# Patient Record
Sex: FEMALE | Race: WHITE | NOT HISPANIC OR LATINO | URBAN - METROPOLITAN AREA
[De-identification: names, ages, dates, MRNs, and addresses within clinical notes are randomized per-mention and may not be internally consistent; named-entity substitution may affect disease eponyms.]

---

## 2018-07-24 ENCOUNTER — OUTPATIENT (OUTPATIENT)
Dept: OUTPATIENT SERVICES | Facility: HOSPITAL | Age: 68
LOS: 1 days | End: 2018-07-24
Payer: MEDICARE

## 2018-07-24 PROCEDURE — 71046 X-RAY EXAM CHEST 2 VIEWS: CPT | Mod: 26

## 2018-07-24 PROCEDURE — 71046 X-RAY EXAM CHEST 2 VIEWS: CPT

## 2018-08-10 ENCOUNTER — INPATIENT (INPATIENT)
Facility: HOSPITAL | Age: 68
LOS: 0 days | Discharge: HOME | DRG: 885 | End: 2018-08-11
Attending: STUDENT IN AN ORGANIZED HEALTH CARE EDUCATION/TRAINING PROGRAM | Admitting: STUDENT IN AN ORGANIZED HEALTH CARE EDUCATION/TRAINING PROGRAM
Payer: MEDICARE

## 2018-08-10 VITALS
SYSTOLIC BLOOD PRESSURE: 142 MMHG | RESPIRATION RATE: 18 BRPM | TEMPERATURE: 99 F | HEART RATE: 98 BPM | OXYGEN SATURATION: 98 % | DIASTOLIC BLOOD PRESSURE: 86 MMHG

## 2018-08-10 DIAGNOSIS — F33.2 MAJOR DEPRESSIVE DISORDER, RECURRENT SEVERE WITHOUT PSYCHOTIC FEATURES: ICD-10-CM

## 2018-08-10 LAB
ALBUMIN SERPL ELPH-MCNC: 4.1 G/DL — SIGNIFICANT CHANGE UP (ref 3.3–5)
ALP SERPL-CCNC: 76 U/L — SIGNIFICANT CHANGE UP (ref 40–120)
ALT FLD-CCNC: 9 U/L — LOW (ref 10–45)
ANION GAP SERPL CALC-SCNC: 16 MMOL/L — SIGNIFICANT CHANGE UP (ref 5–17)
APAP SERPL-MCNC: <15 UG/ML — SIGNIFICANT CHANGE UP (ref 10–30)
APPEARANCE UR: ABNORMAL
AST SERPL-CCNC: 15 U/L — SIGNIFICANT CHANGE UP (ref 10–40)
BASOPHILS NFR BLD AUTO: 0.2 % — SIGNIFICANT CHANGE UP (ref 0–2)
BILIRUB SERPL-MCNC: 0.3 MG/DL — SIGNIFICANT CHANGE UP (ref 0.2–1.2)
BILIRUB UR-MCNC: NEGATIVE — SIGNIFICANT CHANGE UP
BUN SERPL-MCNC: 24 MG/DL — HIGH (ref 7–23)
CALCIUM SERPL-MCNC: 9.6 MG/DL — SIGNIFICANT CHANGE UP (ref 8.4–10.5)
CHLORIDE SERPL-SCNC: 98 MMOL/L — SIGNIFICANT CHANGE UP (ref 96–108)
CO2 SERPL-SCNC: 26 MMOL/L — SIGNIFICANT CHANGE UP (ref 22–31)
COLOR SPEC: YELLOW — SIGNIFICANT CHANGE UP
CREAT SERPL-MCNC: 1.45 MG/DL — HIGH (ref 0.5–1.3)
DIFF PNL FLD: ABNORMAL
EOSINOPHIL NFR BLD AUTO: 2.5 % — SIGNIFICANT CHANGE UP (ref 0–6)
ETHANOL SERPL-MCNC: <10 MG/DL — SIGNIFICANT CHANGE UP (ref 0–10)
GLUCOSE SERPL-MCNC: 103 MG/DL — HIGH (ref 70–99)
GLUCOSE UR QL: NEGATIVE — SIGNIFICANT CHANGE UP
HCT VFR BLD CALC: 38.3 % — SIGNIFICANT CHANGE UP (ref 34.5–45)
HGB BLD-MCNC: 12.8 G/DL — SIGNIFICANT CHANGE UP (ref 11.5–15.5)
KETONES UR-MCNC: NEGATIVE — SIGNIFICANT CHANGE UP
LEUKOCYTE ESTERASE UR-ACNC: ABNORMAL
LYMPHOCYTES # BLD AUTO: 17.5 % — SIGNIFICANT CHANGE UP (ref 13–44)
MCHC RBC-ENTMCNC: 29 PG — SIGNIFICANT CHANGE UP (ref 27–34)
MCHC RBC-ENTMCNC: 33.4 G/DL — SIGNIFICANT CHANGE UP (ref 32–36)
MCV RBC AUTO: 86.7 FL — SIGNIFICANT CHANGE UP (ref 80–100)
MONOCYTES NFR BLD AUTO: 7 % — SIGNIFICANT CHANGE UP (ref 2–14)
NEUTROPHILS NFR BLD AUTO: 72.8 % — SIGNIFICANT CHANGE UP (ref 43–77)
NITRITE UR-MCNC: NEGATIVE — SIGNIFICANT CHANGE UP
PCP SPEC-MCNC: SIGNIFICANT CHANGE UP
PH UR: 6 — SIGNIFICANT CHANGE UP (ref 5–8)
PLATELET # BLD AUTO: 289 K/UL — SIGNIFICANT CHANGE UP (ref 150–400)
POTASSIUM SERPL-MCNC: 4.1 MMOL/L — SIGNIFICANT CHANGE UP (ref 3.5–5.3)
POTASSIUM SERPL-SCNC: 4.1 MMOL/L — SIGNIFICANT CHANGE UP (ref 3.5–5.3)
PROT SERPL-MCNC: 7.4 G/DL — SIGNIFICANT CHANGE UP (ref 6–8.3)
PROT UR-MCNC: >=300 MG/DL
RBC # BLD: 4.42 M/UL — SIGNIFICANT CHANGE UP (ref 3.8–5.2)
RBC # FLD: 13.4 % — SIGNIFICANT CHANGE UP (ref 10.3–16.9)
SODIUM SERPL-SCNC: 140 MMOL/L — SIGNIFICANT CHANGE UP (ref 135–145)
SP GR SPEC: 1.02 — SIGNIFICANT CHANGE UP (ref 1–1.03)
UROBILINOGEN FLD QL: 0.2 E.U./DL — SIGNIFICANT CHANGE UP
WBC # BLD: 10.3 K/UL — SIGNIFICANT CHANGE UP (ref 3.8–10.5)
WBC # FLD AUTO: 10.3 K/UL — SIGNIFICANT CHANGE UP (ref 3.8–10.5)

## 2018-08-10 PROCEDURE — 99285 EMERGENCY DEPT VISIT HI MDM: CPT

## 2018-08-10 RX ORDER — ATENOLOL 25 MG/1
50 TABLET ORAL ONCE
Qty: 0 | Refills: 0 | Status: COMPLETED | OUTPATIENT
Start: 2018-08-10 | End: 2018-08-10

## 2018-08-10 RX ORDER — LANOLIN ALCOHOL/MO/W.PET/CERES
3 CREAM (GRAM) TOPICAL AT BEDTIME
Qty: 0 | Refills: 0 | Status: DISCONTINUED | OUTPATIENT
Start: 2018-08-10 | End: 2018-08-11

## 2018-08-10 RX ORDER — HYDRALAZINE HCL 50 MG
10 TABLET ORAL ONCE
Qty: 0 | Refills: 0 | Status: COMPLETED | OUTPATIENT
Start: 2018-08-10 | End: 2018-08-11

## 2018-08-10 RX ORDER — SERTRALINE 25 MG/1
25 TABLET, FILM COATED ORAL DAILY
Qty: 0 | Refills: 0 | Status: DISCONTINUED | OUTPATIENT
Start: 2018-08-10 | End: 2018-08-11

## 2018-08-10 RX ADMIN — ATENOLOL 50 MILLIGRAM(S): 25 TABLET ORAL at 22:46

## 2018-08-10 NOTE — ED ADULT NURSE REASSESSMENT NOTE - GENERAL PATIENT STATE
smiling/interactive/comfortable appearance/family/SO at bedside
comfortable appearance/1:1, and agent/family/SO at bedside

## 2018-08-10 NOTE — ED ADULT NURSE NOTE - OBJECTIVE STATEMENT
Per agent,  pt is eating very small amts of non nutritious food for the past few days, but she drinks lots of water, and did verbalize to her many times that she wanted to end it, 'melly its Per agent,  "pt is eating very small amts of non nutritious food for the past few days, but she drinks lots of water, and did verbalize to her many times that she wanted to end it, 'melly its not wrth it, but has not acted on it because she don't want to do it" agent added that pt had se psychiatrist before but is not on meds., and did tell her that she attempted to cut her wrist but never tried", no previous hx of attempts.

## 2018-08-10 NOTE — ED ADULT TRIAGE NOTE - CHIEF COMPLAINT QUOTE
Patient BIBA after HCP called 911 for patient's failure to thrive. Patient also endorsing passive SI. To be placed on 1:1

## 2018-08-10 NOTE — ED BEHAVIORAL HEALTH ASSESSMENT NOTE - DETAILS
10cm cyst vs. lipoma over clavicular area Report given Leyla Lizama notified in ED Patient attempted to cut wrist superficially but could not muster true intent due to fear of pain. Deferred to primary team

## 2018-08-10 NOTE — ED ADULT NURSE NOTE - INTERVENTIONS DEFINITIONS
Room bathroom lighting operational/Physically safe environment: no spills, clutter or unnecessary equipment/Instruct patient to call for assistance

## 2018-08-10 NOTE — ED BEHAVIORAL HEALTH ASSESSMENT NOTE - HPI (INCLUDE ILLNESS QUALITY, SEVERITY, DURATION, TIMING, CONTEXT, MODIFYING FACTORS, ASSOCIATED SIGNS AND SYMPTOMS)
68 F with PPH of MDD, recurrent, brought in by close friend due to worsening depression with SI. Patient's landlord apparently contacted friend Leyla Lizama to report that there was concern about patient's living conditions, and she was found to be living in squalor with urine and feces on the floor.     On interview patient reports that she has experienced episodes of depression throughout her adult life, but that they have been getting progressively worse since she retired 7 years ago. The most recent episode began approximately 7 months ago. Her endorsed symptoms are depressed mood, profoundly decreased energy, anhedonia, hopelessness, lack of appetite, disrupted sleep and SI with no specific plan. She has also attempted to superficially cut/scratch her wrist over the past months, but has never broken the skin due to fear of pain. She denies symptoms of mirian and psychosis both past and present. Patient states she is amenable to voluntary admission. 68 F with PPH of MDD, recurrent, brought in by EMS due to worsening depression with SI. Patient's landlord apparently contacted friend Leyla Lizama to report that there was concern about patient's living conditions, and she was found to be living in squalor with urine and feces on the floor.     On interview patient reports that she has experienced episodes of depression throughout her adult life, but that they have been getting progressively worse since she retired 7 years ago. The most recent episode began approximately 7 months ago. Her endorsed symptoms are depressed mood, profoundly decreased energy, anhedonia, hopelessness, lack of appetite, disrupted sleep and SI with no specific plan. She has also attempted to superficially cut/scratch her wrist over the past months, but has never broken the skin due to fear of pain. She denies symptoms of mirian and psychosis both past and present. Patient states she is amenable to voluntary admission.

## 2018-08-10 NOTE — ED ADULT NURSE REASSESSMENT NOTE - NS ED NURSE REASSESS COMMENT FT1
sandwich and half a pitcher of water well tolerated, remains cooperative denies pain, dizziness, SOB, assisted to bathroom by MARY ANN PCT

## 2018-08-10 NOTE — ED BEHAVIORAL HEALTH ASSESSMENT NOTE - DESCRIPTION
Uneventful. HTN Born in [place] the [facility], [employer], worked as  at the [employer] until custodial in 2011. , no children.

## 2018-08-10 NOTE — ED PROVIDER NOTE - MEDICAL DECISION MAKING DETAILS
Labs reassuring, negative tox workup w/no active medical complaints. VSS. + severe depressive episode, seen by psych, unable to cope at home, will admit for further psychiatric treatment as pt poses a great risk to self in current state.

## 2018-08-10 NOTE — ED PROVIDER NOTE - OBJECTIVE STATEMENT
67 y/o F w/hx HTN and remote history of MDD previously on zoloft 20+ years ago, today p/w progressive functional decline at home w/severe anhedonia, anorexia, poor sleep, feeling depressed and suicidal (would cut herself and claims to have started cutting herself several months ago), often drinking alcohol at home to consol herself. She states that her depression began after retiring from her job at the [employer] 1.5yrs ago. No prior hospitalizations. No medical complaints. Found covered in feces and urine in her home by friends.

## 2018-08-10 NOTE — ED BEHAVIORAL HEALTH ASSESSMENT NOTE - OTHER PAST PSYCHIATRIC HISTORY (INCLUDE DETAILS REGARDING ONSET, COURSE OF ILLNESS, INPATIENT/OUTPATIENT TREATMENT)
History of recurrent MDD  Saw psychotherapist for many years but stopped 20 years ago  1 year trial of Zoloft 20 years ago, responded well with no side effects  No history of hospitalizations  No history of SA

## 2018-08-10 NOTE — ED PROVIDER NOTE - PHYSICAL EXAMINATION
VITAL SIGNS: I have reviewed nursing notes and confirm.  CONSTITUTIONAL: Well-developed; well-nourished; in no acute distress.  SKIN: Agree with RN documentation regarding decubitus evaluation. Remainder of skin exam is warm and dry, no acute rash. + 1.5x1.5 cyst L medial clavicle   HEAD: Normocephalic; atraumatic.  EYES: PERRL, EOM intact; conjunctiva and sclera clear.  ENT: No nasal discharge; airway clear.  NECK: Supple; non tender.  CARD: S1, S2 normal; no murmurs, gallops, or rubs. Regular rate and rhythm.  RESP: No wheezes, rales or rhonchi.  ABD: Normal bowel sounds; soft; non-distended; non-tender; no hepatosplenomegaly.  EXT: Normal ROM. No clubbing, cyanosis or edema.  LYMPH: No acute cervical adenopathy.  NEURO: Alert, oriented. Grossly unremarkable.  PSYCH: Cooperative, appropriate.

## 2018-08-11 ENCOUNTER — INPATIENT (INPATIENT)
Facility: HOSPITAL | Age: 68
LOS: 24 days | DRG: 65 | End: 2018-09-05
Attending: PSYCHIATRY & NEUROLOGY | Admitting: PSYCHIATRY & NEUROLOGY
Payer: MEDICARE

## 2018-08-11 VITALS
HEART RATE: 58 BPM | OXYGEN SATURATION: 97 % | DIASTOLIC BLOOD PRESSURE: 93 MMHG | SYSTOLIC BLOOD PRESSURE: 229 MMHG | RESPIRATION RATE: 18 BRPM | TEMPERATURE: 98 F

## 2018-08-11 VITALS
HEART RATE: 53 BPM | SYSTOLIC BLOOD PRESSURE: 174 MMHG | TEMPERATURE: 98 F | DIASTOLIC BLOOD PRESSURE: 83 MMHG | WEIGHT: 136.03 LBS | RESPIRATION RATE: 16 BRPM

## 2018-08-11 DIAGNOSIS — R00.1 BRADYCARDIA, UNSPECIFIED: ICD-10-CM

## 2018-08-11 DIAGNOSIS — F33.2 MAJOR DEPRESSIVE DISORDER, RECURRENT SEVERE WITHOUT PSYCHOTIC FEATURES: ICD-10-CM

## 2018-08-11 DIAGNOSIS — G91.9 HYDROCEPHALUS, UNSPECIFIED: ICD-10-CM

## 2018-08-11 DIAGNOSIS — I16.0 HYPERTENSIVE URGENCY: ICD-10-CM

## 2018-08-11 DIAGNOSIS — I16.1 HYPERTENSIVE EMERGENCY: ICD-10-CM

## 2018-08-11 DIAGNOSIS — R63.8 OTHER SYMPTOMS AND SIGNS CONCERNING FOOD AND FLUID INTAKE: ICD-10-CM

## 2018-08-11 DIAGNOSIS — I63.9 CEREBRAL INFARCTION, UNSPECIFIED: ICD-10-CM

## 2018-08-11 LAB
ALBUMIN SERPL ELPH-MCNC: 3.2 G/DL — LOW (ref 3.3–5)
ALP SERPL-CCNC: 63 U/L — SIGNIFICANT CHANGE UP (ref 40–120)
ALT FLD-CCNC: 11 U/L — SIGNIFICANT CHANGE UP (ref 10–45)
ANION GAP SERPL CALC-SCNC: 19 MMOL/L — HIGH (ref 5–17)
APPEARANCE UR: CLEAR — SIGNIFICANT CHANGE UP
APTT BLD: 34 SEC — SIGNIFICANT CHANGE UP (ref 27.5–37.4)
AST SERPL-CCNC: 15 U/L — SIGNIFICANT CHANGE UP (ref 10–40)
BASOPHILS NFR BLD AUTO: 0.6 % — SIGNIFICANT CHANGE UP (ref 0–2)
BILIRUB SERPL-MCNC: 0.2 MG/DL — SIGNIFICANT CHANGE UP (ref 0.2–1.2)
BILIRUB UR-MCNC: NEGATIVE — SIGNIFICANT CHANGE UP
BUN SERPL-MCNC: 29 MG/DL — HIGH (ref 7–23)
CALCIUM SERPL-MCNC: 8.9 MG/DL — SIGNIFICANT CHANGE UP (ref 8.4–10.5)
CHLORIDE SERPL-SCNC: 94 MMOL/L — LOW (ref 96–108)
CHOLEST SERPL-MCNC: 199 MG/DL — SIGNIFICANT CHANGE UP (ref 10–199)
CK MB CFR SERPL CALC: 2.6 NG/ML — SIGNIFICANT CHANGE UP (ref 0–6.7)
CK SERPL-CCNC: 62 U/L — SIGNIFICANT CHANGE UP (ref 25–170)
CO2 SERPL-SCNC: 25 MMOL/L — SIGNIFICANT CHANGE UP (ref 22–31)
COLOR SPEC: YELLOW — SIGNIFICANT CHANGE UP
CREAT SERPL-MCNC: 1.77 MG/DL — HIGH (ref 0.5–1.3)
DIFF PNL FLD: NEGATIVE — SIGNIFICANT CHANGE UP
EOSINOPHIL NFR BLD AUTO: 2.5 % — SIGNIFICANT CHANGE UP (ref 0–6)
GLUCOSE SERPL-MCNC: 92 MG/DL — SIGNIFICANT CHANGE UP (ref 70–99)
GLUCOSE UR QL: NEGATIVE — SIGNIFICANT CHANGE UP
HCT VFR BLD CALC: 34.7 % — SIGNIFICANT CHANGE UP (ref 34.5–45)
HCT VFR BLD CALC: 37 % — SIGNIFICANT CHANGE UP (ref 34.5–45)
HDLC SERPL-MCNC: 34 MG/DL — LOW (ref 40–125)
HGB BLD-MCNC: 11.6 G/DL — SIGNIFICANT CHANGE UP (ref 11.5–15.5)
HGB BLD-MCNC: 11.9 G/DL — SIGNIFICANT CHANGE UP (ref 11.5–15.5)
INR BLD: 1.03 — SIGNIFICANT CHANGE UP (ref 0.88–1.16)
KETONES UR-MCNC: NEGATIVE — SIGNIFICANT CHANGE UP
LEUKOCYTE ESTERASE UR-ACNC: NEGATIVE — SIGNIFICANT CHANGE UP
LIPID PNL WITH DIRECT LDL SERPL: 134 MG/DL — HIGH
LYMPHOCYTES # BLD AUTO: 18.5 % — SIGNIFICANT CHANGE UP (ref 13–44)
MCHC RBC-ENTMCNC: 28.5 PG — SIGNIFICANT CHANGE UP (ref 27–34)
MCHC RBC-ENTMCNC: 29.1 PG — SIGNIFICANT CHANGE UP (ref 27–34)
MCHC RBC-ENTMCNC: 32.2 G/DL — SIGNIFICANT CHANGE UP (ref 32–36)
MCHC RBC-ENTMCNC: 33.4 G/DL — SIGNIFICANT CHANGE UP (ref 32–36)
MCV RBC AUTO: 87 FL — SIGNIFICANT CHANGE UP (ref 80–100)
MCV RBC AUTO: 88.5 FL — SIGNIFICANT CHANGE UP (ref 80–100)
MONOCYTES NFR BLD AUTO: 10 % — SIGNIFICANT CHANGE UP (ref 2–14)
NEUTROPHILS NFR BLD AUTO: 68.4 % — SIGNIFICANT CHANGE UP (ref 43–77)
NITRITE UR-MCNC: NEGATIVE — SIGNIFICANT CHANGE UP
PH UR: 7 — SIGNIFICANT CHANGE UP (ref 5–8)
PLATELET # BLD AUTO: 271 K/UL — SIGNIFICANT CHANGE UP (ref 150–400)
PLATELET # BLD AUTO: 289 K/UL — SIGNIFICANT CHANGE UP (ref 150–400)
POTASSIUM SERPL-MCNC: 3.4 MMOL/L — LOW (ref 3.5–5.3)
POTASSIUM SERPL-SCNC: 3.4 MMOL/L — LOW (ref 3.5–5.3)
PROT SERPL-MCNC: 6.1 G/DL — SIGNIFICANT CHANGE UP (ref 6–8.3)
PROT UR-MCNC: 30 MG/DL
PROTHROM AB SERPL-ACNC: 11.4 SEC — SIGNIFICANT CHANGE UP (ref 9.8–12.7)
RBC # BLD: 3.99 M/UL — SIGNIFICANT CHANGE UP (ref 3.8–5.2)
RBC # BLD: 4.18 M/UL — SIGNIFICANT CHANGE UP (ref 3.8–5.2)
RBC # FLD: 13.2 % — SIGNIFICANT CHANGE UP (ref 10.3–16.9)
RBC # FLD: 13.5 % — SIGNIFICANT CHANGE UP (ref 10.3–16.9)
SODIUM SERPL-SCNC: 138 MMOL/L — SIGNIFICANT CHANGE UP (ref 135–145)
SP GR SPEC: 1.01 — SIGNIFICANT CHANGE UP (ref 1–1.03)
TOTAL CHOLESTEROL/HDL RATIO MEASUREMENT: 5.9 RATIO — SIGNIFICANT CHANGE UP (ref 3.3–7.1)
TRIGL SERPL-MCNC: 156 MG/DL — HIGH (ref 10–149)
TROPONIN T SERPL-MCNC: 0.33 NG/ML — CRITICAL HIGH (ref 0–0.01)
TROPONIN T SERPL-MCNC: 0.36 NG/ML — CRITICAL HIGH (ref 0–0.01)
UROBILINOGEN FLD QL: 0.2 E.U./DL — SIGNIFICANT CHANGE UP
WBC # BLD: 8.5 K/UL — SIGNIFICANT CHANGE UP (ref 3.8–10.5)
WBC # BLD: 8.5 K/UL — SIGNIFICANT CHANGE UP (ref 3.8–10.5)
WBC # FLD AUTO: 8.5 K/UL — SIGNIFICANT CHANGE UP (ref 3.8–10.5)
WBC # FLD AUTO: 8.5 K/UL — SIGNIFICANT CHANGE UP (ref 3.8–10.5)

## 2018-08-11 PROCEDURE — 93010 ELECTROCARDIOGRAM REPORT: CPT

## 2018-08-11 PROCEDURE — 70496 CT ANGIOGRAPHY HEAD: CPT | Mod: 26

## 2018-08-11 PROCEDURE — 99291 CRITICAL CARE FIRST HOUR: CPT

## 2018-08-11 PROCEDURE — 0042T: CPT

## 2018-08-11 PROCEDURE — 70450 CT HEAD/BRAIN W/O DYE: CPT | Mod: 26,59

## 2018-08-11 PROCEDURE — 71045 X-RAY EXAM CHEST 1 VIEW: CPT | Mod: 26

## 2018-08-11 PROCEDURE — 70498 CT ANGIOGRAPHY NECK: CPT | Mod: 26

## 2018-08-11 PROCEDURE — 93306 TTE W/DOPPLER COMPLETE: CPT | Mod: 26

## 2018-08-11 PROCEDURE — 99233 SBSQ HOSP IP/OBS HIGH 50: CPT

## 2018-08-11 RX ORDER — ASPIRIN/CALCIUM CARB/MAGNESIUM 324 MG
325 TABLET ORAL ONCE
Qty: 0 | Refills: 0 | Status: COMPLETED | OUTPATIENT
Start: 2018-08-11 | End: 2018-08-11

## 2018-08-11 RX ORDER — POTASSIUM CHLORIDE 20 MEQ
40 PACKET (EA) ORAL ONCE
Qty: 0 | Refills: 0 | Status: COMPLETED | OUTPATIENT
Start: 2018-08-11 | End: 2018-08-11

## 2018-08-11 RX ORDER — AMLODIPINE BESYLATE 2.5 MG/1
10 TABLET ORAL DAILY
Qty: 0 | Refills: 0 | Status: DISCONTINUED | OUTPATIENT
Start: 2018-08-11 | End: 2018-08-11

## 2018-08-11 RX ORDER — HYDRALAZINE HCL 50 MG
10 TABLET ORAL ONCE
Qty: 0 | Refills: 0 | Status: COMPLETED | OUTPATIENT
Start: 2018-08-11 | End: 2018-08-11

## 2018-08-11 RX ORDER — POTASSIUM CHLORIDE 20 MEQ
10 PACKET (EA) ORAL
Qty: 0 | Refills: 0 | Status: DISCONTINUED | OUTPATIENT
Start: 2018-08-11 | End: 2018-08-11

## 2018-08-11 RX ORDER — ATORVASTATIN CALCIUM 80 MG/1
40 TABLET, FILM COATED ORAL AT BEDTIME
Qty: 0 | Refills: 0 | Status: DISCONTINUED | OUTPATIENT
Start: 2018-08-11 | End: 2018-08-12

## 2018-08-11 RX ORDER — SODIUM CHLORIDE 9 MG/ML
1000 INJECTION INTRAMUSCULAR; INTRAVENOUS; SUBCUTANEOUS
Qty: 0 | Refills: 0 | Status: DISCONTINUED | OUTPATIENT
Start: 2018-08-11 | End: 2018-08-11

## 2018-08-11 RX ORDER — CEFTRIAXONE 500 MG/1
1 INJECTION, POWDER, FOR SOLUTION INTRAMUSCULAR; INTRAVENOUS ONCE
Qty: 0 | Refills: 0 | Status: COMPLETED | OUTPATIENT
Start: 2018-08-11 | End: 2018-08-11

## 2018-08-11 RX ORDER — ASPIRIN/CALCIUM CARB/MAGNESIUM 324 MG
81 TABLET ORAL DAILY
Qty: 0 | Refills: 0 | Status: DISCONTINUED | OUTPATIENT
Start: 2018-08-12 | End: 2018-08-12

## 2018-08-11 RX ORDER — HYDRALAZINE HCL 50 MG
5 TABLET ORAL ONCE
Qty: 0 | Refills: 0 | Status: DISCONTINUED | OUTPATIENT
Start: 2018-08-11 | End: 2018-08-11

## 2018-08-11 RX ORDER — ATROPINE SULFATE 0.1 MG/ML
0.5 SYRINGE (ML) INJECTION
Qty: 0 | Refills: 0 | Status: DISCONTINUED | OUTPATIENT
Start: 2018-08-11 | End: 2018-08-27

## 2018-08-11 RX ORDER — ASPIRIN/CALCIUM CARB/MAGNESIUM 324 MG
81 TABLET ORAL DAILY
Qty: 0 | Refills: 0 | Status: DISCONTINUED | OUTPATIENT
Start: 2018-08-11 | End: 2018-08-11

## 2018-08-11 RX ORDER — AMLODIPINE BESYLATE 2.5 MG/1
10 TABLET ORAL DAILY
Qty: 0 | Refills: 0 | Status: DISCONTINUED | OUTPATIENT
Start: 2018-08-11 | End: 2018-08-12

## 2018-08-11 RX ADMIN — Medication 10 MILLIGRAM(S): at 00:05

## 2018-08-11 RX ADMIN — CEFTRIAXONE 100 GRAM(S): 500 INJECTION, POWDER, FOR SOLUTION INTRAMUSCULAR; INTRAVENOUS at 09:00

## 2018-08-11 RX ADMIN — Medication 40 MILLIEQUIVALENT(S): at 16:01

## 2018-08-11 RX ADMIN — Medication 100 MILLIEQUIVALENT(S): at 12:34

## 2018-08-11 RX ADMIN — AMLODIPINE BESYLATE 10 MILLIGRAM(S): 2.5 TABLET ORAL at 14:04

## 2018-08-11 RX ADMIN — Medication 10 MILLIGRAM(S): at 01:32

## 2018-08-11 RX ADMIN — Medication 0.1 MILLIGRAM(S): at 02:33

## 2018-08-11 RX ADMIN — Medication 325 MILLIGRAM(S): at 09:47

## 2018-08-11 RX ADMIN — Medication 40 MILLIEQUIVALENT(S): at 12:34

## 2018-08-11 RX ADMIN — ATORVASTATIN CALCIUM 40 MILLIGRAM(S): 80 TABLET, FILM COATED ORAL at 22:33

## 2018-08-11 RX ADMIN — SODIUM CHLORIDE 75 MILLILITER(S): 9 INJECTION INTRAMUSCULAR; INTRAVENOUS; SUBCUTANEOUS at 08:58

## 2018-08-11 NOTE — H&P ADULT - PROBLEM SELECTOR PLAN 1
- Stroke Code called for Left sided weakness and diminished sensation, NIHSS 6 on first assessment   - CTH reveals lacunar infarcts (basal ganglia, b/l thalami) of indeterminate age   - TPA not given, NIHSS on follow up was 1   - Patient admitted to [facility] for further stroke workup.   - C/w ASA, Lipitor 40mg per nocte   - Allow for permissive HTN first 24 hours - SBP < 200mmHg   - Follow up MRI Brain     - PT/OT Consult

## 2018-08-11 NOTE — ED ADULT NURSE NOTE - CHPI ED NUR SYMPTOMS NEG
no confusion/no change in level of consciousness/no dizziness/no weakness/no blurred vision/no vomiting/no loss of consciousness/no numbness/no fever/no nausea

## 2018-08-11 NOTE — ED PROVIDER NOTE - PROGRESS NOTE DETAILS
cards fellow was consulted for symptomatic bradycardia and hypertensive emergency and will be down to see the patient and also follow along during admission to [facility]. trop 0.36. Cards fellow and [facility] resident notified. Suspect due to HTN urgency and will trend. pt with no new complaints.

## 2018-08-11 NOTE — ED PROVIDER NOTE - OBJECTIVE STATEMENT
68F with a h/o HTN and major depressive disorder who was admitted to 8[facility] last night for major depressive episode who was sent to ER after developing left sided weakness and elevated blood pressure on 8[facility]. A code stoke was called and Dr. Paul and stroke team was down to see the patient. The patient gives a vague history and is complaining of generalized weakness and "not feeling well." She denies CP/SOb, headache, dizziness, nausea or vomits.

## 2018-08-11 NOTE — H&P ADULT - ASSESSMENT
67yo Female, PMHx HTN and Major Depressive Disorder, presented to the ED with recent history of self harm and depressed mood - stroke code called for L sided weakness and decreased sensation along with significant hypertension. Patient found to have lacunar infarcts, indeterminate age - pursuing remainder of stroke workup.

## 2018-08-11 NOTE — CONSULT NOTE ADULT - ASSESSMENT
69 yo F with HTN, p/w hypertensive emergency and sinus bradycardia    1.	Hypertensive emergency 67 yo F with HTN, p/w hypertensive emergency and sinus bradycardia    Hypertensive emergency 2/2 medication non-compliance  - Recommend starting Hydralazine 10 mg PO TID, can up titrate to 25 mg PO TID if Bps uncontrolled and norvasc 5 mg po qd, up titrate to 10 if Bp not at goal, (Bp goal less than 160/90 if neurology has deemed patient did not have a CVA)  - Recommend starting Lipitor 80 mg po qhs, aspirin  - Avoid Betablocker and Diltiazem / Verapamil   - ECHO, HbA1C, Trend troponin with CK and CMB  - Daily EKGs  - Troponemia is Type 2, with normal CK in the setting of supply demand mismatch than true ischemia, continue to trend and observe for new symptomatology and or EKG changes    Sinus Bradycardia likely 2/2 BB agents, no other obvious etiology identified at this time. Patient currently is sinus bradycardia with HR 55-60 with sinus arrhythmia.   - Continue to avoid any BB or CCBs as mentioned above  - Plan as above otherwise    Will continue to follow

## 2018-08-11 NOTE — ED ADULT NURSE REASSESSMENT NOTE - NS ED NURSE REASSESS COMMENT FT1
pt lost control of her bladder while assisted by Charmaine COLÓN on the way to the bathroom. pt lost control of her bladder while assisted by Charmaine COLÓN on the way to the bathroom, feels tired after Melatonin

## 2018-08-11 NOTE — H&P ADULT - HISTORY OF PRESENT ILLNESS
68F with a h/o HTN and major depressive disorder who was admitted to 8[facility] last night for major depressive episode who was sent to ER after developing left sided weakness and elevated blood pressure on 8U. A code stoke was called and Dr. Paul and stroke team was down to see the patient. The patient gives a vague history and is complaining of generalized weakness and "not feeling well." She denies CP/SOb, headache, dizziness, nausea or vomits. 69yo Female, PMHx HTN and Major Depressive Disorder, presented to the ED with recent history of self harm and depressed mood 69yo Female, PMHx HTN and Major Depressive Disorder, presented to the ED with recent history of self harm and depressed mood. Primary complaint was of progressive functional decline at home w/severe anhedonia, anorexia, poor sleep, suicidality (would cut herself and claims to have started cutting herself several months ago), often drinking alcohol at home to consol herself. She states that her depression began after retiring from her job at the [employer] 1.5yrs ago. Patient was admitted to [facility] for depressive episode - however, 1-2 hours after arriving onto the floor, a stroke code was called for L sided weakness and hypertension. NIHSS was 6 on initial assessment. On CTH - noted to have lacunar infarcts (basal ganglia and R/L thalami). On second assessment, NIHSS 1. Patient was brought back to the ED and re-worked up. EKG revealed profound bradycardia - Cardiology was consulted for HR 30s - asymptomatic. Neurosurgery also consulted for what appeared to be mild hydrocephalus on CT Scan.     SBP in the 200s - allowing for permissive HTN, SBP 180s.     Patient admitted to [facility] for further workup.

## 2018-08-11 NOTE — ED PROVIDER NOTE - CARE PLAN
Principal Discharge DX:	Weakness  Secondary Diagnosis:	Hypertension  Secondary Diagnosis:	Bradycardia

## 2018-08-11 NOTE — H&P ADULT - NSHPSOCIALHISTORY_GEN_ALL_CORE
Patient used to have a job working in the [employer]. Developed depression after prison. Admits to severe alcohol use.

## 2018-08-11 NOTE — H&P ADULT - PROBLEM SELECTOR PLAN 3
- Cardiology consulted given profound bradycardia, HR 30's - albeit asymptomatic   - On EKG: Sinus Bradycardia, with sinus arrythmia   - Unclear etiology   -   - Avoiding Beta Blockers and Dilt/Verapamil   - Pacer pads placed and Atropine at bedside - Cardiology consulted given profound bradycardia, HR 30's - albeit asymptomatic   - On EKG: Sinus Bradycardia, with sinus arrythmia   - Unclear etiology   - Avoiding Beta Blockers and Dilt/Verapamil   - Pacer pads placed and Atropine at bedside

## 2018-08-11 NOTE — CONSULT NOTE ADULT - SUBJECTIVE AND OBJECTIVE BOX
HPI:  67 yo Female, PMHx HTN and Major Depressive Disorder, presented to the ED with recent history of self harm and depressed mood (11 Aug 2018 10:04), initially admitted to psychiatry for major depression, however patient was found to be in hypertensive emergency with Bps in 220s-250 / 110s with L sided weakness, stroke code called, CT head negative for acute ICH, CTA neck --> b/l carotid artery stenosis (50-70%). Per ER attending patient was given atenolol 50 mg PO x 1 and a total of 10 mg IV hydralazine. Timing of the BB and the onset of bradycardia is unclear. However patient was in sinus bradycardic with sinus arrhythmia with a nidus HR of 42. Patient otherwise denies chest pain, chest pressure, SOB, palpitations, presyncope or syncope. Patient denies taking any medications at home and has not had any cardiac JOHNSON in the past.      ROS: A 10-point review of systems was otherwise negative.    PAST MEDICAL & SURGICAL HISTORY:      SOCIAL HISTORY:  FAMILY HISTORY:      ALLERGIES: 	  No Known Allergies    MEDICATIONS:  atropine Injectable 0.5 milliGRAM(s) IV Push every 5 minutes PRN  hydrALAZINE Injectable 5 milliGRAM(s) IV Push once  potassium chloride  10 mEq/100 mL IVPB 10 milliEquivalent(s) IV Intermittent every 1 hour  sodium chloride 0.9%. 1000 milliLiter(s) IV Continuous <Continuous>      Home Meds:  None per patient    PHYSICAL EXAM:  T(C): 36.7 (08-11-18 @ 07:53), Max: 37 (08-10-18 @ 19:57)  HR: 44 (08-11-18 @ 10:20) (44 - 98)  BP: 191/96 (08-11-18 @ 10:20) (142/86 - 247/131)  RR: 15 (08-11-18 @ 10:20) (15 - 18)  SpO2: 98% (08-11-18 @ 10:20) (97% - 99%)  Wt(kg): --    GEN: Awake, comfortable. NAD.   HEENT: NCAT, PERRL, EOMI. Mucosa moist. No JVD.   RESP: CTA b/l  CV: RRR, normal s1/s2. No m/r/g.  ABD: Soft, NTND. BS+  EXT: Warm. No edema, clubbing, or cyanosis.   NEURO: AAOx3. No focal deficits.    I&O's Summary    Height (cm): 152.4 (08-11 @ 10:20)  Weight (kg): 60.5 (08-11 @ 10:20), 61.7 (08-10 @ 20:47)  BMI (kg/m2): 26 (08-11 @ 10:20)  BSA (m2): 1.57 (08-11 @ 10:20)  	  LABS:	 	    CARDIAC MARKERS: Troponin T, Serum: 0.36 (8/11, 08:31am)                            11.6   8.5   )-----------( 271      ( 11 Aug 2018 08:31 )             34.7     08-11    138  |  94<L>  |  29<H>  ----------------------------<  92  3.4<L>   |  25  |  1.77<H>    Ca    8.9      11 Aug 2018 08:31  Mg     2.1     08-11    TPro  6.1  /  Alb  3.2<L>  /  TBili  0.2  /  DBili  x   /  AST  15  /  ALT  11  /  AlkPhos  63  08-11  proBNP: Pending  Lipid Profile: Lipid Profile (08.11.18 @ 08:31)  ·	  Total Cholesterol/HDL Ratio Measurement: 5.9 RATIO  ·	  Cholesterol, Serum: 199 mg/dL  ·	  Triglycerides, Serum: 156 mg/dL  ·	  HDL Cholesterol, Serum: 34 mg/dL    HgA1c: Pending  TSH: Thyroid Stimulating Hormone, Serum: 1.865 uIU/mL (08-11 @ 08:31)    TELEMETRY: Sinus bradycardia with sinus arrhythmia 	    ECG: Sinus bradycardia with sinus arrhythmia, LVH with voltage criteria and strain pattern in limb leads, borderline ischemic TWI II, III, aVF   RADIOLOGY: CT head negative, CTA neck < from: CT Angio Neck w/ IV Cont (08.11.18 @ 07:52): Bilateral carotid bifurcations with a moderate 50-70% stenosis at the origin of bilateral  internal carotid arteries.  ECHO: Pending  STRESS: NA (None in the past)  CATH: NA (None in the past)

## 2018-08-11 NOTE — CONSULT NOTE ADULT - SUBJECTIVE AND OBJECTIVE BOX
HISTORY OF PRESENT ILLNESS: 67 yo F with PMHx of MDD, HTN admitted in psych unit for suicidal ideation had episodes of Left sided weakness for which stroke code was called this am at 07:05 am. presenting NIHSS was 6 which spontaneously improved to 1 Patient is on  and no tPA was given.  CTH showed no acute infarct but showed multiple age-indeterminate lacunar infarction in jovon basal ganglia and thalami. CTH also showed mild hydrocephalus. Patient denied HA, Nausea, vomiting, but as per team patient was incontinent earlier this morning. Patient is being treated for UTI. Neurosurgery was consulted for findings of hydrocephalus on CTH.     Allergies    No Known Allergies    Intolerances      REVIEW OF SYSTEMS  Constitutional: No fever, weight loss or fatigue  Eyes: No eye pain, visual disturbances, or discharge  ENMT:  No difficulty hearing, tinnitus, vertigo; No sinus or throat pain  Neck: No pain or stiffness  Respiratory: No cough, wheezing, chills or hemoptysis  Cardiovascular: No chest pain, palpitations, shortness of breath, dizziness or leg swelling  Gastrointestinal: No abdominal pain. No nausea, vomiting or hematemesis; No diarrhea or constipation. Nohematochezia.  Genitourinary: No dysuria, frequency, hematuria or incontinence  Neurological: As per HPI  Skin: No itching, burning, rashes or lesions   Endocrine: No heat or cold intolerance; No hair loss  Musculoskeletal: No joint pain or swelling; No muscle, back or extremity pain  Psychiatric: No depression, anxiety, mood swings or difficulty sleeping  Heme/Lymph: No easy bruising or bleeding gums      MEDICATIONS:  Antibiotics:    Neuro:    Anticoagulation:    OTHER:  atropine Injectable 0.5 milliGRAM(s) IV Push every 5 minutes PRN  hydrALAZINE Injectable 5 milliGRAM(s) IV Push once    IVF:  potassium chloride    Tablet ER 40 milliEquivalent(s) Oral once  potassium chloride  10 mEq/100 mL IVPB 10 milliEquivalent(s) IV Intermittent every 1 hour  sodium chloride 0.9%. 1000 milliLiter(s) IV Continuous <Continuous>      Vital Signs Last 24 Hrs  T(C): 36.7 (11 Aug 2018 07:53), Max: 37 (10 Aug 2018 19:57)  T(F): 98 (11 Aug 2018 07:53), Max: 98.6 (10 Aug 2018 19:57)  HR: 44 (11 Aug 2018 10:20) (44 - 98)  BP: 191/96 (11 Aug 2018 10:20) (142/86 - 247/131)  BP(mean): 132 (11 Aug 2018 10:20) (132 - 146)  RR: 15 (11 Aug 2018 10:20) (15 - 18)  SpO2: 98% (11 Aug 2018 10:20) (97% - 99%)    PHYSICAL EXAM:  Patient is somnolent with flat affect. Arousable but intermittently following commands  A&Ox3, OE on command, but doesn't maintain eye contact. Able to count fingers correctly; PERRL  No facial asymmetry. Tongue midline.   Moving Right UE and RLE spontaneously  5/5 strength.   Left LE purposeful withdrawal to noxious stimuli   Moving LUE on command, 4/5 strength.   limited evaluation of dysmetria but no obvious dysmetria noted.   Ataxia: patient unable to properly fc     LABS:                        11.6   8.5   )-----------( 271      ( 11 Aug 2018 08:31 )             34.7     08-11    138  |  94<L>  |  29<H>  ----------------------------<  92  3.4<L>   |  25  |  1.77<H>    Ca    8.9      11 Aug 2018 08:31  Mg     2.1     08-11    TPro  6.1  /  Alb  3.2<L>  /  TBili  0.2  /  DBili  x   /  AST  15  /  ALT  11  /  AlkPhos  63  08-11    PT/INR - ( 11 Aug 2018 08:31 )   PT: 11.4 sec;   INR: 1.03          PTT - ( 11 Aug 2018 08:31 )  PTT:34.0 sec  Urinalysis Basic - ( 11 Aug 2018 09:10 )    Color: Yellow / Appearance: Clear / S.010 / pH: x  Gluc: x / Ketone: NEGATIVE  / Bili: Negative / Urobili: 0.2 E.U./dL   Blood: x / Protein: 30 mg/dL / Nitrite: NEGATIVE   Leuk Esterase: NEGATIVE / RBC: < 5 /HPF / WBC 5-10 /HPF   Sq Epi: x / Non Sq Epi: 0-5 /HPF / Bacteria: Many /HPF      CULTURES:      RADIOLOGY & ADDITIONAL STUDIES:  < from: CT Angio Neck w/ IV Cont (18 @ 07:52) >  IMPRESSION:  1. Examination reveals moderate partially calcified atherosclerotic   plaque in bilateral carotid  bifurcations with a moderate 50-70% stenosis at the origin of bilateral   internal carotid arteries. There  is medial retropharyngeal ectasia of bilateral internal carotid arteries.  2. The left vertebral artery is not visualized probably secondary to   chronic occlusion.    < end of copied text >  < from: CT Angio Neck w/ IV Cont (18 @ 07:52) >  IMPRESSION:  1. There is chronic occlusion of the left vertebral artery.  2. The right vertebral artery and basilar artery are hypoplastic   suggesting vertebrobasilar  insufficiency.  3. There is moderate atherosclerotic calcification of bilateral carotid   siphons with a mild 40-50%  stenosis. No aneurysm.      < end of copied text >  < from: CT Perfusion w/ Maps w/ IV Cont (18 @ 07:50) >  IMPRESSION:  The study is nondiagnostic due to misregistration artifact.    < end of copied text >  < from: CT Angio Head w/ IV Cont (18 @ 07:49) >  IMPRESSION:  1. There is chronic occlusion of the left vertebral artery.  2. The right vertebral artery and basilar artery are hypoplastic   suggesting vertebrobasilar  insufficiency.  3. There is moderate atherosclerotic calcification of bilateral carotid   siphons with a mild 40-50%  stenosis. No aneurysm.    < end of copied text >  < from: CT Brain Stroke Protocol (18 @ 07:48) >  IMPRESSION:   1.  No large territorial infarction.   2.  Multiple age-indeterminate lacunar infarctions inthe basal ganglia   and thalami.   3.  Mild hydrocephalus.     < end of copied text >    Assessment: 67 yo F with MDD, HTN, CT finding of mild hydrocephalus       Plan:  - no neurosurgical intervention needed.   - Observe for symptoms such as HA, N/V, ataxia etc    d/w Dr. Simmons

## 2018-08-11 NOTE — H&P ADULT - NSHPLABSRESULTS_GEN_ALL_CORE
11.9   8.5   )-----------( 289      ( 11 Aug 2018 12:42 )             37.0       138  |  94<L>  |  29<H>  ----------------------------<  92  3.4<L>   |  25  |  1.77<H>    Ca    8.9      11 Aug 2018 08:31  Mg     2.1     08-    TPro  6.1  /  Alb  3.2<L>  /  TBili  0.2  /  DBili  x   /  AST  15  /  ALT  11  /  AlkPhos  63  08-          Urinalysis Basic - ( 11 Aug 2018 09:10 )    Color: Yellow / Appearance: Clear / S.010 / pH: x  Gluc: x / Ketone: NEGATIVE  / Bili: Negative / Urobili: 0.2 E.U./dL   Blood: x / Protein: 30 mg/dL / Nitrite: NEGATIVE   Leuk Esterase: NEGATIVE / RBC: < 5 /HPF / WBC 5-10 /HPF   Sq Epi: x / Non Sq Epi: 0-5 /HPF / Bacteria: Many /HPF      PT/INR - ( 11 Aug 2018 08:31 )   PT: 11.4 sec;   INR: 1.03          PTT - ( 11 Aug 2018 08:31 )  PTT:34.0 sec      CARDIAC MARKERS ( 11 Aug 2018 12:42 )  x     / 0.33 ng/mL / 62 U/L / x     / 2.6 ng/mL  CARDIAC MARKERS ( 11 Aug 2018 09:10 )  x     / x     / 72 U/L / x     / x      CARDIAC MARKERS ( 11 Aug 2018 08:31 )  x     / 0.36 ng/mL / x     / x     / x          CAPILLARY BLOOD GLUCOSE  POCT Blood Glucose.: 103 mg/dL (11 Aug 2018 07:53)

## 2018-08-11 NOTE — H&P ADULT - PROBLEM SELECTOR PLAN 4
- Psych to be consulted, unable to reach at this time.   - Patient was admitted for severe depression, with suicidality  - c/w constant obs, 1:1   - Likely transfer back to [facility] after medical issues resolve

## 2018-08-11 NOTE — ED PROVIDER NOTE - PHYSICAL EXAMINATION
GEN: Elderly, chronically ill appearing, sleepy but arousable, alert, oriented to person, place, time/situation, appears generally weak and dehdryated. NTAF  ENT: Airway patent, Nasal mucosa clear. Mouth with dry mucosa.  EYES: Clear bilaterally. perll, eomi  RESPIRATORY: Breathing comfortably with normal RR. No w/c/r  CARDIAC: RRR, intermittently bradycardic  ABDOMEN: Soft, nontender, +bowel sounds, no rebound, rigidity, or guarding.  MSK: Range of motion is not limited, no deformities noted.  NEURO: Alert and oriented x 3. Cn 2-12 intact.  Left side weaker compared to R, decreased sensation L side. FTN and no pronator drift. Gait deferred.  SKIN: Skin normal color for race, warm, dry and intact. No evidence of rash.  PSYCH: Alert and oriented to person, place, time/situation. depressed mood and affect.

## 2018-08-11 NOTE — ED ADULT NURSE REASSESSMENT NOTE - NS ED NURSE REASSESS COMMENT FT1
awaiting 8U admit, BP monitoring, denies pain, weakness, dizziness, SOB at this time, speaks clear full sentence, repeat BP in 30 mins. awaiting 8U admit, BP monitoring, denies pain, weakness, dizziness, SOB at this time, speaks clear full sentence, repeat BP in 30 mins. Last 0025 - 231/114, HR - 65, RR - 18 pain 0/10

## 2018-08-11 NOTE — ED ADULT NURSE NOTE - NSIMPLEMENTINTERV_GEN_ALL_ED
Implemented All Fall Risk Interventions:  [place] to call system. Call bell, personal items and telephone within reach. Instruct patient to call for assistance. Room bathroom lighting operational. Non-slip footwear when patient is off stretcher. Physically safe environment: no spills, clutter or unnecessary equipment. Stretcher in lowest position, wheels locked, appropriate side rails in place. Provide visual cue, wrist band, yellow gown, etc. Monitor gait and stability. Monitor for mental status changes and reorient to person, place, and time. Review medications for side effects contributing to fall risk. Reinforce activity limits and safety measures with patient and family.

## 2018-08-11 NOTE — H&P ADULT - PROBLEM SELECTOR PLAN 2
- SBP 220s-230s in the ED and on floor, patient entirely asymptomatic   - Permissive HTN - SBP < 200mmHg in setting of stroke   - Started patient on Norvasc 10mg - and will start Hydralazine 10mg TID and up-titrate as allows to meet BP goal. - SBP 220s-230s in the ED and on floor, patient entirely asymptomatic   - Elevated trop, since peaked, likely in the setting of demand ischemia   - Permissive HTN - SBP < 200mmHg in setting of stroke   - Started patient on Norvasc 10mg - and will start Hydralazine 10mg TID and up-titrate as allows to meet BP goal.

## 2018-08-11 NOTE — ED ADULT NURSE NOTE - OBJECTIVE STATEMENT
PT alert and orientated x's 3. Pt denies pain or discomfort. Pt moves all extremeties. Pt has no neuro deficits upon my arrival to her bedside; speech is clear, face is symetric.

## 2018-08-11 NOTE — CONSULT NOTE ADULT - SUBJECTIVE AND OBJECTIVE BOX
**STROKE CODE CONSULT NOTE**    Last known well time/Time of onset of symptoms: 2018 at 5:20am    HPI:  68F with a h/o HTN and major depressive disorder who was admitted to 8U last night for major depressive episode who was sent to ER after developing left sided weakness and elevated blood pressure on 8U. A code stoke was called and Dr. Paul and stroke team was down to see the patient. The patient gives a vague history and is complaining of generalized weakness and "not feeling well." She denies CP/SOb, headache, dizziness, nausea or vomits.  PAST MEDICAL & SURGICAL HISTORY:    FAMILY HISTORY:      SOCIAL HISTORY:  Smoking Cessation: Discussed    ROS:  Constitutional: No fever, weight loss or fatigue  Eyes: No eye pain, visual disturbances, or discharge  ENMT:  No difficulty hearing, tinnitus, vertigo; No sinus or throat pain  Neck: No pain or stiffness  Respiratory: No cough, wheezing, chills or hemoptysis  Cardiovascular: No chest pain, palpitations, shortness of breath, dizziness or leg swelling  Gastrointestinal: No abdominal pain. No nausea, vomiting or hematemesis; No diarrhea or constipation. Nohematochezia.  Genitourinary: No dysuria, frequency, hematuria or incontinence  Neurological: As per HPI  Skin: No itching, burning, rashes or lesions   Endocrine: No heat or cold intolerance; No hair loss  Musculoskeletal: No joint pain or swelling; No muscle, back or extremity pain  Psychiatric: No depression, anxiety, mood swings or difficulty sleeping  Heme/Lymph: No easy bruising or bleeding gums    MEDICATIONS  (STANDING):  sodium chloride 0.9%. 1000 milliLiter(s) (75 mL/Hr) IV Continuous <Continuous>      Allergies    No Known Allergies    Intolerances    Vital Signs Last 24 Hrs  T(C): 36.7 (11 Aug 2018 07:53), Max: 37 (10 Aug 2018 19:57)  T(F): 98 (11 Aug 2018 07:53), Max: 98.6 (10 Aug 2018 19:57)  HR: 60 (11 Aug 2018 08:30) (49 - 98)  BP: 214/100 (11 Aug 2018 08:30) (142/86 - 247/131)  BP(mean): --  RR: 16 (11 Aug 2018 08:02) (16 - 18)  SpO2: 97% (11 Aug 2018 08:02) (97% - 99%)    PHYSICAL EXAM:  Constitutional: WDWN; NAD  Cardiovascular: RRR, no appreciable murmurs; no carotid bruits  Neurologic:  Mental status: Awake, alert and oriented x3.  Recent and remote memory intact.  Naming, repetition and comprehension intact.  Attention/concentration intact.  No dysarthria, no aphasia.  Fund of knowledge appropriate.    Cranial nerves: Pupils equally round and reactive to light, visual fields full, no nystagmus, extraocular muscles intact, V1 through V3 intact bilaterally and symmetric, face symmetric, hearing intact to finger rub, palate elevation symmetric, tongue was midline, sternocleidomastoid/shoulder shrug strength bilaterally 5/5.    Motor:  Normal bulk and tone, strength 5/5 in bilateral upper and lower extremities.   strength 5/5.  Rapid alternating movements intact and symmetric.   Sensation: Intact to light touch, proprioception, and pinprick.  No neglect.   Coordination: No dysmetria on finger-to-nose and heel-to-shin.  No clumsiness.  Reflexes: 2+ in upper and lower extremities, downgoing toes bilaterally  Gait: Narrow and steady. No ataxia.  Romberg negative    NIHSS:    Fingerstick Blood Glucose: CAPILLARY BLOOD GLUCOSE      POCT Blood Glucose.: 103 mg/dL (11 Aug 2018 07:53)       LABS:                        11.6   8.5   )-----------( 271      ( 11 Aug 2018 08:31 )             34.7     08-11    138  |  94<L>  |  29<H>  ----------------------------<  92  3.4<L>   |  25  |  1.77<H>    Ca    8.9      11 Aug 2018 08:31  Mg     2.1     08-11    TPro  6.1  /  Alb  3.2<L>  /  TBili  0.2  /  DBili  x   /  AST  15  /  ALT  11  /  AlkPhos  63  08-11    PT/INR - ( 11 Aug 2018 08:31 )   PT: 11.4 sec;   INR: 1.03          PTT - ( 11 Aug 2018 08:31 )  PTT:34.0 sec  CARDIAC MARKERS ( 11 Aug 2018 08:31 )  x     / 0.36 ng/mL / x     / x     / x          Urinalysis Basic - ( 11 Aug 2018 09:10 )    Color: Yellow / Appearance: Clear / S.010 / pH: x  Gluc: x / Ketone: NEGATIVE  / Bili: Negative / Urobili: 0.2 E.U./dL   Blood: x / Protein: 30 mg/dL / Nitrite: NEGATIVE   Leuk Esterase: NEGATIVE / RBC: < 5 /HPF / WBC 5-10 /HPF   Sq Epi: x / Non Sq Epi: 0-5 /HPF / Bacteria: Many /HPF        RADIOLOGY & ADDITIONAL STUDIES:    IV-tPA (Y/N):                                   Bolus time:  Reason IV-tPA not given:    ASSESSMENT/PLAN: **STROKE CODE CONSULT NOTE**    Last known well time/Time of onset of symptoms: 2018 at 5:20am    HPI:  67yo Female, PMHx HTN and Major Depressive Disorder, presented to the ED with recent history of self harm and depressed mood. Primary complaint was of progressive functional decline at home w/severe anhedonia, anorexia, poor sleep, suicidality (would cut herself and claims to have started cutting herself several months ago), often drinking alcohol at home to consol herself. She states that her depression began after retiring from her job at the [employer] 1.5yrs ago. Patient was [facility] for depressive episode - however, 1-2 hours after arriving onto the floor, a stroke code was called for L sided weakness and hypertension. NIHSS was 6 on initial assessment. On CTH - noted to have lacunar infarcts (basal ganglia and R/L thalami). On second assessment, NIHSS 1. Patient was brought back to the ED and re-worked up. EKG revealed profound bradycardia - Cardiology was consulted for HR 30s - asymptomatic. Neurosurgery also consulted for what appeared to be mild hydrocephalus on CT Scan.     SBP in the 200s - allowing for permissive HTN, SBP 180s.     Patient admitted to [facility] for further workup.          FAMILY HISTORY: None      SOCIAL HISTORY:  Used to work at the [employer]. Drinks heavily every day.     ROS:  Constitutional: No fever, weight loss or fatigue  Eyes: No eye pain, visual disturbances, or discharge  ENMT:  No difficulty hearing, tinnitus, vertigo; No sinus or throat pain  Neck: No pain or stiffness  Respiratory: No cough, wheezing, chills or hemoptysis  Cardiovascular: No chest pain, palpitations, shortness of breath, dizziness or leg swelling  Gastrointestinal: No abdominal pain. No nausea, vomiting or hematemesis; No diarrhea or constipation. Nohematochezia.  Genitourinary: No dysuria, frequency, hematuria or incontinence  Neurological: As per HPI  Skin: No itching, burning, rashes or lesions   Endocrine: No heat or cold intolerance; No hair loss  Musculoskeletal: No joint pain or swelling; No muscle, back or extremity pain  Psychiatric: No depression, anxiety, mood swings or difficulty sleeping  Heme/Lymph: No easy bruising or bleeding gums    MEDICATIONS  (STANDING):  sodium chloride 0.9%. 1000 milliLiter(s) (75 mL/Hr) IV Continuous <Continuous>      Allergies    No Known Allergies    Intolerances    Vital Signs Last 24 Hrs  T(C): 36.7 (11 Aug 2018 07:53), Max: 37 (10 Aug 2018 19:57)  T(F): 98 (11 Aug 2018 07:53), Max: 98.6 (10 Aug 2018 19:57)  HR: 60 (11 Aug 2018 08:30) (49 - 98)  BP: 214/100 (11 Aug 2018 08:30) (142/86 - 247/131)  BP(mean): --  RR: 16 (11 Aug 2018 08:02) (16 - 18)  SpO2: 97% (11 Aug 2018 08:02) (97% - 99%)    PHYSICAL EXAM:  Physical Exam: PHYSICAL EXAM:  Constitutional: Patient seated comfortably in bed, of appropriate color, nutrition, and hydration.   HEENT: PERRLA, Normal Range of eye movement with no complaint of diplopia, Normal Hearing  Neck: No LAD, No JVD  Back: Normal spine flexure, No CVA tenderness  Respiratory: Normal air entry, Lungs clear to auscultation b/l w/o wheeze or crepitations.   Cardiovascular: S1 and S2 present - no additional abnormal sounds or murmurs. Normal rhythm and rate of pulse.   Gastrointestinal: BS+, soft, NT/ND  Extremities: No peripheral edema  Vascular: 2+ peripheral pulses  Neurological: Neurologic:  A&O x 3   -- CN: unimpaired visual acuity.  Blink to threat intact B/L.  No gaze palsy. vertical nystagmus with upwards gaze;  Pupils reactive b/l. Face is symmetric without facial droop.  Hearing intact b/L. no tongue deviation; soft palette elevation symmetric; shoulder shrug symmetric b/l   -- Motor: Normal bulk and tone throughout. Left side weaker as compared to R - drift not present on R side, minimal effort achieved on L   -- Sensory: sensation in tact on R - diminished on L    -- Coordination: no finger to nose dysmetria; no limb ataxia noted during cerebellar exam.   -- Reflexes: 2+ in brachioradialis and patellae b/l.    -- Gait: Deferred  Psychiatric: Depressed mood and flat affect	      NIHSS:  Fingerstick Blood Glucose: CAPILLARY BLOOD GLUCOSE  POCT Blood Glucose.: 103 mg/dL (11 Aug 2018 07:53)       LABS:                        11.6   8.5   )-----------( 271      ( 11 Aug 2018 08:31 )             34.7     08-11    138  |  94<L>  |  29<H>  ----------------------------<  92  3.4<L>   |  25  |  1.77<H>    Ca    8.9      11 Aug 2018 08:31  Mg     2.1     -11    TPro  6.1  /  Alb  3.2<L>  /  TBili  0.2  /  DBili  x   /  AST  15  /  ALT  11  /  AlkPhos  63  08-11    PT/INR - ( 11 Aug 2018 08:31 )   PT: 11.4 sec;   INR: 1.03          PTT - ( 11 Aug 2018 08:31 )  PTT:34.0 sec  CARDIAC MARKERS ( 11 Aug 2018 08:31 )  x     / 0.36 ng/mL / x     / x     / x          Urinalysis Basic - ( 11 Aug 2018 09:10 )    Color: Yellow / Appearance: Clear / S.010 / pH: x  Gluc: x / Ketone: NEGATIVE  / Bili: Negative / Urobili: 0.2 E.U./dL   Blood: x / Protein: 30 mg/dL / Nitrite: NEGATIVE   Leuk Esterase: NEGATIVE / RBC: < 5 /HPF / WBC 5-10 /HPF   Sq Epi: x / Non Sq Epi: 0-5 /HPF / Bacteria: Many /HPF      RADIOLOGY & ADDITIONAL STUDIES:    IV-tPA (Y/N):                                   Bolus time:  Reason IV-tPA not given:    ASSESSMENT/PLAN:  67yo Female, PMHx HTN and Major Depressive Disorder, presented to the ED with recent history of self harm and depressed mood - stroke code called for L sided weakness and decreased sensation along with significant hypertension. Patient found to have lacunar infarcts, indeterminate age - pursuing remainder of stroke workup.        Stroke Code called for Left sided weakness and diminished sensation, NIHSS 6 on first assessment   - CTH reveals lacunar infarcts (basal ganglia, b/l thalami) of indeterminate age   - TPA not given, NIHSS on follow up was 1   - Patient admitted to [facility] for further stroke workup.   - C/w ASA, Lipitor 40mg per nocte   - Allow for permissive HTN first 24 hours - SBP < 200mmHg   - Follow up MRI Brain     - PT/OT Consult.   - HSQ, SCDs

## 2018-08-11 NOTE — ED ADULT NURSE NOTE - DOES PATIENT HAVE ADVANCE DIRECTIVE
----- Message from Tyree Bangura DO sent at 8/17/2017  9:26 PM CDT -----  Stable chem panel.  Fasting BS normal
Pt notified Stable chem panel. Fasting BS normal.  Pt verbalized understanding and denied having any questions.
unknown

## 2018-08-12 DIAGNOSIS — I63.9 CEREBRAL INFARCTION, UNSPECIFIED: ICD-10-CM

## 2018-08-12 LAB
ANION GAP SERPL CALC-SCNC: 16 MMOL/L — SIGNIFICANT CHANGE UP (ref 5–17)
BUN SERPL-MCNC: 23 MG/DL — SIGNIFICANT CHANGE UP (ref 7–23)
CALCIUM SERPL-MCNC: 9.2 MG/DL — SIGNIFICANT CHANGE UP (ref 8.4–10.5)
CHLORIDE SERPL-SCNC: 100 MMOL/L — SIGNIFICANT CHANGE UP (ref 96–108)
CO2 SERPL-SCNC: 22 MMOL/L — SIGNIFICANT CHANGE UP (ref 22–31)
CREAT SERPL-MCNC: 1.57 MG/DL — HIGH (ref 0.5–1.3)
GLUCOSE SERPL-MCNC: 105 MG/DL — HIGH (ref 70–99)
MAGNESIUM SERPL-MCNC: 2.1 MG/DL — SIGNIFICANT CHANGE UP (ref 1.6–2.6)
PHOSPHATE SERPL-MCNC: 3.4 MG/DL — SIGNIFICANT CHANGE UP (ref 2.5–4.5)
POTASSIUM SERPL-MCNC: 3.7 MMOL/L — SIGNIFICANT CHANGE UP (ref 3.5–5.3)
POTASSIUM SERPL-SCNC: 3.7 MMOL/L — SIGNIFICANT CHANGE UP (ref 3.5–5.3)
SODIUM SERPL-SCNC: 138 MMOL/L — SIGNIFICANT CHANGE UP (ref 135–145)

## 2018-08-12 PROCEDURE — 70496 CT ANGIOGRAPHY HEAD: CPT | Mod: 26

## 2018-08-12 PROCEDURE — 99291 CRITICAL CARE FIRST HOUR: CPT | Mod: 24

## 2018-08-12 PROCEDURE — 70450 CT HEAD/BRAIN W/O DYE: CPT | Mod: 26,59

## 2018-08-12 PROCEDURE — 70498 CT ANGIOGRAPHY NECK: CPT | Mod: 26

## 2018-08-12 PROCEDURE — 70551 MRI BRAIN STEM W/O DYE: CPT | Mod: 26

## 2018-08-12 PROCEDURE — 0042T: CPT

## 2018-08-12 RX ORDER — ATORVASTATIN CALCIUM 80 MG/1
80 TABLET, FILM COATED ORAL AT BEDTIME
Qty: 0 | Refills: 0 | Status: DISCONTINUED | OUTPATIENT
Start: 2018-08-12 | End: 2018-08-27

## 2018-08-12 RX ORDER — ASPIRIN/CALCIUM CARB/MAGNESIUM 324 MG
81 TABLET ORAL ONCE
Qty: 0 | Refills: 0 | Status: DISCONTINUED | OUTPATIENT
Start: 2018-08-12 | End: 2018-08-12

## 2018-08-12 RX ORDER — AMLODIPINE BESYLATE 2.5 MG/1
10 TABLET ORAL DAILY
Qty: 0 | Refills: 0 | Status: DISCONTINUED | OUTPATIENT
Start: 2018-08-12 | End: 2018-08-12

## 2018-08-12 RX ORDER — HYDRALAZINE HCL 50 MG
10 TABLET ORAL EVERY 8 HOURS
Qty: 0 | Refills: 0 | Status: DISCONTINUED | OUTPATIENT
Start: 2018-08-12 | End: 2018-08-12

## 2018-08-12 RX ORDER — HYDRALAZINE HCL 50 MG
5 TABLET ORAL ONCE
Qty: 0 | Refills: 0 | Status: COMPLETED | OUTPATIENT
Start: 2018-08-12 | End: 2018-08-12

## 2018-08-12 RX ORDER — HYDRALAZINE HCL 50 MG
10 TABLET ORAL EVERY 4 HOURS
Qty: 0 | Refills: 0 | Status: DISCONTINUED | OUTPATIENT
Start: 2018-08-12 | End: 2018-08-12

## 2018-08-12 RX ORDER — DEXTROSE 50 % IN WATER 50 %
25 SYRINGE (ML) INTRAVENOUS ONCE
Qty: 0 | Refills: 0 | Status: DISCONTINUED | OUTPATIENT
Start: 2018-08-12 | End: 2018-09-05

## 2018-08-12 RX ORDER — SODIUM CHLORIDE 9 MG/ML
1000 INJECTION INTRAMUSCULAR; INTRAVENOUS; SUBCUTANEOUS
Qty: 0 | Refills: 0 | Status: DISCONTINUED | OUTPATIENT
Start: 2018-08-12 | End: 2018-08-14

## 2018-08-12 RX ORDER — HYDRALAZINE HCL 50 MG
10 TABLET ORAL ONCE
Qty: 0 | Refills: 0 | Status: DISCONTINUED | OUTPATIENT
Start: 2018-08-12 | End: 2018-08-12

## 2018-08-12 RX ORDER — SODIUM CHLORIDE 9 MG/ML
1000 INJECTION, SOLUTION INTRAVENOUS
Qty: 0 | Refills: 0 | Status: DISCONTINUED | OUTPATIENT
Start: 2018-08-12 | End: 2018-09-05

## 2018-08-12 RX ORDER — ASPIRIN/CALCIUM CARB/MAGNESIUM 324 MG
243 TABLET ORAL ONCE
Qty: 0 | Refills: 0 | Status: COMPLETED | OUTPATIENT
Start: 2018-08-12 | End: 2018-08-12

## 2018-08-12 RX ORDER — HYDRALAZINE HCL 50 MG
5 TABLET ORAL EVERY 4 HOURS
Qty: 0 | Refills: 0 | Status: DISCONTINUED | OUTPATIENT
Start: 2018-08-12 | End: 2018-08-12

## 2018-08-12 RX ORDER — INSULIN LISPRO 100/ML
VIAL (ML) SUBCUTANEOUS
Qty: 0 | Refills: 0 | Status: DISCONTINUED | OUTPATIENT
Start: 2018-08-12 | End: 2018-08-13

## 2018-08-12 RX ORDER — GLUCAGON INJECTION, SOLUTION 0.5 MG/.1ML
1 INJECTION, SOLUTION SUBCUTANEOUS ONCE
Qty: 0 | Refills: 0 | Status: DISCONTINUED | OUTPATIENT
Start: 2018-08-12 | End: 2018-09-05

## 2018-08-12 RX ORDER — POTASSIUM CHLORIDE 20 MEQ
10 PACKET (EA) ORAL
Qty: 0 | Refills: 0 | Status: COMPLETED | OUTPATIENT
Start: 2018-08-12 | End: 2018-08-12

## 2018-08-12 RX ORDER — DEXTROSE 50 % IN WATER 50 %
15 SYRINGE (ML) INTRAVENOUS ONCE
Qty: 0 | Refills: 0 | Status: DISCONTINUED | OUTPATIENT
Start: 2018-08-12 | End: 2018-09-05

## 2018-08-12 RX ORDER — ASPIRIN/CALCIUM CARB/MAGNESIUM 324 MG
325 TABLET ORAL DAILY
Qty: 0 | Refills: 0 | Status: DISCONTINUED | OUTPATIENT
Start: 2018-08-12 | End: 2018-08-26

## 2018-08-12 RX ORDER — DEXTROSE 50 % IN WATER 50 %
12.5 SYRINGE (ML) INTRAVENOUS ONCE
Qty: 0 | Refills: 0 | Status: DISCONTINUED | OUTPATIENT
Start: 2018-08-12 | End: 2018-09-05

## 2018-08-12 RX ADMIN — Medication 10 MILLIGRAM(S): at 00:36

## 2018-08-12 RX ADMIN — AMLODIPINE BESYLATE 10 MILLIGRAM(S): 2.5 TABLET ORAL at 02:00

## 2018-08-12 RX ADMIN — Medication 243 MILLIGRAM(S): at 16:28

## 2018-08-12 RX ADMIN — Medication 5 MILLIGRAM(S): at 05:16

## 2018-08-12 RX ADMIN — ATORVASTATIN CALCIUM 80 MILLIGRAM(S): 80 TABLET, FILM COATED ORAL at 21:01

## 2018-08-12 RX ADMIN — Medication 5 MILLIGRAM(S): at 20:54

## 2018-08-12 RX ADMIN — SODIUM CHLORIDE 75 MILLILITER(S): 9 INJECTION INTRAMUSCULAR; INTRAVENOUS; SUBCUTANEOUS at 17:09

## 2018-08-12 RX ADMIN — Medication 81 MILLIGRAM(S): at 11:14

## 2018-08-12 RX ADMIN — Medication 10 MILLIGRAM(S): at 13:02

## 2018-08-12 NOTE — PROGRESS NOTE ADULT - PROBLEM SELECTOR PLAN 1
Second Stroke Code since hospital stay called for Left sided weakness, R sided facial droop and L hemianopia. NIH 10. Repeat CT head with no acute changes however MRI ON demonstrating acute infarct in PATRICIA in addition to lacunar. TPA not given as out of window (last known normal: evening og 8/11)  Possibly 2/2 L vertebral artery occlusion.  - C/w  (increased from 81 mg to 325 mg daily), increased from Lipitor 40mg to 80 mg daily today   - Permissive HTN - Goals: -220mmHg in setting of stroke. Discontinued anti-hypertensive meds to maintain pressures within specified goals  - PT/OT Consult

## 2018-08-12 NOTE — CONSULT NOTE ADULT - SUBJECTIVE AND OBJECTIVE BOX
NEUROLOGY CONSULT PROGRESS NOTE    INTERVAL HISTORY:      REVIEW OF SYSTEMS:  As per HPI, otherwise negative for Constitutional, Eyes, Ears/Nose/Mouth/Throat, Neck, Cardiovascular, Respiratory, Gastrointestinal, Genitourinary, Skin, Endocrine, Musculoskeletal, Psychiatric, and Hematologic/Lymphatic.    MEDICATIONS:  aspirin 325 milliGRAM(s) Oral daily  atorvastatin 80 milliGRAM(s) Oral at bedtime  atropine Injectable 0.5 milliGRAM(s) IV Push every 5 minutes PRN  sodium chloride 0.9%. 1000 milliLiter(s) IV Continuous <Continuous>    VITAL SIGNS:  Vital Signs Last 24 Hrs  T(C): 36.4 (12 Aug 2018 05:53), Max: 36.9 (12 Aug 2018 02:00)  T(F): 97.5 (12 Aug 2018 05:53), Max: 98.4 (12 Aug 2018 02:00)  HR: 64 (12 Aug 2018 16:22) (44 - 64)  BP: 227/104 (12 Aug 2018 16:22) (139/67 - 227/104)  BP(mean): 149 (12 Aug 2018 16:22) (96 - 149)  RR: 16 (12 Aug 2018 16:22) (16 - 19)  SpO2: 98% (12 Aug 2018 16:22) (96% - 98%)    PHYSICAL EXAMINATION:  Constitutional: WDWN female; NAD  Eyes: anicteric sclera, no conjunctival pallor  Cardiovascular: +S1/S2, RRR; no M/R/G  Neurologic:  - Mental Status:  AAOx3; speech is fluent with intact naming, repetition, and comprehension  - Cranial Nerves II-XII:    II:  PERRLA; visual fields are full to confrontation on the R eye, blurry vision associated with the L eye, unable to correctly identify   III, IV, VI:  EOMI, no nystagmus  V:  facial sensation is intact in the V1-V3 distribution bilaterally.  VII:  face is symmetric with normal eye closure and smile  VIII:  hearing is intact to finger rub  IX, X:  uvula is midline and soft palate rises symmetrically  XI:  head turning and shoulder shrug are intact bilaterally  XII:  tongue protrudes in the midline  - Motor:  strength is 5/5 throughout; normal muscle bulk and tone throughout; no pronator drift  - Reflexes:  2+ and symmetric at the biceps, triceps, brachioradialis, knees, and ankles;  plantar reflexes downgoing bilaterally  - Sensory:  intact to light touch, pin prick, vibration, and joint-position sense throughout  - Coordination:  finger-nose-finger and heel-knee-shin intact without dysmetria; rapid alternating hand movements intact  - Gait:  normal steps, base, arm swing, and turning; heel and toe walking are normal; tandem gait is normal; Romberg testing is negative    LABS:                          11.9   8.5   )-----------( 289      ( 11 Aug 2018 12:42 )             37.0     08-12    138  |  100  |  23  ----------------------------<  105<H>  3.7   |  22  |  1.57<H>    Ca    9.2      12 Aug 2018 06:24  Phos  3.4     08-12  Mg     2.1     -12    TPro  6.1  /  Alb  3.2<L>  /  TBili  0.2  /  DBili  x   /  AST  15  /  ALT  11  /  AlkPhos  63  08-11    PT/INR - ( 11 Aug 2018 08:31 )   PT: 11.4 sec;   INR: 1.03          PTT - ( 11 Aug 2018 08:31 )  PTT:34.0 sec  Urinalysis Basic - ( 11 Aug 2018 09:10 )    Color: Yellow / Appearance: Clear / S.010 / pH: x  Gluc: x / Ketone: NEGATIVE  / Bili: Negative / Urobili: 0.2 E.U./dL   Blood: x / Protein: 30 mg/dL / Nitrite: NEGATIVE   Leuk Esterase: NEGATIVE / RBC: < 5 /HPF / WBC 5-10 /HPF   Sq Epi: x / Non Sq Epi: 0-5 /HPF / Bacteria: Many /HPF        RADIOLOGY & ADDITIONAL STUDIES:      IMPRESSION & RECOMMENDATIONS: NEUROLOGY STROKE CODE CONSULT NOTE    HPI:   69 y/o Female, PMHx HTN and Major Depressive Disorder with hx of alcohol use, and self-cutting, presented to the ED with recent history of self harm and depressed mood, admitted to inpatient psych ([facility]) for close monitoring. Approximately, 1-2 hours after arrival, stroke code called for L sided weakness and hypertension to >200s. NIHSS was 6 on initial assessment for L sided numbness and LLE weakness.  CT head notable for lacunar infarcts (basal ganglia and R/L thalami) of indeterminate age. 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 Repeat assessment with NIHSS 1 with resolution of weakness. Of note, EKG revealed profound bradycardia - Cardiology was consulted for HR 30s - recommending that patient c/w CVA w/u, avoid BBs and continue to monitor with daily EKGs. Neurosurgery also consulted for what appeared to be mild hydrocephalus on CT Scan, recommending no intervention. Patient subsequently admitted to [facility] for further workup of CVA. Patient noted to be consistently hypertensive to 200s overnight, requiring IV hydralazine with improvement in pressures. MRI performed ON revealing L acute PATRICIA infarct along with previously noted findings. AM exam notable for LLE weakness, 2/5 and LUE 2-3/5 strength. 11:30 pm on 2018 exam per NF team notable for similar weakness however with no other deficits. Around 5:20 pm on 2018, patient found to have new R facial droop with worsening LLE/LUE strength, stroke code called. NIHSS score 10, for LUE/LLE inability to maintain against gravity, L eye hemianopia and R facial droop. Repeat CT head with no acute changes compared to prior imaging and CTA H/N unchanged. NSG consulted for consideration of intervention given L vertebral artery occlusion. Patient stepped up to SICU for close monitoring and for possible intervention.      REVIEW OF SYSTEMS:  As per HPI, otherwise negative for Constitutional, Eyes, Ears/Nose/Mouth/Throat, Neck, Cardiovascular, Respiratory, Gastrointestinal, Genitourinary, Skin, Endocrine, Musculoskeletal, Psychiatric, and Hematologic/Lymphatic.    MEDICATIONS:  aspirin 325 milliGRAM(s) Oral daily  atorvastatin 80 milliGRAM(s) Oral at bedtime  atropine Injectable 0.5 milliGRAM(s) IV Push every 5 minutes PRN  sodium chloride 0.9%. 1000 milliLiter(s) IV Continuous <Continuous>    VITAL SIGNS:  Vital Signs Last 24 Hrs  T(C): 36.4 (12 Aug 2018 05:53), Max: 36.9 (12 Aug 2018 02:00)  T(F): 97.5 (12 Aug 2018 05:53), Max: 98.4 (12 Aug 2018 02:00)  HR: 64 (12 Aug 2018 16:22) (44 - 64)  BP: 227/104 (12 Aug 2018 16:22) (139/67 - 227/104)  BP(mean): 149 (12 Aug 2018 16:22) (96 - 149)  RR: 16 (12 Aug 2018 16:22) (16 - 19)  SpO2: 98% (12 Aug 2018 16:22) (96% - 98%)    PHYSICAL EXAMINATION:  Constitutional: WDWN female; NAD  Eyes: anicteric sclera, no conjunctival pallor  Cardiovascular: +S1/S2, RRR; no M/R/G  Neurologic:  - Mental Status:  AAOx3; speech is fluent with intact naming, repetition, and comprehension  - Cranial Nerves II-XII:    II:  PERRLA; visual fields are full to confrontation on the R eye, blurry vision associated with the L eye, unable to correctly identify   III, IV, VI:  EOMI, no nystagmus  V:  facial sensation is intact in the V1-V3 distribution bilaterally.  VII:  R sided facial droop  VIII:  hearing is intact to finger rub  IX, X:  uvula is midline and soft palate rises symmetrically  XI:  head turning and shoulder shrug are intact bilaterally  XII:  tongue protrudes in the midline  - Motor:  strength is 2/5 LLE; 2-3/5 LUE; 5/5 strength RUE/RLE; normal muscle bulk and tone throughout; no pronator drift  - Reflexes:  2+ and symmetric at the biceps, triceps, brachioradialis, knees, and ankles;  plantar reflexes downgoing bilaterally  - Sensory:  intact to light touch, pin prick, vibration, and joint-position sense throughout  - Coordination:  finger-nose-finger without dysmetria on the R, inability to perform on the L 2/2 weakness; rapid alternating hand movements intact  - Gait:  not assessed    LABS:                          11.9   8.5   )-----------( 289      ( 11 Aug 2018 12:42 )             37.0     08-    138  |  100  |  23  ----------------------------<  105<H>  3.7   |  22  |  1.57<H>    Ca    9.2      12 Aug 2018 06:24  Phos  3.4       Mg     2.1         TPro  6.1  /  Alb  3.2<L>  /  TBili  0.2  /  DBili  x   /  AST  15  /  ALT  11  /  AlkPhos  63  08-11    PT/INR - ( 11 Aug 2018 08:31 )   PT: 11.4 sec;   INR: 1.03          PTT - ( 11 Aug 2018 08:31 )  PTT:34.0 sec  Urinalysis Basic - ( 11 Aug 2018 09:10 )    Color: Yellow / Appearance: Clear / S.010 / pH: x  Gluc: x / Ketone: NEGATIVE  / Bili: Negative / Urobili: 0.2 E.U./dL   Blood: x / Protein: 30 mg/dL / Nitrite: NEGATIVE   Leuk Esterase: NEGATIVE / RBC: < 5 /HPF / WBC 5-10 /HPF   Sq Epi: x / Non Sq Epi: 0-5 /HPF / Bacteria: Many /HPF        RADIOLOGY & ADDITIONAL STUDIES:      IMPRESSION & RECOMMENDATIONS:

## 2018-08-12 NOTE — PROGRESS NOTE ADULT - PROBLEM SELECTOR PLAN 3
Cardiology consulted given profound bradycardia, HR 30's - albeit asymptomatic. EKG revealing sinus bradycardia. Of unclear etiology at this time.    - Avoiding Beta Blockers and Dilt/Verapamil as per cardio  - continue to monitor with daily EKGs as per cardio recs

## 2018-08-12 NOTE — PROGRESS NOTE ADULT - PROBLEM SELECTOR PLAN 6
F: IVF @ 75 cc/hr  E: Replete PRN, Replete electrolytes K>4, Mg>2  N: NPO for now, S&S eval pending, would keep NPO in the event NSG decides to pursue intervention  Ppx: HSQ  Dispo: [facility]-->SICU

## 2018-08-12 NOTE — PROGRESS NOTE ADULT - SUBJECTIVE AND OBJECTIVE BOX
PGY2 Transfer Note ([facility]-->SICU)  Hospital Course: 69 y/o Female, PMHx HTN and Major Depressive Disorder with hx of alcohol use, and self-cutting, presented to the ED with recent history of self harm and depressed mood, admitted to inpatient psych ([facility]) for close monitoring. Approximately, 1-2 hours after arrival, stroke code called for L sided weakness and hypertension to >200s. NIHSS was 6 on initial assessment for L sided numbness and LLE weakness.  CT head notable for lacunar infarcts (basal ganglia and R/L thalami) of indeterminate age. 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 Repeat assessment with NIHSS 1 with resolution of weakness. Of note, EKG revealed profound bradycardia - Cardiology was consulted for HR 30s - recommending that patient c/w CVA w/u, avoid BBs and continue to monitor with daily EKGs. Neurosurgery also consulted for what appeared to be mild hydrocephalus on CT Scan, recommending no intervention. Patient subsequently admitted to [facility] for further workup of CVA. Patient noted to be consistently hypertensive to 200s overnight, requiring IV hydralazine with improvement in pressures. MRI performed ON revealing L acute PATRICIA infarct along with previously noted findings. AM exam notable for LLE weakness, 2/5 and LUE 2-3/5 strength. 11:30 pm on 2018 exam per NF team notable for similar weakness however with no other deficits. Around 5:20 pm on 2018, patient found to have new R facial droop with worsening LLE/LUE strength, stroke code called. NIHSS score 10, for LUE/LLE inability to maintain against gravity, L eye hemianopia and R facial droop. Repeat CT head with no acute changes compared to prior imaging and CTA H/N unchanged. NSG consulted for consideration of intervention given L vertebral artery occlusion. Patient stepped up to SICU for close monitoring and for possible intervention.  OVERNIGHT EVENTS: Patient noted to be hypertensive to 230s overnight around 1 am, given 10 mg IVP Hydralazine and administered standing Amlodipine 10 mg daily medication earlier. Patient hypertensive again to 210 around 5 am, given 5 mg IVP hydralazine as well with improvement of BPs to 140s.    SUBJECTIVE / INTERVAL HPI: Patient seen and examined at bedside. Patient with no reported complaints this morning. Denies any fevers, chills, NS, dizziness, headaches, acute vision changes, chest pain, shortness of breath, numbness, paresthesias, or weakness. Remainder of ROS negative.     VITAL SIGNS:  Vital Signs Last 24 Hrs  T(C): 36.6 (12 Aug 2018 18:00), Max: 36.9 (12 Aug 2018 02:00)  T(F): 97.8 (12 Aug 2018 18:00), Max: 98.4 (12 Aug 2018 02:00)  HR: 56 (12 Aug 2018 18:30) (44 - 64)  BP: 220/102 (12 Aug 2018 18:30) (139/67 - 233/105)  BP(mean): 163 (12 Aug 2018 18:30) (96 - 163)  RR: 16 (12 Aug 2018 18:30) (16 - 19)  SpO2: 97% (12 Aug 2018 18:30) (96% - 98%)    PHYSICAL EXAM:    Constitutional: WDWN female; NAD  Eyes: anicteric sclera, no conjunctival pallor  Cardiovascular: +S1/S2, RRR; no M/R/G  Neurologic:  - Mental Status:  AAOx3; speech is fluent with intact naming, repetition, and comprehension  - Cranial Nerves II-XII:    II:  PERRLA; visual fields are full to confrontation on the R eye, blurry vision associated with the L eye, unable to correctly identify   III, IV, VI:  EOMI, no nystagmus  V:  facial sensation is intact in the V1-V3 distribution bilaterally.  VII:  R sided facial droop  VIII:  hearing is intact to finger rub  IX, X:  uvula is midline and soft palate rises symmetrically  XI:  head turning and shoulder shrug are intact bilaterally  XII:  tongue protrudes in the midline  - Motor:  strength is 2/5 LLE; 2-3/5 LUE; 5/5 strength RUE/RLE; normal muscle bulk and tone throughout; no pronator drift  - Reflexes:  2+ and symmetric at the biceps, triceps, brachioradialis, knees, and ankles;  plantar reflexes downgoing bilaterally  - Sensory:  intact to light touch, pin prick, vibration, and joint-position sense throughout  - Coordination:  finger-nose-finger without dysmetria on the R, inability to perform on the L 2/2 weakness; rapid alternating hand movements intact  - Gait:  not assessed    MEDICATIONS:  MEDICATIONS  (STANDING):  aspirin 325 milliGRAM(s) Oral daily  atorvastatin 80 milliGRAM(s) Oral at bedtime  sodium chloride 0.9%. 1000 milliLiter(s) (75 mL/Hr) IV Continuous <Continuous>    MEDICATIONS  (PRN):  atropine Injectable 0.5 milliGRAM(s) IV Push every 5 minutes PRN Symptomatic Bradycardia      ALLERGIES:  Allergies    No Known Allergies    Intolerances        LABS:                        11.9   8.5   )-----------( 289      ( 11 Aug 2018 12:42 )             37.0     08-    138  |  100  |  23  ----------------------------<  105<H>  3.7   |  22  |  1.57<H>    Ca    9.2      12 Aug 2018 06:24  Phos  3.4     08-12  Mg     2.1     -12    TPro  6.1  /  Alb  3.2<L>  /  TBili  0.2  /  DBili  x   /  AST  15  /  ALT  11  /  AlkPhos  63  08-11    PT/INR - ( 11 Aug 2018 08:31 )   PT: 11.4 sec;   INR: 1.03          PTT - ( 11 Aug 2018 08:31 )  PTT:34.0 sec  Urinalysis Basic - ( 11 Aug 2018 09:10 )    Color: Yellow / Appearance: Clear / S.010 / pH: x  Gluc: x / Ketone: NEGATIVE  / Bili: Negative / Urobili: 0.2 E.U./dL   Blood: x / Protein: 30 mg/dL / Nitrite: NEGATIVE   Leuk Esterase: NEGATIVE / RBC: < 5 /HPF / WBC 5-10 /HPF   Sq Epi: x / Non Sq Epi: 0-5 /HPF / Bacteria: Many /HPF      CAPILLARY BLOOD GLUCOSE  130 (12 Aug 2018 17:54)      POCT Blood Glucose.: 130 mg/dL (12 Aug 2018 15:21)      RADIOLOGY & ADDITIONAL TESTS: Reviewed.    ASSESSMENT:    PLAN:

## 2018-08-12 NOTE — PROGRESS NOTE ADULT - SUBJECTIVE AND OBJECTIVE BOX
Pt seen and examined at the bedside. No acute complaints today.    VSS; SBPs intermittently elevated  HEENT: LEONARDA  Chest: CTA b/l  CVS: S1 S2 of normal intensity; RRR; No murmurs appreciated  Abd: Soft, NT, ND, BS present  Ext: No sig edema appreciated    LABS:                        11.9   8.5   )-----------( 289      ( 11 Aug 2018 12:42 )             37.0   08-12    138  |  100  |  23  ----------------------------<  105<H>  3.7   |  22  |  1.57<H>    Ca    9.2      12 Aug 2018 06:24  Phos  3.4     08-12  Mg     2.1     08-12    TPro  6.1  /  Alb  3.2<L>  /  TBili  0.2  /  DBili  x   /  AST  15  /  ALT  11  /  AlkPhos  63  08-11      ECG: No ecg from today  Echo: Severe concentric LVH. EF 55%. Grade I diastolic dysfunction; No sig valvular dz; No pericardial effusion    A/P:   67 yo F with HTN, p/w hypertensive emergency and sinus bradycardia    Hypertensive emergency 2/2 medication non-compliance  - Recommend starting Hydralazine 25 mg PO TID, uptitrate to 50mg TID if not at goal, dc IV Hydralazine as BP response may be unpredictable.  Cont on Norvasc 10 mg po qd. Goal BP to be established by neurology.  - Cont with high intensity statin + ASA 81mg    Sinus Bradycardia: Pt currently with intermittent sinus bradycardia on monitor. Asymptomatic.  - Continue to avoid any BB  Case discussed with Dr Rios

## 2018-08-12 NOTE — PROGRESS NOTE ADULT - ASSESSMENT
68y/F with  1.  acute R PATRICIA territory infarction; L centrum semiovale, L subcortical insular, medial R temporal horn; likely cardioembolic / A2A; chronic small lacunar infarctions B basal ganglia, corona radiata, bilateral thalami, wilfred  2.  vertebrobasilar insufficiency, L VA chronic occlusion, R VA / basilar artery hypoplastic  3.  Hypertension, dyslipidemia  4.  depression  5.  acute on chronic kidney injury    PLAN:   NEURO: neurochecks q4h  acute R PATRICIA infarction:  KATHERINE, increase ASA to 325 daily   vertebrobasilar insufficiency:  neurointerventional consult  depression  REHAB:  physical therapy evaluation and management    EARLY MOB:  ambulate as tolerated    PULM:  Room air, incentive spirometry  CARDIO:  SBP goal 160-220mm Hg permissive hypertension, d/c amlodipine; hydralazine  ENDO:  Blood sugar goals 140-180 mg/dL, continue insulin sliding scale; f/u A1C; continue high dose statins  GI:  PPI for GI prophylaxis  DIET: regular diet  RENAL:  start IVF NS@50cc/hr  HEM/ONC: Hb stable  VTE Prophylaxis: SCDs, start SQH  ID: afebrile, no leukocytosis  Social: will update family    ATTENDING ATTESTATION:  I was physically present for the key portions of the evaluation and management (E/M) service provided.  I agree with the above history, physical and plan which I have reviewed and edited where appropriate.     Patient not at high risk for neurologic deterioration / death.  Time spent on this noncritically ill patient: 45 minutes spent on total encounter, more than 50% of the visit was spent counseling and/or coordinating care by the attending physician.    Plan discussed with RN, house staff.    REVIEW OF SYSTEMS:  No headaches, no nausea or vomiting; 14 -point review of systems otherwise unremarkable. 68y/F with  1.  acute R PATRICIA territory infarction; L centrum semiovale, L subcortical insular, medial R temporal horn; likely cardioembolic / A2A; chronic small lacunar infarctions B basal ganglia, corona radiata, bilateral thalami, wilfred  2.  vertebrobasilar insufficiency, L VA chronic occlusion, R VA / basilar artery hypoplastic  3.  Hypertension, dyslipidemia  4.  depression  5.  acute on chronic kidney injury    PLAN:   NEURO: neurochecks q1h, transfer to ICU for closer monitoring  acute R PATRICIA infarction:  KATHERINE,  daily   vertebrobasilar insufficiency:  neurointerventional consult  depression: not on any pharmacologic treatment for now  REHAB:  physical therapy evaluation and management    EARLY MOB:  bedrest, flat    PULM:  Room air, incentive spirometry  CARDIO:  SBP goal 160-220mm Hg permissive hypertension, d/c amlodipine; hydralazine; KATHERINE  ENDO:  Blood sugar goals 140-180 mg/dL, continue insulin sliding scale; f/u A1C; continue high dose statins  GI:  PPI for GI prophylaxis  DIET: NPO for now  RENAL:  bolus 500cc x 1; start NS @75cc/hr  HEM/ONC: Hb stable  VTE Prophylaxis: SCDs, no DVT chemoprophylaxis for now as patient is high risk for bleed (recent stroke, permissive hypertension)  ID: afebrile, no leukocytosis  Social: Leyla (friend, HCP, power of ) at bedside and updated    ATTENDING ATTESTATION:  I was physically present for the key portions of the evaluation and management (E/M) service provided.  I agree with the above history, physical and plan which I have reviewed and edited where appropriate.     Patient not at high risk for neurologic deterioration / death.  Time spent on this noncritically ill patient: 45 minutes spent on total encounter, more than 50% of the visit was spent counseling and/or coordinating care by the attending physician.    Plan discussed with RN, house staff.    REVIEW OF SYSTEMS:  No headaches, no nausea or vomiting; 14 -point review of systems otherwise unremarkable. 68y/F with  1.  acute R PATRICIA territory infarction; L centrum semiovale, L subcortical insular, medial R temporal horn; likely cardioembolic / A2A; chronic small lacunar infarctions B basal ganglia, corona radiata, bilateral thalami, wilfred  2.  vertebrobasilar insufficiency, L VA chronic occlusion, R VA / basilar artery hypoplastic  3.  Hypertension, dyslipidemia  4.  depression  5.  acute on chronic kidney injury    PLAN:   NEURO: neurochecks q1h, transfer to ICU for closer monitoring  acute R PATRICIA infarction:  KATHERINE,  daily   vertebrobasilar insufficiency:  neurointerventional consult  depression: not on any pharmacologic treatment for now  REHAB:  physical therapy evaluation and management    EARLY MOB:  bedrest, flat    PULM:  Room air, incentive spirometry  CARDIO:  SBP goal 160-220mm Hg permissive hypertension, d/c amlodipine; hydralazine; KATHERINE  ENDO:  Blood sugar goals 140-180 mg/dL, continue insulin sliding scale; f/u A1C; continue high dose statins  GI:  PPI for GI prophylaxis  DIET: NPO for now  RENAL:  bolus 500cc x 1; start NS @75cc/hr  HEM/ONC: Hb stable  VTE Prophylaxis: SCDs, no DVT chemoprophylaxis for now as patient is high risk for bleed (recent stroke, permissive hypertension)  ID: afebrile, no leukocytosis  Social: Leyla (friend, HCP, power of ) at bedside and updated    ATTENDING ATTESTATION:  I was physically present for the key portions of the evaluation and management (E/M) service provided.  I agree with the above history, physical and plan, which I have reviewed and edited where appropriate.    Patient at high risk for neurological deterioration or death due to:  ICU delirium, aspiration PNA, DVT / PE.  Critical care time, excluding procedures: 60 minutes spent on total encounter, more than 50% of the visit was spent counseling and/or coordinating care by the attending physician.     Plan discussed with RN, house staff.

## 2018-08-12 NOTE — PROGRESS NOTE ADULT - ASSESSMENT
67 y/o Female, PMHx HTN and Major Depressive Disorder with hx of alcohol use, and self-cutting, presented to the ED with recent history of self harm and depressed mood, admitted to inpatient psych ([facility]) for close monitoring, HC c/b development of focal deficits, now s/p 2 STROKE CODES, with imaging revealing acute infarct in L PATRICIA and subacute insular infarct, being stepped up to the SICU for close monitoring and for consideration of possible intervention of L occluded vertebral artery.

## 2018-08-12 NOTE — PROGRESS NOTE ADULT - PROBLEM SELECTOR PLAN 2
SBP 220s-230s in the ED and on floor as well as overnight. Found to have tropinemia on transfer, since peaked, likely in the setting of demand ischemia of hypertensive emergency  - Permissive HTN - Goals: -220mmHg in setting of stroke. Discontinued anti-hypertensive meds to maintain pressures within specified goals  -Continue to closely monitor

## 2018-08-12 NOTE — CONSULT NOTE ADULT - ASSESSMENT
69 y/o Female, PMHx HTN and Major Depressive Disorder with hx of alcohol use, and self-cutting, presented to the ED with recent history of self harm and depressed mood, admitted to inpatient psych ([facility]) for close monitoring, HC c/b development of focal deficits, now s/p 2 STROKE CODES, with imaging revealing acute infarct in L PATRICIA and subacute insular infarct, being stepped up to the SICU for close monitoring and for consideration of possible intervention of L occluded vertebral artery.    #CVA   Patient now s/p 2 STROKE CODES, today with worsening weakness of the L side of the body, R facial droop and L eye hemianopia. No acute changes since prior imaging on repeat imaging after Stroke code.   -Initiate IVF at 75 cc/hr to maintain permissive HTN goals (Goal SBPs 160-220)  -s/p  mg x1 today (8/12/18), given complete occlusion of L vertebral artery, will continue with  daily  -NPO, pending S&S eval  -Would recommend CXR in AM to assess if patient aspirated given new R sided facial droop  -NSG on board, being considered for possible intervention, step up to the SICU for close monitoring, management as per NSG team

## 2018-08-12 NOTE — PROGRESS NOTE ADULT - SUBJECTIVE AND OBJECTIVE BOX
=====================   STROKE ATTENDING NOTE  =====================     ALCON COPE   [id number]  Summary:  68y/F with depression, found down in apartment in her stool, admitted initially to [facility] under psychiatry fro major depression.  On day of admission, noted to have L facial droop and L weakness, NIHSS 6, SBP 170s  a.m. glu WNL.  CT CTA showed multiple lacunar infarcts of indeterminate age, hydrocephalus.  Transferred to [facility] for further stroke work-up.  Overnight Events:  Hypertensive to 200s    PMH:  Hypertension Depression  Allergies:  No Known Allergies  Home meds:     PHYSICAL EXAMINATION  T(C): 36.4 ( @ 05:53), Max: 36.9 ( @ 02:00) HR: 58 (- @ 12:47) (44 - 62) BP: 197/88 (- @ 12:47) (139/67 - 197/88) RR: 16 (- @ 12:47) (16 - 19) SpO2: 97% ( @ 12:47) (96% - 98%)  NEUROLOGIC EXAMINATION:  Patient is awake, alert, fully oriented, pupils 2-3mm equal and briskly reactive to light, EOMs intact, muscle strength 5/5 on all 4 extremities  GENERAL:  not intubated, not in cardiorespiratory distress  EENT: anicteric  CARDIOVASC:  (+) S1 S2, bradycardic rate and regular rhythm  PULMONARY:  clear to auscultation bilaterally  ABDOMEN:  soft, nontender, with normoactive bowel sounds  EXTREMITIES:  no edema  SKIN:  no rash    LABS:             11.9   8.5   )-----------( 289      ( 11 Aug 2018 12:42 )             37.0     138  |  100  |  23  ----------------------------<  105<H>  3.7   |  22  |  1.57<H> (1.77, 1.45)    Ca    9.2      12 Aug 2018 06:24  Phos  3.4     08-12  Mg     2.1     08-12    TPro  6.1  /  Alb  3.2<L>  /  TBili  0.2  /  DBili  x   /  AST  15  /  ALT  11  /  AlkPhos  63   @ 07:01  -   @ 07:00  IN: 450 mL / OUT: 1 mL / NET: 449 mL    HbA1C =   LDL = 132 () 134 ()   HDL = 37 () 34 ()  TG = 149 () 156 ()   TSH = 1.865 ()    Bacteriology:  CSF studies:  EEG:  Neuroimagin/12 MRI:  acute R PATRICIA infarction, scattered smaller areas of acute infarction L centrum semiovale, L subcortical insular, medial margin of R temporal horn, ?embolic, no hemorrhagic transformation; extensive SVID, numerous chronic small lacunar infarctions B basal ganglia, bilateral corona radiata, bilateral thalami, wilfred   CTP: nondiagnostic   CTA: mod calcified atherosclerotic plaque bilateral carotid bifurcation, 50-70% stenosis at origin, L VA likely chronically occluded; R VA and basilar artery hypoplastic, ?vertebrobasilar insufficiency   CT: multiple age-indeterminate lacunar infarctions, basal ganglia, thalami; mild HCP  Other imagin/11 TTE:  severe LVH EF 55% LA severely dilated,     MEDICATIONS: amlodipine 10mg daily asa 81mg daily atorvastatin 80mg HS hydralazine 10mg PO q8h     IV FLUIDS:  DRIPS:  DIET: regular diet  Lines:    CODE STATUS:  full code                       GOALS OF CARE:  aggressive                      DISPOSITION:  [facility]  NIHSS 6 --> 1, no tPA given due to resolution of symptoms =====================   STROKE ATTENDING NOTE  =====================     ALCON COPE   [id number]  Summary:  68y/F with depression, found down in apartment in her stool, admitted initially to [facility] under psychiatry fro major depression.  On day of admission, noted to have L facial droop and L weakness, NIHSS 6, SBP 170s  a.m. glu WNL.  CT CTA showed multiple lacunar infarcts of indeterminate age, hydrocephalus.  Transferred to [facility] for further stroke work-up.  Overnight Events:  Hypertensive to 200s    PMH:  Hypertension Depression  Allergies:  No Known Allergies  Home meds:     PHYSICAL EXAMINATION  T(C): 36.4 ( @ 05:53), Max: 36.9 ( @ 02:00) HR: 58 (- @ 12:47) (44 - 62) BP: 197/88 (- @ 12:47) (139/67 - 197/88) RR: 16 (- @ 12:47) (16 - 19) SpO2: 97% ( @ 12:47) (96% - 98%)  NEUROLOGIC EXAMINATION:  Patient examined this afternoon - noted to have L facial droop, L UE 2/5, L LE 1/5, awake alert, fully oriented, following commands, pupils equal and reactive.  GENERAL:  not intubated, not in cardiorespiratory distress  EENT: anicteric  CARDIOVASC:  (+) S1 S2, bradycardic rate and regular rhythm  PULMONARY:  clear to auscultation bilaterally  ABDOMEN:  soft, nontender, with normoactive bowel sounds  EXTREMITIES:  no edema  SKIN:  no rash    LABS:             11.9   8.5   )-----------( 289      ( 11 Aug 2018 12:42 )             37.0     138  |  100  |  23  ----------------------------<  105<H>  3.7   |  22  |  1.57<H> (1.77, 1.45)    Ca    9.2      12 Aug 2018 06:24  Phos  3.4     08-12  Mg     2.1     08-12    TPro  6.1  /  Alb  3.2<L>  /  TBili  0.2  /  DBili  x   /  AST  15  /  ALT  11  /  AlkPhos  63   @ 07:01  -   @ 07:00  IN: 450 mL / OUT: 1 mL / NET: 449 mL    HbA1C =   LDL = 132 () 134 ()   HDL = 37 () 34 ()  TG = 149 () 156 ()   TSH = 1.865 ()    Bacteriology:  CSF studies:  EEG:  Neuroimagin/12 MRI:  acute R PATRICIA infarction, scattered smaller areas of acute infarction L centrum semiovale, L subcortical insular, medial margin of R temporal horn, ?embolic, no hemorrhagic transformation; extensive SVID, numerous chronic small lacunar infarctions B basal ganglia, bilateral corona radiata, bilateral thalami, wilfred   CTP: nondiagnostic   CTA: mod calcified atherosclerotic plaque bilateral carotid bifurcation, 50-70% stenosis at origin, L VA likely chronically occluded; R VA and basilar artery hypoplastic, ?vertebrobasilar insufficiency   CT: multiple age-indeterminate lacunar infarctions, basal ganglia, thalamic; mild HCP  Other imagin/11 TTE:  severe LVH EF 55% LA severely dilated,     MEDICATIONS: amlodipine 10mg daily asa 81mg daily atorvastatin 80mg HS hydralazine 10mg PO q8h     IV FLUIDS:  DRIPS:  DIET: regular diet  Lines:    CODE STATUS:  full code                       GOALS OF CARE:  aggressive                      DISPOSITION:  [facility]  NIHSS 6 --> 1, no tPA given due to resolution of symptoms =====================   STROKE ATTENDING NOTE  =====================     ALCON COPE   [id number]  Summary:  68y/F with depression, found down in apartment in her stool, admitted initially to [facility] under psychiatry fro major depression.  On day of admission, noted to have L facial droop and L weakness, NIHSS 6, SBP 170s  a.m. glu WNL.  CT CTA showed multiple lacunar infarcts of indeterminate age, hydrocephalus.  Transferred to [facility] for further stroke work-up.  Overnight Events:  Hypertensive to 200s; stroke code called this afternoon for new facial droop, weakness L UE/LE; uncertain time of onset,     PMH:  Hypertension Depression  Allergies:  No Known Allergies  Home meds:     PHYSICAL EXAMINATION  T(C): 36.4 ( @ 05:53), Max: 36.9 ( @ 02:00) HR: 58 ( @ 12:47) (44 - 62) BP: 197/88 (-12 @ 12:47) (139/67 - 197/88) RR: 16 (- @ 12:47) (16 - 19) SpO2: 97% (- @ 12:47) (96% - 98%)  NEUROLOGIC EXAMINATION:  Patient examined this afternoon - noted to have L facial droop, L UE 2/5, L LE 1/5, awake alert, fully oriented, following commands, pupils equal and reactive.  GENERAL:  not intubated, not in cardiorespiratory distress  EENT: anicteric  CARDIOVASC:  (+) S1 S2, bradycardic rate and regular rhythm  PULMONARY:  clear to auscultation bilaterally  ABDOMEN:  soft, nontender, with normoactive bowel sounds  EXTREMITIES:  no edema  SKIN:  no rash    LABS:             11.9   8.5   )-----------( 289      ( 11 Aug 2018 12:42 )             37.0     138  |  100  |  23  ----------------------------<  105<H>  3.7   |  22  |  1.57<H> (1.77, 1.45)    Ca    9.2      12 Aug 2018 06:24  Phos  3.4       Mg     2.1         TPro  6.1  /  Alb  3.2<L>  /  TBili  0.2  /  DBili  x   /  AST  15  /  ALT  11  /  AlkPhos  63   @ 07:01  -   @ 07:00  IN: 450 mL / OUT: 1 mL / NET: 449 mL    HbA1C =   LDL = 132 () 134 ()   HDL = 37 () 34 ()  TG = 149 () 156 ()   TSH = 1.865 ()    Bacteriology:  CSF studies:  EEG:  Neuroimagin/12 MRI:  acute R PATRICIA infarction, scattered smaller areas of acute infarction L centrum semiovale, L subcortical insular, medial margin of R temporal horn, ?embolic, no hemorrhagic transformation; extensive SVID, numerous chronic small lacunar infarctions B basal ganglia, bilateral corona radiata, bilateral thalami, wilfred   CTP: nondiagnostic   CTA: mod calcified atherosclerotic plaque bilateral carotid bifurcation, 50-70% stenosis at origin, L VA likely chronically occluded; R VA and basilar artery hypoplastic, ?vertebrobasilar insufficiency   CT: multiple age-indeterminate lacunar infarctions, basal ganglia, thalamic; mild HCP  Other imagin/11 TTE:  severe LVH EF 55% LA severely dilated,     MEDICATIONS: amlodipine 10mg daily asa 81mg daily atorvastatin 80mg HS hydralazine 10mg PO q8h     IV FLUIDS:  DRIPS:  DIET: regular diet  Lines:    CODE STATUS:  full code                       GOALS OF CARE:  aggressive                      DISPOSITION:  [facility]  NIHSS 6 --> 1, no tPA given due to resolution of symptoms

## 2018-08-12 NOTE — PROGRESS NOTE ADULT - SUBJECTIVE AND OBJECTIVE BOX
**f/u Consult note**      Update: Neurosurgery was reconsulted for findings of acute Rt PATRICIA region ischemic stroke in MRI and CTA finding of vertebrobasilar insufficiency. During exam patient noted to have new facial droop along with LLE plegia and LUE weakness. Stroke code was called and patient was taken to CT. while laying flat, some motor function returned to LLE. repeat CT showed no areas of infarction. CTA () findings were stable as compared to . CT perfusion showed no perfusion abnormality. Pressure dependent motor deficit was suspected and  patient was admitted to ICU for SBP augmentation under Dr. Paul. Out of window for tpA;      Exam:   AAOX2-3. Patient appears somnolent. Patient intermittently fails to FC. Verbal function intact;   Left facial droop noted. PERRL; VA diminished in Left eye. EOMI.   LUE: AG proximally; 4+/5 handgrip  LLE: 0/5   RUE and RLE 5/5   Sensation: intact to touch in all extremities  Pronator Drift: not present in RUE.     < from: CT Perfusion w/ Maps w/ IV Cont (18 @ 15:59) >  IMPRESSION: No perfusion abnormality on the CBV are CBF map. Areas of   mild increased transit time within the posterior circulation as described   above.    < end of copied text >    < from: CT Head No Cont (18 @ 15:49) >  IMPRESSION:     No evidence of acute intracranial hemorrhage. Please note the patient had   an MRI performed earlier this morning which bihemispheric areas of acute   infarction. These are not appreciated on this noncontrast CT study. The     < end of copied text >    < from: CT Angio Head w/ IV Cont (18 @ 16:01) >  FINDINGS:    Comparison made to a CTA head and neck performed just yesterday at   2018 redemonstrates an occluded left vertebral artery from its   origin off the left subclavian artery throughout its course.   Redemonstrated ispatency of the right vertebral artery and basilar   artery though both hypoplastic in caliber, and unchanged in appearance.   Proximal segments of the bilateral PCA and SCA branches are patent.    Patent bilateral common carotid arteries. Redemonstration is combination   of calcified and noncalcified plaque at the bilateral carotid bifurcation   resulting in moderate stenosis as described on the earlier CTA head and   neck. Bilateral intracranial internal carotid arteries and middle   cerebral arteries are patent. Right A1 segment is normal in caliber. Left   A1 segment is hypoplastic, essentially unchanged in appearance from the   prior study.    IMPRESSION:    < end of copied text >  < from: CT Angio Neck w/ IV Cont (18 @ 16:00) >  FINDINGS:    Comparison made to a CTA head and neck performed just yesterday at   2018 redemonstrates an occluded left vertebral artery from its   origin off the left subclavian artery throughout its course.   Redemonstrated ispatency of the right vertebral artery and basilar   artery though both hypoplastic in caliber, and unchanged in appearance.   Proximal segments of the bilateral PCA and SCA branches are patent.    Patent bilateral common carotid arteries. Redemonstration is combination   of calcified and noncalcified plaque at the bilateral carotid bifurcation   resulting in moderate stenosis as described on the earlier CTA head and   neck. Bilateral intracranial internal carotid arteries and middle   cerebral arteries are patent. Right A1 segment is normal in caliber. Left   A1 segment is hypoplastic, essentially unchanged in appearance from the   prior study.    IMPRESSION:  No significant interval change.    < end of copied text >      HISTORY OF PRESENT ILLNESS: 67 yo F with PMHx of MDD, HTN admitted in psych unit for suicidal ideation had episodes of Left sided weakness for which stroke code was called this am at 07:05 am. presenting NIHSS was 6 which spontaneously improved to 1 Patient is on  and no tPA was given.  CTH showed no acute infarct but showed multiple age-indeterminate lacunar infarction in jovon basal ganglia and thalami. CTH also showed mild hydrocephalus. Patient denied HA, Nausea, vomiting, but as per team patient was incontinent earlier this morning. Patient is being treated for UTI. Neurosurgery was consulted for findings of hydrocephalus on CTH.         Vital Signs Last 24 Hrs  T(C): 36.4 (12 Aug 2018 05:53), Max: 36.9 (12 Aug 2018 02:00)  T(F): 97.5 (12 Aug 2018 05:53), Max: 98.4 (12 Aug 2018 02:00)  HR: 64 (12 Aug 2018 16:22) (44 - 64)  BP: 227/104 (12 Aug 2018 16:22) (139/67 - 227/104)  BP(mean): 149 (12 Aug 2018 16:22) (96 - 149)  RR: 16 (12 Aug 2018 16:22) (16 - 19)  SpO2: 98% (12 Aug 2018 16:22) (96% - 98%)    I&O's Summary    11 Aug 2018 07:01  -  12 Aug 2018 07:00  --------------------------------------------------------  IN: 450 mL / OUT: 1 mL / NET: 449 mL                          11.9   8.5   )-----------( 289      ( 11 Aug 2018 12:42 )             37.0     08-12    138  |  100  |  23  ----------------------------<  105<H>  3.7   |  22  |  1.57<H>    Ca    9.2      12 Aug 2018 06:24  Phos  3.4     08-12  Mg     2.1     08-12    TPro  6.1  /  Alb  3.2<L>  /  TBili  0.2  /  DBili  x   /  AST  15  /  ALT  11  /  AlkPhos  63  08-11    PT/INR - ( 11 Aug 2018 08:31 )   PT: 11.4 sec;   INR: 1.03          PTT - ( 11 Aug 2018 08:31 )  PTT:34.0 sec  Urinalysis Basic - ( 11 Aug 2018 09:10 )    Color: Yellow / Appearance: Clear / S.010 / pH: x  Gluc: x / Ketone: NEGATIVE  / Bili: Negative / Urobili: 0.2 E.U./dL   Blood: x / Protein: 30 mg/dL / Nitrite: NEGATIVE   Leuk Esterase: NEGATIVE / RBC: < 5 /HPF / WBC 5-10 /HPF   Sq Epi: x / Non Sq Epi: 0-5 /HPF / Bacteria: Many /HPF      CARDIAC MARKERS ( 11 Aug 2018 12:42 )  x     / 0.33 ng/mL / 62 U/L / x     / 2.6 ng/mL  CARDIAC MARKERS ( 11 Aug 2018 09:10 )  x     / x     / 72 U/L / x     / x      CARDIAC MARKERS ( 11 Aug 2018 08:31 )  x     / 0.36 ng/mL / x     / x     / x          WEAKNESS HYPERTENSIONBRADYCARDIA  No pertinent family history in first degree relatives  Handoff  MEWS Score  Hypertension  Depression  Weakness  Nutrition, metabolism, and development symptoms  Hypertensive emergency  Hydrocephalus  Severe episode of recurrent major depressive disorder, without psychotic features  Bradycardia, sinus  Hypertensive urgency  Lacunar infarction  No significant past surgical history  STROKE CODE  0  UTI (urinary tract infection)  Bradycardia  Hypertension      A/p: 67 yo F with vertebrobasilar insufficiency.     - will discuss with Dr. Haddad for further recommendation.

## 2018-08-12 NOTE — PROGRESS NOTE ADULT - PROBLEM SELECTOR PLAN 4
Psych to be consulted, unable to reach at this time. Patient was admitted for severe depression, with suicidality. On 1:1.  - c/w constant obs, 1:1

## 2018-08-13 LAB
ANION GAP SERPL CALC-SCNC: 14 MMOL/L — SIGNIFICANT CHANGE UP (ref 5–17)
BUN SERPL-MCNC: 23 MG/DL — SIGNIFICANT CHANGE UP (ref 7–23)
CALCIUM SERPL-MCNC: 9.2 MG/DL — SIGNIFICANT CHANGE UP (ref 8.4–10.5)
CHLORIDE SERPL-SCNC: 101 MMOL/L — SIGNIFICANT CHANGE UP (ref 96–108)
CO2 SERPL-SCNC: 22 MMOL/L — SIGNIFICANT CHANGE UP (ref 22–31)
CREAT SERPL-MCNC: 1.44 MG/DL — HIGH (ref 0.5–1.3)
GLUCOSE SERPL-MCNC: 104 MG/DL — HIGH (ref 70–99)
HBA1C BLD-MCNC: 5.2 % — SIGNIFICANT CHANGE UP (ref 4–5.6)
HCT VFR BLD CALC: 38.1 % — SIGNIFICANT CHANGE UP (ref 34.5–45)
HGB BLD-MCNC: 12.7 G/DL — SIGNIFICANT CHANGE UP (ref 11.5–15.5)
MAGNESIUM SERPL-MCNC: 2.1 MG/DL — SIGNIFICANT CHANGE UP (ref 1.6–2.6)
MCHC RBC-ENTMCNC: 29.1 PG — SIGNIFICANT CHANGE UP (ref 27–34)
MCHC RBC-ENTMCNC: 33.3 G/DL — SIGNIFICANT CHANGE UP (ref 32–36)
MCV RBC AUTO: 87.2 FL — SIGNIFICANT CHANGE UP (ref 80–100)
PHOSPHATE SERPL-MCNC: 3.4 MG/DL — SIGNIFICANT CHANGE UP (ref 2.5–4.5)
PLATELET # BLD AUTO: 281 K/UL — SIGNIFICANT CHANGE UP (ref 150–400)
POTASSIUM SERPL-MCNC: 3.7 MMOL/L — SIGNIFICANT CHANGE UP (ref 3.5–5.3)
POTASSIUM SERPL-SCNC: 3.7 MMOL/L — SIGNIFICANT CHANGE UP (ref 3.5–5.3)
RBC # BLD: 4.37 M/UL — SIGNIFICANT CHANGE UP (ref 3.8–5.2)
RBC # FLD: 13.9 % — SIGNIFICANT CHANGE UP (ref 10.3–16.9)
SODIUM SERPL-SCNC: 137 MMOL/L — SIGNIFICANT CHANGE UP (ref 135–145)
WBC # BLD: 11.7 K/UL — HIGH (ref 3.8–10.5)
WBC # FLD AUTO: 11.7 K/UL — HIGH (ref 3.8–10.5)

## 2018-08-13 PROCEDURE — 99291 CRITICAL CARE FIRST HOUR: CPT

## 2018-08-13 PROCEDURE — 99291 CRITICAL CARE FIRST HOUR: CPT | Mod: 24

## 2018-08-13 RX ORDER — AMLODIPINE BESYLATE 2.5 MG/1
5 TABLET ORAL ONCE
Qty: 0 | Refills: 0 | Status: COMPLETED | OUTPATIENT
Start: 2018-08-13 | End: 2018-08-13

## 2018-08-13 RX ORDER — HEPARIN SODIUM 5000 [USP'U]/ML
5000 INJECTION INTRAVENOUS; SUBCUTANEOUS EVERY 8 HOURS
Qty: 0 | Refills: 0 | Status: DISCONTINUED | OUTPATIENT
Start: 2018-08-13 | End: 2018-09-05

## 2018-08-13 RX ORDER — AMLODIPINE BESYLATE 2.5 MG/1
5 TABLET ORAL DAILY
Qty: 0 | Refills: 0 | Status: DISCONTINUED | OUTPATIENT
Start: 2018-08-13 | End: 2018-08-13

## 2018-08-13 RX ORDER — CEFTRIAXONE 500 MG/1
INJECTION, POWDER, FOR SOLUTION INTRAMUSCULAR; INTRAVENOUS
Qty: 0 | Refills: 0 | Status: DISCONTINUED | OUTPATIENT
Start: 2018-08-13 | End: 2018-08-14

## 2018-08-13 RX ORDER — CEFTRIAXONE 500 MG/1
1 INJECTION, POWDER, FOR SOLUTION INTRAMUSCULAR; INTRAVENOUS ONCE
Qty: 0 | Refills: 0 | Status: COMPLETED | OUTPATIENT
Start: 2018-08-13 | End: 2018-08-13

## 2018-08-13 RX ORDER — CEFTRIAXONE 500 MG/1
1 INJECTION, POWDER, FOR SOLUTION INTRAMUSCULAR; INTRAVENOUS EVERY 24 HOURS
Qty: 0 | Refills: 0 | Status: DISCONTINUED | OUTPATIENT
Start: 2018-08-14 | End: 2018-08-14

## 2018-08-13 RX ORDER — AMLODIPINE BESYLATE 2.5 MG/1
10 TABLET ORAL DAILY
Qty: 0 | Refills: 0 | Status: DISCONTINUED | OUTPATIENT
Start: 2018-08-14 | End: 2018-08-27

## 2018-08-13 RX ORDER — LISINOPRIL 2.5 MG/1
20 TABLET ORAL DAILY
Qty: 0 | Refills: 0 | Status: DISCONTINUED | OUTPATIENT
Start: 2018-08-13 | End: 2018-08-14

## 2018-08-13 RX ORDER — NICARDIPINE HYDROCHLORIDE 30 MG/1
5 CAPSULE, EXTENDED RELEASE ORAL
Qty: 40 | Refills: 0 | Status: DISCONTINUED | OUTPATIENT
Start: 2018-08-13 | End: 2018-08-14

## 2018-08-13 RX ADMIN — LISINOPRIL 20 MILLIGRAM(S): 2.5 TABLET ORAL at 16:57

## 2018-08-13 RX ADMIN — HEPARIN SODIUM 5000 UNIT(S): 5000 INJECTION INTRAVENOUS; SUBCUTANEOUS at 15:00

## 2018-08-13 RX ADMIN — Medication 325 MILLIGRAM(S): at 12:44

## 2018-08-13 RX ADMIN — NICARDIPINE HYDROCHLORIDE 25 MG/HR: 30 CAPSULE, EXTENDED RELEASE ORAL at 23:22

## 2018-08-13 RX ADMIN — ATORVASTATIN CALCIUM 80 MILLIGRAM(S): 80 TABLET, FILM COATED ORAL at 21:25

## 2018-08-13 RX ADMIN — NICARDIPINE HYDROCHLORIDE 25 MG/HR: 30 CAPSULE, EXTENDED RELEASE ORAL at 00:33

## 2018-08-13 RX ADMIN — AMLODIPINE BESYLATE 5 MILLIGRAM(S): 2.5 TABLET ORAL at 12:44

## 2018-08-13 RX ADMIN — AMLODIPINE BESYLATE 5 MILLIGRAM(S): 2.5 TABLET ORAL at 16:57

## 2018-08-13 RX ADMIN — HEPARIN SODIUM 5000 UNIT(S): 5000 INJECTION INTRAVENOUS; SUBCUTANEOUS at 21:25

## 2018-08-13 RX ADMIN — CEFTRIAXONE 100 GRAM(S): 500 INJECTION, POWDER, FOR SOLUTION INTRAMUSCULAR; INTRAVENOUS at 15:01

## 2018-08-13 NOTE — DIETITIAN INITIAL EVALUATION ADULT. - PROBLEM SELECTOR PLAN 2
- SBP 220s-230s in the ED and on floor, patient entirely asymptomatic   - Elevated trop, since peaked, likely in the setting of demand ischemia   - Permissive HTN - SBP < 200mmHg in setting of stroke   - Started patient on Norvasc 10mg - and will start Hydralazine 10mg TID and up-titrate as allows to meet BP goal.

## 2018-08-13 NOTE — PROGRESS NOTE ADULT - ASSESSMENT
68F w/PMH HTN and MDD who initially p/w SI, hosp course c/b hypertensive emergency w/CVA and asx bradycardia.    - Imaging shows acute R PATRICIA territory infarction; L centrum semiovale, L subcortical insular, medial R temporal horn. chronic small lacunar infarctions B basal ganglia, corona radiata, bilateral thalami, wilfred  - NSICU team concerned for cardioembolic phenomenon, KATHERINE ordered  - L VA chronic occlusion, R VA and basilar artery hypoplastic -> VB insuff. Does not fully explain neuro event, however concern that pt may be flow dep and need elevated pressures to maintain cerebral perfusion  - C/w cardene gtt as per NSICU team, lowering slowly and monitoring neuro exam. Started on norvasc 5mg POqd, will likely need to be uptitrated tomorrow. Would minimize use of hydralazine IVP as BP response can be unpredictable. Can start PO hydralazine when ok w/NSICU team  - TTE w/severe concentric LVH, but no LVOT obstruction  - MARYJANE likely 2/2 hypertensive emergency, improving, cont to monitor, avoid nephrotoxic agents  - C/w [id number] and lipitor 80qhs as per neuro for risk modification  - Asx bradycardia, when able to ambulate will need to check HR w/ambulation to assess chronotropic competence. Avoid AVN blockade (cardene w/some effect, but primarily anti-HTN)

## 2018-08-13 NOTE — PROGRESS NOTE ADULT - SUBJECTIVE AND OBJECTIVE BOX
NEUROCRITICAL CARE PROGRESS NOTE    ALCON COPE   MRN-[id number]  Summary:  /  HPI:  69yo Female, PMHx HTN and Major Depressive Disorder, presented to the ED with recent history of self harm and depressed mood. Primary complaint was of progressive functional decline at home w/severe anhedonia, anorexia, poor sleep, suicidality (would cut herself and claims to have started cutting herself several months ago), often drinking alcohol at home to consol herself. She states that her depression began after retiring from her job at the [employer] 1.5yrs ago. Patient was admitted to [facility] for depressive episode - however, 1-2 hours after arriving onto the floor, a stroke code was called for L sided weakness and hypertension. NIHSS was 6 on initial assessment. On CTH - noted to have lacunar infarcts (basal ganglia and R/L thalami). On second assessment, NIHSS 1. Patient was brought back to the ED and re-worked up. EKG revealed profound bradycardia - Cardiology was consulted for HR 30s - asymptomatic. Neurosurgery also consulted for what appeared to be mild hydrocephalus on CT Scan.     S/Overnight events:      Vital Signs Last 24 Hrs  T(C): 36.3 (12 Aug 2018 22:05), Max: 36.6 (12 Aug 2018 18:00)  T(F): 97.4 (12 Aug 2018 22:05), Max: 97.8 (12 Aug 2018 18:00)  HR: 58 (13 Aug 2018 07:00) (54 - 76)  BP: 174/78 (13 Aug 2018 07:00) (139/67 - 243/117)  BP(mean): 117 (13 Aug 2018 07:00) (88 - 182)  RR: 18 (13 Aug 2018 07:00) (14 - 20)  SpO2: 97% (13 Aug 2018 07:00) (95% - 98%)    I&O's Detail    12 Aug 2018 07:01  -  13 Aug 2018 07:00  --------------------------------------------------------  IN:    niCARdipine Infusion: 142.5 mL    sodium chloride 0.9%.: 900 mL  Total IN: 1042.5 mL    OUT:    Voided: 400 mL  Total OUT: 400 mL    Total NET: 642.5 mL      LABS:                        12.7   11.7  )-----------( 281      ( 13 Aug 2018 05:19 )             38.1     08-13    137  |  101  |  23  ----------------------------<  104<H>  3.7   |  22  |  1.44<H>    Ca    9.2      13 Aug 2018 05:19  Phos  3.4     08-13  Mg     2.1         TPro  6.1  /  Alb  3.2<L>  /  TBili  0.2  /  DBili  x   /  AST  15  /  ALT  11  /  AlkPhos  63  08-11    PT/INR - ( 11 Aug 2018 08:31 )   PT: 11.4 sec;   INR: 1.03          PTT - ( 11 Aug 2018 08:31 )  PTT:34.0 sec  Urinalysis Basic - ( 11 Aug 2018 09:10 )    Color: Yellow / Appearance: Clear / S.010 / pH: x  Gluc: x / Ketone: NEGATIVE  / Bili: Negative / Urobili: 0.2 E.U./dL   Blood: x / Protein: 30 mg/dL / Nitrite: NEGATIVE   Leuk Esterase: NEGATIVE / RBC: < 5 /HPF / WBC 5-10 /HPF   Sq Epi: x / Non Sq Epi: 0-5 /HPF / Bacteria: Many /HPF      CARDIAC MARKERS ( 11 Aug 2018 12:42 )  x     / 0.33 ng/mL / 62 U/L / x     / 2.6 ng/mL  CARDIAC MARKERS ( 11 Aug 2018 09:10 )  x     / x     / 72 U/L / x     / x      CARDIAC MARKERS ( 11 Aug 2018 08:31 )  x     / 0.36 ng/mL / x     / x     / x          CAPILLARY BLOOD GLUCOSE  130 (12 Aug 2018 17:54)      POCT Blood Glucose.: 99 mg/dL (13 Aug 2018 06:18)  POCT Blood Glucose.: 100 mg/dL (12 Aug 2018 22:20)  POCT Blood Glucose.: 130 mg/dL (12 Aug 2018 15:21)      Drug Levels: [] N/A    CSF Analysis: [] N/A      Allergies    No Known Allergies    Intolerances      MEDICATIONS:  Antibiotics:    Neuro:  aspirin 325 milliGRAM(s) Oral daily    Anticoagulation:    OTHER:  atorvastatin 80 milliGRAM(s) Oral at bedtime  atropine Injectable 0.5 milliGRAM(s) IV Push every 5 minutes PRN  dextrose 40% Gel 15 Gram(s) Oral once PRN  dextrose 50% Injectable 12.5 Gram(s) IV Push once  dextrose 50% Injectable 25 Gram(s) IV Push once  dextrose 50% Injectable 25 Gram(s) IV Push once  glucagon  Injectable 1 milliGRAM(s) IntraMuscular once PRN  insulin lispro (HumaLOG) corrective regimen sliding scale   SubCutaneous Before meals and at bedtime  niCARdipine Infusion 5 mG/Hr IV Continuous <Continuous>    IVF:  dextrose 5%. 1000 milliLiter(s) IV Continuous <Continuous>  sodium chloride 0.9%. 1000 milliLiter(s) IV Continuous <Continuous>    CULTURES:  Culture Results:   >100,000 CFU/ml Gram Negative Rods  Identification and susceptibility to follow. ( @ 12:08)          NEUROLOGIC EXAMINATION:  Patient examined this afternoon - noted to have L facial droop, L UE 2/5, L LE 1/5, awake alert, fully oriented, following commands, pupils equal and reactive.  GENERAL:  not intubated, not in cardiorespiratory distress  EENT: anicteric  CARDIOVASC:  (+) S1 S2, bradycardic rate and regular rhythm  PULMONARY:  clear to auscultation bilaterally  ABDOMEN:  soft, nontender, with normoactive bowel sounds  EXTREMITIES:  no edema  SKIN:  no rash    LABS:             11.9   8.5   )-----------( 289      ( 11 Aug 2018 12:42 )             37.0     138  |  100  |  23  ----------------------------<  105<H>  3.7   |  22  |  1.57<H> (1.77, 1.45)    Ca    9.2      12 Aug 2018 06:24  Phos  3.4       Mg     2.1         TPro  6.1  /  Alb  3.2<L>  /  TBili  0.2  /  DBili  x   /  AST  15  /  ALT  11  /  AlkPhos  63   @ 07:01  -   @ 07:00  IN: 450 mL / OUT: 1 mL / NET: 449 mL    HbA1C =   LDL = 132 () 134 ()   HDL = 37 () 34 ()  TG = 149 () 156 ()   TSH = 1.865 ()    Bacteriology:  CSF studies:  EEG:  Neuroimagin/12 MRI:  acute R PATRICIA infarction, scattered smaller areas of acute infarction L centrum semiovale, L subcortical insular, medial margin of R temporal horn, ?embolic, no hemorrhagic transformation; extensive SVID, numerous chronic small lacunar infarctions B basal ganglia, bilateral corona radiata, bilateral thalami, wilfred   CTP: nondiagnostic   CTA: mod calcified atherosclerotic plaque bilateral carotid bifurcation, 50-70% stenosis at origin, L VA likely chronically occluded; R VA and basilar artery hypoplastic, ?vertebrobasilar insufficiency   CT: multiple age-indeterminate lacunar infarctions, basal ganglia, thalamic; mild HCP  Other imagin/11 TTE:  severe LVH EF 55% LA severely dilated,     MEDICATIONS: amlodipine 10mg daily asa 81mg daily atorvastatin 80mg HS hydralazine 10mg PO q8h NEUROCRITICAL CARE PROGRESS NOTE    ALCON COPE   [id number]  Summary:  /  HPI:  69yo Female, PMHx HTN and Major Depressive Disorder, presented to the ED with recent history of self harm and depressed mood. Primary complaint was of progressive functional decline at home w/severe anhedonia, anorexia, poor sleep, suicidality (would cut herself and claims to have started cutting herself several months ago), often drinking alcohol at home to consol herself. She states that her depression began after retiring from her job at the [employer] 1.5yrs ago. Patient was admitted to [facility] for depressive episode - however, 1-2 hours after arriving onto the floor, a stroke code was called for L sided weakness and hypertension. NIHSS was 6 on initial assessment. On CTH - noted to have lacunar infarcts (basal ganglia and R/L thalami). On second assessment, NIHSS 1. Patient was brought back to the ED and re-worked up. EKG revealed profound bradycardia - Cardiology was consulted for HR 30s - asymptomatic. Neurosurgery also consulted for what appeared to be mild hydrocephalus on CT Scan.     S/Overnight events:  NPO. doing well. stroke service called to evaluate. spoke to  to speak to friend.  Patient does NOT want us to contact her sister and her mother in a nursing home.      Vital Signs Last 24 Hrs  T(C): 36.3 (12 Aug 2018 22:05), Max: 36.6 (12 Aug 2018 18:00)  T(F): 97.4 (12 Aug 2018 22:05), Max: 97.8 (12 Aug 2018 18:00)  HR: 58 (13 Aug 2018 07:00) (54 - 76)  BP: 174/78 (13 Aug 2018 07:00) (139/67 - 243/117)  BP(mean): 117 (13 Aug 2018 07:00) (88 - 182)  RR: 18 (13 Aug 2018 07:00) (14 - 20)  SpO2: 97% (13 Aug 2018 07:00) (95% - 98%)    I&O's Detail    12 Aug 2018 07:01  -  13 Aug 2018 07:00  --------------------------------------------------------  IN:    niCARdipine Infusion: 142.5 mL    sodium chloride 0.9%.: 900 mL  Total IN: 1042.5 mL    OUT:    Voided: 400 mL  Total OUT: 400 mL    Total NET: 642.5 mL      LABS:                        12.7   11.7  )-----------( 281      ( 13 Aug 2018 05:19 )             38.1     08-13    137  |  101  |  23  ----------------------------<  104<H>  3.7   |  22  |  1.44<H>    Ca    9.2      13 Aug 2018 05:19  Phos  3.4     08-13  Mg     2.1     08-13    TPro  6.1  /  Alb  3.2<L>  /  TBili  0.2  /  DBili  x   /  AST  15  /  ALT  11  /  AlkPhos  63  08-11    PT/INR - ( 11 Aug 2018 08:31 )   PT: 11.4 sec;   INR: 1.03          PTT - ( 11 Aug 2018 08:31 )  PTT:34.0 sec  Urinalysis Basic - ( 11 Aug 2018 09:10 )    Color: Yellow / Appearance: Clear / S.010 / pH: x  Gluc: x / Ketone: NEGATIVE  / Bili: Negative / Urobili: 0.2 E.U./dL   Blood: x / Protein: 30 mg/dL / Nitrite: NEGATIVE   Leuk Esterase: NEGATIVE / RBC: < 5 /HPF / WBC 5-10 /HPF   Sq Epi: x / Non Sq Epi: 0-5 /HPF / Bacteria: Many /HPF      CARDIAC MARKERS ( 11 Aug 2018 12:42 )  x     / 0.33 ng/mL / 62 U/L / x     / 2.6 ng/mL  CARDIAC MARKERS ( 11 Aug 2018 09:10 )  x     / x     / 72 U/L / x     / x      CARDIAC MARKERS ( 11 Aug 2018 08:31 )  x     / 0.36 ng/mL / x     / x     / x          CAPILLARY BLOOD GLUCOSE  130 (12 Aug 2018 17:54)      POCT Blood Glucose.: 99 mg/dL (13 Aug 2018 06:18)  POCT Blood Glucose.: 100 mg/dL (12 Aug 2018 22:20)  POCT Blood Glucose.: 130 mg/dL (12 Aug 2018 15:21)      Drug Levels: [] N/A    CSF Analysis: [] N/A      Allergies    No Known Allergies    Intolerances      MEDICATIONS:  Antibiotics:    Neuro:  aspirin 325 milliGRAM(s) Oral daily    Anticoagulation:    OTHER:  atorvastatin 80 milliGRAM(s) Oral at bedtime  atropine Injectable 0.5 milliGRAM(s) IV Push every 5 minutes PRN  dextrose 40% Gel 15 Gram(s) Oral once PRN  dextrose 50% Injectable 12.5 Gram(s) IV Push once  dextrose 50% Injectable 25 Gram(s) IV Push once  dextrose 50% Injectable 25 Gram(s) IV Push once  glucagon  Injectable 1 milliGRAM(s) IntraMuscular once PRN  insulin lispro (HumaLOG) corrective regimen sliding scale   SubCutaneous Before meals and at bedtime  niCARdipine Infusion 5 mG/Hr IV Continuous <Continuous>    IVF:  dextrose 5%. 1000 milliLiter(s) IV Continuous <Continuous>  sodium chloride 0.9%. 1000 milliLiter(s) IV Continuous <Continuous>    CULTURES:  Culture Results:   >100,000 CFU/ml Gram Negative Rods  Identification and susceptibility to follow. ( @ 12:08)      NEUROLOGIC EXAMINATION:  Patient examined this afternoon - noted to have L facial droop, L UE 2/5, L LE 1/5, awake alert, fully oriented, following commands, pupils equal and reactive.    LABS:             11.9   8.5   )-----------( 289      ( 11 Aug 2018 12:42 )             37.0     138  |  100  |  23  ----------------------------<  105<H>  3.7   |  22  |  1.57<H> (1.77, 1.45)    Ca    9.2      12 Aug 2018 06:24  Phos  3.4       Mg     2.1         TPro  6.1  /  Alb  3.2<L>  /  TBili  0.2  /  DBili  x   /  AST  15  /  ALT  11  /  AlkPhos  63   @ 07:01  -  12 @ 07:00  IN: 450 mL / OUT: 1 mL / NET: 449 mL    HbA1C =   LDL = 132 () 134 ()   HDL = 37 () 34 ()  TG = 149 () 156 ()   TSH = 1.865 ()    Bacteriology:  CSF studies:  EEG:  Neuroimagin/12 MRI:  acute R PATRICIA infarction, scattered smaller areas of acute infarction L centrum semiovale, L subcortical insular, medial margin of R temporal horn, ?embolic, no hemorrhagic transformation; extensive SVID, numerous chronic small lacunar infarctions B basal ganglia, bilateral corona radiata, bilateral thalami, wilfred   CTP: nondiagnostic   CTA: mod calcified atherosclerotic plaque bilateral carotid bifurcation, 50-70% stenosis at origin, L VA likely chronically occluded; R VA and basilar artery hypoplastic, ?vertebrobasilar insufficiency   CT: multiple age-indeterminate lacunar infarctions, basal ganglia, thalamic; mild HCP  Other imagin/11 TTE:  severe LVH EF 55% LA severely dilated,     MEDICATIONS: amlodipine 10mg daily asa 81mg daily atorvastatin 80mg HS hydralazine 10mg PO q8h

## 2018-08-13 NOTE — PROGRESS NOTE ADULT - ASSESSMENT
68y/F with  1.  acute R PATRICIA territory infarction; L centrum semiovale, L subcortical insular, medial R temporal horn; likely cardioembolic / A2A; chronic small lacunar infarctions B basal ganglia, corona radiata, bilateral thalami, wilfred  2.  vertebrobasilar insufficiency, L VA chronic occlusion, R VA / basilar artery hypoplastic  3.  Hypertension, dyslipidemia  4.  depression  5.  acute on chronic kidney injury    PLAN:   NEURO: neurochecks q1h, transfer to ICU for closer monitoring  acute R PATRICIA infarction:  KATHERINE,  daily, statin  vertebrobasilar insufficiency:  neurointerventional consult  depression: not on any pharmacologic treatment for now  REHAB:  physical therapy evaluation and management    EARLY MOB:  bedrest, flat    PULM:  Room air, incentive spirometry  CARDIO:  SBP goal 160-220mm Hg permissive hypertension, d/c amlodipine; hydralazine; KATHERINE  ENDO:  Blood sugar goals 140-180 mg/dL, continue insulin sliding scale; f/u A1C  GI:  PPI for GI prophylaxis  DIET: NPO for now  RENAL:  bolus 500cc x 1; start NS @75cc/hr  HEM/ONC: Hb stable  VTE Prophylaxis: SCDs, no DVT chemoprophylaxis for now as patient is high risk for bleed (recent stroke, permissive hypertension)  ID: afebrile, no leukocytosis  Social: Leyla (friend, HCP, power of ) at bedside and updated    ATTENDING ATTESTATION:  I was physically present for the key portions of the evaluation and management (E/M) service provided.  I agree with the above history, physical and plan, which I have reviewed and edited where appropriate.    Patient at high risk for neurological deterioration or death due to:  ICU delirium, aspiration PNA, DVT / PE.  Critical care time, excluding procedures: 60 minutes spent on total encounter, more than 50% of the visit was spent counseling and/or coordinating care by the attending physician.     Plan discussed with RN, house staff. 68y/F with  1.  acute R PATRICIA territory infarction; L centrum semiovale, L subcortical insular, medial R temporal horn; likely cardioembolic / A2A; chronic small lacunar infarctions B basal ganglia, corona radiata, bilateral thalami, wilfred  2.  vertebrobasilar insufficiency, L VA chronic occlusion, R VA / basilar artery hypoplastic  3.  Hypertension, dyslipidemia  4.  depression  5.  acute on chronic kidney injury    PLAN:   NEURO: neurochecks q1h, transfer to ICU for closer monitoring  acute R PATRICIA infarction:  KATHERINE,  daily, statin  vertebrobasilar insufficiency:  neurointerventional consult  depression: not on any pharmacologic treatment for now  REHAB:  physical therapy evaluation and management    EARLY MOB:  bedrest, flat    PULM:  Room air, incentive spirometry  CARDIO:  SBP goal 160-220mm Hg permissive hypertension, d/c amlodipine; hydralazine; KATHERINE  ENDO:  Blood sugar goals 140-180 mg/dL, continue insulin sliding scale; f/u A1C  GI:  PPI for GI prophylaxis  DIET: NPO for now  RENAL:  start NS @75cc/hr  HEM/ONC: Hb stable  VTE Prophylaxis: SCDs, DVT chemoprophylaxis:  start heparin 5k q 8  ID: afebrile, no leukocytosis  Social: Leyla (friend, HCP, power of ) at bedside and updated    ATTENDING ATTESTATION:  I was physically present for the key portions of the evaluation and management (E/M) service provided.  I agree with the above history, physical and plan, which I have reviewed and edited where appropriate.    Patient at high risk for neurological deterioration or death due to:  ICU delirium, aspiration PNA, DVT / PE.  Critical care time, excluding procedures: 60 minutes spent on total encounter, more than 50% of the visit was spent counseling and/or coordinating care by the attending physician.     Plan discussed with RN, house staff. 68y/F with  1.  acute R PATRICIA territory infarction; L centrum semiovale, L subcortical insular, medial R temporal horn; likely cardioembolic / A2A; chronic small lacunar infarctions B basal ganglia, corona radiata, bilateral thalami, wilfred  2.  vertebrobasilar insufficiency, L VA chronic occlusion, R VA / basilar artery hypoplastic  3.  Hypertension, dyslipidemia  4.  depression  5.  acute on chronic kidney injury    PLAN:   NEURO: neurochecks q1h, transfer to ICU for closer monitoring  acute R PATRICIA infarction:  KATHERINE,  daily, statin  vertebrobasilar insufficiency:  neurointerventional consult  depression: not on any pharmacologic treatment for now  REHAB:  physical therapy evaluation and management    EARLY MOB:  bedrest, flat    PULM:  Room air, incentive spirometry  CARDIO:  SBP goal 160-220mm Hg permissive hypertension, d/c amlodipine; hydralazine; KATHERINE  ENDO:  Blood sugar goals 140-180 mg/dL, continue insulin sliding scale; f/u A1C  GI:  PPI for GI prophylaxis  DIET: NPO for now  RENAL:  NS @75cc/hr  HEM/ONC: Hb stable  VTE Prophylaxis: SCDs, DVT chemoprophylaxis:  start heparin 5k q 8  ID: afebrile, leukocytosis, GNR in urine, start ceftriaxone, f/u on cultures  Social: Leyla (friend, HCP, power of ) at bedside and updated    ATTENDING ATTESTATION:  I was physically present for the key portions of the evaluation and management (E/M) service provided.  I agree with the above history, physical and plan, which I have reviewed and edited where appropriate.    Patient at high risk for neurological deterioration or death due to:  ICU delirium, aspiration PNA, DVT / PE.  Critical care time, excluding procedures: 60 minutes spent on total encounter, more than 50% of the visit was spent counseling and/or coordinating care by the attending physician.     Plan discussed with RN, house staff.

## 2018-08-13 NOTE — PROGRESS NOTE ADULT - SUBJECTIVE AND OBJECTIVE BOX
INTERVAL EVENTS:    Pt seen and examined at bedside, no major events o/n. Pt still HTN on cardene gtt, highest 231/116 at 11PM last night, received hydralazine 5mg IVP x1. Pt currently denies complaints inc CP/palp/SOB/HA/LH/dizziness    MEDICATIONS  (STANDING):  amLODIPine   Tablet 5 milliGRAM(s) Oral daily  aspirin 325 milliGRAM(s) Oral daily  atorvastatin 80 milliGRAM(s) Oral at bedtime  cefTRIAXone   IVPB      dextrose 5%. 1000 milliLiter(s) (50 mL/Hr) IV Continuous <Continuous>  dextrose 50% Injectable 12.5 Gram(s) IV Push once  dextrose 50% Injectable 25 Gram(s) IV Push once  dextrose 50% Injectable 25 Gram(s) IV Push once  heparin  Injectable 5000 Unit(s) SubCutaneous every 8 hours  niCARdipine Infusion 5 mG/Hr (25 mL/Hr) IV Continuous <Continuous>  sodium chloride 0.9%. 1000 milliLiter(s) (75 mL/Hr) IV Continuous <Continuous>    MEDICATIONS  (PRN):  atropine Injectable 0.5 milliGRAM(s) IV Push every 5 minutes PRN Symptomatic Bradycardia  dextrose 40% Gel 15 Gram(s) Oral once PRN Blood Glucose LESS THAN 70 milliGRAM(s)/deciliter  glucagon  Injectable 1 milliGRAM(s) IntraMuscular once PRN Glucose LESS THAN 70 milligrams/deciliter      Vital Signs Last 24 Hrs  T(C): 35.5 (13 Aug 2018 14:00), Max: 36.6 (12 Aug 2018 18:00)  T(F): 95.9 (13 Aug 2018 14:00), Max: 97.8 (12 Aug 2018 18:00)  HR: 58 (13 Aug 2018 13:00) (54 - 76)  BP: 189/98 (13 Aug 2018 13:00) (150/70 - 243/117)  BP(mean): 154 (13 Aug 2018 13:00) (88 - 182)  RR: 18 (13 Aug 2018 10:01) (14 - 20)  SpO2: 97% (13 Aug 2018 13:00) (90% - 98%)     PHYSICAL EXAM:  GEN: Awake, alert. NAD. Flat affect  HEENT: NCAT, PERRL, EOMI. Mucosa moist. No JVD.  RESP: CTA b/l  CV: RRR. Normal S1/S2. No m/r/g.  ABD: Soft. NT/ND. BS+  EXT: Warm. No edema, clubbing, or cyanosis.   NEURO: AAOx3. Fluent speech. Mild L-sided facial droop. L-sided weakness (LLE>LUE)    LABS:                        12.7   11.7  )-----------( 281      ( 13 Aug 2018 05:19 )             38.1     08-13    137  |  101  |  23  ----------------------------<  104<H>  3.7   |  22  |  1.44<H>    Ca    9.2      13 Aug 2018 05:19  Phos  3.4     08-13  Mg     2.1     08-13              I&O's Summary    12 Aug 2018 07:01  -  13 Aug 2018 07:00  --------------------------------------------------------  IN: 1042.5 mL / OUT: 400 mL / NET: 642.5 mL      BNP  RADIOLOGY & ADDITIONAL STUDIES:    TELEMETRY: Sinus 40s-60s. No events. Notable TWI/borderline ST dep in inf leads, present on prev EKG, likely repol abn, unchanged.    EKG: No EKG for review

## 2018-08-13 NOTE — PROGRESS NOTE ADULT - SUBJECTIVE AND OBJECTIVE BOX
Hospital Course:   :admitted in psych unit for suicidal ideation had episodes of Left sided weakness for which stroke code was called this am at 07:05 am. presenting NIHSS was 6 which spontaneously improved to 1 Patient is on  and no tPA was given.  CTH showed no acute infarct but showed multiple age-indeterminate lacunar infarction in the basal ganglia and thalami. CTH also showed mild hydrocephalus. Neurosurgery was consulted for findings of hydrocephalus on CTH.   : Neurosurgery was reconsulted for findings of acute Rt PATRICIA region ischemic stroke in MRI and CTA finding of vertebrobasilar insufficiency. During exam patient noted to have new facial droop along with LLE plegia and LUE weakness. Stroke code was called and patient was taken to CT. while laying flat, some motor function returned to LLE. repeat CT showed no areas of infarction. CTA () findings were stable as compared to . CT perfusion showed no perfusion abnormality. Pressure dependent motor deficit was suspected and  patient was admitted to ICU for SBP augmentation under Dr. Strickland Out of window for tpA  : Neurologically Pt is stable.      Vital Signs Last 24 Hrs  T(C): 36.3 (12 Aug 2018 22:05), Max: 36.6 (12 Aug 2018 18:00)  T(F): 97.4 (12 Aug 2018 22:05), Max: 97.8 (12 Aug 2018 18:00)  HR: 58 (13 Aug 2018 06:00) (54 - 76)  BP: 209/84 (13 Aug 2018 06:00) (139/67 - 243/117)  BP(mean): 129 (13 Aug 2018 06:00) (88 - 182)  RR: 18 (13 Aug 2018 06:00) (14 - 20)  SpO2: 97% (13 Aug 2018 06:00) (95% - 98%)    I&O's Detail    11 Aug 2018 07:01  -  12 Aug 2018 07:00  --------------------------------------------------------  IN:    sodium chloride 0.9%: 450 mL  Total IN: 450 mL    OUT:    Voided: 1 mL  Total OUT: 1 mL    Total NET: 449 mL      12 Aug 2018 07:01  -  13 Aug 2018 06:24  --------------------------------------------------------  IN:    niCARdipine Infusion: 107.5 mL    sodium chloride 0.9%.: 750 mL  Total IN: 857.5 mL    OUT:    Voided: 400 mL  Total OUT: 400 mL    Total NET: 457.5 mL        I&O's Summary    11 Aug 2018 07:  -  12 Aug 2018 07:00  --------------------------------------------------------  IN: 450 mL / OUT: 1 mL / NET: 449 mL    12 Aug 2018 07:  -  13 Aug 2018 06:24  --------------------------------------------------------  IN: 857.5 mL / OUT: 400 mL / NET: 457.5 mL    PHYSICAL EXAM:  Neurological: AAOX2-3, somnolent. Patient intermittently fails to FC. Verbal function intact  Left facial droop noted. PERRL; VA diminished in Left eye. EOMI.   LUE: 3/5 proximally; 4+/5 handgrip, LLE: 0/5 , RUE and RLE 5/5, no drift  SILT    LABS:                        12.7   11.7  )-----------( 281      ( 13 Aug 2018 05:19 )             38.1     08-13    137  |  101  |  23  ----------------------------<  104<H>  3.7   |  22  |  1.44<H>    Ca    9.2      13 Aug 2018 05:19  Phos  3.4     08-13  Mg     2.1     08-13    TPro  6.1  /  Alb  3.2<L>  /  TBili  0.2  /  DBili  x   /  AST  15  /  ALT  11  /  AlkPhos  63  08-11    PT/INR - ( 11 Aug 2018 08:31 )   PT: 11.4 sec;   INR: 1.03          PTT - ( 11 Aug 2018 08:31 )  PTT:34.0 sec  Urinalysis Basic - ( 11 Aug 2018 09:10 )    Color: Yellow / Appearance: Clear / S.010 / pH: x  Gluc: x / Ketone: NEGATIVE  / Bili: Negative / Urobili: 0.2 E.U./dL   Blood: x / Protein: 30 mg/dL / Nitrite: NEGATIVE   Leuk Esterase: NEGATIVE / RBC: < 5 /HPF / WBC 5-10 /HPF   Sq Epi: x / Non Sq Epi: 0-5 /HPF / Bacteria: Many /HPF      CARDIAC MARKERS ( 11 Aug 2018 12:42 )  x     / 0.33 ng/mL / 62 U/L / x     / 2.6 ng/mL  CARDIAC MARKERS ( 11 Aug 2018 09:10 )  x     / x     / 72 U/L / x     / x      CARDIAC MARKERS ( 11 Aug 2018 08:31 )  x     / 0.36 ng/mL / x     / x     / x          CAPILLARY BLOOD GLUCOSE  130 (12 Aug 2018 17:54)      POCT Blood Glucose.: 100 mg/dL (12 Aug 2018 22:20)  POCT Blood Glucose.: 130 mg/dL (12 Aug 2018 15:21)      Drug Levels: [] N/A    CSF Analysis: [] N/A      Allergies    No Known Allergies    Intolerances      MEDICATIONS:  Antibiotics:    Neuro:  aspirin 325 milliGRAM(s) Oral daily    Anticoagulation:    OTHER:  atorvastatin 80 milliGRAM(s) Oral at bedtime  atropine Injectable 0.5 milliGRAM(s) IV Push every 5 minutes PRN  dextrose 40% Gel 15 Gram(s) Oral once PRN  dextrose 50% Injectable 12.5 Gram(s) IV Push once  dextrose 50% Injectable 25 Gram(s) IV Push once  dextrose 50% Injectable 25 Gram(s) IV Push once  glucagon  Injectable 1 milliGRAM(s) IntraMuscular once PRN  insulin lispro (HumaLOG) corrective regimen sliding scale   SubCutaneous Before meals and at bedtime  niCARdipine Infusion 5 mG/Hr IV Continuous <Continuous>    IVF:  dextrose 5%. 1000 milliLiter(s) IV Continuous <Continuous>  sodium chloride 0.9%. 1000 milliLiter(s) IV Continuous <Continuous>    CULTURES:  Culture Results:   >100,000 CFU/ml Gram Negative Rods  Identification and susceptibility to follow. ( @ 12:08)    RADIOLOGY & ADDITIONAL TESTS:      ASSESSMENT:  68y Female with vertebrobasilar insufficiency    WEAKNESS HYPERTENSIONBRADYCARDIA  No pertinent family history in first degree relatives  Handoff  MEWS Score  Hypertension  Depression  Weakness  CVA (cerebral vascular accident)  Nutrition, metabolism, and development symptoms  Hypertensive emergency  Hydrocephalus  Severe episode of recurrent major depressive disorder, without psychotic features  Bradycardia, sinus  Hypertensive urgency  Lacunar infarction  No significant past surgical history  STROKE CODE  0  UTI (urinary tract infection)  Bradycardia  Hypertension      PLAN:  -no neurosurgical intervention at this time  -D/w Dr. Haddad Hospital Course:   :admitted in psych unit for suicidal ideation had episodes of Left sided weakness for which stroke code was called this am at 07:05 am. presenting NIHSS was 6 which spontaneously improved to 1 Patient is on  and no tPA was given.  CTH showed no acute infarct but showed multiple age-indeterminate lacunar infarction in the basal ganglia and thalami. CTH also showed mild hydrocephalus. Neurosurgery was consulted for findings of hydrocephalus on CTH.   : Neurosurgery was reconsulted for findings of acute Rt PATRICIA region ischemic stroke in MRI and CTA finding of vertebrobasilar insufficiency. During exam patient noted to have new facial droop along with LLE plegia and LUE weakness. Stroke code was called and patient was taken to CT. while laying flat, some motor function returned to LLE. repeat CT showed no areas of infarction. CTA () findings were stable as compared to . CT perfusion showed no perfusion abnormality. Pressure dependent motor deficit was suspected and  patient was admitted to ICU for SBP augmentation under Dr. Strickland Out of window for tpA  : Neurologically Pt is stable.      Vital Signs Last 24 Hrs  T(C): 36.3 (12 Aug 2018 22:05), Max: 36.6 (12 Aug 2018 18:00)  T(F): 97.4 (12 Aug 2018 22:05), Max: 97.8 (12 Aug 2018 18:00)  HR: 58 (13 Aug 2018 06:00) (54 - 76)  BP: 209/84 (13 Aug 2018 06:00) (139/67 - 243/117)  BP(mean): 129 (13 Aug 2018 06:00) (88 - 182)  RR: 18 (13 Aug 2018 06:00) (14 - 20)  SpO2: 97% (13 Aug 2018 06:00) (95% - 98%)    I&O's Detail    11 Aug 2018 07:01  -  12 Aug 2018 07:00  --------------------------------------------------------  IN:    sodium chloride 0.9%: 450 mL  Total IN: 450 mL    OUT:    Voided: 1 mL  Total OUT: 1 mL    Total NET: 449 mL      12 Aug 2018 07:01  -  13 Aug 2018 06:24  --------------------------------------------------------  IN:    niCARdipine Infusion: 107.5 mL    sodium chloride 0.9%.: 750 mL  Total IN: 857.5 mL    OUT:    Voided: 400 mL  Total OUT: 400 mL    Total NET: 457.5 mL        I&O's Summary    11 Aug 2018 07:  -  12 Aug 2018 07:00  --------------------------------------------------------  IN: 450 mL / OUT: 1 mL / NET: 449 mL    12 Aug 2018 07:  -  13 Aug 2018 06:24  --------------------------------------------------------  IN: 857.5 mL / OUT: 400 mL / NET: 457.5 mL    PHYSICAL EXAM:  Neurological: AAOX2-3, somnolent. Patient intermittently fails to FC. Verbal function intact  Left facial droop noted. PERRL; VA diminished in Left eye. EOMI.   LUE: 3/5 proximally; 4+/5 handgrip, LLE: 0/5 , RUE and RLE 5/5, no drift  SILT    LABS:                        12.7   11.7  )-----------( 281      ( 13 Aug 2018 05:19 )             38.1     08-13    137  |  101  |  23  ----------------------------<  104<H>  3.7   |  22  |  1.44<H>    Ca    9.2      13 Aug 2018 05:19  Phos  3.4     08-13  Mg     2.1     08-13    TPro  6.1  /  Alb  3.2<L>  /  TBili  0.2  /  DBili  x   /  AST  15  /  ALT  11  /  AlkPhos  63  08-11    PT/INR - ( 11 Aug 2018 08:31 )   PT: 11.4 sec;   INR: 1.03          PTT - ( 11 Aug 2018 08:31 )  PTT:34.0 sec  Urinalysis Basic - ( 11 Aug 2018 09:10 )    Color: Yellow / Appearance: Clear / S.010 / pH: x  Gluc: x / Ketone: NEGATIVE  / Bili: Negative / Urobili: 0.2 E.U./dL   Blood: x / Protein: 30 mg/dL / Nitrite: NEGATIVE   Leuk Esterase: NEGATIVE / RBC: < 5 /HPF / WBC 5-10 /HPF   Sq Epi: x / Non Sq Epi: 0-5 /HPF / Bacteria: Many /HPF      CARDIAC MARKERS ( 11 Aug 2018 12:42 )  x     / 0.33 ng/mL / 62 U/L / x     / 2.6 ng/mL  CARDIAC MARKERS ( 11 Aug 2018 09:10 )  x     / x     / 72 U/L / x     / x      CARDIAC MARKERS ( 11 Aug 2018 08:31 )  x     / 0.36 ng/mL / x     / x     / x          CAPILLARY BLOOD GLUCOSE  130 (12 Aug 2018 17:54)      POCT Blood Glucose.: 100 mg/dL (12 Aug 2018 22:20)  POCT Blood Glucose.: 130 mg/dL (12 Aug 2018 15:21)      Drug Levels: [] N/A    CSF Analysis: [] N/A      Allergies    No Known Allergies    Intolerances      MEDICATIONS:  Antibiotics:    Neuro:  aspirin 325 milliGRAM(s) Oral daily    Anticoagulation:    OTHER:  atorvastatin 80 milliGRAM(s) Oral at bedtime  atropine Injectable 0.5 milliGRAM(s) IV Push every 5 minutes PRN  dextrose 40% Gel 15 Gram(s) Oral once PRN  dextrose 50% Injectable 12.5 Gram(s) IV Push once  dextrose 50% Injectable 25 Gram(s) IV Push once  dextrose 50% Injectable 25 Gram(s) IV Push once  glucagon  Injectable 1 milliGRAM(s) IntraMuscular once PRN  insulin lispro (HumaLOG) corrective regimen sliding scale   SubCutaneous Before meals and at bedtime  niCARdipine Infusion 5 mG/Hr IV Continuous <Continuous>    IVF:  dextrose 5%. 1000 milliLiter(s) IV Continuous <Continuous>  sodium chloride 0.9%. 1000 milliLiter(s) IV Continuous <Continuous>    CULTURES:  Culture Results:   >100,000 CFU/ml Gram Negative Rods  Identification and susceptibility to follow. ( @ 12:08)    RADIOLOGY & ADDITIONAL TESTS:      ASSESSMENT:  68y Female with vertebrobasilar insufficiency    WEAKNESS HYPERTENSIONBRADYCARDIA  No pertinent family history in first degree relatives  Handoff  MEWS Score  Hypertension  Depression  Weakness  CVA (cerebral vascular accident)  Nutrition, metabolism, and development symptoms  Hypertensive emergency  Hydrocephalus  Severe episode of recurrent major depressive disorder, without psychotic features  Bradycardia, sinus  Hypertensive urgency  Lacunar infarction  No significant past surgical history  STROKE CODE  0  UTI (urinary tract infection)  Bradycardia  Hypertension      PLAN:  -no neurosurgical intervention at this time  -Recommend MRI/MRA of head/neck  --220  -D/w Dr. Haddad and Dr. Hugo Hospital Course:   :admitted in psych unit for suicidal ideation had episodes of Left sided weakness for which stroke code was called this am at 07:05 am. presenting NIHSS was 6 which spontaneously improved to 1 Patient is on  and no tPA was given.  CTH showed no acute infarct but showed multiple age-indeterminate lacunar infarction in the basal ganglia and thalami. CTH also showed mild hydrocephalus. Neurosurgery was consulted for findings of hydrocephalus on CTH.   : Neurosurgery was reconsulted for findings of acute Rt PATRICIA region ischemic stroke in MRI and CTA finding of vertebrobasilar insufficiency. During exam patient noted to have new facial droop along with LLE plegia and LUE weakness. Stroke code was called and patient was taken to CT. while laying flat, some motor function returned to LLE. repeat CT showed no areas of infarction. CTA () findings were stable as compared to . CT perfusion showed no perfusion abnormality. Pressure dependent motor deficit was suspected and  patient was admitted to ICU for SBP augmentation under Dr. Strickland Out of window for tpA  : Neurologically Pt is stable.      Vital Signs Last 24 Hrs  T(C): 36.3 (12 Aug 2018 22:05), Max: 36.6 (12 Aug 2018 18:00)  T(F): 97.4 (12 Aug 2018 22:05), Max: 97.8 (12 Aug 2018 18:00)  HR: 58 (13 Aug 2018 06:00) (54 - 76)  BP: 209/84 (13 Aug 2018 06:00) (139/67 - 243/117)  BP(mean): 129 (13 Aug 2018 06:00) (88 - 182)  RR: 18 (13 Aug 2018 06:00) (14 - 20)  SpO2: 97% (13 Aug 2018 06:00) (95% - 98%)    I&O's Detail    11 Aug 2018 07:01  -  12 Aug 2018 07:00  --------------------------------------------------------  IN:    sodium chloride 0.9%: 450 mL  Total IN: 450 mL    OUT:    Voided: 1 mL  Total OUT: 1 mL    Total NET: 449 mL      12 Aug 2018 07:01  -  13 Aug 2018 06:24  --------------------------------------------------------  IN:    niCARdipine Infusion: 107.5 mL    sodium chloride 0.9%.: 750 mL  Total IN: 857.5 mL    OUT:    Voided: 400 mL  Total OUT: 400 mL    Total NET: 457.5 mL        I&O's Summary    11 Aug 2018 07:  -  12 Aug 2018 07:00  --------------------------------------------------------  IN: 450 mL / OUT: 1 mL / NET: 449 mL    12 Aug 2018 07:  -  13 Aug 2018 06:24  --------------------------------------------------------  IN: 857.5 mL / OUT: 400 mL / NET: 457.5 mL    PHYSICAL EXAM:  Neurological: AAOX2-3, somnolent. Patient intermittently fails to FC. Verbal function intact  Left facial droop noted. PERRL; VA diminished in Left eye. EOMI.   LUE: 3/5 proximally; 4+/5 handgrip, LLE: 0/5 , RUE and RLE 5/5, no drift  SILT    LABS:                        12.7   11.7  )-----------( 281      ( 13 Aug 2018 05:19 )             38.1     08-13    137  |  101  |  23  ----------------------------<  104<H>  3.7   |  22  |  1.44<H>    Ca    9.2      13 Aug 2018 05:19  Phos  3.4     08-13  Mg     2.1     08-13    TPro  6.1  /  Alb  3.2<L>  /  TBili  0.2  /  DBili  x   /  AST  15  /  ALT  11  /  AlkPhos  63  08-11    PT/INR - ( 11 Aug 2018 08:31 )   PT: 11.4 sec;   INR: 1.03          PTT - ( 11 Aug 2018 08:31 )  PTT:34.0 sec  Urinalysis Basic - ( 11 Aug 2018 09:10 )    Color: Yellow / Appearance: Clear / S.010 / pH: x  Gluc: x / Ketone: NEGATIVE  / Bili: Negative / Urobili: 0.2 E.U./dL   Blood: x / Protein: 30 mg/dL / Nitrite: NEGATIVE   Leuk Esterase: NEGATIVE / RBC: < 5 /HPF / WBC 5-10 /HPF   Sq Epi: x / Non Sq Epi: 0-5 /HPF / Bacteria: Many /HPF      CARDIAC MARKERS ( 11 Aug 2018 12:42 )  x     / 0.33 ng/mL / 62 U/L / x     / 2.6 ng/mL  CARDIAC MARKERS ( 11 Aug 2018 09:10 )  x     / x     / 72 U/L / x     / x      CARDIAC MARKERS ( 11 Aug 2018 08:31 )  x     / 0.36 ng/mL / x     / x     / x          CAPILLARY BLOOD GLUCOSE  130 (12 Aug 2018 17:54)      POCT Blood Glucose.: 100 mg/dL (12 Aug 2018 22:20)  POCT Blood Glucose.: 130 mg/dL (12 Aug 2018 15:21)      Drug Levels: [] N/A    CSF Analysis: [] N/A      Allergies    No Known Allergies    Intolerances      MEDICATIONS:  Antibiotics:    Neuro:  aspirin 325 milliGRAM(s) Oral daily    Anticoagulation:    OTHER:  atorvastatin 80 milliGRAM(s) Oral at bedtime  atropine Injectable 0.5 milliGRAM(s) IV Push every 5 minutes PRN  dextrose 40% Gel 15 Gram(s) Oral once PRN  dextrose 50% Injectable 12.5 Gram(s) IV Push once  dextrose 50% Injectable 25 Gram(s) IV Push once  dextrose 50% Injectable 25 Gram(s) IV Push once  glucagon  Injectable 1 milliGRAM(s) IntraMuscular once PRN  insulin lispro (HumaLOG) corrective regimen sliding scale   SubCutaneous Before meals and at bedtime  niCARdipine Infusion 5 mG/Hr IV Continuous <Continuous>    IVF:  dextrose 5%. 1000 milliLiter(s) IV Continuous <Continuous>  sodium chloride 0.9%. 1000 milliLiter(s) IV Continuous <Continuous>    CULTURES:  Culture Results:   >100,000 CFU/ml Gram Negative Rods  Identification and susceptibility to follow. ( @ 12:08)    RADIOLOGY & ADDITIONAL TESTS:      ASSESSMENT:  68y Female with vertebrobasilar insufficiency    WEAKNESS HYPERTENSIONBRADYCARDIA  No pertinent family history in first degree relatives  Handoff  MEWS Score  Hypertension  Depression  Weakness  CVA (cerebral vascular accident)  Nutrition, metabolism, and development symptoms  Hypertensive emergency  Hydrocephalus  Severe episode of recurrent major depressive disorder, without psychotic features  Bradycardia, sinus  Hypertensive urgency  Lacunar infarction  No significant past surgical history  STROKE CODE  0  UTI (urinary tract infection)  Bradycardia  Hypertension      PLAN:  -neuro checks every 1 hour  -no neurosurgical intervention at this time  -Recommend MRI/MRA [facility] of head/neck  -NS hydration with NS at 75cc/hr  --200  -Continue aspirin 325mg  -Start DVT prophylaxis with SQH, SCDs  -D/w Dr. Haddad and Dr. Hugo

## 2018-08-13 NOTE — DIETITIAN INITIAL EVALUATION ADULT. - ORAL INTAKE PTA
per H&P, but pt reports normally having a good appetite; intake is questionable 2/2 minimal history provided/poor

## 2018-08-13 NOTE — PROGRESS NOTE ADULT - SUBJECTIVE AND OBJECTIVE BOX
Neurology Stroke Progress Note    HPI  68y yo Female with major depressive disorder, found down in her apartment. Stroke code called on 8/11 for L facial droop and weakness. CT then showed multiple lacunar infarcts, age indeterminate, in basal ganglia and thalamus. CTA showed chronic L VA occlusion, R VA and basilar hypoplasia, carotid stenosis bilaterally. Symptoms improved, then yesterday 8/12 another stroke code was called for L facial droop and left UE/LE weakness, CT unchanged. MRI showed acute right PATRICIA infarct with scattered smaller areas of infarct.    Today, pt states she feels alright, denies any pain. States her name and the month.     MEDICATIONS  (STANDING):  amLODIPine   Tablet 5 milliGRAM(s) Oral daily  aspirin 325 milliGRAM(s) Oral daily  atorvastatin 80 milliGRAM(s) Oral at bedtime  dextrose 5%. 1000 milliLiter(s) (50 mL/Hr) IV Continuous <Continuous>  dextrose 50% Injectable 12.5 Gram(s) IV Push once  dextrose 50% Injectable 25 Gram(s) IV Push once  dextrose 50% Injectable 25 Gram(s) IV Push once  heparin  Injectable 5000 Unit(s) SubCutaneous every 8 hours  niCARdipine Infusion 5 mG/Hr (25 mL/Hr) IV Continuous <Continuous>  sodium chloride 0.9%. 1000 milliLiter(s) (75 mL/Hr) IV Continuous <Continuous>    MEDICATIONS  (PRN):  atropine Injectable 0.5 milliGRAM(s) IV Push every 5 minutes PRN Symptomatic Bradycardia  dextrose 40% Gel 15 Gram(s) Oral once PRN Blood Glucose LESS THAN 70 milliGRAM(s)/deciliter  glucagon  Injectable 1 milliGRAM(s) IntraMuscular once PRN Glucose LESS THAN 70 milligrams/deciliter      Allergies    No Known Allergies    Intolerances        ROS: As per HPI, otherwise negative    Vital Signs Last 24 Hrs  T(C): 36.2 (13 Aug 2018 10:00), Max: 36.6 (12 Aug 2018 18:00)  T(F): 97.1 (13 Aug 2018 10:00), Max: 97.8 (12 Aug 2018 18:00)  HR: 58 (13 Aug 2018 10:01) (54 - 76)  BP: 199/88 (13 Aug 2018 10:01) (150/70 - 243/117)  BP(mean): 123 (13 Aug 2018 10:01) (88 - 182)  RR: 18 (13 Aug 2018 10:01) (14 - 20)  SpO2: 97% (13 Aug 2018 10:01) (90% - 98%)    Physical exam:  General: awake and alert, laying comfortably, no acute distress  Neurologic:  Mental status: awake, alert, oriented to person, place, and time. Speech is fluent. Follows commands. No dysarthria, no aphasia.  Cranial nerves:   II: Left superior visual field pt said she could not see without looking directly at my fingers. Other visual fields intact - able to see fingers. pupils equally round and reactive to light,   II, IV, VI: EOMI without nystagmus  V:  V1-V3 sensation intact,   VII: Left facial droop  VIII: hearing is intact to finger rub  IX, X: Uvula is midline and soft palate rises symmetrically  XI: head turning and shoulder shrug are intact.  XII: tongue midline  Motor: Normal bulk and tone, strength 5/5 in right UE. Left UE 4/5 handgrip, no drift but 3/5 strength to testing. Right LE no drift, but would not keep up for strength testing. Left LE unable to lift off bed but with some purposeful spontaneous movement. Full strength in toes bilaterally.  Sensation: intact to light touch  Coordination: No dysmetria on finger-to-nose, though slower on the left. Right heel-to-shin slides off shin when sliding down. Left LE deferred.  Reflexes: 2+ in upper and lower extremities, downgoing toes bilaterally  Gait: deferred        LABS:                        12.7   11.7  )-----------( 281      ( 13 Aug 2018 05:19 )             38.1     08-13    137  |  101  |  23  ----------------------------<  104<H>  3.7   |  22  |  1.44<H>    Ca    9.2      13 Aug 2018 05:19  Phos  3.4     08-13  Mg     2.1     08-13    Lipid Profile (08.11.18 @ 12:42)    Total Cholesterol/HDL Ratio Measurement: 5.4 RATIO    Cholesterol, Serum: 199 mg/dL    Triglycerides, Serum: 149 mg/dL    HDL Cholesterol, Serum: 37 mg/dL    Direct LDL: 132    Hemoglobin A1C, Whole Blood (08.13.18 @ 05:19)    Hemoglobin A1C, Whole Blood: 5.2            RADIOLOGY & ADDITIONAL TESTS:  < from: CT Head No Cont (08.12.18 @ 15:49) >  IMPRESSION:     No evidence of acute intracranial hemorrhage. Please note the patient had   an MRI performed earlier this morning which bihemispheric areas of acute   infarction. These are not appreciated on this noncontrast CT study.     < end of copied text >    < from: MR Head No Cont (08.12.18 @ 10:58) >    IMPRESSION: Acute right PATRICIA infarction with scattered smaller areas of   acute infarction involving the left centrum semiovale, left subcortical  insular, adjacent to the anterior margin of the right occipital horn,   there is also linear restricted diffusion along the medial margin of the   right temporal horn. Multi vascular territory involvement suggest an   embolic phenomenon. No evidence of hemorrhagic transformation at this   time. No significant adjacent mass effect or midline shift.    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    < end of copied text >          Assessment and Plan  68y yo Female with major depressive disorder, found down in her apartment. Stroke code called on 8/11 for L facial droop and weakness. CT then showed multiple lacunar infarcts, age indeterminate, in basal ganglia and thalamus. CTA showed chronic L VA occlusion, R VA and basilar hypoplasia, carotid stenosis bilaterally. No tPA given. Symptoms improved, then yesterday 8/12 another stroke code was called for L facial droop and left UE/LE weakness, CT unchanged. MRI showed acute right PATRICIA infarct with scattered smaller areas of infarct.    1)Secondary stroke prevention  - for antiplatelet  -Atorvastatin 80    2) workup   -Pt hypertensive - BP control per neurosurgery team  -KATHERINE  -MRA nova    3)DVT prophylaxis   -Heparin SQ TID and SCDs Neurology Stroke Progress Note    HPI  68y yo Female with major depressive disorder, found down in her apartment. Stroke code called on 8/11 for L facial droop and weakness. CT then showed multiple lacunar infarcts, age indeterminate, in basal ganglia and thalamus. CTA showed chronic L VA occlusion, R VA and basilar hypoplasia, carotid stenosis bilaterally. Symptoms improved, then yesterday 8/12 another stroke code was called for L facial droop and left UE/LE weakness, CT unchanged. MRI showed acute right PATRICIA infarct with scattered smaller areas of infarct.    Today, pt states she feels alright, denies any pain. States her name and the month.     MEDICATIONS  (STANDING):  amLODIPine   Tablet 5 milliGRAM(s) Oral daily  aspirin 325 milliGRAM(s) Oral daily  atorvastatin 80 milliGRAM(s) Oral at bedtime  dextrose 5%. 1000 milliLiter(s) (50 mL/Hr) IV Continuous <Continuous>  dextrose 50% Injectable 12.5 Gram(s) IV Push once  dextrose 50% Injectable 25 Gram(s) IV Push once  dextrose 50% Injectable 25 Gram(s) IV Push once  heparin  Injectable 5000 Unit(s) SubCutaneous every 8 hours  niCARdipine Infusion 5 mG/Hr (25 mL/Hr) IV Continuous <Continuous>  sodium chloride 0.9%. 1000 milliLiter(s) (75 mL/Hr) IV Continuous <Continuous>    MEDICATIONS  (PRN):  atropine Injectable 0.5 milliGRAM(s) IV Push every 5 minutes PRN Symptomatic Bradycardia  dextrose 40% Gel 15 Gram(s) Oral once PRN Blood Glucose LESS THAN 70 milliGRAM(s)/deciliter  glucagon  Injectable 1 milliGRAM(s) IntraMuscular once PRN Glucose LESS THAN 70 milligrams/deciliter      Allergies    No Known Allergies    Intolerances    ROS: As per HPI, otherwise negative    Vital Signs Last 24 Hrs  T(C): 36.2 (13 Aug 2018 10:00), Max: 36.6 (12 Aug 2018 18:00)  T(F): 97.1 (13 Aug 2018 10:00), Max: 97.8 (12 Aug 2018 18:00)  HR: 58 (13 Aug 2018 10:01) (54 - 76)  BP: 199/88 (13 Aug 2018 10:01) (150/70 - 243/117)  BP(mean): 123 (13 Aug 2018 10:01) (88 - 182)  RR: 18 (13 Aug 2018 10:01) (14 - 20)  SpO2: 97% (13 Aug 2018 10:01) (90% - 98%)    Physical exam:  General: awake and alert, laying comfortably, no acute distress  Neurologic:  Mental status: awake, alert, oriented to person, place, and time. Speech is fluent. Follows commands. No dysarthria, no aphasia.  Cranial nerves:   II: Left superior visual field pt said she could not see without looking directly at my fingers. Other visual fields intact - able to see fingers. pupils equally round and reactive to light,   II, IV, VI: EOMI without nystagmus  V:  V1-V3 sensation intact,   VII: Left facial droop  VIII: hearing is intact to finger rub  IX, X: Uvula is midline and soft palate rises symmetrically  XI: head turning and shoulder shrug are intact.  XII: tongue midline  Motor: Normal bulk and tone, strength 5/5 in right UE. Left UE 4/5 handgrip, no drift but 3/5 strength to testing. Right LE no drift, but would not keep up for strength testing. Left LE unable to lift off bed but with some purposeful spontaneous movement. Full strength in toes bilaterally.  Sensation: intact to light touch  Coordination: No dysmetria on finger-to-nose, though slower on the left. Right heel-to-shin slides off shin when sliding down. Left LE deferred.  Reflexes: 2+ in upper and lower extremities, downgoing toes bilaterally  Gait: deferred        LABS:                        12.7   11.7  )-----------( 281      ( 13 Aug 2018 05:19 )             38.1     08-13    137  |  101  |  23  ----------------------------<  104<H>  3.7   |  22  |  1.44<H>    Ca    9.2      13 Aug 2018 05:19  Phos  3.4     08-13  Mg     2.1     08-13    Lipid Profile (08.11.18 @ 12:42)    Total Cholesterol/HDL Ratio Measurement: 5.4 RATIO    Cholesterol, Serum: 199 mg/dL    Triglycerides, Serum: 149 mg/dL    HDL Cholesterol, Serum: 37 mg/dL    Direct LDL: 132    Hemoglobin A1C, Whole Blood (08.13.18 @ 05:19)    Hemoglobin A1C, Whole Blood: 5.2            RADIOLOGY & ADDITIONAL TESTS:  < from: CT Head No Cont (08.12.18 @ 15:49) >  IMPRESSION:     No evidence of acute intracranial hemorrhage. Please note the patient had   an MRI performed earlier this morning which bihemispheric areas of acute   infarction. These are not appreciated on this noncontrast CT study.     < end of copied text >    < from: MR Head No Cont (08.12.18 @ 10:58) >    IMPRESSION: Acute right PATRICIA infarction with scattered smaller areas of   acute infarction involving the left centrum semiovale, left subcortical  insular, adjacent to the anterior margin of the right occipital horn,   there is also linear restricted diffusion along the medial margin of the   right temporal horn. Multi vascular territory involvement suggest an   embolic phenomenon. No evidence of hemorrhagic transformation at this   time. No significant adjacent mass effect or midline shift.    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    < end of copied text >          Assessment and Plan  68y yo Female with major depressive disorder, found down in her apartment. Stroke code called on 8/11 for L facial droop and weakness. CT then showed multiple lacunar infarcts, age indeterminate, in basal ganglia and thalamus. CTA showed chronic L VA occlusion, R VA and basilar hypoplasia, carotid stenosis bilaterally. No tPA given. Symptoms improved, then yesterday 8/12 another stroke code was called for L facial droop and left UE/LE weakness, CT unchanged. MRI showed acute right PATRICIA infarct with scattered smaller areas of infarct.    1)Secondary stroke prevention  - for antiplatelet  -Atorvastatin 80    2) workup   -Pt hypertensive - BP control per neurosurgery team  -KATHERINE  -MRA nova    3)DVT prophylaxis   -Heparin SQ TID and SCDs Neurology Stroke Progress Note    HPI  68y yo Female with major depressive disorder, found down in her apartment. Stroke code called on 8/11 for L facial droop and weakness. CT then showed multiple lacunar infarcts, age indeterminate, in basal ganglia and thalamus. CTA showed chronic L VA occlusion, R VA and basilar hypoplasia, carotid stenosis bilaterally. Symptoms improved, then yesterday 8/12 another stroke code was called for L facial droop and left UE/LE weakness, CT unchanged. MRI showed acute right PATRICIA infarct with scattered smaller areas of infarct.    Today, pt states she feels alright, denies any pain. States her name and the month.     MEDICATIONS  (STANDING):  amLODIPine   Tablet 5 milliGRAM(s) Oral daily  aspirin 325 milliGRAM(s) Oral daily  atorvastatin 80 milliGRAM(s) Oral at bedtime  dextrose 5%. 1000 milliLiter(s) (50 mL/Hr) IV Continuous <Continuous>  dextrose 50% Injectable 12.5 Gram(s) IV Push once  dextrose 50% Injectable 25 Gram(s) IV Push once  dextrose 50% Injectable 25 Gram(s) IV Push once  heparin  Injectable 5000 Unit(s) SubCutaneous every 8 hours  niCARdipine Infusion 5 mG/Hr (25 mL/Hr) IV Continuous <Continuous>  sodium chloride 0.9%. 1000 milliLiter(s) (75 mL/Hr) IV Continuous <Continuous>    MEDICATIONS  (PRN):  atropine Injectable 0.5 milliGRAM(s) IV Push every 5 minutes PRN Symptomatic Bradycardia  dextrose 40% Gel 15 Gram(s) Oral once PRN Blood Glucose LESS THAN 70 milliGRAM(s)/deciliter  glucagon  Injectable 1 milliGRAM(s) IntraMuscular once PRN Glucose LESS THAN 70 milligrams/deciliter      Allergies    No Known Allergies    Intolerances    ROS: As per HPI, otherwise negative    Vital Signs Last 24 Hrs  T(C): 36.2 (13 Aug 2018 10:00), Max: 36.6 (12 Aug 2018 18:00)  T(F): 97.1 (13 Aug 2018 10:00), Max: 97.8 (12 Aug 2018 18:00)  HR: 58 (13 Aug 2018 10:01) (54 - 76)  BP: 199/88 (13 Aug 2018 10:01) (150/70 - 243/117)  BP(mean): 123 (13 Aug 2018 10:01) (88 - 182)  RR: 18 (13 Aug 2018 10:01) (14 - 20)  SpO2: 97% (13 Aug 2018 10:01) (90% - 98%)    Physical exam:  General: awake and alert, laying comfortably, no acute distress  Neurologic:  Mental status: awake, alert, oriented to person, place, and time. Speech is fluent. Follows commands. No dysarthria, no aphasia.  Cranial nerves:   II: Left superior visual field pt said she could not see without looking directly at my fingers. Other visual fields intact - able to see fingers. pupils equally round and reactive to light,   II, IV, VI: EOMI without nystagmus  V:  V1-V3 sensation intact,   VII: Left facial droop  VIII: hearing is intact to finger rub  IX, X: Uvula is midline and soft palate rises symmetrically  XI: head turning and shoulder shrug are intact.  XII: tongue midline  Motor: Normal bulk and tone, strength 5/5 in right UE. Left UE 4/5 handgrip, no drift but 3/5 strength to testing. Right LE no drift, but would not keep up for strength testing. Left LE unable to lift off bed but with some purposeful spontaneous movement. Full strength in toes bilaterally.  Sensation: intact to light touch  Coordination: No dysmetria on finger-to-nose, though slower on the left. Right heel-to-shin slides off shin when sliding down. Left LE deferred.  Reflexes: 2+ in upper and lower extremities, downgoing toes bilaterally  Gait: deferred        LABS:                        12.7   11.7  )-----------( 281      ( 13 Aug 2018 05:19 )             38.1     08-13    137  |  101  |  23  ----------------------------<  104<H>  3.7   |  22  |  1.44<H>    Ca    9.2      13 Aug 2018 05:19  Phos  3.4     08-13  Mg     2.1     08-13    Lipid Profile (08.11.18 @ 12:42)    Total Cholesterol/HDL Ratio Measurement: 5.4 RATIO    Cholesterol, Serum: 199 mg/dL    Triglycerides, Serum: 149 mg/dL    HDL Cholesterol, Serum: 37 mg/dL    Direct LDL: 132    Hemoglobin A1C, Whole Blood (08.13.18 @ 05:19)    Hemoglobin A1C, Whole Blood: 5.2            RADIOLOGY & ADDITIONAL TESTS:  < from: CT Head No Cont (08.12.18 @ 15:49) >  IMPRESSION:     No evidence of acute intracranial hemorrhage. Please note the patient had   an MRI performed earlier this morning which bihemispheric areas of acute   infarction. These are not appreciated on this noncontrast CT study.     < end of copied text >    < from: MR Head No Cont (08.12.18 @ 10:58) >    IMPRESSION: Acute right PATRICIA infarction with scattered smaller areas of   acute infarction involving the left centrum semiovale, left subcortical  insular, adjacent to the anterior margin of the right occipital horn,   there is also linear restricted diffusion along the medial margin of the   right temporal horn. Multi vascular territory involvement suggest an   embolic phenomenon. No evidence of hemorrhagic transformation at this   time. No significant adjacent mass effect or midline shift.    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    < end of copied text >          Assessment and Plan  68y yo Female with major depressive disorder, found down in her apartment. Stroke code called on 8/11 for L facial droop and weakness. CT then showed multiple lacunar infarcts, age indeterminate, in basal ganglia and thalamus. CTA showed chronic L VA occlusion, R VA and basilar hypoplasia, carotid stenosis bilaterally. No tPA given. Symptoms improved, then yesterday 8/12 another stroke code was called for L facial droop and left UE/LE weakness, CT unchanged. MRI showed acute right PATRICIA infarct with scattered smaller areas of infarct.    1)Secondary stroke prevention  - for antiplatelet  -Atorvastatin 80    2) workup   -Pt hypertensive - BP control per neurosurgery team  -KATHERINE  -MRA nova    3) other workup  -Derm consult for chest wall     4)DVT prophylaxis   -Heparin SQ TID and SCDs

## 2018-08-13 NOTE — SWALLOW BEDSIDE ASSESSMENT ADULT - ASR SWALLOW ASPIRATION MONITOR
gurgly voice/pneumonia/upper respiratory infection/cough/change of breathing pattern/position upright (90Y)/fever/throat clearing/oral hygiene

## 2018-08-13 NOTE — DIETITIAN INITIAL EVALUATION ADULT. - ENERGY NEEDS
Ht 152.4cm; Wt 60.5Kg  IBW 45.5Kg; %%  BMI 26.1    Utilized IBW to calculate needs, pt >120% of IBW. Adjusted for age/stroke.

## 2018-08-13 NOTE — SWALLOW BEDSIDE ASSESSMENT ADULT - NS SPL SWALLOW CLINIC TRIAL FT
Pt presents with functional juanito-pharyngeal swallow on this exam with no overt signs of airway protection deficits. Given fluctuating cognitive status, aspiration precautions should be maintained.

## 2018-08-13 NOTE — PROGRESS NOTE ADULT - ATTENDING COMMENTS
Assessment: Patient personally seen and examined myself during rounds with the House Staff/Fellow  ON DATE 8/13/18  House Staff/Fellow note read, including vitals, physical findings, laboratory data, and radiological reports.   Revisions included below.   Direct personal management at bed side and extensive interpretation of the data.    Plan was outlined and discussed in details with the House Staff/Fellow.    Decision making of high complexity   Risk high of complications, morbidity, and/or mortality  Assessment and Action taken for acute disease activity to reflect the level of care provided:  -Hemodynamic evaluation and support  -ACS assessment and treatment as applicable  -Heart failur/e assessment and treatment as applicable  -Cardiac Telemetry reviewed  -Medication reconciliation  -Review laboratory data  -EKG reviewed   -Echo reviewed  -Interdisciplinary discussion with IC / EP / HF / CTS teams as needed  My plan includes :  close hemodynamic monitoring and management   Monitor for arrhythmias and monitor parameters for organ perfusion  monitor neurologic status  Head of the bed should remain elevated to 45 deg .   chest PT and IS will be encouraged  monitor adequacy of oxygenation and ventilation and attempt to wean oxygen  Nutritional goals will be met using po eventually , ensure adequate caloric intake and montior the same  Stress ulcer and VTE prophylaxis will be achieved    Glycemic control is satisfactory  Electrolytes have been replete as necessary and wound care has been carried out. Pain control has been achieved.   aggressive physical therapy and early mobility and ambulation goals will be met   The family was updated about the course and plan  CRITICAL CARE TIME SPENT in evaluation and management, reassessments, review and interpretation of labs and x-rays, ventilator and hemodynamic management, formulating a plan and coordinating care: ___90____ MIN.  Time does not include procedural time.    Minnie Jovel MD    Mobile: [phone]

## 2018-08-13 NOTE — DIETITIAN INITIAL EVALUATION ADULT. - PROBLEM SELECTOR PLAN 3
- Cardiology consulted given profound bradycardia, HR 30's - albeit asymptomatic   - On EKG: Sinus Bradycardia, with sinus arrythmia   - Unclear etiology   - Avoiding Beta Blockers and Dilt/Verapamil   - Pacer pads placed and Atropine at bedside

## 2018-08-13 NOTE — DIETITIAN INITIAL EVALUATION ADULT. - OTHER INFO
67y/o F PMHx HTN and Major Depressive Disorder with hx of poor sleep, anorexia, alcohol use, and self-cutting, presented to the ED with recent history of self harm and depressed mood, admitted to inpatient psych ([facility]) for close monitoring. Stroke code called with imaging revealing acute infarct in L PATRICIA and subacute insular infarct; stepped up to the SICU for close monitoring and for consideration of possible intervention of L occluded vertebral artery. NPO ordered and pt awaiting S&S prior to PO. Pt seen resting in bed. She reports having an appetite and wanting to eat. Denies N/V/D/C and pain. PTA pt reports normally having an appetite, but with questionable intake as pt does not elaborate on diet recall; per H&P pt with anorexia with major depressive disorder. UBW stated to be ~136#; current ~133# (stable). Discussed diet advancement pending SLP consult and encouraged intake. SLP later assessed pt and pt cleared for mechanical soft diet/thin liquids. Will follow.

## 2018-08-14 LAB
-  AMPICILLIN/SULBACTAM: SIGNIFICANT CHANGE UP
-  CEFTRIAXONE: SIGNIFICANT CHANGE UP
-  CIPROFLOXACIN: SIGNIFICANT CHANGE UP
-  GENTAMICIN: SIGNIFICANT CHANGE UP
-  TETRACYCLINE: SIGNIFICANT CHANGE UP
-  TOBRAMYCIN: SIGNIFICANT CHANGE UP
-  TRIMETHOPRIM/SULFAMETHOXAZOLE: SIGNIFICANT CHANGE UP
ANION GAP SERPL CALC-SCNC: 13 MMOL/L — SIGNIFICANT CHANGE UP (ref 5–17)
BUN SERPL-MCNC: 20 MG/DL — SIGNIFICANT CHANGE UP (ref 7–23)
CALCIUM SERPL-MCNC: 9.1 MG/DL — SIGNIFICANT CHANGE UP (ref 8.4–10.5)
CHLORIDE SERPL-SCNC: 102 MMOL/L — SIGNIFICANT CHANGE UP (ref 96–108)
CO2 SERPL-SCNC: 22 MMOL/L — SIGNIFICANT CHANGE UP (ref 22–31)
CREAT SERPL-MCNC: 1.5 MG/DL — HIGH (ref 0.5–1.3)
CULTURE RESULTS: SIGNIFICANT CHANGE UP
GLUCOSE SERPL-MCNC: 96 MG/DL — SIGNIFICANT CHANGE UP (ref 70–99)
HCT VFR BLD CALC: 33.3 % — LOW (ref 34.5–45)
HGB BLD-MCNC: 11.3 G/DL — LOW (ref 11.5–15.5)
MAGNESIUM SERPL-MCNC: 2.2 MG/DL — SIGNIFICANT CHANGE UP (ref 1.6–2.6)
MCHC RBC-ENTMCNC: 29.7 PG — SIGNIFICANT CHANGE UP (ref 27–34)
MCHC RBC-ENTMCNC: 33.9 G/DL — SIGNIFICANT CHANGE UP (ref 32–36)
MCV RBC AUTO: 87.4 FL — SIGNIFICANT CHANGE UP (ref 80–100)
METHOD TYPE: SIGNIFICANT CHANGE UP
ORGANISM # SPEC MICROSCOPIC CNT: SIGNIFICANT CHANGE UP
ORGANISM # SPEC MICROSCOPIC CNT: SIGNIFICANT CHANGE UP
PHOSPHATE SERPL-MCNC: 3.5 MG/DL — SIGNIFICANT CHANGE UP (ref 2.5–4.5)
PLATELET # BLD AUTO: 268 K/UL — SIGNIFICANT CHANGE UP (ref 150–400)
POTASSIUM SERPL-MCNC: 3.4 MMOL/L — LOW (ref 3.5–5.3)
POTASSIUM SERPL-SCNC: 3.4 MMOL/L — LOW (ref 3.5–5.3)
RBC # BLD: 3.81 M/UL — SIGNIFICANT CHANGE UP (ref 3.8–5.2)
RBC # FLD: 13.9 % — SIGNIFICANT CHANGE UP (ref 10.3–16.9)
SODIUM SERPL-SCNC: 137 MMOL/L — SIGNIFICANT CHANGE UP (ref 135–145)
SPECIMEN SOURCE: SIGNIFICANT CHANGE UP
WBC # BLD: 10.1 K/UL — SIGNIFICANT CHANGE UP (ref 3.8–10.5)
WBC # FLD AUTO: 10.1 K/UL — SIGNIFICANT CHANGE UP (ref 3.8–10.5)

## 2018-08-14 PROCEDURE — 99232 SBSQ HOSP IP/OBS MODERATE 35: CPT

## 2018-08-14 PROCEDURE — 99233 SBSQ HOSP IP/OBS HIGH 50: CPT

## 2018-08-14 PROCEDURE — 93320 DOPPLER ECHO COMPLETE: CPT | Mod: 26

## 2018-08-14 PROCEDURE — 93312 ECHO TRANSESOPHAGEAL: CPT | Mod: 26

## 2018-08-14 PROCEDURE — 93325 DOPPLER ECHO COLOR FLOW MAPG: CPT | Mod: 26

## 2018-08-14 RX ORDER — FENTANYL CITRATE 50 UG/ML
50 INJECTION INTRAVENOUS ONCE
Qty: 0 | Refills: 0 | Status: DISCONTINUED | OUTPATIENT
Start: 2018-08-14 | End: 2018-08-14

## 2018-08-14 RX ORDER — LISINOPRIL 2.5 MG/1
40 TABLET ORAL DAILY
Qty: 0 | Refills: 0 | Status: DISCONTINUED | OUTPATIENT
Start: 2018-08-14 | End: 2018-08-15

## 2018-08-14 RX ORDER — POTASSIUM CHLORIDE 20 MEQ
10 PACKET (EA) ORAL
Qty: 0 | Refills: 0 | Status: COMPLETED | OUTPATIENT
Start: 2018-08-14 | End: 2018-08-14

## 2018-08-14 RX ORDER — LISINOPRIL 2.5 MG/1
20 TABLET ORAL ONCE
Qty: 0 | Refills: 0 | Status: COMPLETED | OUTPATIENT
Start: 2018-08-14 | End: 2018-08-14

## 2018-08-14 RX ORDER — HYDRALAZINE HCL 50 MG
25 TABLET ORAL EVERY 8 HOURS
Qty: 0 | Refills: 0 | Status: DISCONTINUED | OUTPATIENT
Start: 2018-08-14 | End: 2018-08-15

## 2018-08-14 RX ORDER — POTASSIUM CHLORIDE 20 MEQ
20 PACKET (EA) ORAL
Qty: 0 | Refills: 0 | Status: DISCONTINUED | OUTPATIENT
Start: 2018-08-14 | End: 2018-08-14

## 2018-08-14 RX ORDER — MIDAZOLAM HYDROCHLORIDE 1 MG/ML
5 INJECTION, SOLUTION INTRAMUSCULAR; INTRAVENOUS ONCE
Qty: 0 | Refills: 0 | Status: DISCONTINUED | OUTPATIENT
Start: 2018-08-14 | End: 2018-08-14

## 2018-08-14 RX ORDER — CEFTRIAXONE 500 MG/1
1 INJECTION, POWDER, FOR SOLUTION INTRAMUSCULAR; INTRAVENOUS EVERY 24 HOURS
Qty: 0 | Refills: 0 | Status: DISCONTINUED | OUTPATIENT
Start: 2018-08-14 | End: 2018-08-15

## 2018-08-14 RX ORDER — BACITRACIN ZINC 500 UNIT/G
1 OINTMENT IN PACKET (EA) TOPICAL
Qty: 0 | Refills: 0 | Status: DISCONTINUED | OUTPATIENT
Start: 2018-08-14 | End: 2018-08-26

## 2018-08-14 RX ORDER — POLYMYXIN B SULFATE 500000 [USP'U]/1
1 INJECTION, POWDER, LYOPHILIZED, FOR SOLUTION INTRAMUSCULAR; INTRATHECAL; INTRAVENOUS; OPHTHALMIC
Qty: 0 | Refills: 0 | Status: DISCONTINUED | OUTPATIENT
Start: 2018-08-14 | End: 2018-08-14

## 2018-08-14 RX ORDER — HYDRALAZINE HCL 50 MG
25 TABLET ORAL EVERY 8 HOURS
Qty: 0 | Refills: 0 | Status: DISCONTINUED | OUTPATIENT
Start: 2018-08-14 | End: 2018-08-14

## 2018-08-14 RX ADMIN — Medication 25 MILLIGRAM(S): at 13:07

## 2018-08-14 RX ADMIN — CEFTRIAXONE 100 GRAM(S): 500 INJECTION, POWDER, FOR SOLUTION INTRAMUSCULAR; INTRAVENOUS at 21:30

## 2018-08-14 RX ADMIN — LISINOPRIL 40 MILLIGRAM(S): 2.5 TABLET ORAL at 21:35

## 2018-08-14 RX ADMIN — FENTANYL CITRATE 50 MICROGRAM(S): 50 INJECTION INTRAVENOUS at 18:04

## 2018-08-14 RX ADMIN — Medication 1 APPLICATION(S): at 19:43

## 2018-08-14 RX ADMIN — AMLODIPINE BESYLATE 10 MILLIGRAM(S): 2.5 TABLET ORAL at 06:25

## 2018-08-14 RX ADMIN — CEFTRIAXONE 100 GRAM(S): 500 INJECTION, POWDER, FOR SOLUTION INTRAMUSCULAR; INTRAVENOUS at 13:13

## 2018-08-14 RX ADMIN — Medication 325 MILLIGRAM(S): at 13:07

## 2018-08-14 RX ADMIN — Medication 100 MILLIEQUIVALENT(S): at 10:06

## 2018-08-14 RX ADMIN — LISINOPRIL 20 MILLIGRAM(S): 2.5 TABLET ORAL at 10:05

## 2018-08-14 RX ADMIN — Medication 100 MILLIEQUIVALENT(S): at 13:05

## 2018-08-14 RX ADMIN — Medication 25 MILLIGRAM(S): at 21:36

## 2018-08-14 RX ADMIN — HEPARIN SODIUM 5000 UNIT(S): 5000 INJECTION INTRAVENOUS; SUBCUTANEOUS at 06:24

## 2018-08-14 RX ADMIN — HEPARIN SODIUM 5000 UNIT(S): 5000 INJECTION INTRAVENOUS; SUBCUTANEOUS at 13:07

## 2018-08-14 RX ADMIN — LISINOPRIL 20 MILLIGRAM(S): 2.5 TABLET ORAL at 06:24

## 2018-08-14 RX ADMIN — HEPARIN SODIUM 5000 UNIT(S): 5000 INJECTION INTRAVENOUS; SUBCUTANEOUS at 21:33

## 2018-08-14 RX ADMIN — MIDAZOLAM HYDROCHLORIDE 5 MILLIGRAM(S): 1 INJECTION, SOLUTION INTRAMUSCULAR; INTRAVENOUS at 17:23

## 2018-08-14 RX ADMIN — FENTANYL CITRATE 50 MICROGRAM(S): 50 INJECTION INTRAVENOUS at 17:00

## 2018-08-14 RX ADMIN — ATORVASTATIN CALCIUM 80 MILLIGRAM(S): 80 TABLET, FILM COATED ORAL at 21:29

## 2018-08-14 NOTE — PROGRESS NOTE ADULT - SUBJECTIVE AND OBJECTIVE BOX
Neurosurgery Transfer Note    69yo Female, PMHx HTN and Major Depressive Disorder, presented to the ED with recent history of self harm and depressed mood. Primary complaint was of progressive functional decline at home w/severe anhedonia, anorexia, poor sleep, suicidality (would cut herself and claims to have started cutting herself several months ago), often drinking alcohol at home to consol herself. She states that her depression began after retiring from her job at the [employer] 1.5yrs ago. Patient was admitted to [facility] for depressive episode - however, 1-2 hours after arriving onto the floor, a stroke code was called for L sided weakness and hypertension. NIHSS was 6 on initial assessment. On CTH - noted to have lacunar infarcts (basal ganglia and R/L thalami). On second assessment, NIHSS 1. Patient was brought back to the ED and re-worked up. EKG revealed profound bradycardia - Cardiology was consulted for HR 30s - asymptomatic. Neurosurgery also consulted for what appeared to be mild hydrocephalus on CT Scan.  No acute interventions initially, however Neurosurgery was reconsulted for findings of acute Rt PATRICIA region ischemic stroke in MRI and CTA finding of vertebrobasilar insufficiency. During exam patient noted to have new facial droop along with LLE plegia and LUE weakness. Stroke code was called and patient was taken to CT. while laying flat, some motor function returned to LLE. repeat CT showed no areas of infarction. CTA (7/12) findings were stable as compared to 7/11. CT perfusion showed no perfusion abnormality. Pressure dependent motor deficit was suspected and  patient was admitted to ICU for SBP augmentation under Dr. Strickland Out of window for tpA;    Patient was monitored in ICU x 48 hours in stable neurologic condition.  Patient was on nicardipine drip to maintain -200.  Multiple oral agents started, now off nicardipine.    Cardiology consulted to assist in stroke workup given concern of cardioembolic phenomenon.  KATHERINE ordered and pending.  Cardiology service actively following    Patient also presented with anterior chest wall wound, hemorrhagic in appearance.  Dermatology service consulted, recommending warm compresses twice daily along with bacitracin and keeping wound covered so patient does not continue to irritate it.    Patient was admitted to psych floor prior to discovery of stroke symptoms.  Psych notified and agreed to continue following patient while in house.    Most recent neuro exam:  Lethargic but easily awakens, oriented x 3, speech clear  Left facial droop  Right side 5/5 strength  Left UE 3/5, Left LE 2/5    A/P 68F with right PATRICIA infarct, vertebrobasilar occlusion.  Neurologically stable  -SBP goal 160-200, currently on Hydralazine, Lisinopril, and Norvasc.  Patient requires higher than normal BP to allow for cerebral perfusion  -Vertebrobasilar occlusion - needs MRA NOVA at some point  -KATHERINE pending  -Psych notified: will continue to follow patient while in house  -Anterior chest wall wound care ordered  -+UTI, last dose of ceftriaxone ordered for 8/19  -Case discussed with Dr. Sapp who has accepted patient Neurosurgery Transfer Note    69yo Female, PMHx HTN and Major Depressive Disorder, presented to the ED with recent history of self harm and depressed mood. Primary complaint was of progressive functional decline at home w/severe anhedonia, anorexia, poor sleep, suicidality (would cut herself and claims to have started cutting herself several months ago), often drinking alcohol at home to consol herself. She states that her depression began after retiring from her job at the [employer] 1.5yrs ago. Patient was admitted to [facility] for depressive episode - however, 1-2 hours after arriving onto the floor, a stroke code was called for L sided weakness and hypertension. NIHSS was 6 on initial assessment. On CTH - noted to have lacunar infarcts (basal ganglia and R/L thalami). On second assessment, NIHSS 1. Patient was brought back to the ED and re-worked up. EKG revealed profound bradycardia - Cardiology was consulted for HR 30s - asymptomatic. Neurosurgery also consulted for what appeared to be mild hydrocephalus on CT Scan.  No acute interventions initially, however Neurosurgery was reconsulted for findings of acute Rt PATRICIA region ischemic stroke in MRI and CTA finding of vertebrobasilar insufficiency. During exam patient noted to have new facial droop along with LLE plegia and LUE weakness. Stroke code was called and patient was taken to CT. while laying flat, some motor function returned to LLE. repeat CT showed no areas of infarction. CTA (7/12) findings were stable as compared to 7/11. CT perfusion showed no perfusion abnormality. Pressure dependent motor deficit was suspected and  patient was admitted to ICU for SBP augmentation under Dr. Strickland Out of window for tpA;    Patient was monitored in ICU x 48 hours in stable neurologic condition.  Patient was on nicardipine drip to maintain -200.  Multiple oral agents started, now off nicardipine.    Cardiology consulted to assist in stroke workup given concern of cardioembolic phenomenon.  KATHERINE ordered and pending.  Cardiology service actively following    Patient also presented with anterior chest wall wound, hemorrhagic in appearance.  Dermatology service consulted, recommending warm compresses twice daily along with bacitracin and keeping wound covered so patient does not continue to irritate it.    Patient was admitted to psych floor prior to discovery of stroke symptoms.  Psych notified and agreed to continue following patient while in house.    Most recent neuro exam:  Lethargic but easily awakens, oriented x 3, speech clear  Left facial droop  Right side 5/5 strength  Left UE 3/5, Left LE 2/5    A/P 68F with right PATRICIA infarct, vertebrobasilar occlusion.  Neurologically stable  -SBP goal 160-200, currently on Hydralazine, Lisinopril, and Norvasc.  Patient requires higher than normal BP to allow for cerebral perfusion  -Vertebrobasilar occlusion - needs MRA NOVA at some point  -KATHERINE pending  -Psych notified: will continue to follow patient while in house  -Anterior chest wall wound care ordered  -+UTI, last dose of ceftriaxone ordered for 8/18  -Case discussed with Dr. Sapp who has accepted patient Neurosurgery Transfer Note    67yo Female, PMHx HTN and Major Depressive Disorder, presented to the ED with recent history of self harm and depressed mood. Primary complaint was of progressive functional decline at home w/severe anhedonia, anorexia, poor sleep, suicidality (would cut herself and claims to have started cutting herself several months ago), often drinking alcohol at home to consol herself. She states that her depression began after retiring from her job at the [employer] 1.5yrs ago. Patient was admitted to [facility]s for depressive episode - however, 1-2 hours after arriving onto the floor, a stroke code was called for L sided weakness and hypertension. NIHSS was 6 on initial assessment. On CTH - noted to have lacunar infarcts (basal ganglia and R/L thalami). On second assessment, NIHSS 1. Patient was brought back to the ED and re-worked up. EKG revealed profound bradycardia - Cardiology was consulted for HR 30s - asymptomatic. Neurosurgery also consulted for what appeared to be mild hydrocephalus on CT Scan.  No acute interventions initially, however Neurosurgery was reconsulted for findings of acute Rt PATRICIA region ischemic stroke in MRI and CTA finding of vertebrobasilar insufficiency. During exam patient noted to have new facial droop along with LLE plegia and LUE weakness. Stroke code was called and patient was taken to CT. while laying flat, some motor function returned to LLE. repeat CT showed no areas of infarction. CTA (7/12) findings were stable as compared to 7/11. CT perfusion showed no perfusion abnormality. Pressure dependent motor deficit was suspected and  patient was admitted to ICU for SBP augmentation under Dr. Strickland Out of window for tpA;    Patient was monitored in ICU x 48 hours in stable neurologic condition.  Patient was on nicardipine drip to maintain -200.  Multiple oral agents started, now off nicardipine.    Cardiology consulted to assist in stroke workup given concern of cardioembolic phenomenon.  KATHERINE performed on 8/14.  Cardiology service actively following    Patient also presented with anterior chest wall wound, hemorrhagic in appearance.  Dermatology service consulted, recommending warm compresses twice daily along with bacitracin and keeping wound covered so patient does not continue to irritate it.    Patient was admitted to psych floor prior to discovery of stroke symptoms.  Psych notified and agreed to continue following patient while in house.    Most recent neuro exam:  Lethargic but easily awakens, oriented x 3, speech clear  Left facial droop  Right side 5/5 strength  Left UE 3/5, Left LE 2/5    A/P 68F with right PATRICIA infarct, vertebrobasilar occlusion.  Neurologically stable  -SBP goal 160-200, currently on Hydralazine, Lisinopril, and Norvasc.  Patient requires higher than normal BP to allow for cerebral perfusion  -Vertebrobasilar occlusion - needs MRA NOVA at some point  -KATHERINE completed 8/14, f/u results  -Psych notified: will continue to follow patient while in house  -Anterior chest wall wound care ordered  -+UTI, last dose of ceftriaxone ordered for 8/18  -Case discussed with Dr. Sapp who has accepted patient

## 2018-08-14 NOTE — PROGRESS NOTE ADULT - SUBJECTIVE AND OBJECTIVE BOX
S/Overnight events:  Seen/evaluated at bedside.  No acute overnight events.  Cardene stopped this AM.  No acute events.  No new complaints      Hospital Course:   HD#2: Cardene stopped, oral BP meds being titrated      Vital Signs Last 24 Hrs  T(C): 35.8 (14 Aug 2018 06:19), Max: 36.8 (13 Aug 2018 21:50)  T(F): 96.5 (14 Aug 2018 06:19), Max: 98.3 (13 Aug 2018 21:50)  HR: 50 (14 Aug 2018 07:00) (50 - 72)  BP: 199/99 (14 Aug 2018 07:00) (150/62 - 223/92)  BP(mean): 153 (14 Aug 2018 07:00) (84 - 167)  RR: 16 (14 Aug 2018 07:00) (12 - 18)  SpO2: 97% (14 Aug 2018 07:00) (90% - 99%)    I&O's Detail    13 Aug 2018 07:01  -  14 Aug 2018 07:00  --------------------------------------------------------  IN:    niCARdipine Infusion: 617.5 mL    sodium chloride 0.9%.: 1275 mL  Total IN: 1892.5 mL    OUT:    Voided: 1500 mL  Total OUT: 1500 mL    Total NET: 392.5 mL        I&O's Summary    13 Aug 2018 07:01  -  14 Aug 2018 07:00  --------------------------------------------------------  IN: 1892.5 mL / OUT: 1500 mL / NET: 392.5 mL        PHYSICAL EXAM:  Neurological:  Lethargic but easily arousable, follows commands, speech clear, oriented x 3  moves right side 5/5 strength  left UE 3/5, left LE 2/5  slight left facial droop    TUBES/LINES:  [] CVC  [] A-line  [] Lumbar Drain  [] Ventriculostomy  [] Other    DIET:  [x] NPO for KATHERINE  [] Mechanical  [] Tube feeds    LABS:                        11.3   10.1  )-----------( 268      ( 14 Aug 2018 05:09 )             33.3     08-14    137  |  102  |  20  ----------------------------<  96  3.4<L>   |  22  |  1.50<H>    Ca    9.1      14 Aug 2018 05:09  Phos  3.5     08-14  Mg     2.2     08-14              CAPILLARY BLOOD GLUCOSE          Drug Levels: [] N/A    CSF Analysis: [] N/A      Allergies    No Known Allergies    Intolerances      MEDICATIONS:  Antibiotics:  cefTRIAXone   IVPB 1 Gram(s) IV Intermittent every 24 hours  cefTRIAXone   IVPB        Neuro:  aspirin 325 milliGRAM(s) Oral daily    Anticoagulation:  heparin  Injectable 5000 Unit(s) SubCutaneous every 8 hours    OTHER:  amLODIPine   Tablet 10 milliGRAM(s) Oral daily  atorvastatin 80 milliGRAM(s) Oral at bedtime  atropine Injectable 0.5 milliGRAM(s) IV Push every 5 minutes PRN  dextrose 40% Gel 15 Gram(s) Oral once PRN  dextrose 50% Injectable 12.5 Gram(s) IV Push once  dextrose 50% Injectable 25 Gram(s) IV Push once  dextrose 50% Injectable 25 Gram(s) IV Push once  glucagon  Injectable 1 milliGRAM(s) IntraMuscular once PRN  lisinopril 20 milliGRAM(s) Oral daily  niCARdipine Infusion 5 mG/Hr IV Continuous <Continuous>    IVF:  dextrose 5%. 1000 milliLiter(s) IV Continuous <Continuous>  sodium chloride 0.9%. 1000 milliLiter(s) IV Continuous <Continuous>    CULTURES:  Culture Results:   >100,000 CFU/ml Gram Negative Rods  Identification and susceptibility to follow. (08-11 @ 12:08)    RADIOLOGY & ADDITIONAL TESTS:      ASSESSMENT:    WEAKNESS HYPERTENSIONBRADYCARDIA  No pertinent family history in first degree relatives  Handoff  MEWS Score  Hypertension  Depression  Weakness  CVA (cerebral vascular accident)  Nutrition, metabolism, and development symptoms  Hypertensive emergency  Hydrocephalus  Severe episode of recurrent major depressive disorder, without psychotic features  Bradycardia, sinus  Hypertensive urgency  Lacunar infarction  No significant past surgical history  STROKE CODE  0  UTI (urinary tract infection)  Bradycardia  Hypertension      PLAN:  NEURO:  -Continue neuro checks  -stroke core measures, stroke team following  -Continue  daily    CARDIOVASCULAR:  -SBP goal<200, cardene off for now  -Norvasc and lisinopril started, may need additional med  -NPO for KATHERINE today    PULMONARY:  -on RA, no issues    RENAL:  -CKD, creatinine stable-  -sodium stable  -hypokalemia repleted    GI:  -NPO for KATHERINE    HEME:  -H/H stable    ID:  -afebrile    ENDO:  -Continue ISS    DVT PROPHYLAXIS:  [x] Venodynes                                [x] Heparin/Lovenox    FALL RISK:  [x] Low Risk                                    [] Impulsive    DISPOSITION: D/W Deisy Hugo

## 2018-08-14 NOTE — PHYSICAL THERAPY INITIAL EVALUATION ADULT - PLANNED THERAPY INTERVENTIONS, PT EVAL
balance training/bed mobility training/motor coordination training/ROM/gait training/postural re-education/neuromuscular re-education/strengthening/transfer training

## 2018-08-14 NOTE — PHYSICAL THERAPY INITIAL EVALUATION ADULT - ADDITIONAL COMMENTS
Pt lives in apartment with 4 stairs to enter, alone. Pt's amb tolerance ~1/2 limited by fatigue. Pt owns straight cane but does not use. H/o of falls with outdoor ambulation. Denies outside assistance in home.

## 2018-08-14 NOTE — PHYSICAL THERAPY INITIAL EVALUATION ADULT - PERTINENT HX OF CURRENT PROBLEM, REHAB EVAL
67 y/o female admitted to 8 for self harm followed by stroke codex2, now acute R PATRICIA territory infarction; L centrum semiovale, L subcortical insular, medial R temporal horn; likely cardioembolic / A2A; chronic small lacunar infarctions B basal ganglia, corona radiata, bilateral thalami, wilfred

## 2018-08-14 NOTE — PROGRESS NOTE ADULT - ASSESSMENT
68y/F with  1.  acute R PATRICIA territory infarction; L centrum semiovale, L subcortical insular, medial R temporal horn; likely cardioembolic / A2A; chronic small lacunar infarctions B basal ganglia, corona radiata, bilateral thalami, wilfred  2.  vertebrobasilar insufficiency, L VA chronic occlusion, R VA / basilar artery hypoplastic  3.  Hypertension, dyslipidemia  4.  depression  5.  acute on chronic kidney injury    PLAN:   NEURO: neurochecks q1h, transfer to ICU for closer monitoring  acute R PATRICIA infarction:  KATHERINE,  daily, statin  vertebrobasilar insufficiency:  neurointerventional consult  depression: not on any pharmacologic treatment for now  REHAB:  physical therapy evaluation and management    EARLY MOB:  bedrest, flat    PULM:  Room air, incentive spirometry  CARDIO:  SBP goal 160-220mm Hg permissive hypertension, d/c amlodipine; hydralazine; KATHERINE  ENDO:  Blood sugar goals 140-180 mg/dL, continue insulin sliding scale; f/u A1C  GI:  PPI for GI prophylaxis  DIET: NPO for now  RENAL:  NS @75cc/hr  HEM/ONC: Hb stable  VTE Prophylaxis: SCDs, DVT chemoprophylaxis:  start heparin 5k q 8  ID: afebrile, leukocytosis, GNR in urine, start ceftriaxone, f/u on cultures  Social: Leyla (friend, HCP, power of ) at bedside and updated    ATTENDING ATTESTATION:  I was physically present for the key portions of the evaluation and management (E/M) service provided.  I agree with the above history, physical and plan, which I have reviewed and edited where appropriate.    Patient at high risk for neurological deterioration or death due to:  ICU delirium, aspiration PNA, DVT / PE.  Critical care time, excluding procedures: 60 minutes spent on total encounter, more than 50% of the visit was spent counseling and/or coordinating care by the attending physician.     Plan discussed with RN, house staff. 68y/F with  1.  acute R PATRICIA territory infarction; L centrum semiovale, L subcortical insular, medial R temporal horn; likely cardioembolic / A2A; chronic small lacunar infarctions B basal ganglia, corona radiata, bilateral thalami, wilfred  2.  vertebrobasilar insufficiency, L VA chronic occlusion, R VA / basilar artery hypoplastic  3.  Hypertension, dyslipidemia  4.  depression  5.  acute on chronic kidney injury    PLAN:   NEURO: neurochecks q1h, transfer to ICU for closer monitoring  acute R PATRICIA infarction:  KATHERINE,  daily, statin  vertebrobasilar insufficiency:  neurointerventional consult  depression: not on any pharmacologic treatment for now  REHAB:  physical therapy evaluation and management    EARLY MOB:  bedrest, flat    PULM:  Room air, incentive spirometry  CARDIO:  SBP goal < 200, increase lisinopril, amlodipine, start hydralazine 25 mg q 8; KATHERINE  ENDO:  stable   GI:  PPI for GI prophylaxis  DIET: NPO for now  RENAL:  NS @ 50  HEM/ONC: Hb stable  VTE Prophylaxis: SCDs, DVT chemoprophylaxis:  start heparin 5k q 8  ID: afebrile, leukocytosis, GNR in urine, start ceftriaxone, f/u on cultures  Social: Leyla (friend, HCP, power of ) at bedside and updated    ATTENDING ATTESTATION:  I was physically present for the key portions of the evaluation and management (E/M) service provided.  I agree with the above history, physical and plan, which I have reviewed and edited where appropriate.    Patient at high risk for neurological deterioration or death due to:  ICU delirium, aspiration PNA, DVT / PE.  Critical care time, excluding procedures: 45 minutes spent on total encounter, more than 50% of the visit was spent counseling and/or coordinating care by the attending physician.     Plan discussed with RN, house staff.

## 2018-08-14 NOTE — PHYSICAL THERAPY INITIAL EVALUATION ADULT - IMPAIRED TRANSFERS: SIT/STAND, REHAB EVAL
impaired coordination/decreased ROM/impaired postural control/decreased sensation/impaired balance/cognition/decreased strength

## 2018-08-14 NOTE — PROGRESS NOTE ADULT - SUBJECTIVE AND OBJECTIVE BOX
INTERVAL EVENTS:    Pt seen and examined at bedside, no major events o/n.    MEDICATIONS  (STANDING):  amLODIPine   Tablet 10 milliGRAM(s) Oral daily  aspirin 325 milliGRAM(s) Oral daily  atorvastatin 80 milliGRAM(s) Oral at bedtime  cefTRIAXone   IVPB 1 Gram(s) IV Intermittent every 24 hours  cefTRIAXone   IVPB      dextrose 5%. 1000 milliLiter(s) (50 mL/Hr) IV Continuous <Continuous>  dextrose 50% Injectable 12.5 Gram(s) IV Push once  dextrose 50% Injectable 25 Gram(s) IV Push once  dextrose 50% Injectable 25 Gram(s) IV Push once  heparin  Injectable 5000 Unit(s) SubCutaneous every 8 hours  hydrALAZINE 25 milliGRAM(s) Oral every 8 hours  lisinopril 40 milliGRAM(s) Oral daily  niCARdipine Infusion 5 mG/Hr (25 mL/Hr) IV Continuous <Continuous>  potassium chloride  10 mEq/100 mL IVPB 10 milliEquivalent(s) IV Intermittent every 2 hours    MEDICATIONS  (PRN):  atropine Injectable 0.5 milliGRAM(s) IV Push every 5 minutes PRN Symptomatic Bradycardia  dextrose 40% Gel 15 Gram(s) Oral once PRN Blood Glucose LESS THAN 70 milliGRAM(s)/deciliter  glucagon  Injectable 1 milliGRAM(s) IntraMuscular once PRN Glucose LESS THAN 70 milligrams/deciliter      Vital Signs Last 24 Hrs  T(C): 35.5 (14 Aug 2018 09:25), Max: 36.8 (13 Aug 2018 21:50)  T(F): 95.9 (14 Aug 2018 09:25), Max: 98.3 (13 Aug 2018 21:50)  HR: 60 (14 Aug 2018 11:00) (48 - 72)  BP: 227/110 (14 Aug 2018 11:00) (150/62 - 227/110)  BP(mean): 169 (14 Aug 2018 11:00) (84 - 169)  RR: 18 (14 Aug 2018 09:00) (12 - 18)  SpO2: 99% (14 Aug 2018 11:00) (96% - 99%)     PHYSICAL EXAM:  GEN: Awake, alert. NAD. Flat affect  HEENT: NCAT, PERRL, EOMI. Mucosa moist. No JVD.  RESP: CTA b/l  CV: RRR. Normal S1/S2. No m/r/g.  ABD: Soft. NT/ND. BS+  EXT: Warm. No edema, clubbing, or cyanosis.   NEURO: AAOx3. Fluent speech. Mild L-sided facial droop. L-sided weakness (LLE>LUE)    LABS:                        11.3   10.1  )-----------( 268      ( 14 Aug 2018 05:09 )             33.3     08-14    137  |  102  |  20  ----------------------------<  96  3.4<L>   |  22  |  1.50<H>    Ca    9.1      14 Aug 2018 05:09  Phos  3.5     08-14  Mg     2.2     08-14      I&O's Summary    13 Aug 2018 07:01  -  14 Aug 2018 07:00  --------------------------------------------------------  IN: 1892.5 mL / OUT: 1500 mL / NET: 392.5 mL    14 Aug 2018 07:01  -  14 Aug 2018 11:50  --------------------------------------------------------  IN: 350 mL / OUT: 0 mL / NET: 350 mL      BNP  RADIOLOGY & ADDITIONAL STUDIES:    TELEMETRY: Sinus paige in 50s    EKG: No EKG for review

## 2018-08-14 NOTE — PROGRESS NOTE ADULT - SUBJECTIVE AND OBJECTIVE BOX
Neurology Stroke Progress Note     INTERVAL HPI/OVERNIGHT EVENTS:  Patient seen and examined this morning. Pt easily arousable, states she is thirsty when woken up. No other complaints.     Per phone conversation with pt's friend Leyla last night: pt has been progressively declining. Leyla has known the patient for several years, though fell off contact after the pt retired from her job at the [employer] in 2011. Leyla stated that October of last year, she was notified by authorities that pt was in a state of decline - pt was not paying rent, was hoarding things, and when Leyla asked pt was having suicidal thoughts and wanting "to put a gun in her mouth". About 3 months ago, Leyla stated that the pt seemed to be walking "off balance" and was shaky, though does not think pt was abusing alcohol. About 1 month ago, Leyla noticed that pt seemed to be shakier, was not showering or taking care of herself. About 2 weeks ago, Leyla brought pt to a doctor, who noted high blood pressure and gave her some medication. Leyla called the pt a few days after that appt, and noted pt to be lethargic, disoriented with slurred speech but would answer questions appropriately but in short answers. A few days prior to admission, Leyla found pt down and covered in her own feces, so Leyla cleaned her up. On day of admission, Leyla decided to take the pt to the hospital.       MEDICATIONS  (STANDING):  amLODIPine   Tablet 10 milliGRAM(s) Oral daily  aspirin 325 milliGRAM(s) Oral daily  atorvastatin 80 milliGRAM(s) Oral at bedtime  cefTRIAXone   IVPB 1 Gram(s) IV Intermittent every 24 hours  cefTRIAXone   IVPB      dextrose 5%. 1000 milliLiter(s) (50 mL/Hr) IV Continuous <Continuous>  dextrose 50% Injectable 12.5 Gram(s) IV Push once  dextrose 50% Injectable 25 Gram(s) IV Push once  dextrose 50% Injectable 25 Gram(s) IV Push once  heparin  Injectable 5000 Unit(s) SubCutaneous every 8 hours  hydrALAZINE 25 milliGRAM(s) Oral every 8 hours  lisinopril 40 milliGRAM(s) Oral daily  niCARdipine Infusion 5 mG/Hr (25 mL/Hr) IV Continuous <Continuous>  potassium chloride  10 mEq/100 mL IVPB 10 milliEquivalent(s) IV Intermittent every 2 hours    MEDICATIONS  (PRN):  atropine Injectable 0.5 milliGRAM(s) IV Push every 5 minutes PRN Symptomatic Bradycardia  dextrose 40% Gel 15 Gram(s) Oral once PRN Blood Glucose LESS THAN 70 milliGRAM(s)/deciliter  glucagon  Injectable 1 milliGRAM(s) IntraMuscular once PRN Glucose LESS THAN 70 milligrams/deciliter      Allergies    No Known Allergies      ROS: As per HPI, otherwise negative    Vital Signs Last 24 Hrs  T(C): 35.5 (14 Aug 2018 09:25), Max: 36.8 (13 Aug 2018 21:50)  T(F): 95.9 (14 Aug 2018 09:25), Max: 98.3 (13 Aug 2018 21:50)  HR: 60 (14 Aug 2018 11:00) (48 - 72)  BP: 227/110 (14 Aug 2018 11:00) (150/62 - 227/110)  BP(mean): 169 (14 Aug 2018 11:00) (84 - 169)  RR: 18 (14 Aug 2018 09:00) (12 - 18)  SpO2: 99% (14 Aug 2018 11:00) (96% - 99%)    Physical exam:  General: awake and alert, laying comfortably, no acute distress  Neurologic:  Mental status: sleeping but easily arousable to voice. Speech is fluent. Follows commands. No dysarthria, no aphasia.  Cranial nerves:   II: pupils equally round and reactive to light,   II, IV, VI: EOMI without nystagmus  VII: Left facial droop  VIII: hearing is intact to finger rub  IX, X: Uvula is midline and soft palate rises symmetrically  XI: head turning and shoulder shrug are intact.  XII: tongue midline  Motor: Normal bulk and tone, strength 5/5 in right UE. Left UE 4/5 handgrip, no drift but 3/5 strength to testing. Right LE no drift, but would not keep up for strength testing. Left LE unable to lift off bed but with some purposeful spontaneous movement. Full strength in toes bilaterally.  Sensation: intact to light touch  Coordination: No dysmetria on finger-to-nose, though slower on the left. Right heel-to-shin slides off shin when sliding down. Left LE deferred.  Reflexes: 2+ in upper and lower extremities, downgoing toes bilaterally  Gait: deferred        LABS:                        11.3   10.1  )-----------( 268      ( 14 Aug 2018 05:09 )             33.3     08-14    137  |  102  |  20  ----------------------------<  96  3.4<L>   |  22  |  1.50<H>    Ca    9.1      14 Aug 2018 05:09  Phos  3.5     08-14  Mg     2.2     08-14            RADIOLOGY & ADDITIONAL TESTS:  No new cerebral imaging      Assessment and Plan  68y yo Female with major depressive disorder, found down in her apartment. Stroke code called on 8/11 for L facial droop and weakness. CT then showed multiple lacunar infarcts, age indeterminate, in basal ganglia and thalamus. CTA showed chronic L VA occlusion, R VA and basilar hypoplasia, carotid stenosis bilaterally. No tPA given. Symptoms improved, then 8/12 another stroke code was called for L facial droop and left UE/LE weakness, CT unchanged. MRI showed acute right PATRICIA infarct with scattered smaller areas of infarct.    1)Secondary stroke prevention  - for antiplatelet  -Atorvastatin 80    2) workup   -Pt hypertensive, better today off drip.  BP control per neurosurgery team  -KATHERINE scheduled for today  -MRA nova    3) other workup  -Derm consult for chest wall   -Psych consult    4)DVT prophylaxis   -Heparin SQ TID and SCDs

## 2018-08-14 NOTE — PHYSICAL THERAPY INITIAL EVALUATION ADULT - MANUAL MUSCLE TESTING RESULTS, REHAB EVAL
MMT 4+/5 R UE/LE except L shoulder flexion 2/5, L elbow flexion 2-/5, L elbow extension 2-/5, L hip flexion 0/5, L knee extension 0/5 with poor effort

## 2018-08-14 NOTE — PROGRESS NOTE ADULT - ATTENDING COMMENTS
Assessment: Patient personally seen and examined myself during rounds with the Cardiology Fellow  ON DATE 8/14/18  Cardiology Fellow note read, including vitals, physical findings, laboratory data, and radiological reports.   Revisions included below.   Direct personal management at bed side and extensive interpretation of the data.    Plan was outlined and discussed in details with the Cardiology Fellow.    Decision making of high complexity   Risk high of complications, morbidity, and/or mortality  Assessment and Action taken for acute disease activity to reflect the level of care provided:  -Hemodynamic evaluation and support  -Medication reconciliation  -Review laboratory data  -EKG reviewed   -    TIME SPENT in evaluation and management, reassessments, review and interpretation of labs and x-rays, and hemodynamic management, formulating a plan and coordinating care: ___25____ MIN.  Time does not include procedural time.    Minnie Jovel MD  Cardiology    Mobile: [phone]  Office: [phone]  [address], [address]

## 2018-08-14 NOTE — PROGRESS NOTE ADULT - ASSESSMENT
68F w/PMH HTN and MDD who initially p/w SI, hosp course c/b hypertensive emergency w/CVA and asx bradycardia.    - Imaging shows acute R PATRICIA territory infarction; L centrum semiovale, L subcortical insular, medial R temporal horn. chronic small lacunar infarctions B basal ganglia, corona radiata, bilateral thalami, wilfred  - NSICU team concerned for cardioembolic phenomenon, KATHERINE ordered  - L VA chronic occlusion, R VA and basilar artery hypoplastic -> VB insuff. Does not fully explain neuro event, however concern that pt may be flow dep and needs elevated pressures to maintain cerebral perfusion  - Goal -220 as per NSICU. Titrating off cardene gtt this AM, started on lisinopril 40qd, norvasc 10qd, hydralazine 25 q8h. Would minimize use of hydralazine IVP as BP response can be unpredictable. Cont to monitor SBP.  - TTE w/severe concentric LVH, but no LVOT obstruction  - Cr mildly elev, unclear chronicity, appears overall stable. Cont to monitor after starting ACEi. Avoid nephrotoxic agents.  - C/w [id number] and lipitor 80qhs as per neuro for risk modification  - Asx bradycardia, when able to ambulate will need to check HR w/ambulation to assess chronotropic competence. Avoid AVN blockade

## 2018-08-14 NOTE — PHYSICAL THERAPY INITIAL EVALUATION ADULT - MODALITIES TREATMENT COMMENTS
R hand dominant; (L) hand  2/5, (R) hand  4+/5. CN Testing: B/L Frontalis intact; B/L buccinator intact; smile symmetrical; tongue protrusion at midline; B/L eyes open/close intact; Shoulder elevation: trace L; Vision H-Test: bilateral tracking with upbeat nystagmus and nystagmus with R tracking; Convergence/Divergence: intact; Vision Quadrant Test: L visual field cut unclear (unable to fully follow formal testing). L side neglect, no L peripheral vision, unable to count correct # people in room as PCA sits on pt's L side.

## 2018-08-14 NOTE — PHYSICAL THERAPY INITIAL EVALUATION ADULT - IMPAIRMENTS FOUND, PT EVAL
decreased midline orientation/ergonomics and body mechanics/posture/aerobic capacity/endurance/cranial and peripheral nerve integrity/arousal, attention, and cognition/gait, locomotion, and balance/fine motor/poor safety awareness/neuromotor development and sensory integration/ROM

## 2018-08-14 NOTE — PHYSICAL THERAPY INITIAL EVALUATION ADULT - ACTIVE RANGE OF MOTION EXAMINATION, REHAB EVAL
bilateral upper extremity Active ROM was WFL (within functional limits)/bilateral  lower extremity Active ROM was WFL (within functional limits)/except L shoulder flexion 0-25 deg, L elbow flexion 0-90, L knee extension no AROM observed

## 2018-08-14 NOTE — PROGRESS NOTE ADULT - SUBJECTIVE AND OBJECTIVE BOX
NEUROCRITICAL CARE PROGRESS NOTE    ALCON COPE   MRN-[id number]  Summary:  /  HPI:  67yo Female, PMHx HTN and Major Depressive Disorder, presented to the ED with recent history of self harm and depressed mood. Primary complaint was of progressive functional decline at home w/severe anhedonia, anorexia, poor sleep, suicidality (would cut herself and claims to have started cutting herself several months ago), often drinking alcohol at home to consol herself. She states that her depression began after retiring from her job at the [employer] 1.5yrs ago. Patient was admitted to [facility] for depressive episode - however, 1-2 hours after arriving onto the floor, a stroke code was called for L sided weakness and hypertension. NIHSS was 6 on initial assessment. On CTH - noted to have lacunar infarcts (basal ganglia and R/L thalami). On second assessment, NIHSS 1. Patient was brought back to the ED and re-worked up. EKG revealed profound bradycardia - Cardiology was consulted for HR 30s - asymptomatic. Neurosurgery also consulted for what appeared to be mild hydrocephalus on CT Scan.     SBP in the 200s - allowing for permissive HTN, SBP 180s.     Patient admitted to [facility] for further workup. (11 Aug 2018 10:04)    S/Overnight events:      Vital Signs Last 24 Hrs  T(C): 35.8 (14 Aug 2018 06:19), Max: 36.8 (13 Aug 2018 21:50)  T(F): 96.5 (14 Aug 2018 06:19), Max: 98.3 (13 Aug 2018 21:50)  HR: 50 (14 Aug 2018 07:00) (50 - 72)  BP: 199/99 (14 Aug 2018 07:00) (150/62 - 223/92)  BP(mean): 153 (14 Aug 2018 07:00) (84 - 167)  RR: 16 (14 Aug 2018 07:00) (12 - 18)  SpO2: 97% (14 Aug 2018 07:00) (90% - 99%)    I&O's Detail    13 Aug 2018 07:01  -  14 Aug 2018 07:00  --------------------------------------------------------  IN:    niCARdipine Infusion: 617.5 mL    sodium chloride 0.9%.: 1275 mL  Total IN: 1892.5 mL    OUT:    Voided: 1500 mL  Total OUT: 1500 mL    Total NET: 392.5 mL      LABS:                        11.3   10.1  )-----------( 268      ( 14 Aug 2018 05:09 )             33.3     08-14    137  |  102  |  20  ----------------------------<  96  3.4<L>   |  22  |  1.50<H>    Ca    9.1      14 Aug 2018 05:09  Phos  3.5     -  Mg     2.2         CAPILLARY BLOOD GLUCOSE    Drug Levels: [] N    CSF Analysis: [] N/A      Allergies    No Known Allergies    Intolerances      MEDICATIONS:  Antibiotics:  cefTRIAXone   IVPB 1 Gram(s) IV Intermittent every 24 hours  cefTRIAXone   IVPB        Neuro:  aspirin 325 milliGRAM(s) Oral daily    Anticoagulation:  heparin  Injectable 5000 Unit(s) SubCutaneous every 8 hours    OTHER:  amLODIPine   Tablet 10 milliGRAM(s) Oral daily  atorvastatin 80 milliGRAM(s) Oral at bedtime  atropine Injectable 0.5 milliGRAM(s) IV Push every 5 minutes PRN  dextrose 40% Gel 15 Gram(s) Oral once PRN  dextrose 50% Injectable 12.5 Gram(s) IV Push once  dextrose 50% Injectable 25 Gram(s) IV Push once  dextrose 50% Injectable 25 Gram(s) IV Push once  glucagon  Injectable 1 milliGRAM(s) IntraMuscular once PRN  lisinopril 20 milliGRAM(s) Oral daily  niCARdipine Infusion 5 mG/Hr IV Continuous <Continuous>    IVF:  dextrose 5%. 1000 milliLiter(s) IV Continuous <Continuous>  sodium chloride 0.9%. 1000 milliLiter(s) IV Continuous <Continuous>    CULTURES:  Culture Results:   >100,000 CFU/ml Gram Negative Rods  Identification and susceptibility to follow. ( @ 12:08)      NEUROLOGIC EXAMINATION:  Patient examined this afternoon - noted to have L facial droop, L UE 2/5, L LE 1/5, awake alert, fully oriented, following commands, pupils equal and reactive.      HbA1C =   LDL = 132 () 134 ()   HDL = 37 () 34 ()  TG = 149 () 156 ()   TSH = 1.865 ()    Bacteriology:  CSF studies:  EEG:  Neuroimagin/12 MRI:  acute R PATRICIA infarction, scattered smaller areas of acute infarction L centrum semiovale, L subcortical insular, medial margin of R temporal horn, ?embolic, no hemorrhagic transformation; extensive SVID, numerous chronic small lacunar infarctions B basal ganglia, bilateral corona radiata, bilateral thalami, wilfred   CTP: nondiagnostic   CTA: mod calcified atherosclerotic plaque bilateral carotid bifurcation, 50-70% stenosis at origin, L VA likely chronically occluded; R VA and basilar artery hypoplastic, ?vertebrobasilar insufficiency   CT: multiple age-indeterminate lacunar infarctions, basal ganglia, thalamic; mild HCP  Other imagin/11 TTE:  severe LVH EF 55% LA severely dilated,     MEDICATIONS: amlodipine 10mg daily asa 81mg daily atorvastatin 80mg HS hydralazine 10mg PO q8h NEUROCRITICAL CARE PROGRESS NOTE    ALCON COPE   [id number]  Summary:  /  HPI:  69yo Female, PMHx HTN and Major Depressive Disorder, presented to the ED with recent history of self harm and depressed mood. Primary complaint was of progressive functional decline at home w/severe anhedonia, anorexia, poor sleep, suicidality (would cut herself and claims to have started cutting herself several months ago), often drinking alcohol at home to consol herself. She states that her depression began after retiring from her job at the [employer] 1.5yrs ago. Patient was admitted to [facility] for depressive episode - however, 1-2 hours after arriving onto the floor, a stroke code was called for L sided weakness and hypertension. NIHSS was 6 on initial assessment. On CTH - noted to have lacunar infarcts (basal ganglia and R/L thalami). On second assessment, NIHSS 1. Patient was brought back to the ED and re-worked up. EKG revealed profound bradycardia - Cardiology was consulted for HR 30s - asymptomatic. Neurosurgery also consulted for what appeared to be mild hydrocephalus on CT Scan.     SBP in the 200s - allowing for permissive HTN, SBP 180s.     Patient admitted to [facility] for further workup. (11 Aug 2018 10:04)    S/Overnight events:  off cardene this AM at 6.  norvasc and lisinopril given this AM.     Vital Signs Last 24 Hrs  T(C): 35.8 (14 Aug 2018 06:19), Max: 36.8 (13 Aug 2018 21:50)  T(F): 96.5 (14 Aug 2018 06:19), Max: 98.3 (13 Aug 2018 21:50)  HR: 50 (14 Aug 2018 07:00) (50 - 72)  BP: 199/99 (14 Aug 2018 07:00) (150/62 - 223/92)  BP(mean): 153 (14 Aug 2018 07:00) (84 - 167)  RR: 16 (14 Aug 2018 07:00) (12 - 18)  SpO2: 97% (14 Aug 2018 07:00) (90% - 99%)    I&O's Detail    13 Aug 2018 07:01  -  14 Aug 2018 07:00  --------------------------------------------------------  IN:    niCARdipine Infusion: 617.5 mL    sodium chloride 0.9%.: 1275 mL  Total IN: 1892.5 mL    OUT:    Voided: 1500 mL  Total OUT: 1500 mL    Total NET: 392.5 mL      LABS:                        11.3   10.1  )-----------( 268      ( 14 Aug 2018 05:09 )             33.3     08-    137  |  102  |  20  ----------------------------<  96  3.4<L>   |  22  |  1.50<H>    Ca    9.1      14 Aug 2018 05:09  Phos  3.5       Mg     2.2         CAPILLARY BLOOD GLUCOSE    Drug Levels: [] N    CSF Analysis: [] N/A      Allergies    No Known Allergies    Intolerances      MEDICATIONS:  Antibiotics:  cefTRIAXone   IVPB 1 Gram(s) IV Intermittent every 24 hours  cefTRIAXone   IVPB        Neuro:  aspirin 325 milliGRAM(s) Oral daily    Anticoagulation:  heparin  Injectable 5000 Unit(s) SubCutaneous every 8 hours    OTHER:  amLODIPine   Tablet 10 milliGRAM(s) Oral daily  atorvastatin 80 milliGRAM(s) Oral at bedtime  atropine Injectable 0.5 milliGRAM(s) IV Push every 5 minutes PRN  dextrose 40% Gel 15 Gram(s) Oral once PRN  dextrose 50% Injectable 12.5 Gram(s) IV Push once  dextrose 50% Injectable 25 Gram(s) IV Push once  dextrose 50% Injectable 25 Gram(s) IV Push once  glucagon  Injectable 1 milliGRAM(s) IntraMuscular once PRN  lisinopril 20 milliGRAM(s) Oral daily  niCARdipine Infusion 5 mG/Hr IV Continuous <Continuous>    IVF:  dextrose 5%. 1000 milliLiter(s) IV Continuous <Continuous>  sodium chloride 0.9%. 1000 milliLiter(s) IV Continuous <Continuous>    CULTURES:  Culture Results:   >100,000 CFU/ml Gram Negative Rods  Identification and susceptibility to follow. ( @ 12:08)      NEUROLOGIC EXAMINATION:  Patient examined this afternoon - noted to have L facial droop, L UE 2/5, L LE 1/5, awake alert, fully oriented, following commands, pupils equal and reactive.      HbA1C = 5  LDL = 132 () 134 ()   HDL = 37 (-) 34 ()  TG = 149 () 156 ()   TSH = 1.865 ()    Bacteriology:  CSF studies:  EEG:  Neuroimagin/12 MRI:  acute R PATRICIA infarction, scattered smaller areas of acute infarction L centrum semiovale, L subcortical insular, medial margin of R temporal horn, ?embolic, no hemorrhagic transformation; extensive SVID, numerous chronic small lacunar infarctions B basal ganglia, bilateral corona radiata, bilateral thalami, wilfred   CTP: nondiagnostic   CTA: mod calcified atherosclerotic plaque bilateral carotid bifurcation, 50-70% stenosis at origin, L VA likely chronically occluded; R VA and basilar artery hypoplastic, ?vertebrobasilar insufficiency   CT: multiple age-indeterminate lacunar infarctions, basal ganglia, thalamic; mild HCP  Other imagin/11 TTE:  severe LVH EF 55% LA severely dilated,     MEDICATIONS: amlodipine 10mg daily asa 81mg daily atorvastatin 80mg HS hydralazine 10mg PO q8h

## 2018-08-15 DIAGNOSIS — F32.9 MAJOR DEPRESSIVE DISORDER, SINGLE EPISODE, UNSPECIFIED: ICD-10-CM

## 2018-08-15 DIAGNOSIS — F33.2 MAJOR DEPRESSIVE DISORDER, RECURRENT SEVERE WITHOUT PSYCHOTIC FEATURES: ICD-10-CM

## 2018-08-15 DIAGNOSIS — Z91.89 OTHER SPECIFIED PERSONAL RISK FACTORS, NOT ELSEWHERE CLASSIFIED: ICD-10-CM

## 2018-08-15 DIAGNOSIS — Z29.9 ENCOUNTER FOR PROPHYLACTIC MEASURES, UNSPECIFIED: ICD-10-CM

## 2018-08-15 DIAGNOSIS — N39.0 URINARY TRACT INFECTION, SITE NOT SPECIFIED: ICD-10-CM

## 2018-08-15 DIAGNOSIS — I10 ESSENTIAL (PRIMARY) HYPERTENSION: ICD-10-CM

## 2018-08-15 DIAGNOSIS — S20.319A ABRASION OF UNSPECIFIED FRONT WALL OF THORAX, INITIAL ENCOUNTER: ICD-10-CM

## 2018-08-15 LAB
ANION GAP SERPL CALC-SCNC: 12 MMOL/L — SIGNIFICANT CHANGE UP (ref 5–17)
BUN SERPL-MCNC: 20 MG/DL — SIGNIFICANT CHANGE UP (ref 7–23)
CALCIUM SERPL-MCNC: 9.8 MG/DL — SIGNIFICANT CHANGE UP (ref 8.4–10.5)
CHLORIDE SERPL-SCNC: 106 MMOL/L — SIGNIFICANT CHANGE UP (ref 96–108)
CO2 SERPL-SCNC: 22 MMOL/L — SIGNIFICANT CHANGE UP (ref 22–31)
CREAT SERPL-MCNC: 1.73 MG/DL — HIGH (ref 0.5–1.3)
GLUCOSE SERPL-MCNC: 85 MG/DL — SIGNIFICANT CHANGE UP (ref 70–99)
HCT VFR BLD CALC: 36 % — SIGNIFICANT CHANGE UP (ref 34.5–45)
HGB BLD-MCNC: 11.8 G/DL — SIGNIFICANT CHANGE UP (ref 11.5–15.5)
MAGNESIUM SERPL-MCNC: 2.2 MG/DL — SIGNIFICANT CHANGE UP (ref 1.6–2.6)
MCHC RBC-ENTMCNC: 29.1 PG — SIGNIFICANT CHANGE UP (ref 27–34)
MCHC RBC-ENTMCNC: 32.8 G/DL — SIGNIFICANT CHANGE UP (ref 32–36)
MCV RBC AUTO: 88.7 FL — SIGNIFICANT CHANGE UP (ref 80–100)
PHOSPHATE SERPL-MCNC: 3.9 MG/DL — SIGNIFICANT CHANGE UP (ref 2.5–4.5)
PLATELET # BLD AUTO: 281 K/UL — SIGNIFICANT CHANGE UP (ref 150–400)
POTASSIUM SERPL-MCNC: 4 MMOL/L — SIGNIFICANT CHANGE UP (ref 3.5–5.3)
POTASSIUM SERPL-SCNC: 4 MMOL/L — SIGNIFICANT CHANGE UP (ref 3.5–5.3)
RBC # BLD: 4.06 M/UL — SIGNIFICANT CHANGE UP (ref 3.8–5.2)
RBC # FLD: 14.2 % — SIGNIFICANT CHANGE UP (ref 10.3–16.9)
SODIUM SERPL-SCNC: 140 MMOL/L — SIGNIFICANT CHANGE UP (ref 135–145)
WBC # BLD: 8.5 K/UL — SIGNIFICANT CHANGE UP (ref 3.8–10.5)
WBC # FLD AUTO: 8.5 K/UL — SIGNIFICANT CHANGE UP (ref 3.8–10.5)

## 2018-08-15 PROCEDURE — 93306 TTE W/DOPPLER COMPLETE: CPT | Mod: 26

## 2018-08-15 PROCEDURE — 99223 1ST HOSP IP/OBS HIGH 75: CPT

## 2018-08-15 RX ORDER — LISINOPRIL 2.5 MG/1
20 TABLET ORAL DAILY
Qty: 0 | Refills: 0 | Status: DISCONTINUED | OUTPATIENT
Start: 2018-08-15 | End: 2018-08-16

## 2018-08-15 RX ORDER — HYDRALAZINE HCL 50 MG
10 TABLET ORAL ONCE
Qty: 0 | Refills: 0 | Status: COMPLETED | OUTPATIENT
Start: 2018-08-15 | End: 2018-08-15

## 2018-08-15 RX ORDER — HYDRALAZINE HCL 50 MG
50 TABLET ORAL EVERY 8 HOURS
Qty: 0 | Refills: 0 | Status: DISCONTINUED | OUTPATIENT
Start: 2018-08-15 | End: 2018-08-16

## 2018-08-15 RX ORDER — HYDRALAZINE HCL 50 MG
25 TABLET ORAL ONCE
Qty: 0 | Refills: 0 | Status: COMPLETED | OUTPATIENT
Start: 2018-08-15 | End: 2018-08-15

## 2018-08-15 RX ORDER — SERTRALINE 25 MG/1
25 TABLET, FILM COATED ORAL DAILY
Qty: 0 | Refills: 0 | Status: DISCONTINUED | OUTPATIENT
Start: 2018-08-15 | End: 2018-08-20

## 2018-08-15 RX ADMIN — SERTRALINE 25 MILLIGRAM(S): 25 TABLET, FILM COATED ORAL at 17:23

## 2018-08-15 RX ADMIN — Medication 1 APPLICATION(S): at 07:03

## 2018-08-15 RX ADMIN — Medication 25 MILLIGRAM(S): at 10:44

## 2018-08-15 RX ADMIN — Medication 1 APPLICATION(S): at 17:22

## 2018-08-15 RX ADMIN — AMLODIPINE BESYLATE 10 MILLIGRAM(S): 2.5 TABLET ORAL at 05:36

## 2018-08-15 RX ADMIN — LISINOPRIL 40 MILLIGRAM(S): 2.5 TABLET ORAL at 05:36

## 2018-08-15 RX ADMIN — HEPARIN SODIUM 5000 UNIT(S): 5000 INJECTION INTRAVENOUS; SUBCUTANEOUS at 13:29

## 2018-08-15 RX ADMIN — LISINOPRIL 20 MILLIGRAM(S): 2.5 TABLET ORAL at 13:29

## 2018-08-15 RX ADMIN — HEPARIN SODIUM 5000 UNIT(S): 5000 INJECTION INTRAVENOUS; SUBCUTANEOUS at 21:30

## 2018-08-15 RX ADMIN — Medication 50 MILLIGRAM(S): at 13:29

## 2018-08-15 RX ADMIN — Medication 0.1 MILLIGRAM(S): at 13:29

## 2018-08-15 RX ADMIN — Medication 10 MILLIGRAM(S): at 07:03

## 2018-08-15 RX ADMIN — Medication 25 MILLIGRAM(S): at 05:36

## 2018-08-15 RX ADMIN — HEPARIN SODIUM 5000 UNIT(S): 5000 INJECTION INTRAVENOUS; SUBCUTANEOUS at 05:36

## 2018-08-15 RX ADMIN — ATORVASTATIN CALCIUM 80 MILLIGRAM(S): 80 TABLET, FILM COATED ORAL at 21:31

## 2018-08-15 RX ADMIN — Medication 50 MILLIGRAM(S): at 21:31

## 2018-08-15 RX ADMIN — Medication 325 MILLIGRAM(S): at 11:20

## 2018-08-15 NOTE — PROGRESS NOTE ADULT - PROBLEM SELECTOR PLAN 1
SBP goal 160-200, currently on Hydralazine, Lisinopril, and Norvasc.  Patient requires higher than normal BP to allow for cerebral perfusion  -Vertebrobasilar occlusion - needs MRA NOVA at some point  -KATHERINE completed 8/14, f/u results  - for antiplatelet and Atorvastatin 80 for - Secondary stroke prevention

## 2018-08-15 NOTE — BEHAVIORAL HEALTH ASSESSMENT NOTE - NSBHVIOLPROTECT_PSY_A_CORE
Relationship stability/Residential stability/Insight into violence risk and need for management/treatment

## 2018-08-15 NOTE — OCCUPATIONAL THERAPY INITIAL EVALUATION ADULT - PERTINENT HX OF CURRENT PROBLEM, REHAB EVAL
Admitted to [facility] for depressive episode - however, 1-2 hours after arriving onto the floor, a stroke code was called for L sided weakness and hypertension. Found to have acute right PATRICIA infarct with scattered smaller areas of infarct on MRI.

## 2018-08-15 NOTE — OCCUPATIONAL THERAPY INITIAL EVALUATION ADULT - STANDING BALANCE: DYNAMIC, REHAB EVAL
poor balance/sit<>stand x3 reps with max Ax2, left knee buckling. Standing weight shifting with max Ax2, unable to take side steps at this time.

## 2018-08-15 NOTE — BEHAVIORAL HEALTH ASSESSMENT NOTE - DETAILS
Deferred to primary team see HPI, recent CVA with residual weakness recent passive SI, denies SI now.

## 2018-08-15 NOTE — BEHAVIORAL HEALTH ASSESSMENT NOTE - DESCRIPTION (FIRST USE, LAST USE, QUANTITY, FREQUENCY, DURATION)
States she smokes 3 cigs/day. "I love my cigs".  Education done re risk of CVA and need to abstain. Per ER: States drinks more frequently now to self medicate; now denies any etoh use

## 2018-08-15 NOTE — OCCUPATIONAL THERAPY INITIAL EVALUATION ADULT - MD ORDER
Per chart, 69yo Female found down at home PMHx HTN, Major Depressive Disorder, with recent history of self harm and depressed mood. Primary complaint was of progressive functional decline at home w/severe anhedonia, anorexia, poor sleep, suicidality (would cut herself and claims to have started cutting herself several months ago), often drinking alcohol at home to consol herself. She states that her depression began after retiring from her job at the [employer] 1.5yrs ago.

## 2018-08-15 NOTE — BEHAVIORAL HEALTH ASSESSMENT NOTE - NSBHCONSULTPRIMARYDISCUSSYES_PSY_A_CORE FT
Note: I will not be in [facility] and able to follow patient.  I will notify team she would benefit from support from psych MD or PhD.  Please call with acute issues.

## 2018-08-15 NOTE — OCCUPATIONAL THERAPY INITIAL EVALUATION ADULT - MANUAL MUSCLE TESTING RESULTS, REHAB EVAL
RUE no strength deficits identified. No active left scapular elevation appreciated. Left shoulder flexion, elbow flexion/extension 3-5/5. Left wrist 3/5,  5/5.

## 2018-08-15 NOTE — BEHAVIORAL HEALTH ASSESSMENT NOTE - RISK ASSESSMENT
Patient is denying SI now, no h/o SI.  Is depressed and had recent CVA increasing risk of depression.   Is not felt to be an acute danger to self/others on unit.

## 2018-08-15 NOTE — BEHAVIORAL HEALTH ASSESSMENT NOTE - OTHER PAST PSYCHIATRIC HISTORY (INCLUDE DETAILS REGARDING ONSET, COURSE OF ILLNESS, INPATIENT/OUTPATIENT TREATMENT)
h/o therapy stopped 20 yrs ago, h/o Zoloft 20 yrs ago with good result (per pt, it was for anxiety).  No h/o admits, no h/o SA.

## 2018-08-15 NOTE — OCCUPATIONAL THERAPY INITIAL EVALUATION ADULT - PLANNED THERAPY INTERVENTIONS, OT EVAL
ADL retraining/bed mobility training/fine motor coordination training/balance training/cognitive, visual perceptual/neuromuscular re-education/transfer training/ROM/strengthening/stretching/IADL retraining

## 2018-08-15 NOTE — BEHAVIORAL HEALTH ASSESSMENT NOTE - NSBHCONSULTFOLLOWDETAILS_PSY_A_CORE FT
She is not felt to be an acute danger to self/others.   At rehab, she should continue to see psych for support and med management. Patient is felt to be safe for JONATHON or similar.

## 2018-08-15 NOTE — PROGRESS NOTE ADULT - ASSESSMENT
68F PMH MDD, found down in her apartment. Stroke code called on 8/11 for L facial droop and weakness. CT then showed multiple lacunar infarcts, age indeterminate, in basal ganglia and thalamus. CTA showed chronic L VA occlusion, R VA and basilar hypoplasia, carotid stenosis bilaterally. No tPA given. Symptoms improved, then 8/12 another stroke code was called for L facial droop and left UE/LE weakness, CT unchanged. MRI showed acute right PATRICIA infarct with scattered smaller areas of infarct. Patient hemodynamically stable and transferred to  for continued management

## 2018-08-15 NOTE — PROGRESS NOTE ADULT - PROBLEM SELECTOR PLAN 4
SBP goal 160-200,   - c/w Hydralazine, Lisinopril, and Norvasc.    NB: Patient requires higher than normal BP to allow for cerebral perfusion

## 2018-08-15 NOTE — BEHAVIORAL HEALTH ASSESSMENT NOTE - SUMMARY
69 yo female with h/o MDD with worsening depression since retiring from [employer] position several years ago.  Came to hospital for failure to take care of self in community.  Was on 8U and had CVA.  Now on med/neuro floor for stabilization.  On interview still depressed but denies SI.  Agrees to treatment.  No behavioral issues per team. 67 yo female with h/o MDD with worsening depression since retiring from [employer] position several years ago.  Came to hospital for failure to take care of self in community.  Was on 8U and had CVA.  Now on med/neuro floor for stabilization.  On interview still depressed but denies SI.  Agrees to treatment.  No behavioral issues per team.    Patient repeatedly tells MD she would love for her friend Leyla to bring her [employer] from [employer] when team feels she can tolerate this diet.

## 2018-08-15 NOTE — PROGRESS NOTE ADULT - SUBJECTIVE AND OBJECTIVE BOX
****Neurosurgery to  Neurology Acceptance Note****    Hospital Course  68 F PMHx HTN and Major Depressive Disorder, presented to the ED with recent history of self harm and depressed mood. Primary complaint was of progressive functional decline at home w/severe anhedonia, anorexia, poor sleep, suicidality (would cut herself and claims to have started cutting herself several months ago), often drinking alcohol at home to consol herself. She states that her depression began after retiring from her job at the [employer] 1.5yrs ago. Patient was admitted to [facility] for depressive episode - however, 1-2 hours after arriving onto the floor, a stroke code was called for L sided weakness and hypertension. NIHSS was 6 on initial assessment. On CTH - noted to have lacunar infarcts (basal ganglia and R/L thalami). On second assessment, NIHSS 1. Patient was brought back to the ED and re-worked up. EKG revealed profound bradycardia - Cardiology was consulted for HR 30s - asymptomatic. Neurosurgery also consulted for what appeared to be mild hydrocephalus on CT Scan.  No acute interventions initially, however Neurosurgery was reconsulted for findings of acute Rt PATRICIA region ischemic stroke in MRI and CTA finding of vertebrobasilar insufficiency. During exam patient noted to have new facial droop along with LLE plegia and LUE weakness. Stroke code was called and patient was taken to CT. while laying flat, some motor function returned to LLE. repeat CT showed no areas of infarction. CTA (7/12) findings were stable as compared to 7/11. CT perfusion showed no perfusion abnormality. Pressure dependent motor deficit was suspected and  patient was admitted to ICU for SBP augmentation under Dr. Strickland Out of window for tPA;    Patient was monitored in ICU x 48 hours in stable neurologic condition.  Patient was on nicardipine drip to maintain -200.  Multiple oral agents started, now off nicardipine.    Cardiology consulted to assist in stroke workup given concern of cardioembolic phenomenon.  KATHERINE performed on 8/14.  Cardiology service actively following    Patient also presented with anterior chest wall wound, hemorrhagic in appearance.  Dermatology service consulted, recommending warm compresses twice daily along with bacitracin and keeping wound covered so patient does not continue to irritate it.    Patient was admitted to psych floor prior to discovery of stroke symptoms.  Psych notified and agreed to continue following patient while in house.    8/15 Patient now transferred to [facility] for continued medical management.    SUBJECTIVE: Patient seen and examined at bedside. No complaints, still drowsy after KATHERINE procedure.    OBJECTIVE:    VITAL SIGNS:  ICU Vital Signs Last 24 Hrs  T(C): 36.3 (15 Aug 2018 02:00), Max: 36.3 (15 Aug 2018 02:00)  T(F): 97.4 (15 Aug 2018 02:00), Max: 97.4 (15 Aug 2018 02:00)  HR: 60 (15 Aug 2018 01:02) (48 - 66)  BP: 197/92 (15 Aug 2018 01:02) (128/60 - 256/91)  BP(mean): 132 (15 Aug 2018 01:02) (84 - 185)  RR: 16 (15 Aug 2018 01:02) (12 - 18)  SpO2: 97% (15 Aug 2018 01:02) (92% - 99%)      08-13 @ 07:01  -  08-14 @ 07:00  --------------------------------------------------------  IN: 1892.5 mL / OUT: 1500 mL / NET: 392.5 mL    08-14 @ 07:01  -  08-15 @ 02:31  --------------------------------------------------------  IN: 550 mL / OUT: 250 mL / NET: 300 mL      CAPILLARY BLOOD GLUCOSE  POCT Blood Glucose.: 99 mg/dL (13 Aug 2018 06:18)      PHYSICAL EXAM:    General: NAD, drowsy, following commands.  HEENT: NC/AT; PERRL, clear conjunctiva  Neck: supple  Respiratory: CTA b/l  Cardiovascular: +S1/S2; RRR  Abdomen: soft, NT/ND; +BS x4  Extremities: WWP, 2+ peripheral pulses b/l; no LE edema  Skin: normal color and turgor; no rash  Neurological: ental status: sleeping but easily arousable to voice. Speech is fluent. Follows commands. No dysarthria, no aphasia.  CN II: pupils equally round and reactive to light,   II, IV, VI: EOMI without nystagmus  VII: Left facial droop  VIII: hearing is intact to finger rub  IX, X: Uvula is midline and soft palate rises symmetrically  XI: head turning and shoulder shrug are intact.  XII: tongue midline    Motor: Normal bulk and tone, strength 5/5 in right UE. Left UE 4/5 handgrip, no drift but 3/5 strength to testing. Right LE no drift, but would not keep up for strength testing. Left LE unable to lift off bed but with some purposeful spontaneous movement. Full strength in toes bilaterally.    Sensation: intact to light touch    Cerebellum: No dysmetria on finger-to-nose, though slower on the left. Right heel-to-shin slides off shin when sliding down. Left LE deferred.    Reflexes: 2+ in upper and lower extremities, downgoing toes bilaterally    Gait: deferred      MEDICATIONS:  MEDICATIONS  (STANDING):  amLODIPine   Tablet 10 milliGRAM(s) Oral daily  aspirin 325 milliGRAM(s) Oral daily  atorvastatin 80 milliGRAM(s) Oral at bedtime  BACItracin   Ointment 1 Application(s) Topical two times a day  cefTRIAXone   IVPB 1 Gram(s) IV Intermittent every 24 hours  dextrose 5%. 1000 milliLiter(s) (50 mL/Hr) IV Continuous <Continuous>  heparin  Injectable 5000 Unit(s) SubCutaneous every 8 hours  hydrALAZINE 25 milliGRAM(s) Oral every 8 hours  lisinopril 40 milliGRAM(s) Oral daily    MEDICATIONS  (PRN):  atropine Injectable 0.5 milliGRAM(s) IV Push every 5 minutes PRN Symptomatic Bradycardia  dextrose 40% Gel 15 Gram(s) Oral once PRN Blood Glucose LESS THAN 70 milliGRAM(s)/deciliter  glucagon  Injectable 1 milliGRAM(s) IntraMuscular once PRN Glucose LESS THAN 70 milligrams/deciliter      ALLERGIES:  No Known Allergies    LABS:                        11.3   10.1  )-----------( 268      ( 14 Aug 2018 05:09 )             33.3     08-14    137  |  102  |  20  ----------------------------<  96  3.4<L>   |  22  |  1.50<H>    Ca    9.1      14 Aug 2018 05:09  Phos  3.5     08-14  Mg     2.2     08-14    RADIOLOGY & ADDITIONAL TESTS:   9/12 MR Head Non Contrast:   Acute R PATRICIA infarction with scattered smaller areas of   acute infarction involving the left centrum semiovale, left subcortical   insular, adjacent to the anterior margin of the right occipital horn,   there is also linear restricted diffusion along the medial margin of the   right temporal horn. Multi vascular territory involvement suggest an   embolic phenomenon. No evidence of hemorrhagic transformation at this   time. No significant adjacent mass effect or midline shift.    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    8/15 KATHERINE: .The right ventricular systolic function is   normal. The left ventricular ejection fraction is estimated to be   55-60%. Mild atherosclerotic plaque(s) in the aortic arch. Mild atherosclerotic   plaque(s) in the descending aorta. There is no pericardial effusion.  No evidence for interatrial shunt by color Doppler assessment

## 2018-08-15 NOTE — BEHAVIORAL HEALTH ASSESSMENT NOTE - NSBHADMITCOUNSEL_PSY_A_CORE
importance of adherence to chosen treatment/risks and benefits of treatment options/diagnostic results/impressions and/or recommended studies/instructions for management, treatment and follow up

## 2018-08-15 NOTE — BEHAVIORAL HEALTH ASSESSMENT NOTE - HPI (INCLUDE ILLNESS QUALITY, SEVERITY, DURATION, TIMING, CONTEXT, MODIFYING FACTORS, ASSOCIATED SIGNS AND SYMPTOMS)
68 F with PPH of MDD originally came to ER on 8/10 by EMS due to worsening depression with SI. Patient's landlord apparently contacted friend Leyla Lizama to report that there was concern about patient's living conditions, and she was found to be living in squalor with urine and feces on the floor.     On ER interview patient reports that she has experienced episodes of depression throughout her adult life, but that they have been getting progressively worse since she retired 7 years ago from job at [employer]. The most recent episode began approximately 7 months ago. Her endorsed symptoms are depressed mood, profoundly decreased energy, anhedonia, hopelessness, lack of appetite, disrupted sleep and SI with no specific plan. She has also attempted to superficially cut/scratch her wrist over the past months, but has never broken the skin due to fear of pain. She denies symptoms of mirian and psychosis both past and present. Patient states she is amenable to voluntary admission.    Hours after arrival to 8U had change in mental status/physical exam and was found to have ischemic CVA likely secondary to vertebobasilal insufficiency with physical symptoms of facial droop and LLE/LUE weakness/plegia.      Today, patient seen on [facility].  Friendly with MD.  William, AOx3.  States "my brain is broken".  Explains she means because of her CVA.  Able to give history of her decompensation from depression consistent with previous interview for 8U admit.  Although she admits she was "ready to give up" denies any active SI "I'm too chicken but I needed help".  Admits she is still depressed "now I have this too, this really sucks". but appears euthymic on interview and is able to make jokes with MD about life issues.

## 2018-08-15 NOTE — OCCUPATIONAL THERAPY INITIAL EVALUATION ADULT - RANGE OF MOTION EXAMINATION, UPPER EXTREMITY
Left shoulder, wrist, hand PROM WFL. Left elbow flexion WFL, unable to achieve full extension./Right UE Active ROM was WFL (within functional limits)

## 2018-08-15 NOTE — PROGRESS NOTE ADULT - PROBLEM SELECTOR PLAN 8
1) PCP Contacted on Admission: (Y/N) --> Name & Phone #:  2) Date of Contact with PCP:  3) PCP Contacted at Discharge: (Y/N, N/A)  4) Summary of Handoff Given to PCP:   5) Post-Discharge Appointment Date and Location:

## 2018-08-15 NOTE — OCCUPATIONAL THERAPY INITIAL EVALUATION ADULT - SITTING BALANCE: DYNAMIC
Patient sat at edge of bed approximately 10 minutes with mod-max posterior support +LOB to left/poor balance

## 2018-08-16 DIAGNOSIS — F32.3 MAJOR DEPRESSIVE DISORDER, SINGLE EPISODE, SEVERE WITH PSYCHOTIC FEATURES: ICD-10-CM

## 2018-08-16 DIAGNOSIS — N17.9 ACUTE KIDNEY FAILURE, UNSPECIFIED: ICD-10-CM

## 2018-08-16 LAB
ANION GAP SERPL CALC-SCNC: 14 MMOL/L — SIGNIFICANT CHANGE UP (ref 5–17)
BUN SERPL-MCNC: 23 MG/DL — SIGNIFICANT CHANGE UP (ref 7–23)
CALCIUM SERPL-MCNC: 9.3 MG/DL — SIGNIFICANT CHANGE UP (ref 8.4–10.5)
CHLORIDE SERPL-SCNC: 106 MMOL/L — SIGNIFICANT CHANGE UP (ref 96–108)
CO2 SERPL-SCNC: 21 MMOL/L — LOW (ref 22–31)
CREAT SERPL-MCNC: 2.05 MG/DL — HIGH (ref 0.5–1.3)
CRP SERPL-MCNC: 1.77 MG/DL — HIGH (ref 0–0.4)
GLUCOSE SERPL-MCNC: 95 MG/DL — SIGNIFICANT CHANGE UP (ref 70–99)
HCT VFR BLD CALC: 34 % — LOW (ref 34.5–45)
HGB BLD-MCNC: 11.2 G/DL — LOW (ref 11.5–15.5)
MAGNESIUM SERPL-MCNC: 2 MG/DL — SIGNIFICANT CHANGE UP (ref 1.6–2.6)
MCHC RBC-ENTMCNC: 29.2 PG — SIGNIFICANT CHANGE UP (ref 27–34)
MCHC RBC-ENTMCNC: 32.9 G/DL — SIGNIFICANT CHANGE UP (ref 32–36)
MCV RBC AUTO: 88.5 FL — SIGNIFICANT CHANGE UP (ref 80–100)
PHOSPHATE SERPL-MCNC: 4.3 MG/DL — SIGNIFICANT CHANGE UP (ref 2.5–4.5)
PLATELET # BLD AUTO: 284 K/UL — SIGNIFICANT CHANGE UP (ref 150–400)
POTASSIUM SERPL-MCNC: 4 MMOL/L — SIGNIFICANT CHANGE UP (ref 3.5–5.3)
POTASSIUM SERPL-SCNC: 4 MMOL/L — SIGNIFICANT CHANGE UP (ref 3.5–5.3)
RBC # BLD: 3.84 M/UL — SIGNIFICANT CHANGE UP (ref 3.8–5.2)
RBC # FLD: 14 % — SIGNIFICANT CHANGE UP (ref 10.3–16.9)
SODIUM SERPL-SCNC: 141 MMOL/L — SIGNIFICANT CHANGE UP (ref 135–145)
WBC # BLD: 8.5 K/UL — SIGNIFICANT CHANGE UP (ref 3.8–10.5)
WBC # FLD AUTO: 8.5 K/UL — SIGNIFICANT CHANGE UP (ref 3.8–10.5)

## 2018-08-16 PROCEDURE — 99232 SBSQ HOSP IP/OBS MODERATE 35: CPT

## 2018-08-16 PROCEDURE — 99223 1ST HOSP IP/OBS HIGH 75: CPT | Mod: GC

## 2018-08-16 PROCEDURE — 99222 1ST HOSP IP/OBS MODERATE 55: CPT

## 2018-08-16 RX ORDER — SODIUM CHLORIDE 9 MG/ML
1000 INJECTION INTRAMUSCULAR; INTRAVENOUS; SUBCUTANEOUS
Qty: 0 | Refills: 0 | Status: DISCONTINUED | OUTPATIENT
Start: 2018-08-16 | End: 2018-08-18

## 2018-08-16 RX ORDER — SODIUM CHLORIDE 9 MG/ML
250 INJECTION INTRAMUSCULAR; INTRAVENOUS; SUBCUTANEOUS ONCE
Qty: 0 | Refills: 0 | Status: COMPLETED | OUTPATIENT
Start: 2018-08-16 | End: 2018-08-16

## 2018-08-16 RX ORDER — HYDRALAZINE HCL 50 MG
25 TABLET ORAL EVERY 8 HOURS
Qty: 0 | Refills: 0 | Status: DISCONTINUED | OUTPATIENT
Start: 2018-08-16 | End: 2018-08-20

## 2018-08-16 RX ORDER — HYDRALAZINE HCL 50 MG
75 TABLET ORAL EVERY 8 HOURS
Qty: 0 | Refills: 0 | Status: DISCONTINUED | OUTPATIENT
Start: 2018-08-16 | End: 2018-08-16

## 2018-08-16 RX ORDER — HYDRALAZINE HCL 50 MG
5 TABLET ORAL ONCE
Qty: 0 | Refills: 0 | Status: COMPLETED | OUTPATIENT
Start: 2018-08-16 | End: 2018-08-16

## 2018-08-16 RX ADMIN — SODIUM CHLORIDE 80 MILLILITER(S): 9 INJECTION INTRAMUSCULAR; INTRAVENOUS; SUBCUTANEOUS at 17:55

## 2018-08-16 RX ADMIN — ATORVASTATIN CALCIUM 80 MILLIGRAM(S): 80 TABLET, FILM COATED ORAL at 21:48

## 2018-08-16 RX ADMIN — SERTRALINE 25 MILLIGRAM(S): 25 TABLET, FILM COATED ORAL at 12:43

## 2018-08-16 RX ADMIN — Medication 25 MILLIGRAM(S): at 23:53

## 2018-08-16 RX ADMIN — LISINOPRIL 20 MILLIGRAM(S): 2.5 TABLET ORAL at 05:22

## 2018-08-16 RX ADMIN — HEPARIN SODIUM 5000 UNIT(S): 5000 INJECTION INTRAVENOUS; SUBCUTANEOUS at 05:22

## 2018-08-16 RX ADMIN — HEPARIN SODIUM 5000 UNIT(S): 5000 INJECTION INTRAVENOUS; SUBCUTANEOUS at 21:48

## 2018-08-16 RX ADMIN — Medication 75 MILLIGRAM(S): at 05:21

## 2018-08-16 RX ADMIN — Medication 1 APPLICATION(S): at 05:21

## 2018-08-16 RX ADMIN — HEPARIN SODIUM 5000 UNIT(S): 5000 INJECTION INTRAVENOUS; SUBCUTANEOUS at 17:50

## 2018-08-16 RX ADMIN — AMLODIPINE BESYLATE 10 MILLIGRAM(S): 2.5 TABLET ORAL at 05:33

## 2018-08-16 RX ADMIN — Medication 5 MILLIGRAM(S): at 03:56

## 2018-08-16 RX ADMIN — Medication 1 APPLICATION(S): at 17:50

## 2018-08-16 RX ADMIN — Medication 325 MILLIGRAM(S): at 12:44

## 2018-08-16 RX ADMIN — Medication 0.1 MILLIGRAM(S): at 05:21

## 2018-08-16 RX ADMIN — SODIUM CHLORIDE 500 MILLILITER(S): 9 INJECTION INTRAMUSCULAR; INTRAVENOUS; SUBCUTANEOUS at 11:16

## 2018-08-16 NOTE — PROGRESS NOTE ADULT - ASSESSMENT
68F w/PMH HTN and MDD who initially p/w SI, hosp course c/b hypertensive emergency w/CVA and asx bradycardia.    - Imaging shows acute R PATRICIA territory infarction; L centrum semiovale, L subcortical insular, medial R temporal horn. 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   - KATHERINE w/o LA/RAFAEL thrombus, no ASD on bubble study  - Neuro team now concerned for vasculitis as possible etiology for CVA, plan for angiogram w/NSGx in AM  - Neuro team also concerned that pt may be flow dep and needs elevated pressures to maintain cerebral perfusion, allowing permissive HTN, SBP goal 160-200  - Currently on norvasc 10mg qd, hydralazine 25mg q8h, clonidine 0.1mg qd. Would recommend considering using vasodilator such as isordil in place of central agent such as clonidine. BP mgmt as per neuro team.  - TTE w/severe concentric LVH, but no LVOT obstruction  - Cr mildly elev from baseline, mild MARYJANE, lisinopril d/c. Avoid nephrotoxic agents. If rising tomorrow check ulytes to further assess  - C/w [id number] and lipitor 80qhs as per neuro for risk modification  - Asx bradycardia, when able to ambulate will need to check HR w/ambulation to assess chronotropic competence. Avoid AVN blockade  - RCRI 2 (6.6% risk of MACE), Brunner 5.66%, pt intermediate risk for low risk procedure  - Agree w/medicine recs: can c/w amlodipine, hydralazine, statin perioperatively; please hold clonidine and lisinopril the morning of procedure

## 2018-08-16 NOTE — PROGRESS NOTE ADULT - SUBJECTIVE AND OBJECTIVE BOX
Surgery: diagnostic angio  Consent: Signed by patient     No Known Allergies      OVERNIGHT EVENTS:  no acute events  preop for angio tomorrow    T(C): 36.1 (08-16-18 @ 09:00), Max: 36.7 (08-16-18 @ 01:30)  HR: 58 (08-16-18 @ 08:26) (52 - 72)  BP: 186/83 (08-16-18 @ 08:26) (160/69 - 224/95)  RR: 18 (08-16-18 @ 04:47) (15 - 18)  SpO2: 98% (08-16-18 @ 08:26) (96% - 99%)  Wt(kg): --    EXAM:  awake, alert, oriented, lethargic, hypophonic  EOMI, PERRL  SIGALA x 4, right side 5/5, LUE 3/5, LLE 2/5  sensation intact    08-16    141  |  106  |  23  ----------------------------<  95  4.0   |  21<L>  |  2.05<H>    Ca    9.3      16 Aug 2018 07:07  Phos  4.3     08-16  Mg     2.0     08-16      CBC Full  -  ( 16 Aug 2018 07:07 )  WBC Count : 8.5 K/uL  Hemoglobin : 11.2 g/dL  Hematocrit : 34.0 %  Platelet Count - Automated : 284 K/uL  Mean Cell Volume : 88.5 fL  Mean Cell Hemoglobin : 29.2 pg  Mean Cell Hemoglobin Concentration : 32.9 g/dL  Auto Neutrophil # : x  Auto Lymphocyte # : x  Auto Monocyte # : x  Auto Eosinophil # : x  Auto Basophil # : x  Auto Neutrophil % : x  Auto Lymphocyte % : x  Auto Monocyte % : x  Auto Eosinophil % : x  Auto Basophil % : x    Type & Screen (in past 72hrs): pending    CXR: < from: Xray Chest 1 View AP/PA (08.11.18 @ 09:51) >  Nodular opacity in the right lower lung zone. Probably represents the   nipple shadow. Recommend repeat imaging with nipple marker for   confirmation. The lungs are clear otherwise.    < end of copied text >    EKG: < from: 12 Lead ECG (08.11.18 @ 08:24) >  Diagnosis Line Marked sinus bradycardia with marked sinus arrhythmia  Left ventricular hypertrophy with repolarization abnormality    < end of copied text >    ECHO: < from: Echocardiogram w/ Bubble and Doppler (08.15.18 @ 16:18) >  Limited bubble study. Injection of agitated saline contrast documented no   interatrial shunt at rest and on Valsalva maneuver.  Procedure Details  A two-dimensional transthoracic echocardiogram with color flow and Doppler  was performed in limited views only.  Study Quality: Good.  Left Atrium  Injection of agitated saline contrast documented no interatrial shunt.    < end of copied text >    Medical Clearances: n/a    Last dose of antiplatelet/anticoagulation drug: sqh/asa daily    Implanted Devices (pacemaker, drug pump...etc):  []YES   [x] NO                  If yes --> EPS consulted to interrogate/adjust device:                 Assessment: 68F with right PATRICIA infarct, vertebrobasilar occlusion.  Neurologically stable    Plan:  preop for cath angio tomorrow to r/o vasculitis  please make npo/iv-f after MN  please obtain t&s specimen  case d/w Dr. Haddad

## 2018-08-16 NOTE — PROGRESS NOTE ADULT - ATTENDING COMMENTS
Assessment: Patient personally seen and examined myself during rounds with the Cardiology Fellow  ON DATE 8/16/18  Cardiology Fellow note read, including vitals, physical findings, laboratory data, and radiological reports.   Revisions included below.   Direct personal management at bed side and extensive interpretation of the data.    Plan was outlined and discussed in details with the Cardiology Fellow    Decision making of high complexity   Risk high of complications, morbidity, and/or mortality  Assessment and Action taken for acute disease activity to reflect the level of care provided:  -Hemodynamic evaluation and support  -Medication reconciliation  -Review laboratory data  -EKG reviewed   -    TIME SPENT in evaluation and management, reassessments, review and interpretation of labs and x-rays, and hemodynamic management, formulating a plan and coordinating care: ___25____ MIN.  Time does not include procedural time.    Minnie Jovel MD  Cardiology    Mobile: [phone]  Office: [phone]  [address], [address]

## 2018-08-16 NOTE — PROGRESS NOTE ADULT - PROBLEM SELECTOR PLAN 3
-Anterior chest wall wound care ordered Patient presented with MARYJANE of Cr 1.45 with baseline unclear. Creatinine uptrend throughout patient's course, today Cr 2.05. Etiology currently unclear, possibly prerenal in setting of decreased PO intake from depression vs. hypertensive nephropathy from persistently elevated BPs  -f/u renal ultrasound  -f/u urine lytes    -holding lisinopril in setting of MARYJANE

## 2018-08-16 NOTE — PROGRESS NOTE ADULT - PROBLEM SELECTOR PLAN 4
SBP goal 160-200,   - c/w Hydralazine, Lisinopril, and Norvasc.    NB: Patient requires higher than normal BP to allow for cerebral perfusion Patient initially admitted to psych for major depressive episode with inability to take care of self and SI. Patient subsequently had CVA.  -psych consulted. recs appreciated. No longer having SI, deemed not to be harm to self or others and d/cal 1:1.   -c/w zoloft 25mg  -psych now signed off case. Recommending formal psych referral upon discharge

## 2018-08-16 NOTE — PROGRESS NOTE ADULT - PROBLEM SELECTOR PLAN 1
SBP goal 160-200, currently on Hydralazine, Lisinopril, and Norvasc.  Patient requires higher than normal BP to allow for cerebral perfusion  -Vertebrobasilar occlusion - needs MRA NOVA at some point  -KATHERINE completed 8/14, f/u results  - for antiplatelet and Atorvastatin 80 for - Secondary stroke prevention Etiology not yet determined. No atrial thrombus on KATHERINE, no afib on SBP goal 160-200, currently on Hydralazine, Norvasc, and clonidine.  Patient requires higher than normal BP to allow for cerebral perfusion. Considering vasculitic etiology given positive ESR, other work up for determining etiology negative. Hypercoaguability workup sent.   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 EF 55-60%  - for antiplatelet and Atorvastatin 80 for - Secondary stroke prevention  -patient sent for hypercoaguability workup. f/u results  -CVA possible 2/2 vasculitis given work up thus far to determine etiology negative, ESR elevated. Patient scheduled for cerebral angiogram in AM  -NPO after midnight  -Neurosurg following, recs appreciated  -medicine following, recs appreciated  -Patient at intermediate risk for procedure. RCRI 6.6 % (+ stroke and Cr > 2)  -cardio recs appreciated. hold lisinopril and clonidine on day of procedure.   -will consider isordil instead of clonidine to optimize BP control

## 2018-08-16 NOTE — CONSULT NOTE ADULT - ASSESSMENT
68 yr old female with a PMHx of HTN that presented to [facility] for psych stabilization, subsequently had a multi-focal stroke with L sided facial and UE/LE deficits. Course c/b persistent HTN requiring nicardipine drip now transition to PO BP regimen. Primary etiology thought to be embolic stroke however, initial neuro w/u (-) for a-fib or LV thrombus; Multifocality of strokes now concerning for vasculitis. Medicine consulted for med clearance prior to angiogram.     # Preoperative Optimization  -RCRI 0.9% (+ stroke) for low risk procedure   -continue amlodipine, hydralazine, statin perioperatively; please hold clonidine and lisinopril the morning of procedure   -Postoperative Telemetry  -Will continue to follow 68 yr old female with a PMHx of HTN that presented to [facility] for psych stabilization, subsequently had a multi-focal stroke with L sided facial and UE/LE deficits. Course c/b persistent HTN requiring nicardipine drip now transition to PO BP regimen. Primary etiology thought to be embolic stroke however, initial neuro w/u (-) for a-fib or LV thrombus; Multifocality of strokes now concerning for vasculitis. Medicine consulted for med clearance prior to angiogram.     # Preoperative Optimization  -RCRI 6.6 % (+ stroke and Cr > 2) intermediate risk for low risk procedure   -continue amlodipine, hydralazine, statin perioperatively; please hold clonidine and lisinopril the morning of procedure   -METS > 4  -Postoperative Telemetry  -Will continue to follow    #HTN  -poorly controlled; SBP 180s - 200s  -currently on 4 drug regimen with lisnopril, clonidine, amlodipine, hydralazine  -HTN management per neurology who would like to maintain an SBP of 200 to facilitate adequate brain perfusion  -once the patient is outside the window for permissive HTN per neuro's recs and MARYJANE is resolved, BP optimization per cardiology    #acute CVA  -w/ residual L sided deficits  -on , 80 mg lipitor   -c/w permissive HTN per neuro recs 68 yr old female with a PMHx of HTN that presented to [facility] for psych stabilization, subsequently had a multi-focal stroke with L sided facial and UE/LE deficits. Course c/b persistent HTN requiring nicardipine drip now transition to PO BP regimen. Primary etiology thought to be embolic stroke however, initial neuro w/u (-) for a-fib or LV thrombus; Multifocality of strokes now concerning for vasculitis. Medicine consulted for med clearance prior to angiogram.     # Preoperative Optimization  -RCRI 6.6 % (+ stroke and Cr > 2) intermediate risk for low risk procedure   -continue amlodipine, hydralazine, statin perioperatively; please hold clonidine and lisinopril the morning of procedure   -METS > 4  -Postoperative Telemetry  -Will continue to follow    #HTN  -poorly controlled; SBP 180s - 200s  -currently on 4 drug regimen with lisnopril, clonidine, amlodipine, hydralazine  -HTN management per neurology who would like to maintain an SBP of 200 to facilitate adequate brain perfusion  -once the patient is outside the window for permissive HTN per neuro's recs and MARYJANE is resolved, BP optimization per cardiology    #acute CVA  -etiolology thought to be 2/2 uncontrolled HTN vs vasculitis; LV thrombus r/o by KATHERINE; no documented A-fib on tele  -w/ residual L sided deficits  -on , 80 mg lipitor   -c/w permissive HTN per neuro recs    #r/o cerebral vasculitis   -initially etiology of patient's strokes thought to be embolic however, patient has been in [place] and KATHERINE unrevealing for LV thrombus  -now etiology of patient's stroked thought to be 2/2 vasculitis  -CRP and ESR elevated; no further rheum testing done   -plan for angiogram with neurosurgery for further evaluation of vasculitis 68 yr old female with a PMHx of HTN that presented to [facility] for psych stabilization, subsequently had a multi-focal stroke with L sided facial and UE/LE deficits. Course c/b persistent HTN requiring nicardipine drip now transition to PO BP regimen. Primary etiology thought to be embolic stroke however, initial neuro w/u (-) for a-fib or LV thrombus; Multifocality of strokes now concerning for vasculitis. Medicine consulted for med clearance prior to angiogram.     # Preoperative Optimization  -RCRI 6.6 % (+ stroke and Cr > 2) intermediate risk for low risk procedure   -continue amlodipine, hydralazine, clonidine & statin perioperatively; please hold lisinopril starting today in the setting of MARYJANE  -METS < 4  -Postoperative Telemetry  -Will continue to follow    #HTN  -poorly controlled; SBP 180s - 200s  -currently on 4 drug regimen with lisnopril, clonidine, amlodipine, hydralazine  -HTN management per neurology who would like to maintain an SBP of 200 to facilitate adequate brain perfusion  -once the patient is outside the window for permissive HTN per neuro's recs and MARYJANE is resolved, BP optimization per cardiology    #MARYJANE   -patient presented with MARYJANE of 1.45; unclear baseline  -cont to uptrend throughout her course; Cr 2.1 today  -etiology unclear; recommend renal ultrasound and urine lytes for further eval   -please hold lisinopril; please avoid all nephrotoxic agents    #acute CVA  -etiolology thought to be 2/2 uncontrolled HTN vs vasculitis; LV thrombus r/o by KATHERINE; no documented A-fib on tele  -w/ residual L sided deficits  -on , 80 mg lipitor   -c/w permissive HTN per neuro recs    #r/o cerebral vasculitis   -initially etiology of patient's strokes thought to be embolic however, patient has been in [place] and KATHERINE unrevealing for LV thrombus  -now etiology of patient's stroked thought to be 2/2 vasculitis  -CRP and ESR elevated; no further rheum testing done   -plan for angiogram with neurosurgery for further evaluation of vasculitis

## 2018-08-16 NOTE — PROGRESS NOTE ADULT - PROBLEM SELECTOR PLAN 5
-Psych notified: will continue to follow patient while in house Anterior chest wall wound care ordered.  -consider derm consult for biopsy

## 2018-08-16 NOTE — PROGRESS NOTE ADULT - SUBJECTIVE AND OBJECTIVE BOX
INTERVAL EVENTS:    Pt seen and examined at bedside, no major events o/n    MEDICATIONS  (STANDING):  amLODIPine   Tablet 10 milliGRAM(s) Oral daily  aspirin 325 milliGRAM(s) Oral daily  atorvastatin 80 milliGRAM(s) Oral at bedtime  BACItracin   Ointment 1 Application(s) Topical two times a day  cloNIDine 0.1 milliGRAM(s) Oral daily  dextrose 5%. 1000 milliLiter(s) (50 mL/Hr) IV Continuous <Continuous>  dextrose 50% Injectable 12.5 Gram(s) IV Push once  dextrose 50% Injectable 25 Gram(s) IV Push once  dextrose 50% Injectable 25 Gram(s) IV Push once  heparin  Injectable 5000 Unit(s) SubCutaneous every 8 hours  hydrALAZINE 25 milliGRAM(s) Oral every 8 hours  sertraline 25 milliGRAM(s) Oral daily  sodium chloride 0.9%. 1000 milliLiter(s) (80 mL/Hr) IV Continuous <Continuous>    MEDICATIONS  (PRN):  atropine Injectable 0.5 milliGRAM(s) IV Push every 5 minutes PRN Symptomatic Bradycardia  dextrose 40% Gel 15 Gram(s) Oral once PRN Blood Glucose LESS THAN 70 milliGRAM(s)/deciliter  glucagon  Injectable 1 milliGRAM(s) IntraMuscular once PRN Glucose LESS THAN 70 milligrams/deciliter      Vital Signs Last 24 Hrs  T(C): 36.3 (16 Aug 2018 17:22), Max: 36.7 (16 Aug 2018 01:30)  T(F): 97.3 (16 Aug 2018 17:22), Max: 98.1 (16 Aug 2018 01:30)  HR: 60 (16 Aug 2018 14:56) (52 - 72)  BP: 178/81 (16 Aug 2018 14:56) (160/77 - 224/95)  BP(mean): 117 (16 Aug 2018 14:56) (107 - 137)  RR: 18 (16 Aug 2018 04:47) (15 - 18)  SpO2: 98% (16 Aug 2018 11:50) (96% - 98%)     PHYSICAL EXAM:  GEN: Awake, alert. NAD. Flat affect  HEENT: NCAT, PERRL, EOMI. Mucosa moist. No JVD.  RESP: CTA b/l  CV: RRR. Normal S1/S2. No m/r/g.  ABD: Soft. NT/ND. BS+  EXT: Warm. No edema, clubbing, or cyanosis.   NEURO: AAOx3. Fluent speech. Mild L-sided facial droop. L-sided weakness (LLE>LUE)      LABS:                        11.2   8.5   )-----------( 284      ( 16 Aug 2018 07:07 )             34.0     08-16    141  |  106  |  23  ----------------------------<  95  4.0   |  21<L>  |  2.05<H>    Ca    9.3      16 Aug 2018 07:07  Phos  4.3     08-16  Mg     2.0     08-16              I&O's Summary    15 Aug 2018 07:01  -  16 Aug 2018 07:00  --------------------------------------------------------  IN: 856 mL / OUT: 0 mL / NET: 856 mL      BNP  RADIOLOGY & ADDITIONAL STUDIES:    TELEMETRY: NSR in 50s, 40s o/n    EKG:

## 2018-08-16 NOTE — CONSULT NOTE ADULT - SUBJECTIVE AND OBJECTIVE BOX
ALCON COPE  68y  Female    Hospital Course:  67yo Female, PMHx HTN and Major Depressive Disorder, presented to the ED with recent history of self harm and depressed mood. Primary complaint was of progressive functional decline at home w/severe anhedonia, anorexia, poor sleep, suicidality (would cut herself and claims to have started cutting herself several months ago), often drinking alcohol at home to consol herself. She states that her depression began after retiring from her job at the [employer] 1.5yrs ago. Patient was admitted to [facility] for depressive episode - however, 1-2 hours after arriving onto the floor, a stroke code was called for L sided weakness and hypertension. NIHSS was 6 on initial assessment. On CTH - noted to have lacunar infarcts (basal ganglia and R/L thalami). On second assessment, NIHSS 1. Did not receive tPA 2/2 acute resolution of symptoms. Neuro workup revealed: KATHERINE (-) for thrombi; EKG revealed profound bradycardia. Cardiology following but not on medical management for this 2/2 being asymptomatic. Pt has been persistently hypertensive since admission w/ SBP > 240 at which time she was initiated on a nicardipine drip. Patient was transitioned to PO regimen of amlodipine, hydralazine, lisinopril and clonidine. Secondary to multi-focality of patient's CVA's, concerned for vasculitis needs to be ruled out. Medicine consulted for pre-op clearance for angiogram.     Subjective: Patient denies any complaints at this time other than feeling very sleepy. She denies any CP, SOB, n/v/d/c, fevers, chills, headaches or SOB. Denies any hx of CAD, CHF, DMT2 requiring insulin or CKD. She cant walk at all at this point in time 2/2 recent stroke.      Home Medications:   none    Past Medical History:   HTN-not on medication for this    Surgical History:  1 hx many years ago which she cant remember    Family History:   denies significant PMHx in mother or father    Social History:  daily ppd smoker for 20 yrs  3 drinks a week  never used illicit drugs  lives at home alone; no kids;   worked for the [employer]; retired 2 yrs ago    Allergies:  NKDA    REVIEW OF SYSTEMS:  CONSTITUTIONAL: No fever, weight loss, or fatigue  EYES: No eye pain, visual disturbances, or discharge  ENMT:  No difficulty hearing, tinnitus, vertigo; No sinus or throat pain  NECK: No pain or stiffness  BREASTS: No pain, masses, or nipple discharge  RESPIRATORY: No cough, wheezing, chills or hemoptysis; No shortness of breath  CARDIOVASCULAR: No chest pain, palpitations, dizziness, or leg swelling  GASTROINTESTINAL: No abdominal or epigastric pain. No nausea, vomiting, or hematemesis; No diarrhea or constipation. No melena or hematochezia.  GENITOURINARY: No dysuria, frequency, hematuria, or incontinence  NEUROLOGICAL: No headaches, memory loss, loss of strength, numbness, or tremors  SKIN: No itching, burning, rashes, or lesions   LYMPH NODES: No enlarged glands  ENDOCRINE: No heat or cold intolerance; No hair loss  MUSCULOSKELETAL: No joint pain or swelling; No muscle, back, or extremity pain  PSYCHIATRIC: No depression, anxiety, mood swings, or difficulty sleeping  HEME/LYMPH: No easy bruising, or bleeding gums  ALLERY AND IMMUNOLOGIC: No hives or eczema    T(C): 36.1 (08-16-18 @ 09:00), Max: 36.7 (08-16-18 @ 01:30)  HR: 58 (08-16-18 @ 08:26) (52 - 72)  BP: 186/83 (08-16-18 @ 08:26) (160/69 - 224/95)  RR: 18 (08-16-18 @ 04:47) (15 - 18)  SpO2: 98% (08-16-18 @ 08:26) (96% - 99%)  Wt(kg): --Vital Signs Last 24 Hrs  T(C): 36.1 (16 Aug 2018 09:00), Max: 36.7 (16 Aug 2018 01:30)  T(F): 97 (16 Aug 2018 09:00), Max: 98.1 (16 Aug 2018 01:30)  HR: 58 (16 Aug 2018 08:26) (52 - 72)  BP: 186/83 (16 Aug 2018 08:26) (160/69 - 224/95)  BP(mean): 119 (16 Aug 2018 08:26) (99 - 137)  RR: 18 (16 Aug 2018 04:47) (15 - 18)  SpO2: 98% (16 Aug 2018 08:26) (96% - 99%)    PHYSICAL EXAM:  GENERAL: NAD, well-groomed, well-developed  HEAD:  Atraumatic, Normocephalic  EYES: EOMI, PERRLA, conjunctiva and sclera clear  ENMT: No tonsillar erythema, exudates, or enlargement; Moist mucous membranes, Good dentition, No lesions  NECK: Supple, No JVD, Normal thyroid  NERVOUS SYSTEM:  CN 2,4,5,6,8,9,10,11,12; L sided facial droop; Alert & Oriented X3, Motor Strength 5/5 in the RUE and RLE; 4/5 strength in the LLE 2/5 strength and motor in the LUE;  DTRs 2+ intact and symmetric  CHEST/LUNG: Clear to percussion bilaterally; No rales, rhonchi, wheezing, or rubs; anterior chest wound  HEART: Regular rate and rhythm; No murmurs, rubs, or gallops  ABDOMEN: Soft, Nontender, Nondistended; Bowel sounds present  EXTREMITIES:  2+ Peripheral Pulses, No clubbing, cyanosis, or edema  LYMPH: No lymphadenopathy noted  SKIN: No rashes or lesions    Consultant(s) Notes Reviewed:  [x ] YES  [ ] NO  Care Discussed with Consultants/Other Providers [ x] YES  [ ] NO    LABS:                        11.2   8.5   )-----------( 284      ( 16 Aug 2018 07:07 )             34.0     08-16    141  |  106  |  23  ----------------------------<  95  4.0   |  21<L>  |  2.05<H>    Ca    9.3      16 Aug 2018 07:07  Phos  4.3     08-16  Mg     2.0     08-16        RADIOLOGY & ADDITIONAL TESTS:    Imaging Personally Reviewed:  [x ] YES  [ ] NO

## 2018-08-16 NOTE — PROGRESS NOTE ADULT - SUBJECTIVE AND OBJECTIVE BOX
OVERNIGHT EVENTS: hypertensive to 220 at 3 am, gave Hydral 5 mg IVP and increased hydral to 75 TID    SUBJECTIVE / INTERVAL HPI: Patient seen and examined at bedside. Patient not cooperative with questioning, depressed affect, speaking at nearly inaudible tone. Patient later with no complaints. Denies fevers, chills, night sweats, chest pain, SOB.     VITAL SIGNS:  Vital Signs Last 24 Hrs  T(C): 36.3 (16 Aug 2018 17:22), Max: 36.7 (16 Aug 2018 01:30)  T(F): 97.3 (16 Aug 2018 17:22), Max: 98.1 (16 Aug 2018 01:30)  HR: 60 (16 Aug 2018 14:56) (52 - 72)  BP: 178/81 (16 Aug 2018 14:56) (160/69 - 224/95)  BP(mean): 117 (16 Aug 2018 14:56) (99 - 137)  RR: 18 (16 Aug 2018 04:47) (15 - 18)  SpO2: 98% (16 Aug 2018 11:50) (96% - 98%)    PHYSICAL EXAM:    General: NAD, drowsy, following commands.  HEENT: NC/AT; PERRL, clear conjunctiva  Neck: supple  Respiratory: CTA b/l  Cardiovascular: +S1/S2; RRR  Abdomen: soft, NT/ND; +BS x4  Extremities: WWP, 2+ peripheral pulses b/l; no LE edema  Skin: normal color and turgor; no rash  Neurological: ental status: sleeping but easily arousable to voice. Speech is fluent. Follows commands. No dysarthria, no aphasia.  CN II: pupils equally round and reactive to light,   II, IV, VI: EOMI without nystagmus  VII: Left facial droop  VIII: hearing is intact to finger rub  IX, X: Uvula is midline and soft palate rises symmetrically    MEDICATIONS:  MEDICATIONS  (STANDING):  amLODIPine   Tablet 10 milliGRAM(s) Oral daily  aspirin 325 milliGRAM(s) Oral daily  atorvastatin 80 milliGRAM(s) Oral at bedtime  BACItracin   Ointment 1 Application(s) Topical two times a day  cloNIDine 0.1 milliGRAM(s) Oral daily  dextrose 5%. 1000 milliLiter(s) (50 mL/Hr) IV Continuous <Continuous>  dextrose 50% Injectable 12.5 Gram(s) IV Push once  dextrose 50% Injectable 25 Gram(s) IV Push once  dextrose 50% Injectable 25 Gram(s) IV Push once  heparin  Injectable 5000 Unit(s) SubCutaneous every 8 hours  hydrALAZINE 25 milliGRAM(s) Oral every 8 hours  sertraline 25 milliGRAM(s) Oral daily  sodium chloride 0.9%. 1000 milliLiter(s) (80 mL/Hr) IV Continuous <Continuous>    MEDICATIONS  (PRN):  atropine Injectable 0.5 milliGRAM(s) IV Push every 5 minutes PRN Symptomatic Bradycardia  dextrose 40% Gel 15 Gram(s) Oral once PRN Blood Glucose LESS THAN 70 milliGRAM(s)/deciliter  glucagon  Injectable 1 milliGRAM(s) IntraMuscular once PRN Glucose LESS THAN 70 milligrams/deciliter      ALLERGIES:  Allergies    No Known Allergies    Intolerances        LABS:                        11.2   8.5   )-----------( 284      ( 16 Aug 2018 07:07 )             34.0     08-16    141  |  106  |  23  ----------------------------<  95  4.0   |  21<L>  |  2.05<H>    Ca    9.3      16 Aug 2018 07:07  Phos  4.3     08-16  Mg     2.0     08-16          CAPILLARY BLOOD GLUCOSE          RADIOLOGY & ADDITIONAL TESTS: Reviewed.

## 2018-08-16 NOTE — PROGRESS NOTE ADULT - PROBLEM SELECTOR PLAN 6
F: none  E Replete for K<4 Mg<2  N: Regular diet. F: IV NS @80cc/hr  E Replete for K<4 Mg<2  N: DASH diet. NPO after midnight

## 2018-08-16 NOTE — CONSULT NOTE ADULT - SUBJECTIVE AND OBJECTIVE BOX
HPI:  69yo Female, PMHx HTN and Major Depressive Disorder, presented to the ED with recent history of self harm and depressed mood. Primary complaint was of progressive functional decline at home w/severe anhedonia, anorexia, poor sleep, suicidality (would cut herself and claims to have started cutting herself several months ago), often drinking alcohol at home to consol herself. She states that her depression began after retiring from her job at the [employer] 1.5yrs ago. Patient was admitted to [facility] for depressive episode - however, 1-2 hours after arriving onto the floor, a stroke code was called for L sided weakness and hypertension. NIHSS was 6 on initial assessment. On CTH - noted to have lacunar infarcts (basal ganglia and R/L thalami). On second assessment, NIHSS 1. Patient was brought back to the ED and re-worked up. EKG revealed profound bradycardia - Cardiology was consulted for HR 30s - asymptomatic. Neurosurgery also consulted for what appeared to be mild hydrocephalus on CT Scan.     SBP in the 200s - allowing for permissive HTN, SBP 180s.     Patient admitted to [facility] for further workup. (11 Aug 2018 10:04)      PAST MEDICAL & SURGICAL HISTORY:  Hypertension  Depression  No significant past surgical history       Review of Systems:  · General	negative  · Skin/Breast	negative  · Ophthalmologic	negative  · ENMT	negative  · Respiratory and Thorax	negative  · Cardiovascular	negative  · Gastrointestinal	negative  · Genitourinary	negative  · Musculoskeletal Comments	negative  · Neurological	negative      MEDICATIONS  (STANDING):  amLODIPine   Tablet 10 milliGRAM(s) Oral daily  aspirin 325 milliGRAM(s) Oral daily  atorvastatin 80 milliGRAM(s) Oral at bedtime  BACItracin   Ointment 1 Application(s) Topical two times a day  cloNIDine 0.1 milliGRAM(s) Oral daily  dextrose 5%. 1000 milliLiter(s) (50 mL/Hr) IV Continuous <Continuous>  dextrose 50% Injectable 12.5 Gram(s) IV Push once  dextrose 50% Injectable 25 Gram(s) IV Push once  dextrose 50% Injectable 25 Gram(s) IV Push once  heparin  Injectable 5000 Unit(s) SubCutaneous every 8 hours  hydrALAZINE 25 milliGRAM(s) Oral every 8 hours  sertraline 25 milliGRAM(s) Oral daily  sodium chloride 0.9%. 1000 milliLiter(s) (80 mL/Hr) IV Continuous <Continuous>    MEDICATIONS  (PRN):  atropine Injectable 0.5 milliGRAM(s) IV Push every 5 minutes PRN Symptomatic Bradycardia  dextrose 40% Gel 15 Gram(s) Oral once PRN Blood Glucose LESS THAN 70 milliGRAM(s)/deciliter  glucagon  Injectable 1 milliGRAM(s) IntraMuscular once PRN Glucose LESS THAN 70 milligrams/deciliter      Allergies    No Known Allergies    Intolerances        SOCIAL HISTORY:    FAMILY HISTORY:  No pertinent family history in first degree relatives      Vital Signs Last 24 Hrs  T(C): 36.3 (16 Aug 2018 17:22), Max: 36.7 (16 Aug 2018 01:30)  T(F): 97.3 (16 Aug 2018 17:22), Max: 98.1 (16 Aug 2018 01:30)  HR: 60 (16 Aug 2018 14:56) (52 - 72)  BP: 178/81 (16 Aug 2018 14:56) (160/69 - 224/95)  BP(mean): 117 (16 Aug 2018 14:56) (99 - 137)  RR: 18 (16 Aug 2018 04:47) (15 - 18)  SpO2: 98% (16 Aug 2018 11:50) (96% - 98%)     Physical Exam:  · Constitutional	detailed exam  · Constitutional Details	well-developed; well-groomed  · Eyes	EOMI; PERRL; no drainage or redness  · ENMT Comments	dry mucous membranes  · Respiratory	detailed exam  · Respiratory Comments	normal breath sounds at the lung bases bilaterally  · Cardiovascular	Regular rate & rhythm, normal S1, S2; no murmurs, gallops or rubs; no S3, S4  · Abd-Soft non tender  ·Ext-no edema, clubbing or cyanosis      LABS:                        11.2   8.5   )-----------( 284      ( 16 Aug 2018 07:07 )             34.0     08-16    141  |  106  |  23  ----------------------------<  95  4.0   |  21<L>  |  2.05<H>    Ca    9.3      16 Aug 2018 07:07  Phos  4.3     08-16  Mg     2.0     08-16            RADIOLOGY & ADDITIONAL STUDIES:

## 2018-08-16 NOTE — CHART NOTE - NSCHARTNOTEFT_GEN_A_CORE
Admitting Diagnosis:   Patient is a 68y old  Female who presents with a chief complaint of Depression, Stroke Code Called in Unit (11 Aug 2018 10:04)      PAST MEDICAL & SURGICAL HISTORY:  Hypertension  Depression  No significant past surgical history      Current Nutrition Order:  DASH/TLC; Consistent Carbohydrate Diet w/o snacks       PO Intake: Good (%) [   ]  Fair (50-75%) [ X  ] Poor (<25%) [   ]    GI Issues: No N/V/C/D reported at this time.     Pain: Pt denies pain at this time     Skin Integrity: Skin intact pressure-wise     Labs:   08-16    141  |  106  |  23  ----------------------------<  95  4.0   |  21<L>  |  2.05<H>    Ca    9.3      16 Aug 2018 07:07  Phos  4.3     08-16  Mg     2.0     08-16      CAPILLARY BLOOD GLUCOSE          Medications:  MEDICATIONS  (STANDING):  amLODIPine   Tablet 10 milliGRAM(s) Oral daily  aspirin 325 milliGRAM(s) Oral daily  atorvastatin 80 milliGRAM(s) Oral at bedtime  BACItracin   Ointment 1 Application(s) Topical two times a day  cloNIDine 0.1 milliGRAM(s) Oral daily  dextrose 5%. 1000 milliLiter(s) (50 mL/Hr) IV Continuous <Continuous>  dextrose 50% Injectable 12.5 Gram(s) IV Push once  dextrose 50% Injectable 25 Gram(s) IV Push once  dextrose 50% Injectable 25 Gram(s) IV Push once  heparin  Injectable 5000 Unit(s) SubCutaneous every 8 hours  hydrALAZINE 25 milliGRAM(s) Oral every 8 hours  lisinopril 20 milliGRAM(s) Oral daily  sertraline 25 milliGRAM(s) Oral daily    MEDICATIONS  (PRN):  atropine Injectable 0.5 milliGRAM(s) IV Push every 5 minutes PRN Symptomatic Bradycardia  dextrose 40% Gel 15 Gram(s) Oral once PRN Blood Glucose LESS THAN 70 milliGRAM(s)/deciliter  glucagon  Injectable 1 milliGRAM(s) IntraMuscular once PRN Glucose LESS THAN 70 milligrams/deciliter      Weight: 60.5kg   Daily     Daily     Weight Change: No new weights recorded since admit     Estimated energy needs: Utilized IBW to calculate needs, pt >120% of IBW. Adjusted for age/stroke.  Calories: 25-30 kcal/kg = 0038-1296 kcal/day  Protein: 1.2-1.4 g/kg = 55-64g protein/day  Fluids: 30-35 mL/kg = 5643-2472 mL/day    Subjective: 67y/o F PMHx HTN and Major Depressive Disorder with hx of poor sleep, anorexia, alcohol use, and self-cutting, presented to the ED with recent history of self harm and depressed mood, admitted to inpatient psych ([id number]) for close monitoring. Stroke code called with imaging revealing acute infarct in L PATRICIA and subacute insular infarct. Pt stepped down to [facility]. Pt currently pre-op for angiogram on Friday to r/o vasculitis per neurosurgery note. Pt seen in room, asleep, but awakened to verbal stimuli, though uninterested in having conversation at this time besides yes/no answers. She endorses that she consumed ~50% of breakfast and denies N/V or pain at this time. Asked if she would be interested in Ensure ONS and she stated yes. Lytes WNL.     Previous Nutrition Diagnosis:  Inadequate oral intake RT NPO status AEB pt meeting 0% of EER    Active [   ]  Resolved [ X  ]    If resolved, new PES: Increased nutrient needs RT increased demand for protein intake AEB s/p CVA     Goal: Pt will meet >75% of EER per day with good tolerance     Recommendations:  1. Recommend addition of Ensure Enlive BID (700 kcal, 40g protein, 360 mL free H2O) and MVI/B1/FA 2/2 EtOH use   *endorsed to MD   2. Encourage PO intake   3. Monitor lytes and replete prn.     Education: N/A- pt uninterested in talking at this time     Risk Level: High [ X  ] Moderate [   ] Low [   ]

## 2018-08-16 NOTE — PROGRESS NOTE ADULT - PROBLEM SELECTOR PLAN 8
1) PCP Contacted on Admission: (Y/N) --> Name & Phone #:  2) Date of Contact with PCP:  3) PCP Contacted at Discharge: (Y/N, N/A)  4) Summary of Handoff Given to PCP:   5) Post-Discharge Appointment Date and Location: 1) PCP Contacted on Admission: (Y/N) --> Name & Phone #:  2) Date of Contact with PCP:  3) PCP Contacted at Discharge: (Y/N, N/A)  4) Summary of Handoff Given to PCP:   5) Post-Discharge Appointment Date and Location:    Case and plan discussed with primary team and consultation services.

## 2018-08-16 NOTE — PROGRESS NOTE ADULT - PROBLEM SELECTOR PLAN 2
+UTI, last dose of ceftriaxone ordered for 8/18 SBP goal 160-200 for permissive HTN  - c/w Hydralazine 25 TID, Norvasc 10mg daily, clonidine 0.1mg daily   NB: Patient requires higher than normal BP to allow for cerebral perfusion  -d/cal lisinopril in setting of MARYJANE  -cardio recs appreciated. will consider isordil instead of clonidine to optimize BP, especially in setting of MARYJANE  -f/u secondary HTN workup

## 2018-08-16 NOTE — CONSULT NOTE ADULT - ATTENDING COMMENTS
Plan for angiogram tomorrow which is a low risk procedure.  RCRI of 2 (Cr >2, CVA), METS <4  Combined clinical and surgical risk is low, no further testing is needed.  Can c/w amlodipine, hydralazine and clonidine- all of them should be given in AM of surgery, same as statin.  Please dc lisinopril in setting of MARYJANE or possible MARYJANE on CKD based on Cr from admission.  Check urine lytes/kidney US. Can give IVF's for now. Monitor renal function post angiogram.  F/up bradycardia-Cards on board.  Consider checking KORINA as r/o vasculitis.  Rest as above. Plan for angiogram tomorrow which is a low risk procedure.  RCRI of 2 (Cr >2, CVA), METS <4  Combined clinical and surgical risk is low, no further testing is needed.  Can c/w amlodipine, hydralazine and clonidine- all of them should be given in AM of surgery, same as statin.  Please dc lisinopril in setting of MARYJANE or possible MARYJANE on CKD based on Cr from admission.  Check urine lytes/kidney US. Can give IVF's for now. Monitor renal function post angiogram.  F/up bradycardia-Cards on board.  Consider checking KORINA as r/o vasculitis plus all other hypercoagulable work up.  Rest as above.

## 2018-08-17 LAB
ALDOST SERPL-MCNC: 10.2 NG/DL — SIGNIFICANT CHANGE UP
ANION GAP SERPL CALC-SCNC: 14 MMOL/L — SIGNIFICANT CHANGE UP (ref 5–17)
ANION GAP SERPL CALC-SCNC: 14 MMOL/L — SIGNIFICANT CHANGE UP (ref 5–17)
APTT BLD: 36.3 SEC — SIGNIFICANT CHANGE UP (ref 27.5–37.4)
BASOPHILS NFR BLD AUTO: 0.1 % — SIGNIFICANT CHANGE UP (ref 0–2)
BUN SERPL-MCNC: 21 MG/DL — SIGNIFICANT CHANGE UP (ref 7–23)
BUN SERPL-MCNC: 21 MG/DL — SIGNIFICANT CHANGE UP (ref 7–23)
CALCIUM SERPL-MCNC: 9.3 MG/DL — SIGNIFICANT CHANGE UP (ref 8.4–10.5)
CALCIUM SERPL-MCNC: 9.4 MG/DL — SIGNIFICANT CHANGE UP (ref 8.4–10.5)
CHLORIDE SERPL-SCNC: 104 MMOL/L — SIGNIFICANT CHANGE UP (ref 96–108)
CHLORIDE SERPL-SCNC: 104 MMOL/L — SIGNIFICANT CHANGE UP (ref 96–108)
CO2 SERPL-SCNC: 20 MMOL/L — LOW (ref 22–31)
CO2 SERPL-SCNC: 20 MMOL/L — LOW (ref 22–31)
CREAT SERPL-MCNC: 1.75 MG/DL — HIGH (ref 0.5–1.3)
CREAT SERPL-MCNC: 1.77 MG/DL — HIGH (ref 0.5–1.3)
DRVVT SCREEN TO CONFIRM RATIO: SIGNIFICANT CHANGE UP
EOSINOPHIL NFR BLD AUTO: 2.2 % — SIGNIFICANT CHANGE UP (ref 0–6)
FOLATE SERPL-MCNC: 5.2 NG/ML — SIGNIFICANT CHANGE UP
GLUCOSE SERPL-MCNC: 102 MG/DL — HIGH (ref 70–99)
GLUCOSE SERPL-MCNC: 98 MG/DL — SIGNIFICANT CHANGE UP (ref 70–99)
HCT VFR BLD CALC: 33.8 % — LOW (ref 34.5–45)
HCT VFR BLD CALC: 34.5 % — SIGNIFICANT CHANGE UP (ref 34.5–45)
HCYS SERPL-MCNC: 12.8 UMOL/L — SIGNIFICANT CHANGE UP (ref 5–20)
HGB BLD-MCNC: 10.9 G/DL — LOW (ref 11.5–15.5)
HGB BLD-MCNC: 11 G/DL — LOW (ref 11.5–15.5)
INR BLD: 0.97 — SIGNIFICANT CHANGE UP (ref 0.88–1.16)
LA NT DPL PPP QL: 27.2 SEC — SIGNIFICANT CHANGE UP
LYMPHOCYTES # BLD AUTO: 16.8 % — SIGNIFICANT CHANGE UP (ref 13–44)
MAGNESIUM SERPL-MCNC: 2 MG/DL — SIGNIFICANT CHANGE UP (ref 1.6–2.6)
MCHC RBC-ENTMCNC: 28.5 PG — SIGNIFICANT CHANGE UP (ref 27–34)
MCHC RBC-ENTMCNC: 29.5 PG — SIGNIFICANT CHANGE UP (ref 27–34)
MCHC RBC-ENTMCNC: 31.6 G/DL — LOW (ref 32–36)
MCHC RBC-ENTMCNC: 32.5 G/DL — SIGNIFICANT CHANGE UP (ref 32–36)
MCV RBC AUTO: 90.3 FL — SIGNIFICANT CHANGE UP (ref 80–100)
MCV RBC AUTO: 90.6 FL — SIGNIFICANT CHANGE UP (ref 80–100)
MONOCYTES NFR BLD AUTO: 4.6 % — SIGNIFICANT CHANGE UP (ref 2–14)
NEUTROPHILS NFR BLD AUTO: 76.3 % — SIGNIFICANT CHANGE UP (ref 43–77)
PHOSPHATE SERPL-MCNC: 4.3 MG/DL — SIGNIFICANT CHANGE UP (ref 2.5–4.5)
PLATELET # BLD AUTO: 317 K/UL — SIGNIFICANT CHANGE UP (ref 150–400)
PLATELET # BLD AUTO: 340 K/UL — SIGNIFICANT CHANGE UP (ref 150–400)
POTASSIUM SERPL-MCNC: 3.8 MMOL/L — SIGNIFICANT CHANGE UP (ref 3.5–5.3)
POTASSIUM SERPL-MCNC: 3.9 MMOL/L — SIGNIFICANT CHANGE UP (ref 3.5–5.3)
POTASSIUM SERPL-SCNC: 3.8 MMOL/L — SIGNIFICANT CHANGE UP (ref 3.5–5.3)
POTASSIUM SERPL-SCNC: 3.9 MMOL/L — SIGNIFICANT CHANGE UP (ref 3.5–5.3)
PROTHROM AB SERPL-ACNC: 10.8 SEC — SIGNIFICANT CHANGE UP (ref 9.8–12.7)
RBC # BLD: 3.73 M/UL — LOW (ref 3.8–5.2)
RBC # BLD: 3.82 M/UL — SIGNIFICANT CHANGE UP (ref 3.8–5.2)
RBC # FLD: 14.4 % — SIGNIFICANT CHANGE UP (ref 10.3–16.9)
RBC # FLD: 14.5 % — SIGNIFICANT CHANGE UP (ref 10.3–16.9)
RENIN DIRECT, PLASMA: 135.7 PG/ML — HIGH
SODIUM SERPL-SCNC: 138 MMOL/L — SIGNIFICANT CHANGE UP (ref 135–145)
SODIUM SERPL-SCNC: 138 MMOL/L — SIGNIFICANT CHANGE UP (ref 135–145)
VIT B12 SERPL-MCNC: 416 PG/ML — SIGNIFICANT CHANGE UP (ref 232–1245)
WBC # BLD: 7.5 K/UL — SIGNIFICANT CHANGE UP (ref 3.8–10.5)
WBC # BLD: 7.8 K/UL — SIGNIFICANT CHANGE UP (ref 3.8–10.5)
WBC # FLD AUTO: 7.5 K/UL — SIGNIFICANT CHANGE UP (ref 3.8–10.5)
WBC # FLD AUTO: 7.8 K/UL — SIGNIFICANT CHANGE UP (ref 3.8–10.5)

## 2018-08-17 PROCEDURE — 36225 PLACE CATH SUBCLAVIAN ART: CPT | Mod: 50

## 2018-08-17 PROCEDURE — 95951: CPT | Mod: 26

## 2018-08-17 PROCEDURE — 36223 PLACE CATH CAROTID/INOM ART: CPT | Mod: 50

## 2018-08-17 RX ORDER — SODIUM CHLORIDE 9 MG/ML
250 INJECTION INTRAMUSCULAR; INTRAVENOUS; SUBCUTANEOUS ONCE
Qty: 0 | Refills: 0 | Status: DISCONTINUED | OUTPATIENT
Start: 2018-08-17 | End: 2018-08-21

## 2018-08-17 RX ORDER — PANTOPRAZOLE SODIUM 20 MG/1
40 TABLET, DELAYED RELEASE ORAL
Qty: 0 | Refills: 0 | Status: DISCONTINUED | OUTPATIENT
Start: 2018-08-17 | End: 2018-08-20

## 2018-08-17 RX ORDER — INSULIN LISPRO 100/ML
VIAL (ML) SUBCUTANEOUS EVERY 6 HOURS
Qty: 0 | Refills: 0 | Status: DISCONTINUED | OUTPATIENT
Start: 2018-08-17 | End: 2018-08-30

## 2018-08-17 RX ORDER — LEVETIRACETAM 250 MG/1
250 TABLET, FILM COATED ORAL
Qty: 0 | Refills: 0 | Status: DISCONTINUED | OUTPATIENT
Start: 2018-08-17 | End: 2018-08-26

## 2018-08-17 RX ORDER — HYDRALAZINE HCL 50 MG
5 TABLET ORAL ONCE
Qty: 0 | Refills: 0 | Status: DISCONTINUED | OUTPATIENT
Start: 2018-08-17 | End: 2018-08-17

## 2018-08-17 RX ADMIN — Medication 25 MILLIGRAM(S): at 06:15

## 2018-08-17 RX ADMIN — Medication 1 APPLICATION(S): at 06:23

## 2018-08-17 RX ADMIN — SODIUM CHLORIDE 80 MILLILITER(S): 9 INJECTION INTRAMUSCULAR; INTRAVENOUS; SUBCUTANEOUS at 06:24

## 2018-08-17 RX ADMIN — Medication 0.1 MILLIGRAM(S): at 06:15

## 2018-08-17 RX ADMIN — HEPARIN SODIUM 5000 UNIT(S): 5000 INJECTION INTRAVENOUS; SUBCUTANEOUS at 23:58

## 2018-08-17 RX ADMIN — AMLODIPINE BESYLATE 10 MILLIGRAM(S): 2.5 TABLET ORAL at 06:15

## 2018-08-17 RX ADMIN — LEVETIRACETAM 250 MILLIGRAM(S): 250 TABLET, FILM COATED ORAL at 19:50

## 2018-08-17 RX ADMIN — ATORVASTATIN CALCIUM 80 MILLIGRAM(S): 80 TABLET, FILM COATED ORAL at 23:58

## 2018-08-17 RX ADMIN — HEPARIN SODIUM 5000 UNIT(S): 5000 INJECTION INTRAVENOUS; SUBCUTANEOUS at 06:15

## 2018-08-17 RX ADMIN — Medication 80 MILLIGRAM(S): at 19:50

## 2018-08-17 RX ADMIN — Medication 1 APPLICATION(S): at 19:46

## 2018-08-17 NOTE — BRIEF OPERATIVE NOTE - PROCEDURE
<<-----Click on this checkbox to enter Procedure Cerebral angiography  08/17/2018    Active  GRESTREP

## 2018-08-17 NOTE — PROGRESS NOTE ADULT - SUBJECTIVE AND OBJECTIVE BOX
OVERNIGHT EVENTS: NEETU    SUBJECTIVE / INTERVAL HPI: Patient seen and examined at bedside. Patient denies chest pain, SOB, n/v/d/c, fevers, chills, night sweats    VITAL SIGNS:  Vital Signs Last 24 Hrs  T(C): 36.5 (17 Aug 2018 09:37), Max: 36.7 (16 Aug 2018 22:00)  T(F): 97.7 (17 Aug 2018 09:37), Max: 98.1 (16 Aug 2018 22:00)  HR: 48 (17 Aug 2018 08:21) (48 - 62)  BP: 131/62 (17 Aug 2018 08:21) (131/62 - 214/92)  BP(mean): 89 (17 Aug 2018 08:21) (89 - 132)  RR: 17 (17 Aug 2018 08:21) (16 - 18)  SpO2: 97% (17 Aug 2018 08:21) (95% - 99%)    PHYSICAL EXAM:    General: NAD, drowsy, following commands, speaking in low volume.  HEENT: NC/AT; PERRL, clear conjunctiva. Dry mucus membranes  Neck: supple  Respiratory: CTA b/l. No wheezes, no rales, no rhonchi appreciated   Cardiovascular: +S1/S2; RRR  Abdomen: soft, NT/ND; +BS x4  Extremities: No erythema, no edema, no cyanosis b/l  Neurological exam limited to patient cooperation and condition. Mental status: sleeping but easily arousable to voice. Speech is fluent. Follows commands. No dysarthria, no aphasia.  CN II: pupils equally round and reactive to light,   II, IV, VI: EOMI without nystagmus. Right gaze preferrence.   VII: Left facial droop  VIII: hearing is intact to finger rub  IX, X: Uvula is midline and soft palate rises symmetrically  Muscle strength: RUE 4/5, LUE 1/5, RLE 4/5, LLE 1/5. Moves RUE and RLE spontaneously.     MEDICATIONS:  MEDICATIONS  (STANDING):  amLODIPine   Tablet 10 milliGRAM(s) Oral daily  aspirin 325 milliGRAM(s) Oral daily  atorvastatin 80 milliGRAM(s) Oral at bedtime  BACItracin   Ointment 1 Application(s) Topical two times a day  cloNIDine 0.1 milliGRAM(s) Oral daily  dextrose 5%. 1000 milliLiter(s) (50 mL/Hr) IV Continuous <Continuous>  dextrose 50% Injectable 12.5 Gram(s) IV Push once  dextrose 50% Injectable 25 Gram(s) IV Push once  dextrose 50% Injectable 25 Gram(s) IV Push once  heparin  Injectable 5000 Unit(s) SubCutaneous every 8 hours  hydrALAZINE 25 milliGRAM(s) Oral every 8 hours  hydrALAZINE Injectable 5 milliGRAM(s) IV Push once  sertraline 25 milliGRAM(s) Oral daily  sodium chloride 0.9%. 1000 milliLiter(s) (80 mL/Hr) IV Continuous <Continuous>    MEDICATIONS  (PRN):  atropine Injectable 0.5 milliGRAM(s) IV Push every 5 minutes PRN Symptomatic Bradycardia  dextrose 40% Gel 15 Gram(s) Oral once PRN Blood Glucose LESS THAN 70 milliGRAM(s)/deciliter  glucagon  Injectable 1 milliGRAM(s) IntraMuscular once PRN Glucose LESS THAN 70 milligrams/deciliter      ALLERGIES:  Allergies    No Known Allergies    Intolerances        LABS:                        11.0   7.5   )-----------( 317      ( 17 Aug 2018 07:44 )             33.8     08-17    138  |  104  |  21  ----------------------------<  102<H>  3.8   |  20<L>  |  1.75<H>    Ca    9.3      17 Aug 2018 07:45  Phos  4.3     08-17  Mg     2.0     08-17      PT/INR - ( 17 Aug 2018 07:44 )   PT: 10.8 sec;   INR: 0.97          PTT - ( 17 Aug 2018 07:44 )  PTT:36.3 sec    CAPILLARY BLOOD GLUCOSE          RADIOLOGY & ADDITIONAL TESTS: Reviewed.

## 2018-08-17 NOTE — PROGRESS NOTE ADULT - PROBLEM SELECTOR PLAN 2
SBP goal 160-200 for permissive HTN  - c/w Hydralazine 25 TID, Norvasc 10mg daily, clonidine 0.1mg daily   NB: Patient requires higher than normal BP to allow for cerebral perfusion  -d/cal lisinopril in setting of MARYJANE  -cardio recs appreciated. will consider isordil instead of clonidine to optimize BP, especially in setting of MARYJANE  -direct renin 135.7, aldosterone 10.2. 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

## 2018-08-17 NOTE — BRIEF OPERATIVE NOTE - OPERATION/FINDINGS
Under MAC sedation, using a 4 fr sheath right CFA, cerebral angiogram was performed.  Findings: B/L extra and intra cranial Carotid stenosis, left vertebral artery origin occlusion with distal reconstitution via dorsocervical artery. Multi segmental intra cranial vessel irregularities in multiple territories c/w vasculitis.  Undeveloped left A1  Full report to follow.  Patient tolerated procedure well, no new neurological deficit, hemodynamically stable.  Right groin/vasc access site: Direct manual compression applied, hemostasis achieved, no hematoma.  Patient was transferred to PACU   Above d/w Dr. Haddad

## 2018-08-17 NOTE — PROGRESS NOTE ADULT - PROBLEM SELECTOR PLAN 8
1) PCP Contacted on Admission: (Y/N) --> Name & Phone #:  2) Date of Contact with PCP:  3) PCP Contacted at Discharge: (Y/N, N/A)  4) Summary of Handoff Given to PCP:   5) Post-Discharge Appointment Date and Location:    Case and plan discussed with primary team and consultation services.

## 2018-08-17 NOTE — PROGRESS NOTE ADULT - PROBLEM SELECTOR PLAN 4
Patient initially admitted to psych for major depressive episode with inability to take care of self and SI. Patient subsequently had CVA.  -psych consulted. recs appreciated. No longer having SI, deemed not to be harm to self or others and d/cal 1:1.   -c/w zoloft 25mg  -psych now signed off case. Recommending formal psych referral upon discharge

## 2018-08-17 NOTE — PROGRESS NOTE ADULT - PROBLEM SELECTOR PLAN 1
Etiology not yet determined. No atrial thrombus on KATHERINE, no afib on SBP goal 160-200, currently on Hydralazine, Norvasc, and clonidine.  Patient requires higher than normal BP to allow for cerebral perfusion. Considering vasculitic etiology given positive ESR, other work up for determining etiology negative. Hypercoaguability workup sent.   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 EF 55-60%  - for antiplatelet and Atorvastatin 80 for - Secondary stroke prevention  -patient sent for hypercoaguability workup- Hexagonal phase negative, DRVVT inhibitor screen 27.2 (WNL), DRVVT S/C screen LA neg.  -f/u results of hypercoag workup  -CVA possible 2/2 vasculitis given work up thus far to determine etiology negative, ESR elevated.   -cerebral angiogram today. f/u results  -Neurosurg following, recs appreciated  -medicine following, recs appreciated  -cardio recs appreciated.  -will consider isordil instead of clonidine to optimize BP control Etiology not yet determined. No atrial thrombus on KATHERINE, no afib on SBP goal 160-200, currently on Hydralazine, Norvasc, and clonidine.  Patient requires higher than normal BP to allow for cerebral perfusion. Considering vasculitic etiology given positive ESR, other work up for determining etiology negative. Hypercoaguability workup sent.   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 EF 55-60%  - for antiplatelet and Atorvastatin 80 for - Secondary stroke prevention  -patient sent for hypercoaguability workup- Hexagonal phase negative, DRVVT inhibitor screen 27.2 (WNL), DRVVT S/C screen LA neg.  -f/u results of hypercoag workup  -CVA possible 2/2 vasculitis given work up thus far to determine etiology negative, ESR elevated.   -cerebral angiogram today. f/u results  -Neurosurg following, recs appreciated  -medicine following, recs appreciated  -cardio recs appreciated.  -will consider isordil instead of clonidine to optimize BP control  -f/u EEG as patient was less responsive yesterday in AM and want to evaluate possibly epileptiform activity as etiology

## 2018-08-17 NOTE — PROGRESS NOTE ADULT - ASSESSMENT
NEUROSURGERY POST OP NOTE:    POD# 0 S/P diagnostic angiography demonstrating B/L extra and intra cranial Carotid stenosis, left vertebral artery origin occlusion with distal reconstitution via dorsocervical artery, and multi segmental intra cranial vessel irregularities in multiple territories c/w vasculitis.    S: Pt seen and examined at bedside in PACU. Denies current pain, headaches, dizziness, blurry vision, new onset weakness or numbness. Laying flat in stretcher for now and pending transfer back to [facility].       T(C): 36.5 (08-17-18 @ 09:37), Max: 36.7 (08-16-18 @ 22:00)  HR: 48 (08-17-18 @ 08:21) (48 - 62)  BP: 131/62 (08-17-18 @ 08:21) (131/62 - 214/92)  RR: 17 (08-17-18 @ 08:21) (16 - 18)  SpO2: 97% (08-17-18 @ 08:21) (95% - 99%)      08-16-18 @ 07:01  -  08-17-18 @ 07:00  --------------------------------------------------------  IN: 800 mL / OUT: 0 mL / NET: 800 mL        amLODIPine   Tablet 10 milliGRAM(s) Oral daily  aspirin 325 milliGRAM(s) Oral daily  atorvastatin 80 milliGRAM(s) Oral at bedtime  atropine Injectable 0.5 milliGRAM(s) IV Push every 5 minutes PRN  BACItracin   Ointment 1 Application(s) Topical two times a day  cloNIDine 0.1 milliGRAM(s) Oral daily  dextrose 40% Gel 15 Gram(s) Oral once PRN  dextrose 5%. 1000 milliLiter(s) IV Continuous <Continuous>  dextrose 50% Injectable 12.5 Gram(s) IV Push once  dextrose 50% Injectable 25 Gram(s) IV Push once  dextrose 50% Injectable 25 Gram(s) IV Push once  glucagon  Injectable 1 milliGRAM(s) IntraMuscular once PRN  heparin  Injectable 5000 Unit(s) SubCutaneous every 8 hours  hydrALAZINE 25 milliGRAM(s) Oral every 8 hours  hydrALAZINE Injectable 5 milliGRAM(s) IV Push once  levETIRAcetam 250 milliGRAM(s) Oral two times a day  sertraline 25 milliGRAM(s) Oral daily  sodium chloride 0.9%. 1000 milliLiter(s) IV Continuous <Continuous>      RADIOLOGY:     Exam:  AOx3, hypophonic speech, laying flat in stretcher, appears comfortable    PERRL, EOMI, R gaze preference    L facial droop    RUE and RLE 5/5, LUE and LLE 0/5   SILT   R groin dressing in place c/d/i   DP pulses 2+ b/l       Assessment: 68y Female s/p diagnostic angiography demonstrating vasculitis       Plan:  - Transfer back to [facility]   - Cont. vEEG as per neurology   - D/c groin dressing in am   - Management as per neurology   - D/w Dr. Haddad

## 2018-08-17 NOTE — PROGRESS NOTE ADULT - SUBJECTIVE AND OBJECTIVE BOX
OVERNIGHT EVENTS:    SUBJECTIVE / INTERVAL HPI: Patient seen and examined at bedside.     VITAL SIGNS:  Vital Signs Last 24 Hrs  T(C): 36.4 (17 Aug 2018 06:00), Max: 36.7 (16 Aug 2018 22:00)  T(F): 97.6 (17 Aug 2018 06:00), Max: 98.1 (16 Aug 2018 22:00)  HR: 48 (17 Aug 2018 08:21) (48 - 62)  BP: 131/62 (17 Aug 2018 08:21) (131/62 - 214/92)  BP(mean): 89 (17 Aug 2018 08:21) (89 - 132)  RR: 17 (17 Aug 2018 08:21) (16 - 18)  SpO2: 97% (17 Aug 2018 08:21) (95% - 99%)    PHYSICAL EXAM:    General: WDWN  HEENT: NC/AT; PERRL, anicteric sclera; MMM  Neck: supple  Cardiovascular: +S1/S2; RRR; no M/R/G on auscultation  Respiratory: CTA B/L; no W/R/R  Gastrointestinal: soft, NT/ND; +BSx4  Extremities: WWP; no edema, clubbing or cyanosis  Vascular: 2+ radial, DP/PT pulses B/L  Neurological: AAOx3; no focal deficits    MEDICATIONS:  MEDICATIONS  (STANDING):  amLODIPine   Tablet 10 milliGRAM(s) Oral daily  aspirin 325 milliGRAM(s) Oral daily  atorvastatin 80 milliGRAM(s) Oral at bedtime  BACItracin   Ointment 1 Application(s) Topical two times a day  cloNIDine 0.1 milliGRAM(s) Oral daily  dextrose 5%. 1000 milliLiter(s) (50 mL/Hr) IV Continuous <Continuous>  dextrose 50% Injectable 12.5 Gram(s) IV Push once  dextrose 50% Injectable 25 Gram(s) IV Push once  dextrose 50% Injectable 25 Gram(s) IV Push once  heparin  Injectable 5000 Unit(s) SubCutaneous every 8 hours  hydrALAZINE 25 milliGRAM(s) Oral every 8 hours  hydrALAZINE Injectable 5 milliGRAM(s) IV Push once  sertraline 25 milliGRAM(s) Oral daily  sodium chloride 0.9%. 1000 milliLiter(s) (80 mL/Hr) IV Continuous <Continuous>    MEDICATIONS  (PRN):  atropine Injectable 0.5 milliGRAM(s) IV Push every 5 minutes PRN Symptomatic Bradycardia  dextrose 40% Gel 15 Gram(s) Oral once PRN Blood Glucose LESS THAN 70 milliGRAM(s)/deciliter  glucagon  Injectable 1 milliGRAM(s) IntraMuscular once PRN Glucose LESS THAN 70 milligrams/deciliter      ALLERGIES:  Allergies    No Known Allergies    Intolerances        LABS:                        11.0   7.5   )-----------( 317      ( 17 Aug 2018 07:44 )             33.8     08-17    138  |  104  |  21  ----------------------------<  102<H>  3.8   |  20<L>  |  1.75<H>    Ca    9.3      17 Aug 2018 07:45  Phos  4.3     08-17  Mg     2.0     08-17      PT/INR - ( 17 Aug 2018 07:44 )   PT: 10.8 sec;   INR: 0.97          PTT - ( 17 Aug 2018 07:44 )  PTT:36.3 sec    CAPILLARY BLOOD GLUCOSE          RADIOLOGY & ADDITIONAL TESTS:   EKG:   CXR:   CT:   MRI:    ASSESSMENT:    PLAN: OVERNIGHT EVENTS:    SUBJECTIVE / INTERVAL HPI: Patient seen and examined at bedside.     VITAL SIGNS:  Vital Signs Last 24 Hrs  T(C): 36.4 (17 Aug 2018 06:00), Max: 36.7 (16 Aug 2018 22:00)  T(F): 97.6 (17 Aug 2018 06:00), Max: 98.1 (16 Aug 2018 22:00)  HR: 48 (17 Aug 2018 08:21) (48 - 62)  BP: 131/62 (17 Aug 2018 08:21) (131/62 - 214/92)  BP(mean): 89 (17 Aug 2018 08:21) (89 - 132)  RR: 17 (17 Aug 2018 08:21) (16 - 18)  SpO2: 97% (17 Aug 2018 08:21) (95% - 99%)      PHYSICAL EXAM:  GENERAL: NAD, well-groomed, well-developed  HEAD:  Atraumatic, Normocephalic  EYES: EOMI, PERRLA, conjunctiva and sclera clear  ENMT: No tonsillar erythema, exudates, or enlargement; Moist mucous membranes, Good dentition, No lesions  NECK: Supple, No JVD, Normal thyroid  NERVOUS SYSTEM:  CN 2,4,5,6,8,9,10,11,12; L sided facial droop; Alert & Oriented X3, Motor Strength 5/5 in the RUE and RLE; 4/5 strength in the LLE 2/5 strength and motor in the LUE;  DTRs 2+ intact and symmetric  CHEST/LUNG: Clear to percussion bilaterally; No rales, rhonchi, wheezing, or rubs; anterior chest wound  HEART: Regular rate and rhythm; No murmurs, rubs, or gallops  ABDOMEN: Soft, Nontender, Nondistended; Bowel sounds present  EXTREMITIES:  2+ Peripheral Pulses, No clubbing, cyanosis, or edema  LYMPH: No lymphadenopathy noted  SKIN: No rashes or lesions    MEDICATIONS:  MEDICATIONS  (STANDING):  amLODIPine   Tablet 10 milliGRAM(s) Oral daily  aspirin 325 milliGRAM(s) Oral daily  atorvastatin 80 milliGRAM(s) Oral at bedtime  BACItracin   Ointment 1 Application(s) Topical two times a day  cloNIDine 0.1 milliGRAM(s) Oral daily  dextrose 5%. 1000 milliLiter(s) (50 mL/Hr) IV Continuous <Continuous>  dextrose 50% Injectable 12.5 Gram(s) IV Push once  dextrose 50% Injectable 25 Gram(s) IV Push once  dextrose 50% Injectable 25 Gram(s) IV Push once  heparin  Injectable 5000 Unit(s) SubCutaneous every 8 hours  hydrALAZINE 25 milliGRAM(s) Oral every 8 hours  hydrALAZINE Injectable 5 milliGRAM(s) IV Push once  sertraline 25 milliGRAM(s) Oral daily  sodium chloride 0.9%. 1000 milliLiter(s) (80 mL/Hr) IV Continuous <Continuous>    MEDICATIONS  (PRN):  atropine Injectable 0.5 milliGRAM(s) IV Push every 5 minutes PRN Symptomatic Bradycardia  dextrose 40% Gel 15 Gram(s) Oral once PRN Blood Glucose LESS THAN 70 milliGRAM(s)/deciliter  glucagon  Injectable 1 milliGRAM(s) IntraMuscular once PRN Glucose LESS THAN 70 milligrams/deciliter      ALLERGIES:  Allergies    No Known Allergies    Intolerances        LABS:                        11.0   7.5   )-----------( 317      ( 17 Aug 2018 07:44 )             33.8     08-17    138  |  104  |  21  ----------------------------<  102<H>  3.8   |  20<L>  |  1.75<H>    Ca    9.3      17 Aug 2018 07:45  Phos  4.3     08-17  Mg     2.0     08-17      PT/INR - ( 17 Aug 2018 07:44 )   PT: 10.8 sec;   INR: 0.97          PTT - ( 17 Aug 2018 07:44 )  PTT:36.3 sec    CAPILLARY BLOOD GLUCOSE          RADIOLOGY & ADDITIONAL TESTS:   EKG:   CXR:   CT:   MRI:    ASSESSMENT:    PLAN: OVERNIGHT EVENTS: initiated on MIVF for MARYJANE    SUBJECTIVE / INTERVAL HPI: Patient seen and examined at bedside. No complaints. Denies CP, N,V,D,C, SOB, fevers or chills. Having regular bowel movements.     VITAL SIGNS:  Vital Signs Last 24 Hrs  T(C): 36.4 (17 Aug 2018 06:00), Max: 36.7 (16 Aug 2018 22:00)  T(F): 97.6 (17 Aug 2018 06:00), Max: 98.1 (16 Aug 2018 22:00)  HR: 48 (17 Aug 2018 08:21) (48 - 62)  BP: 131/62 (17 Aug 2018 08:21) (131/62 - 214/92)  BP(mean): 89 (17 Aug 2018 08:21) (89 - 132)  RR: 17 (17 Aug 2018 08:21) (16 - 18)  SpO2: 97% (17 Aug 2018 08:21) (95% - 99%)      PHYSICAL EXAM:  GENERAL: NAD, well-groomed, well-developed  HEAD:  Atraumatic, Normocephalic  EYES: EOMI, PERRLA, conjunctiva and sclera clear  ENMT: No tonsillar erythema, exudates, or enlargement; Moist mucous membranes, Good dentition, No lesions  NECK: Supple, No JVD, Normal thyroid  NERVOUS SYSTEM:  CN 2,4,5,6,8,9,10,11,12; L sided facial droop; Alert & Oriented X3, Motor Strength 5/5 in the RUE and RLE; 4/5 strength in the LLE 2/5 strength and motor in the LUE;  DTRs 2+ intact and symmetric  CHEST/LUNG: Clear to percussion bilaterally; No rales, rhonchi, wheezing, or rubs; anterior chest wound  HEART: Regular rate and rhythm; No murmurs, rubs, or gallops  ABDOMEN: Soft, Nontender, Nondistended; Bowel sounds present  EXTREMITIES:  2+ Peripheral Pulses, No clubbing, cyanosis, or edema  LYMPH: No lymphadenopathy noted  SKIN: No rashes or lesions    MEDICATIONS:  MEDICATIONS  (STANDING):  amLODIPine   Tablet 10 milliGRAM(s) Oral daily  aspirin 325 milliGRAM(s) Oral daily  atorvastatin 80 milliGRAM(s) Oral at bedtime  BACItracin   Ointment 1 Application(s) Topical two times a day  cloNIDine 0.1 milliGRAM(s) Oral daily  dextrose 5%. 1000 milliLiter(s) (50 mL/Hr) IV Continuous <Continuous>  dextrose 50% Injectable 12.5 Gram(s) IV Push once  dextrose 50% Injectable 25 Gram(s) IV Push once  dextrose 50% Injectable 25 Gram(s) IV Push once  heparin  Injectable 5000 Unit(s) SubCutaneous every 8 hours  hydrALAZINE 25 milliGRAM(s) Oral every 8 hours  hydrALAZINE Injectable 5 milliGRAM(s) IV Push once  sertraline 25 milliGRAM(s) Oral daily  sodium chloride 0.9%. 1000 milliLiter(s) (80 mL/Hr) IV Continuous <Continuous>    MEDICATIONS  (PRN):  atropine Injectable 0.5 milliGRAM(s) IV Push every 5 minutes PRN Symptomatic Bradycardia  dextrose 40% Gel 15 Gram(s) Oral once PRN Blood Glucose LESS THAN 70 milliGRAM(s)/deciliter  glucagon  Injectable 1 milliGRAM(s) IntraMuscular once PRN Glucose LESS THAN 70 milligrams/deciliter      ALLERGIES:  Allergies    No Known Allergies    Intolerances        LABS:                        11.0   7.5   )-----------( 317      ( 17 Aug 2018 07:44 )             33.8     08-17    138  |  104  |  21  ----------------------------<  102<H>  3.8   |  20<L>  |  1.75<H>    Ca    9.3      17 Aug 2018 07:45  Phos  4.3     08-17  Mg     2.0     08-17      PT/INR - ( 17 Aug 2018 07:44 )   PT: 10.8 sec;   INR: 0.97          PTT - ( 17 Aug 2018 07:44 )  PTT:36.3 sec    CAPILLARY BLOOD GLUCOSE          RADIOLOGY & ADDITIONAL TESTS:   EKG:   CXR:   CT:   MRI:    ASSESSMENT:    PLAN:

## 2018-08-17 NOTE — BRIEF OPERATIVE NOTE - POST-OP DX
CVA (cerebral vascular accident)  08/17/2018    Active  Lonnie Araya  Vasculitis  08/17/2018    Active  Lonnie Araya

## 2018-08-17 NOTE — PROGRESS NOTE ADULT - ASSESSMENT
68 yr old female with a PMHx of HTN that presented to 8U for psych stabilization, subsequently had a multi-focal stroke with L sided facial and UE/LE deficits. Course c/b persistent HTN requiring nicardipine drip now transition to PO BP regimen. Primary etiology thought to be embolic stroke however, initial neuro w/u (-) for a-fib or LV thrombus; Multifocality of strokes now concerning for vasculitis. Medicine consulted for med clearance prior to angiogram.     # Preoperative Optimization  -RCRI 6.6 % (+ stroke and Cr > 2) intermediate risk for low risk procedure   -continue amlodipine, hydralazine, clonidine & statin perioperatively; please hold lisinopril starting today in the setting of MARYJANE  -METS < 4  -Postoperative Telemetry  -Will continue to follow    #HTN  -poorly controlled; SBP 180s - 200s  -currently on 4 drug regimen with lisnopril, clonidine, amlodipine, hydralazine  -HTN management per neurology who would like to maintain an SBP of 200 to facilitate adequate brain perfusion  -once the patient is outside the window for permissive HTN per neuro's recs and MARYJANE is resolved, BP optimization per cardiology    #MARYJANE   -patient presented with MARYJANE of 1.45; unclear baseline  -cont to uptrend throughout her course; Cr 2.1 today  -etiology unclear; recommend renal ultrasound and urine lytes for further eval   -please hold lisinopril; please avoid all nephrotoxic agents    #acute CVA  -etiolology thought to be 2/2 uncontrolled HTN vs vasculitis; LV thrombus r/o by KATHERINE; no documented A-fib on tele  -w/ residual L sided deficits  -on , 80 mg lipitor   -c/w permissive HTN per neuro recs    #r/o cerebral vasculitis   -initially etiology of patient's strokes thought to be embolic however, patient has been in [place] and KATHERINE unrevealing for LV thrombus  -now etiology of patient's stroked thought to be 2/2 vasculitis  -CRP and ESR elevated; no further rheum testing done   -plan for angiogram with neurosurgery for further evaluation of vasculitis

## 2018-08-17 NOTE — PROGRESS NOTE ADULT - PROBLEM SELECTOR PLAN 3
Patient presented with MARYJANE of Cr 1.45 with baseline unclear. Creatinine uptrend throughout patient's course, today Cr 2.05. Etiology currently unclear, possibly prerenal in setting of decreased PO intake from depression vs. hypertensive nephropathy from persistently elevated BPs  -f/u renal ultrasound  -f/u urine lytes    -holding lisinopril in setting of MARYJANE

## 2018-08-18 LAB
ANION GAP SERPL CALC-SCNC: 18 MMOL/L — HIGH (ref 5–17)
BUN SERPL-MCNC: 23 MG/DL — SIGNIFICANT CHANGE UP (ref 7–23)
CALCIUM SERPL-MCNC: 9.9 MG/DL — SIGNIFICANT CHANGE UP (ref 8.4–10.5)
CHLORIDE SERPL-SCNC: 100 MMOL/L — SIGNIFICANT CHANGE UP (ref 96–108)
CO2 SERPL-SCNC: 20 MMOL/L — LOW (ref 22–31)
CREAT SERPL-MCNC: 1.89 MG/DL — HIGH (ref 0.5–1.3)
GLUCOSE SERPL-MCNC: 116 MG/DL — HIGH (ref 70–99)
HCT VFR BLD CALC: 37 % — SIGNIFICANT CHANGE UP (ref 34.5–45)
HGB BLD-MCNC: 11.8 G/DL — SIGNIFICANT CHANGE UP (ref 11.5–15.5)
MAGNESIUM SERPL-MCNC: 2.2 MG/DL — SIGNIFICANT CHANGE UP (ref 1.6–2.6)
MCHC RBC-ENTMCNC: 28.7 PG — SIGNIFICANT CHANGE UP (ref 27–34)
MCHC RBC-ENTMCNC: 31.9 G/DL — LOW (ref 32–36)
MCV RBC AUTO: 90 FL — SIGNIFICANT CHANGE UP (ref 80–100)
PHOSPHATE SERPL-MCNC: 5.2 MG/DL — HIGH (ref 2.5–4.5)
PLATELET # BLD AUTO: 369 K/UL — SIGNIFICANT CHANGE UP (ref 150–400)
POTASSIUM SERPL-MCNC: 4.5 MMOL/L — SIGNIFICANT CHANGE UP (ref 3.5–5.3)
POTASSIUM SERPL-SCNC: 4.5 MMOL/L — SIGNIFICANT CHANGE UP (ref 3.5–5.3)
RBC # BLD: 4.11 M/UL — SIGNIFICANT CHANGE UP (ref 3.8–5.2)
RBC # FLD: 14.6 % — SIGNIFICANT CHANGE UP (ref 10.3–16.9)
SODIUM SERPL-SCNC: 138 MMOL/L — SIGNIFICANT CHANGE UP (ref 135–145)
WBC # BLD: 9.6 K/UL — SIGNIFICANT CHANGE UP (ref 3.8–10.5)
WBC # FLD AUTO: 9.6 K/UL — SIGNIFICANT CHANGE UP (ref 3.8–10.5)

## 2018-08-18 PROCEDURE — 99233 SBSQ HOSP IP/OBS HIGH 50: CPT | Mod: GC

## 2018-08-18 PROCEDURE — 70450 CT HEAD/BRAIN W/O DYE: CPT | Mod: 26

## 2018-08-18 RX ORDER — HYDRALAZINE HCL 50 MG
5 TABLET ORAL ONCE
Qty: 0 | Refills: 0 | Status: COMPLETED | OUTPATIENT
Start: 2018-08-18 | End: 2018-08-18

## 2018-08-18 RX ORDER — SODIUM CHLORIDE 9 MG/ML
1000 INJECTION INTRAMUSCULAR; INTRAVENOUS; SUBCUTANEOUS
Qty: 0 | Refills: 0 | Status: DISCONTINUED | OUTPATIENT
Start: 2018-08-18 | End: 2018-08-20

## 2018-08-18 RX ADMIN — PANTOPRAZOLE SODIUM 40 MILLIGRAM(S): 20 TABLET, DELAYED RELEASE ORAL at 07:54

## 2018-08-18 RX ADMIN — Medication 325 MILLIGRAM(S): at 12:42

## 2018-08-18 RX ADMIN — SODIUM CHLORIDE 100 MILLILITER(S): 9 INJECTION INTRAMUSCULAR; INTRAVENOUS; SUBCUTANEOUS at 14:59

## 2018-08-18 RX ADMIN — LEVETIRACETAM 250 MILLIGRAM(S): 250 TABLET, FILM COATED ORAL at 18:18

## 2018-08-18 RX ADMIN — LEVETIRACETAM 250 MILLIGRAM(S): 250 TABLET, FILM COATED ORAL at 07:54

## 2018-08-18 RX ADMIN — Medication 25 MILLIGRAM(S): at 21:07

## 2018-08-18 RX ADMIN — SERTRALINE 25 MILLIGRAM(S): 25 TABLET, FILM COATED ORAL at 12:42

## 2018-08-18 RX ADMIN — AMLODIPINE BESYLATE 10 MILLIGRAM(S): 2.5 TABLET ORAL at 07:54

## 2018-08-18 RX ADMIN — Medication 5 MILLIGRAM(S): at 23:17

## 2018-08-18 RX ADMIN — Medication 0.1 MILLIGRAM(S): at 07:54

## 2018-08-18 RX ADMIN — Medication 1 APPLICATION(S): at 07:54

## 2018-08-18 RX ADMIN — Medication 25 MILLIGRAM(S): at 14:39

## 2018-08-18 RX ADMIN — ATORVASTATIN CALCIUM 80 MILLIGRAM(S): 80 TABLET, FILM COATED ORAL at 22:58

## 2018-08-18 RX ADMIN — HEPARIN SODIUM 5000 UNIT(S): 5000 INJECTION INTRAVENOUS; SUBCUTANEOUS at 14:39

## 2018-08-18 RX ADMIN — HEPARIN SODIUM 5000 UNIT(S): 5000 INJECTION INTRAVENOUS; SUBCUTANEOUS at 07:54

## 2018-08-18 RX ADMIN — HEPARIN SODIUM 5000 UNIT(S): 5000 INJECTION INTRAVENOUS; SUBCUTANEOUS at 22:58

## 2018-08-18 RX ADMIN — Medication 25 MILLIGRAM(S): at 00:49

## 2018-08-18 NOTE — PROGRESS NOTE ADULT - SUBJECTIVE AND OBJECTIVE BOX
OVERNIGHT EVENTS:    SUBJECTIVE / INTERVAL HPI: Patient seen and examined at bedside.     VITAL SIGNS:  Vital Signs Last 24 Hrs  T(C): 36.7 (18 Aug 2018 06:00), Max: 36.7 (17 Aug 2018 19:36)  T(F): 98.1 (18 Aug 2018 06:00), Max: 98.1 (17 Aug 2018 21:00)  HR: 68 (18 Aug 2018 00:40) (64 - 68)  BP: 178/84 (18 Aug 2018 08:20) (120/56 - 178/84)  BP(mean): 121 (18 Aug 2018 08:20) (81 - 121)  RR: 18 (18 Aug 2018 08:20) (17 - 18)  SpO2: 98% (18 Aug 2018 08:20) (98% - 100%)    PHYSICAL EXAM:    General: WDWN  HEENT: NC/AT; PERRL, anicteric sclera; MMM  Neck: supple  Cardiovascular: +S1/S2; RRR; no M/R/G on auscultation  Respiratory: CTA B/L; no W/R/R  Gastrointestinal: soft, NT/ND; +BSx4  Extremities: WWP; no edema, clubbing or cyanosis  Vascular: 2+ radial, DP/PT pulses B/L  Neurological: AAOx3; no focal deficits    MEDICATIONS:  MEDICATIONS  (STANDING):  amLODIPine   Tablet 10 milliGRAM(s) Oral daily  aspirin 325 milliGRAM(s) Oral daily  atorvastatin 80 milliGRAM(s) Oral at bedtime  BACItracin   Ointment 1 Application(s) Topical two times a day  cloNIDine 0.1 milliGRAM(s) Oral daily  dextrose 5%. 1000 milliLiter(s) (50 mL/Hr) IV Continuous <Continuous>  dextrose 50% Injectable 12.5 Gram(s) IV Push once  dextrose 50% Injectable 25 Gram(s) IV Push once  dextrose 50% Injectable 25 Gram(s) IV Push once  heparin  Injectable 5000 Unit(s) SubCutaneous every 8 hours  hydrALAZINE 25 milliGRAM(s) Oral every 8 hours  insulin lispro (HumaLOG) corrective regimen sliding scale   SubCutaneous every 6 hours  levETIRAcetam 250 milliGRAM(s) Oral two times a day  pantoprazole    Tablet 40 milliGRAM(s) Oral before breakfast  predniSONE   Tablet 80 milliGRAM(s) Oral daily  sertraline 25 milliGRAM(s) Oral daily  sodium chloride 0.9% Bolus 250 milliLiter(s) IV Bolus once  sodium chloride 0.9%. 1000 milliLiter(s) (100 mL/Hr) IV Continuous <Continuous>    MEDICATIONS  (PRN):  atropine Injectable 0.5 milliGRAM(s) IV Push every 5 minutes PRN Symptomatic Bradycardia  dextrose 40% Gel 15 Gram(s) Oral once PRN Blood Glucose LESS THAN 70 milliGRAM(s)/deciliter  glucagon  Injectable 1 milliGRAM(s) IntraMuscular once PRN Glucose LESS THAN 70 milligrams/deciliter      ALLERGIES:  Allergies    No Known Allergies    Intolerances        LABS:                        11.8   9.6   )-----------( 369      ( 18 Aug 2018 07:11 )             37.0     08-18    138  |  100  |  23  ----------------------------<  116<H>  4.5   |  20<L>  |  1.89<H>    Ca    9.9      18 Aug 2018 07:11  Phos  5.2     08-18  Mg     2.2     08-18      PT/INR - ( 17 Aug 2018 07:44 )   PT: 10.8 sec;   INR: 0.97          PTT - ( 17 Aug 2018 07:44 )  PTT:36.3 sec    CAPILLARY BLOOD GLUCOSE      POCT Blood Glucose.: 108 mg/dL (18 Aug 2018 06:50)      RADIOLOGY & ADDITIONAL TESTS:   EKG:   CXR:   CT:   MRI:    ASSESSMENT:    PLAN: OVERNIGHT EVENTS:    SUBJECTIVE / INTERVAL HPI: Patient seen and examined at bedside.     VITAL SIGNS:  Vital Signs Last 24 Hrs  T(C): 36.7 (18 Aug 2018 06:00), Max: 36.7 (17 Aug 2018 19:36)  T(F): 98.1 (18 Aug 2018 06:00), Max: 98.1 (17 Aug 2018 21:00)  HR: 68 (18 Aug 2018 00:40) (64 - 68)  BP: 178/84 (18 Aug 2018 08:20) (120/56 - 178/84)  BP(mean): 121 (18 Aug 2018 08:20) (81 - 121)  RR: 18 (18 Aug 2018 08:20) (17 - 18)  SpO2: 98% (18 Aug 2018 08:20) (98% - 100%)    PHYSICAL EXAM:  GENERAL: NAD, well-groomed, well-developed  HEAD:  Atraumatic, Normocephalic  EYES: EOMI, PERRLA, conjunctiva and sclera clear  ENMT: No tonsillar erythema, exudates, or enlargement; Moist mucous membranes, Good dentition, No lesions  NECK: Supple, No JVD, Normal thyroid  NERVOUS SYSTEM:  CN 2,4,5,6,8,9,10,11,12; L sided facial droop; Alert & Oriented X3, Motor Strength 5/5 in the RUE and RLE; 4/5 strength in the LLE 2/5 strength and motor in the LUE;  DTRs 2+ intact and symmetric  CHEST/LUNG: Clear to percussion bilaterally; No rales, rhonchi, wheezing, or rubs; anterior chest wound  HEART: Regular rate and rhythm; No murmurs, rubs, or gallops  ABDOMEN: Soft, Nontender, Nondistended; Bowel sounds present  EXTREMITIES:  2+ Peripheral Pulses, No clubbing, cyanosis, or edema  LYMPH: No lymphadenopathy noted  SKIN: No rashes or lesions  MEDICATIONS:  MEDICATIONS  (STANDING):  amLODIPine   Tablet 10 milliGRAM(s) Oral daily  aspirin 325 milliGRAM(s) Oral daily  atorvastatin 80 milliGRAM(s) Oral at bedtime  BACItracin   Ointment 1 Application(s) Topical two times a day  cloNIDine 0.1 milliGRAM(s) Oral daily  dextrose 5%. 1000 milliLiter(s) (50 mL/Hr) IV Continuous <Continuous>  dextrose 50% Injectable 12.5 Gram(s) IV Push once  dextrose 50% Injectable 25 Gram(s) IV Push once  dextrose 50% Injectable 25 Gram(s) IV Push once  heparin  Injectable 5000 Unit(s) SubCutaneous every 8 hours  hydrALAZINE 25 milliGRAM(s) Oral every 8 hours  insulin lispro (HumaLOG) corrective regimen sliding scale   SubCutaneous every 6 hours  levETIRAcetam 250 milliGRAM(s) Oral two times a day  pantoprazole    Tablet 40 milliGRAM(s) Oral before breakfast  predniSONE   Tablet 80 milliGRAM(s) Oral daily  sertraline 25 milliGRAM(s) Oral daily  sodium chloride 0.9% Bolus 250 milliLiter(s) IV Bolus once  sodium chloride 0.9%. 1000 milliLiter(s) (100 mL/Hr) IV Continuous <Continuous>    MEDICATIONS  (PRN):  atropine Injectable 0.5 milliGRAM(s) IV Push every 5 minutes PRN Symptomatic Bradycardia  dextrose 40% Gel 15 Gram(s) Oral once PRN Blood Glucose LESS THAN 70 milliGRAM(s)/deciliter  glucagon  Injectable 1 milliGRAM(s) IntraMuscular once PRN Glucose LESS THAN 70 milligrams/deciliter      ALLERGIES:  Allergies    No Known Allergies    Intolerances        LABS:                        11.8   9.6   )-----------( 369      ( 18 Aug 2018 07:11 )             37.0     08-18    138  |  100  |  23  ----------------------------<  116<H>  4.5   |  20<L>  |  1.89<H>    Ca    9.9      18 Aug 2018 07:11  Phos  5.2     08-18  Mg     2.2     08-18      PT/INR - ( 17 Aug 2018 07:44 )   PT: 10.8 sec;   INR: 0.97          PTT - ( 17 Aug 2018 07:44 )  PTT:36.3 sec    CAPILLARY BLOOD GLUCOSE      POCT Blood Glucose.: 108 mg/dL (18 Aug 2018 06:50)      RADIOLOGY & ADDITIONAL TESTS:   EKG:   CXR:   CT:   MRI:    ASSESSMENT:    PLAN: OVERNIGHT EVENTS: s/p cerebral angiogram    SUBJECTIVE / INTERVAL HPI: Patient seen and examined at bedside. No acute complaints this am. Denies pain, CP, fevers, chills, N/V/D/C, dyspnea.     VITAL SIGNS:  Vital Signs Last 24 Hrs  T(C): 36.7 (18 Aug 2018 06:00), Max: 36.7 (17 Aug 2018 19:36)  T(F): 98.1 (18 Aug 2018 06:00), Max: 98.1 (17 Aug 2018 21:00)  HR: 68 (18 Aug 2018 00:40) (64 - 68)  BP: 178/84 (18 Aug 2018 08:20) (120/56 - 178/84)  BP(mean): 121 (18 Aug 2018 08:20) (81 - 121)  RR: 18 (18 Aug 2018 08:20) (17 - 18)  SpO2: 98% (18 Aug 2018 08:20) (98% - 100%)    PHYSICAL EXAM:  GENERAL: NAD, well-groomed, well-developed  HEAD:  Atraumatic, Normocephalic  EYES: EOMI, PERRLA, conjunctiva and sclera clear  ENMT: No tonsillar erythema, exudates, or enlargement; Moist mucous membranes, Good dentition, No lesions  NECK: Supple, No JVD, Normal thyroid  NERVOUS SYSTEM:  CN 2,4,5,6,8,9,10,11,12; L sided facial droop; Alert & Oriented X3, Motor Strength 5/5 in the RUE and RLE; 4/5 strength in the LLE 2/5 strength and motor in the LUE;  DTRs 2+ intact and symmetric  CHEST/LUNG: Clear to percussion bilaterally; No rales, rhonchi, wheezing, or rubs; anterior chest wound  HEART: Regular rate and rhythm; No murmurs, rubs, or gallops  ABDOMEN: Soft, Nontender, Nondistended; Bowel sounds present  EXTREMITIES:  2+ Peripheral Pulses, No clubbing, cyanosis, or edema  LYMPH: No lymphadenopathy noted  SKIN: No rashes or lesions  MEDICATIONS:  MEDICATIONS  (STANDING):  amLODIPine   Tablet 10 milliGRAM(s) Oral daily  aspirin 325 milliGRAM(s) Oral daily  atorvastatin 80 milliGRAM(s) Oral at bedtime  BACItracin   Ointment 1 Application(s) Topical two times a day  cloNIDine 0.1 milliGRAM(s) Oral daily  dextrose 5%. 1000 milliLiter(s) (50 mL/Hr) IV Continuous <Continuous>  dextrose 50% Injectable 12.5 Gram(s) IV Push once  dextrose 50% Injectable 25 Gram(s) IV Push once  dextrose 50% Injectable 25 Gram(s) IV Push once  heparin  Injectable 5000 Unit(s) SubCutaneous every 8 hours  hydrALAZINE 25 milliGRAM(s) Oral every 8 hours  insulin lispro (HumaLOG) corrective regimen sliding scale   SubCutaneous every 6 hours  levETIRAcetam 250 milliGRAM(s) Oral two times a day  pantoprazole    Tablet 40 milliGRAM(s) Oral before breakfast  predniSONE   Tablet 80 milliGRAM(s) Oral daily  sertraline 25 milliGRAM(s) Oral daily  sodium chloride 0.9% Bolus 250 milliLiter(s) IV Bolus once  sodium chloride 0.9%. 1000 milliLiter(s) (100 mL/Hr) IV Continuous <Continuous>    MEDICATIONS  (PRN):  atropine Injectable 0.5 milliGRAM(s) IV Push every 5 minutes PRN Symptomatic Bradycardia  dextrose 40% Gel 15 Gram(s) Oral once PRN Blood Glucose LESS THAN 70 milliGRAM(s)/deciliter  glucagon  Injectable 1 milliGRAM(s) IntraMuscular once PRN Glucose LESS THAN 70 milligrams/deciliter      ALLERGIES:  Allergies    No Known Allergies    Intolerances        LABS:                        11.8   9.6   )-----------( 369      ( 18 Aug 2018 07:11 )             37.0     08-18    138  |  100  |  23  ----------------------------<  116<H>  4.5   |  20<L>  |  1.89<H>    Ca    9.9      18 Aug 2018 07:11  Phos  5.2     08-18  Mg     2.2     08-18      PT/INR - ( 17 Aug 2018 07:44 )   PT: 10.8 sec;   INR: 0.97          PTT - ( 17 Aug 2018 07:44 )  PTT:36.3 sec    CAPILLARY BLOOD GLUCOSE      POCT Blood Glucose.: 108 mg/dL (18 Aug 2018 06:50)

## 2018-08-18 NOTE — PROGRESS NOTE ADULT - SUBJECTIVE AND OBJECTIVE BOX
INTERVAL HPI/OVERNIGHT EVENTS:  Patient was seen and examined at bedside. Patient denies: fever, chills, dizziness, weakness, HA, Changes in vision, CP, palpitations, SOB, cough.  Repeat CT today shows no new infarcts.     VITAL SIGNS:  T(F): 97 (08-18-18 @ 18:00)  HR: 48 (08-18-18 @ 18:19)  BP: 191/83 (08-18-18 @ 18:19)  RR: 18 (08-18-18 @ 18:19)  SpO2: 100% (08-18-18 @ 18:19)  Wt(kg): --    PHYSICAL EXAM:    General: NAD, drowsy, following commands, speaking in low volume.  HEENT: NC/AT; PERRL, clear conjunctiva. Dry mucus membranes  Neck: supple  Respiratory: CTA b/l. No wheezes, no rales, no rhonchi appreciated   Cardiovascular: +S1/S2; RRR  Abdomen: soft, NT/ND; +BS x4  Extremities: No erythema, no edema, no cyanosis b/l  Neurological exam limited to patient cooperation and condition. Mental status: sleeping but easily arousable to voice. Speech is fluent. Follows commands. No dysarthria, no aphasia.  CN II: pupils equally round and reactive to light,   II, IV, VI: EOMI without nystagmus. Right gaze preferrence.   VII: Left facial droop  VIII: hearing is intact to finger rub  IX, X: Uvula is midline and soft palate rises symmetrically  Muscle strength: RUE 4/5, LUE 1/5, RLE 4/5, LLE 1/5. Moves RUE and RLE spontaneously.     MEDICATIONS  (STANDING):  amLODIPine   Tablet 10 milliGRAM(s) Oral daily  aspirin 325 milliGRAM(s) Oral daily  atorvastatin 80 milliGRAM(s) Oral at bedtime  BACItracin   Ointment 1 Application(s) Topical two times a day  cloNIDine 0.1 milliGRAM(s) Oral daily  dextrose 5%. 1000 milliLiter(s) (50 mL/Hr) IV Continuous <Continuous>  dextrose 50% Injectable 12.5 Gram(s) IV Push once  dextrose 50% Injectable 25 Gram(s) IV Push once  dextrose 50% Injectable 25 Gram(s) IV Push once  heparin  Injectable 5000 Unit(s) SubCutaneous every 8 hours  hydrALAZINE 25 milliGRAM(s) Oral every 8 hours  insulin lispro (HumaLOG) corrective regimen sliding scale   SubCutaneous every 6 hours  levETIRAcetam 250 milliGRAM(s) Oral two times a day  pantoprazole    Tablet 40 milliGRAM(s) Oral before breakfast  predniSONE   Tablet 80 milliGRAM(s) Oral daily  sertraline 25 milliGRAM(s) Oral daily  sodium chloride 0.9% Bolus 250 milliLiter(s) IV Bolus once  sodium chloride 0.9%. 1000 milliLiter(s) (100 mL/Hr) IV Continuous <Continuous>    MEDICATIONS  (PRN):  atropine Injectable 0.5 milliGRAM(s) IV Push every 5 minutes PRN Symptomatic Bradycardia  dextrose 40% Gel 15 Gram(s) Oral once PRN Blood Glucose LESS THAN 70 milliGRAM(s)/deciliter  glucagon  Injectable 1 milliGRAM(s) IntraMuscular once PRN Glucose LESS THAN 70 milligrams/deciliter      Allergies    No Known Allergies    Intolerances      LABS:                        11.8   9.6   )-----------( 369      ( 18 Aug 2018 07:11 )             37.0     08-18    138  |  100  |  23  ----------------------------<  116<H>  4.5   |  20<L>  |  1.89<H>    Ca    9.9      18 Aug 2018 07:11  Phos  5.2     08-18  Mg     2.2     08-18      PT/INR - ( 17 Aug 2018 07:44 )   PT: 10.8 sec;   INR: 0.97          PTT - ( 17 Aug 2018 07:44 )  PTT:36.3 sec      RADIOLOGY & ADDITIONAL TESTS:  Reviewed

## 2018-08-18 NOTE — PROGRESS NOTE ADULT - PROBLEM SELECTOR PLAN 1
Etiology not yet determined. No atrial thrombus on KATHERINE, no afib on SBP goal 160-200, currently on Hydralazine, Norvasc, and clonidine.  Patient requires higher than normal BP to allow for cerebral perfusion. Considering vasculitic etiology given positive ESR, other work up for determining etiology negative. Hypercoaguability workup sent.   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 EF 55-60%  - for antiplatelet and Atorvastatin 80 for - Secondary stroke prevention  -patient sent for hypercoaguability workup- Hexagonal phase negative, DRVVT inhibitor screen 27.2 (WNL), DRVVT S/C screen LA neg.  -f/u results of hypercoag workup  -CVA possible 2/2 vasculitis given work up thus far to determine etiology negative, ESR elevated. Will send for vasculitis work up.   -cerebral angiogram today. f/u results  -Neurosurg following, recs appreciated  -medicine following, recs appreciated  -cardio recs appreciated.  -will consider isordil instead of clonidine to optimize BP control  -f/u EEG as patient was less responsive yesterday in AM and want to evaluate possibly epileptiform activity as etiology Etiology not yet determined. No atrial thrombus on KATHERINE, no afib on SBP goal 160-200, currently on Hydralazine, Norvasc, and clonidine.  Patient requires higher than normal BP to allow for cerebral perfusion. Considering vasculitic etiology given positive ESR, other work up for determining etiology negative. Hypercoaguability workup sent.   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 EF 55-60%  - for antiplatelet and Atorvastatin 80 for - Secondary stroke prevention  -patient sent for hypercoaguability workup- Hexagonal phase negative, DRVVT inhibitor screen 27.2 (WNL), DRVVT S/C screen LA neg.  -f/u results of hypercoag workup  -CVA possible 2/2 vasculitis given work up thus far to determine etiology negative, ESR elevated. Will send for vasculitis work up. Will continue with prednisone 80mg qd.  -cerebral angiogram today. f/u results  -Neurosurg following, recs appreciated  -medicine following, recs appreciated  -cardio recs appreciated.  -will consider isordil instead of clonidine to optimize BP control  -f/u EEG as patient was less responsive yesterday in AM and want to evaluate possibly epileptiform activity as etiology

## 2018-08-18 NOTE — PROGRESS NOTE ADULT - ATTENDING COMMENTS
persistent neglect and left sided weakness. BP running in 170 to 190  persistent left sided weakness that is unchanged since before the ngio.   1. vasculiitis - cont prednisone 80 mg daily and call rheum in am.   2. HTN- med following  - started on hydralazine  3. acute on chronic renal failure - IVAN US.   Repeat MRI to get new baseline of infarcts now that steroid rx has strted

## 2018-08-18 NOTE — PROGRESS NOTE ADULT - ASSESSMENT
68 yr old female with a PMHx of HTN that presented to 8U for psych stabilization, subsequently had a multi-focal stroke with L sided facial and UE/LE deficits. Course c/b persistent HTN requiring nicardipine drip now transition to PO BP regimen. Primary etiology thought to be embolic stroke however, initial neuro w/u (-) for a-fib or LV thrombus; Multifocality of strokes now concerning for vasculitis. Medicine consulted for med clearance prior to angiogram.     # Preoperative Optimization  -RCRI 6.6 % (+ stroke and Cr > 2) intermediate risk for low risk procedure   -continue amlodipine, hydralazine, clonidine & statin perioperatively; please hold lisinopril starting today in the setting of MARYJANE  -METS < 4  -Postoperative Telemetry  -Will continue to follow    #acute CVA  -etiolology thought to be 2/2 vasculitis based on angiogram findings  -w/ residual L sided deficits  -on , 80 mg lipitor   -c/w permissive HTN per neuro recs    #HTN  -poorly controlled; SBP 180s - 200s  -currently on 4 drug regimen with lisnopril, clonidine, amlodipine, hydralazine  -HTN management per neurology who would like to maintain an SBP of 200 to facilitate adequate brain perfusion  -once the patient is outside the window for permissive HTN per neuro's recs and MARYJANE is resolved, BP optimization per cardiology    #MARYJANE   -patient presented with MARYJANE of 1.45; unclear baseline  -Cr peaked at 2.1 now slowly downtrending to 1.78 today; s/p contrast load yesterday for cerebral angiogram; cont w/ hydration as BP tolerates; monitor Cr for LACY which takes 48-72 hrs post contrast to manifest  - urine lytes pending;  -please hold lisinopril; please avoid all nephrotoxic agents      #r/o cerebral vasculitis   -initially etiology of patient's strokes thought to be embolic however, patient has been in sinus paige and KATHERINE unrevealing for LV thrombus  -now etiology of patient's stroked thought to be 2/2 vasculitis  -CRP and ESR elevated; no further rheum testing done   -plan for angiogram with neurosurgery for further evaluation of vasculitis 68 yr old female with a PMHx of HTN that presented to 8U for psych stabilization, subsequently had a multi-focal stroke with L sided facial and UE/LE deficits. Course c/b persistent HTN requiring nicardipine drip now transition to PO BP regimen. Primary etiology thought to be embolic stroke however, initial neuro w/u (-) for a-fib or LV thrombus; Multifocality of strokes now concerning for vasculitis. Medicine consulted for med clearance prior to angiogram.     # Preoperative Optimization  -RCRI 6.6 % (+ stroke and Cr > 2) intermediate risk for low risk procedure   -continue amlodipine, hydralazine, clonidine & statin perioperatively; please hold lisinopril starting today in the setting of MARYJANE  -METS < 4  -Postoperative Telemetry  -Will continue to follow    #acute CVA  -initially etiology of patient's strokes thought to be embolic however, patient has been in sinus paige and KATHERINE unrevealing for LV thrombus, etiology now thought to be 2/2 vasculitis based on angiogram findings  -w/ residual L sided deficits  -on , 80 mg lipitor   -c/w permissive HTN per neuro recs    #HTN  -poorly controlled; SBP 180s - 200s  -currently on 4 drug regimen with lisnopril, clonidine, amlodipine, hydralazine  -HTN management per neurology who would like to maintain an SBP of 200 to facilitate adequate brain perfusion  -once the patient is outside the window for permissive HTN per neuro's recs and MARYJANE is resolved, BP optimization per cardiology    #MARYJANE   -patient presented with MARYJANE of 1.45; unclear baseline; most likely 2/2 poor PO intake and concomitant persistently elevated BP   -Cr peaked at 2.1 now slowly downtrending to 1.78 today; s/p contrast load yesterday for cerebral angiogram; cont w/ hydration as BP tolerates; monitor Cr for LACY   - urine lytes pending; renal u/s pending  -please hold lisinopril; please avoid all nephrotoxic agents 68 yr old female with a PMHx of HTN that presented to 8U for psych stabilization, subsequently had a multi-focal stroke with L sided facial and UE/LE deficits. Course c/b persistent HTN requiring nicardipine drip now transition to PO BP regimen. Primary etiology thought to be embolic stroke however, initial neuro w/u (-) for a-fib or LV thrombus; Multifocality of strokes now concerning for vasculitis. Medicine consulted for med clearance prior to angiogram.     # Preoperative Optimization  -RCRI 6.6 % (+ stroke and Cr > 2) intermediate risk for low risk procedure   -continue amlodipine, hydralazine, clonidine & statin perioperatively; please hold lisinopril starting today in the setting of MARYJANE  -METS < 4  -Postoperative Telemetry  -Will continue to follow    #acute CVA  -initially etiology of patient's strokes thought to be embolic however, patient has been in sinus paige and KATHERINE unrevealing for LV thrombus, etiology now thought to be 2/2 vasculitis based on angiogram findings  -w/ residual L sided deficits  -on , 80 mg lipitor   -c/w permissive HTN per neuro recs    #HTN  -poorly controlled; SBP 180s - 200s  -currently on 4 drug regimen with lisnopril, clonidine, amlodipine, hydralazine  -HTN management per neurology who would like to maintain an SBP of 200 to facilitate adequate brain perfusion  -once the patient is outside the window for permissive HTN per neuro's recs and MARYJANE is resolved, BP optimization per cardiology    #MARYJANE   -patient presented with MARYJANE of 1.45; unclear baseline; most likely 2/2 poor PO intake and concomitant persistently elevated BP   -Cr peaked at 2.1 now slowly downtrending to 1.78 today; s/p contrast load yesterday for cerebral angiogram; cont w/ hydration as BP tolerates; monitor Cr for LACY   - urine lytes pending; renal u/s pending   -please hold lisinopril; please avoid all nephrotoxic agents

## 2018-08-18 NOTE — PROGRESS NOTE ADULT - ATTENDING COMMENTS
pt seen and examined by me. case d/w resident. agree with VS, PE, assessment and plan as outlined above with following additives    1- maría- stable- monitor BUN/CR  gentle hydration okay given contrast recently  please obtain Doppler US of kidney- given pt has potential cerebral vasculitis may have kidney involvement given renal failure and resistant HTN of relatively unclear etiology  check urine lytes    will continue to follow

## 2018-08-19 LAB
ANION GAP SERPL CALC-SCNC: 18 MMOL/L — HIGH (ref 5–17)
BASOPHILS NFR BLD AUTO: 0.4 % — SIGNIFICANT CHANGE UP (ref 0–2)
BUN SERPL-MCNC: 25 MG/DL — HIGH (ref 7–23)
CALCIUM SERPL-MCNC: 9.5 MG/DL — SIGNIFICANT CHANGE UP (ref 8.4–10.5)
CHLORIDE SERPL-SCNC: 105 MMOL/L — SIGNIFICANT CHANGE UP (ref 96–108)
CO2 SERPL-SCNC: 20 MMOL/L — LOW (ref 22–31)
CREAT SERPL-MCNC: 1.69 MG/DL — HIGH (ref 0.5–1.3)
EOSINOPHIL NFR BLD AUTO: 2.4 % — SIGNIFICANT CHANGE UP (ref 0–6)
GLUCOSE SERPL-MCNC: 92 MG/DL — SIGNIFICANT CHANGE UP (ref 70–99)
HCT VFR BLD CALC: 32.9 % — LOW (ref 34.5–45)
HGB BLD-MCNC: 11 G/DL — LOW (ref 11.5–15.5)
LYMPHOCYTES # BLD AUTO: 22.5 % — SIGNIFICANT CHANGE UP (ref 13–44)
MAGNESIUM SERPL-MCNC: 2.1 MG/DL — SIGNIFICANT CHANGE UP (ref 1.6–2.6)
MCHC RBC-ENTMCNC: 29.4 PG — SIGNIFICANT CHANGE UP (ref 27–34)
MCHC RBC-ENTMCNC: 33.4 G/DL — SIGNIFICANT CHANGE UP (ref 32–36)
MCV RBC AUTO: 88 FL — SIGNIFICANT CHANGE UP (ref 80–100)
MONOCYTES NFR BLD AUTO: 7 % — SIGNIFICANT CHANGE UP (ref 2–14)
NEUTROPHILS NFR BLD AUTO: 67.7 % — SIGNIFICANT CHANGE UP (ref 43–77)
PLATELET # BLD AUTO: 316 K/UL — SIGNIFICANT CHANGE UP (ref 150–400)
POTASSIUM SERPL-MCNC: 3.9 MMOL/L — SIGNIFICANT CHANGE UP (ref 3.5–5.3)
POTASSIUM SERPL-SCNC: 3.9 MMOL/L — SIGNIFICANT CHANGE UP (ref 3.5–5.3)
RBC # BLD: 3.74 M/UL — LOW (ref 3.8–5.2)
RBC # FLD: 14.4 % — SIGNIFICANT CHANGE UP (ref 10.3–16.9)
SODIUM SERPL-SCNC: 143 MMOL/L — SIGNIFICANT CHANGE UP (ref 135–145)
WBC # BLD: 7.8 K/UL — SIGNIFICANT CHANGE UP (ref 3.8–10.5)
WBC # FLD AUTO: 7.8 K/UL — SIGNIFICANT CHANGE UP (ref 3.8–10.5)

## 2018-08-19 PROCEDURE — 99232 SBSQ HOSP IP/OBS MODERATE 35: CPT

## 2018-08-19 PROCEDURE — 99233 SBSQ HOSP IP/OBS HIGH 50: CPT | Mod: GC

## 2018-08-19 RX ORDER — HYDRALAZINE HCL 50 MG
2.5 TABLET ORAL ONCE
Qty: 0 | Refills: 0 | Status: COMPLETED | OUTPATIENT
Start: 2018-08-19 | End: 2018-08-19

## 2018-08-19 RX ORDER — POTASSIUM CHLORIDE 20 MEQ
10 PACKET (EA) ORAL ONCE
Qty: 0 | Refills: 0 | Status: COMPLETED | OUTPATIENT
Start: 2018-08-19 | End: 2018-08-19

## 2018-08-19 RX ADMIN — SERTRALINE 25 MILLIGRAM(S): 25 TABLET, FILM COATED ORAL at 11:51

## 2018-08-19 RX ADMIN — PANTOPRAZOLE SODIUM 40 MILLIGRAM(S): 20 TABLET, DELAYED RELEASE ORAL at 07:38

## 2018-08-19 RX ADMIN — Medication 2.5 MILLIGRAM(S): at 16:03

## 2018-08-19 RX ADMIN — HEPARIN SODIUM 5000 UNIT(S): 5000 INJECTION INTRAVENOUS; SUBCUTANEOUS at 22:12

## 2018-08-19 RX ADMIN — LEVETIRACETAM 250 MILLIGRAM(S): 250 TABLET, FILM COATED ORAL at 07:38

## 2018-08-19 RX ADMIN — ATORVASTATIN CALCIUM 80 MILLIGRAM(S): 80 TABLET, FILM COATED ORAL at 22:12

## 2018-08-19 RX ADMIN — Medication 25 MILLIGRAM(S): at 22:12

## 2018-08-19 RX ADMIN — SODIUM CHLORIDE 100 MILLILITER(S): 9 INJECTION INTRAMUSCULAR; INTRAVENOUS; SUBCUTANEOUS at 07:41

## 2018-08-19 RX ADMIN — LEVETIRACETAM 250 MILLIGRAM(S): 250 TABLET, FILM COATED ORAL at 17:56

## 2018-08-19 RX ADMIN — Medication 1 APPLICATION(S): at 07:37

## 2018-08-19 RX ADMIN — Medication 0.1 MILLIGRAM(S): at 07:37

## 2018-08-19 RX ADMIN — Medication 1 APPLICATION(S): at 00:28

## 2018-08-19 RX ADMIN — Medication 1 APPLICATION(S): at 17:56

## 2018-08-19 RX ADMIN — AMLODIPINE BESYLATE 10 MILLIGRAM(S): 2.5 TABLET ORAL at 07:37

## 2018-08-19 RX ADMIN — Medication 100 MILLIEQUIVALENT(S): at 14:31

## 2018-08-19 RX ADMIN — Medication 325 MILLIGRAM(S): at 11:51

## 2018-08-19 RX ADMIN — HEPARIN SODIUM 5000 UNIT(S): 5000 INJECTION INTRAVENOUS; SUBCUTANEOUS at 14:31

## 2018-08-19 RX ADMIN — Medication 2: at 17:57

## 2018-08-19 RX ADMIN — HEPARIN SODIUM 5000 UNIT(S): 5000 INJECTION INTRAVENOUS; SUBCUTANEOUS at 07:38

## 2018-08-19 RX ADMIN — Medication 25 MILLIGRAM(S): at 14:31

## 2018-08-19 RX ADMIN — Medication 25 MILLIGRAM(S): at 07:38

## 2018-08-19 RX ADMIN — Medication 80 MILLIGRAM(S): at 07:38

## 2018-08-19 NOTE — PROGRESS NOTE ADULT - SUBJECTIVE AND OBJECTIVE BOX
OVERNIGHT EVENTS: SBP 200s s/p hydral 5mg.     SUBJECTIVE / INTERVAL HPI: Patient seen and examined at bedside. Patient with no complaints. Patient denied chest pain, SOB, fevers, chills, night sweats, n/v/d/c. Patient hypertensive to 200s, s/p hydral 2.5 x1.     VITAL SIGNS:  Vital Signs Last 24 Hrs  T(C): 36.6 (19 Aug 2018 18:38), Max: 36.8 (19 Aug 2018 10:00)  T(F): 97.9 (19 Aug 2018 18:38), Max: 98.2 (19 Aug 2018 10:00)  HR: 64 (19 Aug 2018 17:57) (52 - 64)  BP: 205/91 (19 Aug 2018 17:57) (182/88 - 218/100)  BP(mean): 131 (19 Aug 2018 17:57) (115 - 143)  RR: 15 (19 Aug 2018 17:57) (13 - 20)  SpO2: 97% (19 Aug 2018 17:57) (97% - 100%)    PHYSICAL EXAM:    General: NAD, following commands, speaking in low volume.  HEENT: NC/AT; PERRL, clear conjunctiva. Dry mucus membranes  Neck: supple  Respiratory: CTA b/l. No wheezes, no rales, no rhonchi appreciated   Cardiovascular: +S1/S2; RRR  Abdomen: soft, NT/ND; +BS x4  Extremities: No erythema, no edema, no cyanosis b/l  Neurological exam limited to patient cooperation and condition.  Mental status: Awake and alert. Speech is fluent. Follows commands.  CN II: pupils equally round and reactive to light,   II, IV, VI: EOMI without nystagmus. Right gaze preferrence.   VII: Left facial droop  VIII: hearing is intact to finger rub  IX, X: Uvula is midline and soft palate rises symmetrically  Muscle strength: RUE 4/5, LUE 1/5, RLE 4/5, LLE 1/5. Moves RUE and RLE spontaneously.     MEDICATIONS:  MEDICATIONS  (STANDING):  amLODIPine   Tablet 10 milliGRAM(s) Oral daily  aspirin 325 milliGRAM(s) Oral daily  atorvastatin 80 milliGRAM(s) Oral at bedtime  BACItracin   Ointment 1 Application(s) Topical two times a day  cloNIDine 0.1 milliGRAM(s) Oral daily  dextrose 5%. 1000 milliLiter(s) (50 mL/Hr) IV Continuous <Continuous>  dextrose 50% Injectable 12.5 Gram(s) IV Push once  dextrose 50% Injectable 25 Gram(s) IV Push once  dextrose 50% Injectable 25 Gram(s) IV Push once  heparin  Injectable 5000 Unit(s) SubCutaneous every 8 hours  hydrALAZINE 25 milliGRAM(s) Oral every 8 hours  insulin lispro (HumaLOG) corrective regimen sliding scale   SubCutaneous every 6 hours  levETIRAcetam 250 milliGRAM(s) Oral two times a day  pantoprazole    Tablet 40 milliGRAM(s) Oral before breakfast  predniSONE   Tablet 80 milliGRAM(s) Oral daily  sertraline 25 milliGRAM(s) Oral daily  sodium chloride 0.9% Bolus 250 milliLiter(s) IV Bolus once  sodium chloride 0.9%. 1000 milliLiter(s) (100 mL/Hr) IV Continuous <Continuous>    MEDICATIONS  (PRN):  atropine Injectable 0.5 milliGRAM(s) IV Push every 5 minutes PRN Symptomatic Bradycardia  dextrose 40% Gel 15 Gram(s) Oral once PRN Blood Glucose LESS THAN 70 milliGRAM(s)/deciliter  glucagon  Injectable 1 milliGRAM(s) IntraMuscular once PRN Glucose LESS THAN 70 milligrams/deciliter      ALLERGIES:  Allergies    No Known Allergies    Intolerances        LABS:                        11.0   7.8   )-----------( 316      ( 19 Aug 2018 05:46 )             32.9     08-19    143  |  105  |  25<H>  ----------------------------<  92  3.9   |  20<L>  |  1.69<H>    Ca    9.5      19 Aug 2018 05:48  Phos  5.2     08-18  Mg     2.1     08-19          CAPILLARY BLOOD GLUCOSE      POCT Blood Glucose.: 191 mg/dL (19 Aug 2018 17:47)      RADIOLOGY & ADDITIONAL TESTS: Reviewed.

## 2018-08-19 NOTE — PROGRESS NOTE ADULT - ASSESSMENT
68F PMH MDD, found down in her apartment. Stroke code called on 8/11 for L facial droop and weakness. CT then showed multiple lacunar infarcts, age indeterminate, in basal ganglia and thalamus. CTA showed chronic L VA occlusion, R VA and basilar hypoplasia, carotid stenosis bilaterally. No tPA given. Symptoms improved, then 8/12 another stroke code was called for L facial droop and left UE/LE weakness, CT unchanged. MRI showed acute right PATRICIA infarct with scattered smaller areas of infarct. Patient hemodynamically stable and transferred to  now being worked up for vasculitis.

## 2018-08-19 NOTE — PROGRESS NOTE ADULT - PROBLEM SELECTOR PLAN 1
Etiology not yet determined. No atrial thrombus on KATHERINE, no afib on SBP goal 160-200, currently on Hydralazine, Norvasc, and clonidine.  Patient requires higher than normal BP to allow for cerebral perfusion. Considering vasculitic etiology given positive ESR, other work up for determining etiology negative. Hypercoaguability workup sent.   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 EF 55-60%  - for antiplatelet and Atorvastatin 80 for - Secondary stroke prevention  -patient sent for hypercoaguability workup- Hexagonal phase negative, DRVVT inhibitor screen 27.2 (WNL), DRVVT S/C screen LA neg.  -f/u rest of hypercoag workup  -cerebral angiogram with prelim read  with signs of cerebral vasculitis. c/w prednisone 80mg qd. f/u read  -Neurosurg following, recs appreciated  -medicine following, recs appreciated  -cardio recs appreciated.  -will consider isordil instead of clonidine to optimize BP control  -EEG read with epileptiform activity, c/w keppra 250 BID

## 2018-08-19 NOTE — PROGRESS NOTE ADULT - ATTENDING COMMENTS
more awake today. Follow commands   persistent left sided weakness that is unchanged since before the ngio.   1. vasculiitis - cont prednisone 80 mg daily and call rheum in am.   2. HTN- med following  - started on hydralazine  3. acute on chronic renal failure - IVAN US.

## 2018-08-19 NOTE — PROGRESS NOTE ADULT - SUBJECTIVE AND OBJECTIVE BOX
HEALTH ISSUES - PROBLEM Dx:  MARYJANE (acute kidney injury): MARYJANE (acute kidney injury)  Current severe episode of major depressive disorder with psychotic features without prior episode: Current severe episode of major depressive disorder with psychotic features without prior episode  Transition of care performed with sharing of clinical summary: Transition of care performed with sharing of clinical summary  Prophylactic measure: Prophylactic measure  Depression: Depression  Hypertension: Hypertension  Abrasion of chest wall: Abrasion of chest wall  UTI (urinary tract infection): UTI (urinary tract infection)  CVA (cerebral vascular accident): CVA (cerebral vascular accident)  Nutrition, metabolism, and development symptoms: Nutrition, metabolism, and development symptoms  Hypertensive emergency: Hypertensive emergency  Hydrocephalus: Hydrocephalus  Severe episode of recurrent major depressive disorder, without psychotic features: Severe episode of recurrent major depressive disorder, without psychotic features  Bradycardia, sinus: Bradycardia, sinus  Hypertensive urgency: Hypertensive urgency  Lacunar infarction: Lacunar infarction          CHEMOTHERAPY REGIMEN:        Day:                          Diet:  Protocol:                                    IVF:      MEDICATIONS  (STANDING):  amLODIPine   Tablet 10 milliGRAM(s) Oral daily  aspirin 325 milliGRAM(s) Oral daily  atorvastatin 80 milliGRAM(s) Oral at bedtime  BACItracin   Ointment 1 Application(s) Topical two times a day  cloNIDine 0.1 milliGRAM(s) Oral daily  dextrose 5%. 1000 milliLiter(s) (50 mL/Hr) IV Continuous <Continuous>  dextrose 50% Injectable 12.5 Gram(s) IV Push once  dextrose 50% Injectable 25 Gram(s) IV Push once  dextrose 50% Injectable 25 Gram(s) IV Push once  heparin  Injectable 5000 Unit(s) SubCutaneous every 8 hours  hydrALAZINE 25 milliGRAM(s) Oral every 8 hours  insulin lispro (HumaLOG) corrective regimen sliding scale   SubCutaneous every 6 hours  levETIRAcetam 250 milliGRAM(s) Oral two times a day  pantoprazole    Tablet 40 milliGRAM(s) Oral before breakfast  predniSONE   Tablet 80 milliGRAM(s) Oral daily  sertraline 25 milliGRAM(s) Oral daily  sodium chloride 0.9% Bolus 250 milliLiter(s) IV Bolus once  sodium chloride 0.9%. 1000 milliLiter(s) (100 mL/Hr) IV Continuous <Continuous>    MEDICATIONS  (PRN):  atropine Injectable 0.5 milliGRAM(s) IV Push every 5 minutes PRN Symptomatic Bradycardia  dextrose 40% Gel 15 Gram(s) Oral once PRN Blood Glucose LESS THAN 70 milliGRAM(s)/deciliter  glucagon  Injectable 1 milliGRAM(s) IntraMuscular once PRN Glucose LESS THAN 70 milligrams/deciliter      Allergies    No Known Allergies    Intolerances        DVT Prophylaxis: [ ] YES [ ] NO      Antibiotics: [ ] YES [ ] NO    Pain Scale (1-10):       Location:    Vital Signs Last 24 Hrs  T(C): 36.6 (20 Aug 2018 06:00), Max: 36.8 (19 Aug 2018 10:00)  T(F): 97.9 (20 Aug 2018 06:00), Max: 98.2 (19 Aug 2018 10:00)  HR: 50 (20 Aug 2018 08:24) (50 - 64)  BP: 176/78 (20 Aug 2018 08:24) (176/78 - 218/100)  BP(mean): 112 (20 Aug 2018 08:24) (112 - 143)  RR: 18 (20 Aug 2018 08:24) (11 - 20)  SpO2: 97% (20 Aug 2018 08:24) (97% - 98%)    Drug Dosing Weight  Height (cm): 152.4 (11 Aug 2018 10:20)  Weight (kg): 60.5 (11 Aug 2018 10:20)  BMI (kg/m2): 26 (11 Aug 2018 10:20)  BSA (m2): 1.57 (11 Aug 2018 10:20)     Physical Exam:  · Constitutional	detailed exam  · Constitutional Details	well-developed; well-groomed  · Eyes	EOMI; PERRL; no drainage or redness  · ENMT Comments	dry mucous membranes  · Respiratory	detailed exam  · Respiratory Comments	normal breath sounds at the lung bases bilaterally  · Cardiovascular	Regular rate & rhythm, normal S1, S2; no murmurs, gallops or rubs; no S3, S4  · Abd-Soft non tender  ·Ext-no edema, clubbing or cyanosis    URINARY CATHETER: [ ] YES [ ] NO     LABS:  CBC Full  -  ( 20 Aug 2018 06:30 )  WBC Count : 11.0 K/uL  Hemoglobin : 11.1 g/dL  Hematocrit : 34.7 %  Platelet Count - Automated : 378 K/uL  Mean Cell Volume : 89.4 fL  Mean Cell Hemoglobin : 28.6 pg  Mean Cell Hemoglobin Concentration : 32.0 g/dL  Auto Neutrophil # : x  Auto Lymphocyte # : x  Auto Monocyte # : x  Auto Eosinophil # : x  Auto Basophil # : x  Auto Neutrophil % : 75.5 %  Auto Lymphocyte % : 15.2 %  Auto Monocyte % : 9.0 %  Auto Eosinophil % : 0.2 %  Auto Basophil % : 0.1 %    08-20    141  |  106  |  22  ----------------------------<  90  3.9   |  21<L>  |  1.43<H>    Ca    9.4      20 Aug 2018 06:30  Mg     2.1     08-20            CULTURES:    RADIOLOGY & ADDITIONAL STUDIES:

## 2018-08-19 NOTE — PROGRESS NOTE ADULT - PROBLEM SELECTOR PLAN 3
Patient presented with MARYJANE of Cr 1.45 with baseline unclear. Creatinine uptrend throughout patient's course. Etiology currently unclear, possibly prerenal in setting of decreased PO intake from depression vs. hypertensive nephropathy from persistently elevated BPs  -f/u renal ultrasound  -f/u urine lytes    -holding lisinopril in setting of MARYJANE  - Cr trending downward, Cr 1.69

## 2018-08-20 DIAGNOSIS — I77.6 ARTERITIS, UNSPECIFIED: ICD-10-CM

## 2018-08-20 DIAGNOSIS — F32.9 MAJOR DEPRESSIVE DISORDER, SINGLE EPISODE, UNSPECIFIED: ICD-10-CM

## 2018-08-20 LAB
ANION GAP SERPL CALC-SCNC: 14 MMOL/L — SIGNIFICANT CHANGE UP (ref 5–17)
AT III ACT/NOR PPP CHRO: 101 % — SIGNIFICANT CHANGE UP (ref 85–135)
B2 GLYCOPROT1 AB SER QL: NEGATIVE — SIGNIFICANT CHANGE UP
BASOPHILS NFR BLD AUTO: 0.1 % — SIGNIFICANT CHANGE UP (ref 0–2)
BUN SERPL-MCNC: 22 MG/DL — SIGNIFICANT CHANGE UP (ref 7–23)
CALCIUM SERPL-MCNC: 9.4 MG/DL — SIGNIFICANT CHANGE UP (ref 8.4–10.5)
CARDIOLIPIN AB SER-ACNC: NEGATIVE — SIGNIFICANT CHANGE UP
CHLORIDE SERPL-SCNC: 106 MMOL/L — SIGNIFICANT CHANGE UP (ref 96–108)
CO2 SERPL-SCNC: 21 MMOL/L — LOW (ref 22–31)
CREAT SERPL-MCNC: 1.43 MG/DL — HIGH (ref 0.5–1.3)
EOSINOPHIL NFR BLD AUTO: 0.2 % — SIGNIFICANT CHANGE UP (ref 0–6)
GLUCOSE SERPL-MCNC: 90 MG/DL — SIGNIFICANT CHANGE UP (ref 70–99)
HCT VFR BLD CALC: 34.7 % — SIGNIFICANT CHANGE UP (ref 34.5–45)
HGB BLD-MCNC: 11.1 G/DL — LOW (ref 11.5–15.5)
LYMPHOCYTES # BLD AUTO: 15.2 % — SIGNIFICANT CHANGE UP (ref 13–44)
MAGNESIUM SERPL-MCNC: 2.1 MG/DL — SIGNIFICANT CHANGE UP (ref 1.6–2.6)
MCHC RBC-ENTMCNC: 28.6 PG — SIGNIFICANT CHANGE UP (ref 27–34)
MCHC RBC-ENTMCNC: 32 G/DL — SIGNIFICANT CHANGE UP (ref 32–36)
MCV RBC AUTO: 89.4 FL — SIGNIFICANT CHANGE UP (ref 80–100)
MONOCYTES NFR BLD AUTO: 9 % — SIGNIFICANT CHANGE UP (ref 2–14)
NEUTROPHILS NFR BLD AUTO: 75.5 % — SIGNIFICANT CHANGE UP (ref 43–77)
PLATELET # BLD AUTO: 378 K/UL — SIGNIFICANT CHANGE UP (ref 150–400)
POTASSIUM SERPL-MCNC: 3.9 MMOL/L — SIGNIFICANT CHANGE UP (ref 3.5–5.3)
POTASSIUM SERPL-SCNC: 3.9 MMOL/L — SIGNIFICANT CHANGE UP (ref 3.5–5.3)
PROT C ACT/NOR PPP: 110 % — SIGNIFICANT CHANGE UP (ref 74–150)
RBC # BLD: 3.88 M/UL — SIGNIFICANT CHANGE UP (ref 3.8–5.2)
RBC # FLD: 14.6 % — SIGNIFICANT CHANGE UP (ref 10.3–16.9)
SODIUM SERPL-SCNC: 141 MMOL/L — SIGNIFICANT CHANGE UP (ref 135–145)
WBC # BLD: 11 K/UL — HIGH (ref 3.8–10.5)
WBC # FLD AUTO: 11 K/UL — HIGH (ref 3.8–10.5)

## 2018-08-20 PROCEDURE — 99232 SBSQ HOSP IP/OBS MODERATE 35: CPT

## 2018-08-20 PROCEDURE — 99233 SBSQ HOSP IP/OBS HIGH 50: CPT | Mod: GC

## 2018-08-20 PROCEDURE — 99223 1ST HOSP IP/OBS HIGH 75: CPT

## 2018-08-20 PROCEDURE — 76770 US EXAM ABDO BACK WALL COMP: CPT | Mod: 26

## 2018-08-20 PROCEDURE — 70551 MRI BRAIN STEM W/O DYE: CPT | Mod: 26

## 2018-08-20 PROCEDURE — 99233 SBSQ HOSP IP/OBS HIGH 50: CPT

## 2018-08-20 RX ORDER — CHOLECALCIFEROL (VITAMIN D3) 125 MCG
1000 CAPSULE ORAL DAILY
Qty: 0 | Refills: 0 | Status: DISCONTINUED | OUTPATIENT
Start: 2018-08-20 | End: 2018-08-27

## 2018-08-20 RX ORDER — HYDRALAZINE HCL 50 MG
2.5 TABLET ORAL ONCE
Qty: 0 | Refills: 0 | Status: COMPLETED | OUTPATIENT
Start: 2018-08-20 | End: 2018-08-20

## 2018-08-20 RX ORDER — PREGABALIN 225 MG/1
1000 CAPSULE ORAL ONCE
Qty: 0 | Refills: 0 | Status: COMPLETED | OUTPATIENT
Start: 2018-08-20 | End: 2018-08-20

## 2018-08-20 RX ORDER — HYDRALAZINE HCL 50 MG
25 TABLET ORAL ONCE
Qty: 0 | Refills: 0 | Status: DISCONTINUED | OUTPATIENT
Start: 2018-08-20 | End: 2018-08-20

## 2018-08-20 RX ORDER — HYDRALAZINE HCL 50 MG
5 TABLET ORAL ONCE
Qty: 0 | Refills: 0 | Status: COMPLETED | OUTPATIENT
Start: 2018-08-20 | End: 2018-08-20

## 2018-08-20 RX ORDER — SERTRALINE 25 MG/1
50 TABLET, FILM COATED ORAL DAILY
Qty: 0 | Refills: 0 | Status: DISCONTINUED | OUTPATIENT
Start: 2018-08-20 | End: 2018-08-25

## 2018-08-20 RX ORDER — PANTOPRAZOLE SODIUM 20 MG/1
40 TABLET, DELAYED RELEASE ORAL DAILY
Qty: 0 | Refills: 0 | Status: DISCONTINUED | OUTPATIENT
Start: 2018-08-20 | End: 2018-08-26

## 2018-08-20 RX ORDER — HYDRALAZINE HCL 50 MG
50 TABLET ORAL THREE TIMES A DAY
Qty: 0 | Refills: 0 | Status: DISCONTINUED | OUTPATIENT
Start: 2018-08-20 | End: 2018-08-22

## 2018-08-20 RX ADMIN — Medication 25 MILLIGRAM(S): at 17:54

## 2018-08-20 RX ADMIN — LEVETIRACETAM 250 MILLIGRAM(S): 250 TABLET, FILM COATED ORAL at 17:57

## 2018-08-20 RX ADMIN — HEPARIN SODIUM 5000 UNIT(S): 5000 INJECTION INTRAVENOUS; SUBCUTANEOUS at 21:29

## 2018-08-20 RX ADMIN — Medication 5 MILLIGRAM(S): at 18:54

## 2018-08-20 RX ADMIN — Medication 325 MILLIGRAM(S): at 17:52

## 2018-08-20 RX ADMIN — PANTOPRAZOLE SODIUM 40 MILLIGRAM(S): 20 TABLET, DELAYED RELEASE ORAL at 18:54

## 2018-08-20 RX ADMIN — PANTOPRAZOLE SODIUM 40 MILLIGRAM(S): 20 TABLET, DELAYED RELEASE ORAL at 06:36

## 2018-08-20 RX ADMIN — Medication 1000 UNIT(S): at 21:30

## 2018-08-20 RX ADMIN — HEPARIN SODIUM 5000 UNIT(S): 5000 INJECTION INTRAVENOUS; SUBCUTANEOUS at 06:02

## 2018-08-20 RX ADMIN — SERTRALINE 50 MILLIGRAM(S): 25 TABLET, FILM COATED ORAL at 17:53

## 2018-08-20 RX ADMIN — Medication 2.5 MILLIGRAM(S): at 21:29

## 2018-08-20 RX ADMIN — Medication 50 MILLIGRAM(S): at 21:30

## 2018-08-20 RX ADMIN — AMLODIPINE BESYLATE 10 MILLIGRAM(S): 2.5 TABLET ORAL at 06:02

## 2018-08-20 RX ADMIN — PREGABALIN 1000 MICROGRAM(S): 225 CAPSULE ORAL at 21:29

## 2018-08-20 RX ADMIN — Medication 0.1 MILLIGRAM(S): at 21:30

## 2018-08-20 RX ADMIN — Medication 58 MILLIGRAM(S): at 23:30

## 2018-08-20 RX ADMIN — SODIUM CHLORIDE 100 MILLILITER(S): 9 INJECTION INTRAMUSCULAR; INTRAVENOUS; SUBCUTANEOUS at 18:47

## 2018-08-20 RX ADMIN — Medication 80 MILLIGRAM(S): at 06:01

## 2018-08-20 RX ADMIN — Medication 1 APPLICATION(S): at 08:54

## 2018-08-20 RX ADMIN — Medication 0.1 MILLIGRAM(S): at 06:02

## 2018-08-20 RX ADMIN — LEVETIRACETAM 250 MILLIGRAM(S): 250 TABLET, FILM COATED ORAL at 06:02

## 2018-08-20 RX ADMIN — ATORVASTATIN CALCIUM 80 MILLIGRAM(S): 80 TABLET, FILM COATED ORAL at 21:30

## 2018-08-20 RX ADMIN — Medication 25 MILLIGRAM(S): at 06:02

## 2018-08-20 RX ADMIN — HEPARIN SODIUM 5000 UNIT(S): 5000 INJECTION INTRAVENOUS; SUBCUTANEOUS at 17:57

## 2018-08-20 RX ADMIN — Medication 1 APPLICATION(S): at 17:53

## 2018-08-20 NOTE — PROGRESS NOTE BEHAVIORAL HEALTH - NSBHFUPINTERVALHXFT_PSY_A_CORE
Patient seen and interviewed at bedtime. She reports that she continues to feel depressed. She states that her depression has been worsening for the last 4 years. She denies having any SI prior to coming to the hospital or now. She endorses a lack of motivation and anhedonia. she reports motivation to start working on her recovery with PT and continuing her psychiatric treatment.  Off note, during the interview, although the patient reported depressive symptoms she appeared detached, with blunted affect, incongruent with her report (?secondary to stroke)

## 2018-08-20 NOTE — PROGRESS NOTE ADULT - ATTENDING COMMENTS
Angio results noted, started on prednisone, continue to monitor FS's.  F/up rheum work up and rheum eval recs.  MARYJANE continues to improve, f/up urine lytes, renal US and renal duplex. C/w IV hydration for now.  HTN management per Neuro, avoid nephrotoxic agents until after work up has been completed and we identify whether MARYJANE or CKD.  Rest as above.

## 2018-08-20 NOTE — PROGRESS NOTE ADULT - PROBLEM SELECTOR PLAN 4
Patient initially admitted to psych for major depressive episode with inability to take care of self and SI. Patient subsequently had CVA.  -psych consulted. recs appreciated. No longer having SI, deemed not to be harm to self or others and d/cal 1:1.   -increased zoloft to 50mg daily per psych recs   -psych recommending formal psych referral upon discharge

## 2018-08-20 NOTE — PROGRESS NOTE ADULT - ATTENDING COMMENTS
Patient seen and examined with Resident.  Agree with above.  Patient confused overall due to vasculitis that was confirmed on angiogram.  She's been on Prednisone 80mg - will reassess dose once patient seen by Rheumatology, Dr. Nation.  Continue to allow permissive HTN (less than 200) but evaluation why her blood pressure is so elevated is in process with pending renal ultrasound.  She will ultimately need acute rehab

## 2018-08-20 NOTE — PROGRESS NOTE ADULT - SUBJECTIVE AND OBJECTIVE BOX
OVERNIGHT EVENTS: NEETU    SUBJECTIVE / INTERVAL HPI: Patient seen and examined at bedside. Patient denies chest pain, SOB, fevers, chills, night sweats, n/v/d/c    VITAL SIGNS:  Vital Signs Last 24 Hrs  T(C): 36.6 (20 Aug 2018 13:51), Max: 36.8 (20 Aug 2018 09:59)  T(F): 97.9 (20 Aug 2018 13:51), Max: 98.3 (20 Aug 2018 09:59)  HR: 46 (20 Aug 2018 12:53) (46 - 64)  BP: 162/75 (20 Aug 2018 12:53) (162/75 - 216/88)  BP(mean): 108 (20 Aug 2018 12:53) (108 - 131)  RR: 16 (20 Aug 2018 12:53) (11 - 18)  SpO2: 96% (20 Aug 2018 12:53) (96% - 97%)    PHYSICAL EXAM:    General: NAD, following commands, speaking in low volume.  HEENT: NC/AT; PERRL, clear conjunctiva. Dry mucus membranes  Neck: supple  Respiratory: CTA b/l. No wheezes, no rales, no rhonchi appreciated   Cardiovascular: +S1/S2; RRR  Abdomen: soft, NT/ND; +BS x4  Extremities: No erythema, no edema, no cyanosis b/l  Neurological exam limited to patient cooperation and condition.  Mental status: Awake and alert. Speech is fluent. Follows commands.  CN II: pupils equally round and reactive to light,   II, IV, VI: EOMI without nystagmus. Right gaze preferrence.   VII: Left facial droop  VIII: hearing is intact to finger rub  IX, X: Uvula is midline and soft palate rises symmetrically  Muscle strength: RUE 4/5, LUE 1-2/5, RLE 4/5, LLE 1-2/5. Moves RUE and RLE spontaneously.     MEDICATIONS:  MEDICATIONS  (STANDING):  amLODIPine   Tablet 10 milliGRAM(s) Oral daily  aspirin 325 milliGRAM(s) Oral daily  atorvastatin 80 milliGRAM(s) Oral at bedtime  BACItracin   Ointment 1 Application(s) Topical two times a day  cloNIDine 0.1 milliGRAM(s) Oral daily  dextrose 5%. 1000 milliLiter(s) (50 mL/Hr) IV Continuous <Continuous>  dextrose 50% Injectable 12.5 Gram(s) IV Push once  dextrose 50% Injectable 25 Gram(s) IV Push once  dextrose 50% Injectable 25 Gram(s) IV Push once  heparin  Injectable 5000 Unit(s) SubCutaneous every 8 hours  hydrALAZINE 25 milliGRAM(s) Oral every 8 hours  insulin lispro (HumaLOG) corrective regimen sliding scale   SubCutaneous every 6 hours  levETIRAcetam 250 milliGRAM(s) Oral two times a day  pantoprazole    Tablet 40 milliGRAM(s) Oral before breakfast  predniSONE   Tablet 80 milliGRAM(s) Oral daily  sertraline 25 milliGRAM(s) Oral daily  sodium chloride 0.9% Bolus 250 milliLiter(s) IV Bolus once  sodium chloride 0.9%. 1000 milliLiter(s) (100 mL/Hr) IV Continuous <Continuous>    MEDICATIONS  (PRN):  atropine Injectable 0.5 milliGRAM(s) IV Push every 5 minutes PRN Symptomatic Bradycardia  dextrose 40% Gel 15 Gram(s) Oral once PRN Blood Glucose LESS THAN 70 milliGRAM(s)/deciliter  glucagon  Injectable 1 milliGRAM(s) IntraMuscular once PRN Glucose LESS THAN 70 milligrams/deciliter      ALLERGIES:  Allergies    No Known Allergies    Intolerances        LABS:                        11.1   11.0  )-----------( 378      ( 20 Aug 2018 06:30 )             34.7     08-20    141  |  106  |  22  ----------------------------<  90  3.9   |  21<L>  |  1.43<H>    Ca    9.4      20 Aug 2018 06:30  Mg     2.1     08-20          CAPILLARY BLOOD GLUCOSE      POCT Blood Glucose.: 126 mg/dL (20 Aug 2018 11:27)      RADIOLOGY & ADDITIONAL TESTS: Reviewed. Hospital Course  68 F PMHx HTN and Major Depressive Disorder, presented to the ED with recent history of self harm and depressed mood. Primary complaint was of progressive functional decline at home w/severe anhedonia, anorexia, poor sleep, suicidality (would cut herself and claims to have started cutting herself several months ago), often drinking alcohol at home to consol herself. Patient was admitted to [facility] for depressive episode - however, 1-2 hours after arriving onto the floor, a stroke code was called for L sided weakness and hypertension. NIHSS was 6 on initial assessment. On CTH - noted to have lacunar infarcts (basal ganglia and R/L thalami). On second assessment, NIHSS 1. Patient was brought back to the ED and re-worked up. EKG revealed profound bradycardia - Cardiology was consulted for HR 30s - asymptomatic. Neurosurgery also consulted for what appeared to be mild hydrocephalus on CT Scan.  No acute interventions initially, however Neurosurgery was reconsulted for findings of acute Rt PATRICIA region ischemic stroke in MRI and CTA finding of vertebrobasilar insufficiency. During exam patient noted to have new facial droop along with LLE plegia and LUE weakness. Stroke code was called and patient was taken to CT. Repeat CT showed no areas of infarction. CT perfusion showed no perfusion abnormality. Pressure dependent motor deficit was suspected and  patient was admitted to ICU for SBP augmentation under Dr. Strickland Out of window for tPA; Patient was monitored in ICU x 48 hours in stable neurologic condition.  Patient was on nicardipine drip to maintain -200.  Multiple oral agents started, now off nicardipine.Cardiology consulted to assist in stroke workup given concern of cardioembolic phenomenon.  KATHERINE performed on 8/14, negative for shunt.  Patient also presented with anterior chest wall wound, hemorrhagic in appearance.  Dermatology service consulted, recommending warm compresses twice daily along with bacitracin and keeping wound covered so patient does not continue to irritate it. 8/15 Patient transferred to [facility] for continued medical management. Psych following, d/cal 1:1, started on zoloft 25mg, uptitrated to 50mg. Patient started on multiple medications for refractory HTN in the 200s, although patient clinically more alert at higher BPs and recent stroke allowed for SBP goal 180s-200. Hypercoag work up sent. Cerebral angiogram ordered due to clinical suspicion for cerebral vasculitis given positive ESR and negative work up. Cerebral angiogram positive for cerebral vasculitis, started on PO prednisone 80mg.    OVERNIGHT EVENTS: NEETU    SUBJECTIVE / INTERVAL HPI: Patient seen and examined at bedside. Patient denies chest pain, SOB, fevers, chills, night sweats, n/v/d/c    VITAL SIGNS:  Vital Signs Last 24 Hrs  T(C): 36.6 (20 Aug 2018 13:51), Max: 36.8 (20 Aug 2018 09:59)  T(F): 97.9 (20 Aug 2018 13:51), Max: 98.3 (20 Aug 2018 09:59)  HR: 46 (20 Aug 2018 12:53) (46 - 64)  BP: 162/75 (20 Aug 2018 12:53) (162/75 - 216/88)  BP(mean): 108 (20 Aug 2018 12:53) (108 - 131)  RR: 16 (20 Aug 2018 12:53) (11 - 18)  SpO2: 96% (20 Aug 2018 12:53) (96% - 97%)    PHYSICAL EXAM:    General: NAD, following commands, speaking in low volume.  HEENT: NC/AT; PERRL, clear conjunctiva. Dry mucus membranes  Neck: supple  Respiratory: CTA b/l. No wheezes, no rales, no rhonchi appreciated   Cardiovascular: +S1/S2; RRR  Abdomen: soft, NT/ND; +BS x4  Extremities: No erythema, no edema, no cyanosis b/l  Neurological exam limited to patient cooperation and condition.  Mental status: Awake and alert. Speech is fluent. Follows commands.  CN II: pupils equally round and reactive to light,   II, IV, VI: EOMI without nystagmus. Right gaze preferrence.   VII: Left facial droop  VIII: hearing is intact to finger rub  IX, X: Uvula is midline and soft palate rises symmetrically  Muscle strength: RUE 4/5, LUE 1-2/5, RLE 4/5, LLE 1-2/5. Moves RUE and RLE spontaneously.     MEDICATIONS:  MEDICATIONS  (STANDING):  amLODIPine   Tablet 10 milliGRAM(s) Oral daily  aspirin 325 milliGRAM(s) Oral daily  atorvastatin 80 milliGRAM(s) Oral at bedtime  BACItracin   Ointment 1 Application(s) Topical two times a day  cloNIDine 0.1 milliGRAM(s) Oral daily  dextrose 5%. 1000 milliLiter(s) (50 mL/Hr) IV Continuous <Continuous>  dextrose 50% Injectable 12.5 Gram(s) IV Push once  dextrose 50% Injectable 25 Gram(s) IV Push once  dextrose 50% Injectable 25 Gram(s) IV Push once  heparin  Injectable 5000 Unit(s) SubCutaneous every 8 hours  hydrALAZINE 25 milliGRAM(s) Oral every 8 hours  insulin lispro (HumaLOG) corrective regimen sliding scale   SubCutaneous every 6 hours  levETIRAcetam 250 milliGRAM(s) Oral two times a day  pantoprazole    Tablet 40 milliGRAM(s) Oral before breakfast  predniSONE   Tablet 80 milliGRAM(s) Oral daily  sertraline 25 milliGRAM(s) Oral daily  sodium chloride 0.9% Bolus 250 milliLiter(s) IV Bolus once  sodium chloride 0.9%. 1000 milliLiter(s) (100 mL/Hr) IV Continuous <Continuous>    MEDICATIONS  (PRN):  atropine Injectable 0.5 milliGRAM(s) IV Push every 5 minutes PRN Symptomatic Bradycardia  dextrose 40% Gel 15 Gram(s) Oral once PRN Blood Glucose LESS THAN 70 milliGRAM(s)/deciliter  glucagon  Injectable 1 milliGRAM(s) IntraMuscular once PRN Glucose LESS THAN 70 milligrams/deciliter      ALLERGIES:  Allergies    No Known Allergies    Intolerances        LABS:                        11.1   11.0  )-----------( 378      ( 20 Aug 2018 06:30 )             34.7     08-20    141  |  106  |  22  ----------------------------<  90  3.9   |  21<L>  |  1.43<H>    Ca    9.4      20 Aug 2018 06:30  Mg     2.1     08-20          CAPILLARY BLOOD GLUCOSE      POCT Blood Glucose.: 126 mg/dL (20 Aug 2018 11:27)      RADIOLOGY & ADDITIONAL TESTS: Reviewed. Hospital Course  68 F PMHx HTN and Major Depressive Disorder, presented to the ED with recent history of self harm and depressed mood. Primary complaint was of progressive functional decline at home w/severe anhedonia, anorexia, poor sleep, suicidality (would cut herself and claims to have started cutting herself several months ago), often drinking alcohol at home to consol herself. Patient was admitted to [facility] for depressive episode - however, 1-2 hours after arriving onto the floor, a stroke code was called for L sided weakness and hypertension. NIHSS was 6 on initial assessment. On CTH - noted to have lacunar infarcts (basal ganglia and R/L thalami). On second assessment, NIHSS 1. Patient was brought back to the ED and re-worked up. EKG revealed profound bradycardia - Cardiology was consulted for HR 30s - asymptomatic. Neurosurgery also consulted for what appeared to be mild hydrocephalus on CT Scan.  No acute interventions initially, however Neurosurgery was reconsulted for findings of acute Rt PATRICIA region ischemic stroke in MRI and CTA finding of vertebrobasilar insufficiency. During exam patient noted to have new facial droop along with LLE plegia and LUE weakness. Stroke code was called and patient was taken to CT. Repeat CT showed no areas of infarction. CT perfusion showed no perfusion abnormality. Pressure dependent motor deficit was suspected and  patient was admitted to ICU for SBP augmentation under Dr. Strickland Out of window for tPA; Patient was monitored in ICU x 48 hours in stable neurologic condition.  Patient was on nicardipine drip to maintain -200.  Multiple oral agents started, now off nicardipine.Cardiology consulted to assist in stroke workup given concern of cardioembolic phenomenon.  KATHERINE performed on 8/14, negative for shunt.  Patient also presented with anterior chest wall wound, hemorrhagic in appearance.  Dermatology service consulted, recommending warm compresses twice daily along with bacitracin and keeping wound covered so patient does not continue to irritate it. 8/15     Patient transferred to [facility] for continued medical management. Psych following, d/cal 1:1, started on zoloft 25mg, uptitrated to 50mg. Patient started on multiple medications for refractory HTN in the 200s, although patient clinically more alert at higher BPs and recent stroke allowed for SBP goal 180s-200. Secondary HTN work up sent. Hypercoag work up sent. Patient with noted worsening MARYJANE ,started IV NS with gradual improvement. Cerebral angiogram ordered due to clinical suspicion for cerebral vasculitis given positive ESR and negative work up.  EEG demonstrated epileptiform activity, started keppra.  8/17 Cerebral angiogram positive for cerebral vasculitis, started on PO prednisone 80mg. Post-cerebral angiogram patient with concerning findings of decreased mental status and worsening left sided muscle weakness - repeat CT head noncon negative for acute changes from previous study. MRI 8/20 demonstrated new stroke in right medial temporal periventricular white matter, posterior inferior basal ganglia, and capsule. Rheum consulted for cerebral vasculitis work up and management. Rhuem work up sent, patient started on 1g solu-medrol at night for 3-5 days - must follow up final recs. Patient requiring lower BP goals given risk of hemorrhage with high dose steroids. Patient's BP regimen increased to current (hydralazine 50mg TID, clonidine 0.2mg; amlodipine 10mg.       OVERNIGHT EVENTS: NEETU    SUBJECTIVE / INTERVAL HPI: Patient seen and examined at bedside. Patient denies chest pain, SOB, fevers, chills, night sweats, n/v/d/c    VITAL SIGNS:  Vital Signs Last 24 Hrs  T(C): 36.6 (20 Aug 2018 13:51), Max: 36.8 (20 Aug 2018 09:59)  T(F): 97.9 (20 Aug 2018 13:51), Max: 98.3 (20 Aug 2018 09:59)  HR: 46 (20 Aug 2018 12:53) (46 - 64)  BP: 162/75 (20 Aug 2018 12:53) (162/75 - 216/88)  BP(mean): 108 (20 Aug 2018 12:53) (108 - 131)  RR: 16 (20 Aug 2018 12:53) (11 - 18)  SpO2: 96% (20 Aug 2018 12:53) (96% - 97%)    PHYSICAL EXAM:    General: NAD, following commands, speaking in low volume.  HEENT: NC/AT; PERRL, clear conjunctiva. Dry mucus membranes  Neck: supple  Respiratory: CTA b/l. No wheezes, no rales, no rhonchi appreciated   Cardiovascular: +S1/S2; RRR  Abdomen: soft, NT/ND; +BS x4  Extremities: No erythema, no edema, no cyanosis b/l  Neurological exam limited to patient cooperation and condition.  Mental status: Awake and alert. Speech is fluent. Follows commands.  CN II: pupils equally round and reactive to light,   II, IV, VI: EOMI without nystagmus. Right gaze preferrence.   VII: Left facial droop  VIII: hearing is intact to finger rub  IX, X: Uvula is midline and soft palate rises symmetrically  Muscle strength: RUE 4/5, LUE 1-2/5, RLE 4/5, LLE 1-2/5. Moves RUE and RLE spontaneously.     MEDICATIONS:  MEDICATIONS  (STANDING):  amLODIPine   Tablet 10 milliGRAM(s) Oral daily  aspirin 325 milliGRAM(s) Oral daily  atorvastatin 80 milliGRAM(s) Oral at bedtime  BACItracin   Ointment 1 Application(s) Topical two times a day  cloNIDine 0.1 milliGRAM(s) Oral daily  dextrose 5%. 1000 milliLiter(s) (50 mL/Hr) IV Continuous <Continuous>  dextrose 50% Injectable 12.5 Gram(s) IV Push once  dextrose 50% Injectable 25 Gram(s) IV Push once  dextrose 50% Injectable 25 Gram(s) IV Push once  heparin  Injectable 5000 Unit(s) SubCutaneous every 8 hours  hydrALAZINE 25 milliGRAM(s) Oral every 8 hours  insulin lispro (HumaLOG) corrective regimen sliding scale   SubCutaneous every 6 hours  levETIRAcetam 250 milliGRAM(s) Oral two times a day  pantoprazole    Tablet 40 milliGRAM(s) Oral before breakfast  predniSONE   Tablet 80 milliGRAM(s) Oral daily  sertraline 25 milliGRAM(s) Oral daily  sodium chloride 0.9% Bolus 250 milliLiter(s) IV Bolus once  sodium chloride 0.9%. 1000 milliLiter(s) (100 mL/Hr) IV Continuous <Continuous>    MEDICATIONS  (PRN):  atropine Injectable 0.5 milliGRAM(s) IV Push every 5 minutes PRN Symptomatic Bradycardia  dextrose 40% Gel 15 Gram(s) Oral once PRN Blood Glucose LESS THAN 70 milliGRAM(s)/deciliter  glucagon  Injectable 1 milliGRAM(s) IntraMuscular once PRN Glucose LESS THAN 70 milligrams/deciliter      ALLERGIES:  Allergies    No Known Allergies    Intolerances        LABS:                        11.1   11.0  )-----------( 378      ( 20 Aug 2018 06:30 )             34.7     08-20    141  |  106  |  22  ----------------------------<  90  3.9   |  21<L>  |  1.43<H>    Ca    9.4      20 Aug 2018 06:30  Mg     2.1     08-20          CAPILLARY BLOOD GLUCOSE      POCT Blood Glucose.: 126 mg/dL (20 Aug 2018 11:27)      RADIOLOGY & ADDITIONAL TESTS: Reviewed.

## 2018-08-20 NOTE — CONSULT NOTE ADULT - SUBJECTIVE AND OBJECTIVE BOX
68 year old female with severe depression presents with multiple lacunar infarcts after being found down in her apartment.      No tPA given.     Of note, patient developed another stroke on 8/12 after she developed L facial droop, notably the CT was uncahnged. MRI with acute R PATRICIA infart and scattered smaller areas.     Now with angiogram findings consistent with CNS vasculitis.         PMH  PSH  SH  FH  Meds  All                               CTA with chronic L VA occlusion, R VA and basilar hypoplasia, carotid stenosis bilaterally 68 year old female with severe depression presents with multiple lacunar infarcts after being found down in her apartment.      No tPA given.   Of note, patient developed another stroke on 8/12 after she developed L facial droop, notably the CT was uncahnged. MRI with acute R PATRICIA infart and scattered smaller areas.     Now with angiogram findings consistent with CNS vasculitis.     Denies the following:     PMH depression, HTN  PSH none   SH EtOH, retired   FH no family history AI disease   Meds Prednisone 80mg daily   All     Afebrile. HD stable.   Physical exam notable for the following pertinent positives/negatives:      Data reviewed, notable for:   CBC with mild leukocytosis on prednisone (normal prior)  BUN/Cr 22/1.43 trending down   Renin/rohan 135/10   AST/ALT WNL   ESR 45  LAC neg  Cardiolipin neg     KATHERINE no valvular disease or pericardial effusion  CTH/MRA with strokes as described above                     CTA with chronic L VA occlusion, R VA and basilar hypoplasia, carotid stenosis bilaterally 68 year old female with severe depression presents with multiple lacunar infarcts after being found down in her apartment.      Patient with multiple strokes in multiple regions including lacunar, KORINA and scattered smaller areas, with angiogram revealing CNS vasculitis. Now on Prednisone 80mg daily with MRA NOVA reporting again new lesions.     Patient speaks quietly and is minimally interactive.     She endorses photosensitive rashes and sicca symptoms.   She denies CP/SOB/cough/recurrent sinusitis/hemoptysis   She has had multiple ear infections  She denies a history of hematuria or proteinuria  She denies a history of pleuropericarditis   No focal numbness or weakness   No history of raynauds   No alopecia  Currently also denies: oral ulcers, n/v/d/c/melena/brbpr    PMH depression, HTN  PSH none   SH EtOH, retired   FH no family history AI disease   Meds Prednisone 80mg daily   All none     Afebrile. /110   Physical exam notable for the following pertinent positives/negatives:  Minimally interactive  Comfortable  Non toxic   Hyperpigmentation over nasal bridge not sparing nasolabial folds  Slight periorbital erythema  No gottrens papules, malar rash or discoid lesions   Hair thinning  Dry mouth without pooling of saliva, no tongue furrowing   No parotid or lacrimal enlargement   CV bradycardic, reg rhythm no murmurs   Resp CTAB  Facial droop  GI soft non tender BS+ no HSM   No renal bruit appreciated   No livedo     Data reviewed, notable for:   CBC with mild leukocytosis on prednisone (normal prior)  BUN/Cr 22/1.43 trending down   Renin/rohan 135/10   AST/ALT WNL   ESR 45  LAC neg  Cardiolipin neg     KATHERINE no valvular disease or pericardial effusion  CTH/MRA with strokes as described above   Angiogram CNS vasculitis   CTA with chronic L VA occlusion, R VA and basilar hypoplasia, carotid stenosis bilaterally   Renal US with asymmetric kidneys possibly representing nephrosclerosis

## 2018-08-20 NOTE — PROGRESS NOTE ADULT - SUBJECTIVE AND OBJECTIVE BOX
OVERNIGHT EVENTS:    SUBJECTIVE / INTERVAL HPI: Patient seen and examined at bedside.     VITAL SIGNS:  Vital Signs Last 24 Hrs  T(C): 36.6 (20 Aug 2018 06:00), Max: 36.8 (19 Aug 2018 10:00)  T(F): 97.9 (20 Aug 2018 06:00), Max: 98.2 (19 Aug 2018 10:00)  HR: 50 (20 Aug 2018 08:24) (50 - 64)  BP: 176/78 (20 Aug 2018 08:24) (176/78 - 218/100)  BP(mean): 112 (20 Aug 2018 08:24) (112 - 143)  RR: 18 (20 Aug 2018 08:24) (11 - 20)  SpO2: 97% (20 Aug 2018 08:24) (97% - 98%)    PHYSICAL EXAM:  GENERAL: NAD, well-groomed, well-developed  HEAD:  Atraumatic, Normocephalic  EYES: EOMI, PERRLA, conjunctiva and sclera clear  ENMT: No tonsillar erythema, exudates, or enlargement; Moist mucous membranes, Good dentition, No lesions  NECK: Supple, No JVD, Normal thyroid  NERVOUS SYSTEM:  CN 2,4,5,6,8,9,10,11,12; L sided facial droop; Alert & Oriented X3, Motor Strength 5/5 in the RUE and RLE; 4/5 strength in the LLE 2/5 strength and motor in the LUE;  DTRs 2+ intact and symmetric  CHEST/LUNG: Clear to percussion bilaterally; No rales, rhonchi, wheezing, or rubs; anterior chest wound  HEART: Regular rate and rhythm; No murmurs, rubs, or gallops  ABDOMEN: Soft, Nontender, Nondistended; Bowel sounds present  EXTREMITIES:  2+ Peripheral Pulses, No clubbing, cyanosis, or edema  LYMPH: No lymphadenopathy noted  SKIN: No rashes or lesions    MEDICATIONS:  MEDICATIONS  (STANDING):  amLODIPine   Tablet 10 milliGRAM(s) Oral daily  aspirin 325 milliGRAM(s) Oral daily  atorvastatin 80 milliGRAM(s) Oral at bedtime  BACItracin   Ointment 1 Application(s) Topical two times a day  cloNIDine 0.1 milliGRAM(s) Oral daily  dextrose 5%. 1000 milliLiter(s) (50 mL/Hr) IV Continuous <Continuous>  dextrose 50% Injectable 12.5 Gram(s) IV Push once  dextrose 50% Injectable 25 Gram(s) IV Push once  dextrose 50% Injectable 25 Gram(s) IV Push once  heparin  Injectable 5000 Unit(s) SubCutaneous every 8 hours  hydrALAZINE 25 milliGRAM(s) Oral every 8 hours  insulin lispro (HumaLOG) corrective regimen sliding scale   SubCutaneous every 6 hours  levETIRAcetam 250 milliGRAM(s) Oral two times a day  pantoprazole    Tablet 40 milliGRAM(s) Oral before breakfast  predniSONE   Tablet 80 milliGRAM(s) Oral daily  sertraline 25 milliGRAM(s) Oral daily  sodium chloride 0.9% Bolus 250 milliLiter(s) IV Bolus once  sodium chloride 0.9%. 1000 milliLiter(s) (100 mL/Hr) IV Continuous <Continuous>    MEDICATIONS  (PRN):  atropine Injectable 0.5 milliGRAM(s) IV Push every 5 minutes PRN Symptomatic Bradycardia  dextrose 40% Gel 15 Gram(s) Oral once PRN Blood Glucose LESS THAN 70 milliGRAM(s)/deciliter  glucagon  Injectable 1 milliGRAM(s) IntraMuscular once PRN Glucose LESS THAN 70 milligrams/deciliter      ALLERGIES:  Allergies    No Known Allergies    Intolerances        LABS:                        11.1   11.0  )-----------( 378      ( 20 Aug 2018 06:30 )             34.7     08-20    141  |  106  |  22  ----------------------------<  90  3.9   |  21<L>  |  1.43<H>    Ca    9.4      20 Aug 2018 06:30  Mg     2.1     08-20          CAPILLARY BLOOD GLUCOSE      POCT Blood Glucose.: 101 mg/dL (20 Aug 2018 06:26) OVERNIGHT EVENTS: NEETU O/N    SUBJECTIVE / INTERVAL HPI: Patient seen and examined at bedside. No acute complaints this am. Denies pain, CP, fevers, chills, N/V/D/C, dyspnea.     VITAL SIGNS:  Vital Signs Last 24 Hrs  T(C): 36.6 (20 Aug 2018 06:00), Max: 36.8 (19 Aug 2018 10:00)  T(F): 97.9 (20 Aug 2018 06:00), Max: 98.2 (19 Aug 2018 10:00)  HR: 50 (20 Aug 2018 08:24) (50 - 64)  BP: 176/78 (20 Aug 2018 08:24) (176/78 - 218/100)  BP(mean): 112 (20 Aug 2018 08:24) (112 - 143)  RR: 18 (20 Aug 2018 08:24) (11 - 20)  SpO2: 97% (20 Aug 2018 08:24) (97% - 98%)    PHYSICAL EXAM:  GENERAL: NAD, well-groomed, well-developed  HEAD:  Atraumatic, Normocephalic  EYES: EOMI, PERRLA, conjunctiva and sclera clear  ENMT: No tonsillar erythema, exudates, or enlargement; Moist mucous membranes, Good dentition, No lesions  NECK: Supple, No JVD, Normal thyroid  NERVOUS SYSTEM:  CN 2,4,5,6,8,9,10,11,12; L sided facial droop; Alert & Oriented X3, Motor Strength 5/5 in the RUE and RLE; 4/5 strength in the LLE 2/5 strength and motor in the LUE;  DTRs 2+ intact and symmetric  CHEST/LUNG: Clear to percussion bilaterally; No rales, rhonchi, wheezing, or rubs; anterior chest wound  HEART: Regular rate and rhythm; No murmurs, rubs, or gallops  ABDOMEN: Soft, Nontender, Nondistended; Bowel sounds present  EXTREMITIES:  2+ Peripheral Pulses, No clubbing, cyanosis, or edema  LYMPH: No lymphadenopathy noted  SKIN: No rashes or lesions    MEDICATIONS:  MEDICATIONS  (STANDING):  amLODIPine   Tablet 10 milliGRAM(s) Oral daily  aspirin 325 milliGRAM(s) Oral daily  atorvastatin 80 milliGRAM(s) Oral at bedtime  BACItracin   Ointment 1 Application(s) Topical two times a day  cloNIDine 0.1 milliGRAM(s) Oral daily  dextrose 5%. 1000 milliLiter(s) (50 mL/Hr) IV Continuous <Continuous>  dextrose 50% Injectable 12.5 Gram(s) IV Push once  dextrose 50% Injectable 25 Gram(s) IV Push once  dextrose 50% Injectable 25 Gram(s) IV Push once  heparin  Injectable 5000 Unit(s) SubCutaneous every 8 hours  hydrALAZINE 25 milliGRAM(s) Oral every 8 hours  insulin lispro (HumaLOG) corrective regimen sliding scale   SubCutaneous every 6 hours  levETIRAcetam 250 milliGRAM(s) Oral two times a day  pantoprazole    Tablet 40 milliGRAM(s) Oral before breakfast  predniSONE   Tablet 80 milliGRAM(s) Oral daily  sertraline 25 milliGRAM(s) Oral daily  sodium chloride 0.9% Bolus 250 milliLiter(s) IV Bolus once  sodium chloride 0.9%. 1000 milliLiter(s) (100 mL/Hr) IV Continuous <Continuous>    MEDICATIONS  (PRN):  atropine Injectable 0.5 milliGRAM(s) IV Push every 5 minutes PRN Symptomatic Bradycardia  dextrose 40% Gel 15 Gram(s) Oral once PRN Blood Glucose LESS THAN 70 milliGRAM(s)/deciliter  glucagon  Injectable 1 milliGRAM(s) IntraMuscular once PRN Glucose LESS THAN 70 milligrams/deciliter      ALLERGIES:  Allergies    No Known Allergies    Intolerances        LABS:                        11.1   11.0  )-----------( 378      ( 20 Aug 2018 06:30 )             34.7     08-20    141  |  106  |  22  ----------------------------<  90  3.9   |  21<L>  |  1.43<H>    Ca    9.4      20 Aug 2018 06:30  Mg     2.1     08-20          CAPILLARY BLOOD GLUCOSE      POCT Blood Glucose.: 101 mg/dL (20 Aug 2018 06:26)

## 2018-08-20 NOTE — PROGRESS NOTE ADULT - PROBLEM SELECTOR PLAN 2
SBP goal 160-200 for permissive HTN  - c/w Hydralazine 25 TID, Norvasc 10mg daily, clonidine 0.1mg daily   NB: Patient requires higher than normal BP to allow for cerebral perfusion  -lisinopril held in setting of MARYJANE, consider restarting as renal function improving  -cardio recs appreciated. will consider isordil instead of clonidine to optimize BP, especially in setting of MARYJANE  -direct renin 135.7, aldosterone 10.2. 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

## 2018-08-20 NOTE — PROGRESS NOTE ADULT - PROBLEM SELECTOR PLAN 1
Etiology not yet determined. No atrial thrombus on KATHERINE, no afib on SBP goal 160-200, currently on Hydralazine, Norvasc, and clonidine.  Patient requires higher than normal BP to allow for cerebral perfusion. Considering vasculitic etiology given positive ESR, other work up for determining etiology negative. Hypercoaguability workup sent.   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 EF 55-60%  - for antiplatelet and Atorvastatin 80 for - Secondary stroke prevention  -patient sent for hypercoaguability workup- Hexagonal phase negative, DRVVT inhibitor screen 27.2 (WNL), DRVVT S/C screen LA neg.  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 c/w prednisone 80mg qd.   -Neurosurg following, recs appreciated  -medicine following, recs appreciated  -cardio recs appreciated.  -will consider isordil instead of clonidine to optimize BP control  -EEG read with epileptiform activity, c/w keppra 250 BID

## 2018-08-20 NOTE — PROGRESS NOTE ADULT - ASSESSMENT
68 yr old female with a PMHx of HTN that presented to 8[facility] for psych stabilization, subsequently had a multi-focal stroke with L sided facial and UE/LE deficits. Course c/b persistent HTN requiring nicardipine drip now transition to PO BP regimen. Primary etiology thought to be embolic stroke however, initial neuro w/u (-) for a-fib or LV thrombus; Multifocality of strokes now concerning for vasculitis. Medicine consulted for med clearance prior to angiogram.     #acute CVA  -initially etiology of patient's strokes thought to be embolic however, patient has been in sinus paige and KATHERINE unrevealing for LV thrombus, etiology now thought to be 2/2 vasculitis based on angiogram findings  -w/ residual L sided deficits  -on , 80 mg lipitor   -c/w permissive HTN per neuro recs    #HTN  -poorly controlled; SBP 180s - 200s  -currently on 4 drug regimen with lisnopril, clonidine, amlodipine, hydralazine  -HTN management per neurology who would like to maintain an SBP of 200 to facilitate adequate brain perfusion  -once the patient is outside the window for permissive HTN per neuro's recs and MARYJANE is resolved, BP optimization per cardiology    #MARYJANE   -patient presented with MARYJANE of 1.45; unclear baseline; most likely 2/2 poor PO intake and concomitant persistently elevated BP   -Cr peaked at 2.1 now slowly downtrending to 1.78 today; s/p contrast load yesterday for cerebral angiogram; cont w/ hydration as BP tolerates; monitor Cr for LACY   - urine lytes pending; renal u/s pending   -please hold lisinopril; please avoid all nephrotoxic agents 68 yr old female with a PMHx of HTN that presented to 8[facility] for psych stabilization, subsequently had a multi-focal stroke with L sided facial and UE/LE deficits. Course c/b persistent HTN requiring nicardipine drip now transition to PO BP regimen. Primary etiology thought to be embolic stroke however, initial neuro w/u (-) for a-fib or LV thrombus; Multifocality of strokes now concerning for vasculitis. Medicine consulted for med clearance prior to angiogram.     #acute CVA  -initially etiology of patient's strokes thought to be embolic however, patient has been in sinus paige and KATHERINE unrevealing for LV thrombus, etiology now thought to be 2/2 vasculitis based on angiogram findings  -w/ residual L sided deficits  -on , 80 mg lipitor   -c/w permissive HTN per neuro recs    #HTN  -poorly controlled; SBP 180s - 200s  -currently on 4 drug regimen with lisnopril, clonidine, amlodipine, hydralazine  -HTN management per neurology who would like to maintain an SBP of 200 to facilitate adequate brain perfusion  -once the patient is outside the window for permissive HTN per neuro's recs and MARYJANE is resolved, BP optimization per cardiology    #MARYJANE   -patient presented with MARYJANE of 1.45; unclear baseline; most likely 2/2 poor PO intake and concomitant persistently elevated BP   -Cr peaked at 2.1 now slowly downtrending to 1.78 today; s/p contrast load Friday for cerebral angiogram; cont w/ hydration as BP tolerates; monitor Cr for LACY   - urine lytes pending; renal u/s pending   -please hold lisinopril; please avoid all nephrotoxic agents

## 2018-08-20 NOTE — PROGRESS NOTE ADULT - ASSESSMENT
68F PMH MDD, found down in her apartment. Stroke code called on 8/11 for L facial droop and weakness. CT then showed multiple lacunar infarcts, age indeterminate, in basal ganglia and thalamus. CTA showed chronic L VA occlusion, R VA and basilar hypoplasia, carotid stenosis bilaterally. No tPA given. Symptoms improved, then 8/12 another stroke code was called for L facial droop and left UE/LE weakness, CT unchanged. MRI showed acute right PATRICIA infarct with scattered smaller areas of infarct. Patient hemodynamically stable and transferred to  now being worked up for cerebral vasculitis

## 2018-08-20 NOTE — CONSULT NOTE ADULT - ASSESSMENT
68 year old female with severe depression presents with multiple lacunar infarcts after being found down in her apartment. 68 year old female with severe depression presents with multiple lacunar infarcts after being found down in her apartment.  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   Severe depression may also be explained by an inflammatory process and would be expected to improve with treatment.   Given systemic process involving multiple organs and concern for evolving strokes on high dose steroids would start pulse dose steroids 1g X 3 days, will consider a 5 day course pending clinical response.   I am concerned about giving a a pulse of steroids given her elevated BP, though this may be secondary to the vasculitic process itself, particularly in the kidneys w/ activation of the RAAS systme.     Start Solumdrol 1g IV X 3d  IV PPI  Vitamin D 1000IU daily   Will likely initiate CYC on day 4, to discuss w/ patient and Dr. Sapp tomorrow    Check the following: ANCA, myeloperoxidase, proteinase 3, KORINA by IFA, JAMMIE, dsDNA, C3/C4, ssa/ssb, quantiferon, hepatitis C ab, HBsAg/HBsAb, Hep B core total, HIV, IgG/IgM/IgA, IgG4 subclasses, RPR, cyroglobulins     Feel free to call me with any questions

## 2018-08-20 NOTE — PROGRESS NOTE ADULT - PROBLEM SELECTOR PLAN 3
Patient presented with MARYJANE of Cr 1.45 with baseline unclear. Creatinine uptrend throughout patient's course. Etiology currently unclear, possibly prerenal in setting of decreased PO intake from depression vs. hypertensive nephropathy from persistently elevated BPs  -f/u renal ultrasound  -holding lisinopril in setting of MARYJANE, consider restarting as kidney function improving  - Cr trending downward, Cr 1.43  -continue to monitor

## 2018-08-21 LAB
24R-OH-CALCIDIOL SERPL-MCNC: 13 NG/ML — LOW (ref 30–80)
ANION GAP SERPL CALC-SCNC: 14 MMOL/L — SIGNIFICANT CHANGE UP (ref 5–17)
APCR PPP: 2.87 RATIO — SIGNIFICANT CHANGE UP
BUN SERPL-MCNC: 27 MG/DL — HIGH (ref 7–23)
C3 SERPL-MCNC: 117 MG/DL — SIGNIFICANT CHANGE UP (ref 81–157)
C4 SERPL-MCNC: 20 MG/DL — SIGNIFICANT CHANGE UP (ref 13–39)
CALCIUM SERPL-MCNC: 9.8 MG/DL — SIGNIFICANT CHANGE UP (ref 8.4–10.5)
CHLORIDE SERPL-SCNC: 102 MMOL/L — SIGNIFICANT CHANGE UP (ref 96–108)
CO2 SERPL-SCNC: 22 MMOL/L — SIGNIFICANT CHANGE UP (ref 22–31)
CREAT SERPL-MCNC: 1.59 MG/DL — HIGH (ref 0.5–1.3)
GLUCOSE SERPL-MCNC: 150 MG/DL — HIGH (ref 70–99)
HAV IGM SER-ACNC: SIGNIFICANT CHANGE UP
HBV CORE IGM SER-ACNC: SIGNIFICANT CHANGE UP
HBV SURFACE AG SER-ACNC: SIGNIFICANT CHANGE UP
HCT VFR BLD CALC: 33.6 % — LOW (ref 34.5–45)
HCV AB S/CO SERPL IA: 0.04 S/CO — SIGNIFICANT CHANGE UP
HCV AB SERPL-IMP: SIGNIFICANT CHANGE UP
HGB BLD-MCNC: 11.1 G/DL — LOW (ref 11.5–15.5)
HIV 1+2 AB+HIV1 P24 AG SERPL QL IA: SIGNIFICANT CHANGE UP
IGA FLD-MCNC: 226 MG/DL — SIGNIFICANT CHANGE UP (ref 84–499)
IGG FLD-MCNC: 748 MG/DL — SIGNIFICANT CHANGE UP (ref 610–1660)
IGM SERPL-MCNC: 271 MG/DL — HIGH (ref 35–242)
KAPPA LC SER QL IFE: 2.42 MG/DL — HIGH (ref 0.33–1.94)
KAPPA/LAMBDA FREE LIGHT CHAIN RATIO, SERUM: 1.04 RATIO — SIGNIFICANT CHANGE UP (ref 0.26–1.65)
LAMBDA LC SER QL IFE: 2.33 MG/DL — SIGNIFICANT CHANGE UP (ref 0.57–2.63)
MAGNESIUM SERPL-MCNC: 2.3 MG/DL — SIGNIFICANT CHANGE UP (ref 1.6–2.6)
MCHC RBC-ENTMCNC: 29.1 PG — SIGNIFICANT CHANGE UP (ref 27–34)
MCHC RBC-ENTMCNC: 33 G/DL — SIGNIFICANT CHANGE UP (ref 32–36)
MCV RBC AUTO: 88.2 FL — SIGNIFICANT CHANGE UP (ref 80–100)
METANEPHRINE, PL: 36 PG/ML — SIGNIFICANT CHANGE UP (ref 0–62)
NORMETANEPHRINE, PL: 80 PG/ML — SIGNIFICANT CHANGE UP (ref 0–145)
PHOSPHATE SERPL-MCNC: 3.7 MG/DL — SIGNIFICANT CHANGE UP (ref 2.5–4.5)
PLATELET # BLD AUTO: 354 K/UL — SIGNIFICANT CHANGE UP (ref 150–400)
POTASSIUM SERPL-MCNC: 4.3 MMOL/L — SIGNIFICANT CHANGE UP (ref 3.5–5.3)
POTASSIUM SERPL-SCNC: 4.3 MMOL/L — SIGNIFICANT CHANGE UP (ref 3.5–5.3)
PROT S FREE PPP-ACNC: 84 % NORMAL — SIGNIFICANT CHANGE UP (ref 60–140)
RBC # BLD: 3.81 M/UL — SIGNIFICANT CHANGE UP (ref 3.8–5.2)
RBC # FLD: 14.5 % — SIGNIFICANT CHANGE UP (ref 10.3–16.9)
RHEUMATOID FACT SERPL-ACNC: <10 IU/ML — SIGNIFICANT CHANGE UP (ref 0–13)
SODIUM SERPL-SCNC: 138 MMOL/L — SIGNIFICANT CHANGE UP (ref 135–145)
WBC # BLD: 9.1 K/UL — SIGNIFICANT CHANGE UP (ref 3.8–10.5)
WBC # FLD AUTO: 9.1 K/UL — SIGNIFICANT CHANGE UP (ref 3.8–10.5)

## 2018-08-21 PROCEDURE — 99233 SBSQ HOSP IP/OBS HIGH 50: CPT

## 2018-08-21 PROCEDURE — 99233 SBSQ HOSP IP/OBS HIGH 50: CPT | Mod: GC

## 2018-08-21 PROCEDURE — 99231 SBSQ HOSP IP/OBS SF/LOW 25: CPT

## 2018-08-21 RX ORDER — HYDRALAZINE HCL 50 MG
5 TABLET ORAL ONCE
Qty: 0 | Refills: 0 | Status: COMPLETED | OUTPATIENT
Start: 2018-08-21 | End: 2018-08-21

## 2018-08-21 RX ORDER — HYDRALAZINE HCL 50 MG
25 TABLET ORAL ONCE
Qty: 0 | Refills: 0 | Status: COMPLETED | OUTPATIENT
Start: 2018-08-21 | End: 2018-08-21

## 2018-08-21 RX ADMIN — ATORVASTATIN CALCIUM 80 MILLIGRAM(S): 80 TABLET, FILM COATED ORAL at 21:16

## 2018-08-21 RX ADMIN — Medication 2: at 06:49

## 2018-08-21 RX ADMIN — Medication 50 MILLIGRAM(S): at 06:50

## 2018-08-21 RX ADMIN — Medication 50 MILLIGRAM(S): at 21:16

## 2018-08-21 RX ADMIN — Medication 325 MILLIGRAM(S): at 13:04

## 2018-08-21 RX ADMIN — Medication 1 APPLICATION(S): at 06:50

## 2018-08-21 RX ADMIN — Medication 1 APPLICATION(S): at 16:43

## 2018-08-21 RX ADMIN — SERTRALINE 50 MILLIGRAM(S): 25 TABLET, FILM COATED ORAL at 13:05

## 2018-08-21 RX ADMIN — HEPARIN SODIUM 5000 UNIT(S): 5000 INJECTION INTRAVENOUS; SUBCUTANEOUS at 06:49

## 2018-08-21 RX ADMIN — Medication 2: at 13:07

## 2018-08-21 RX ADMIN — Medication 25 MILLIGRAM(S): at 22:26

## 2018-08-21 RX ADMIN — Medication 5 MILLIGRAM(S): at 00:15

## 2018-08-21 RX ADMIN — Medication 1000 UNIT(S): at 16:43

## 2018-08-21 RX ADMIN — Medication 50 MILLIGRAM(S): at 13:05

## 2018-08-21 RX ADMIN — HEPARIN SODIUM 5000 UNIT(S): 5000 INJECTION INTRAVENOUS; SUBCUTANEOUS at 13:05

## 2018-08-21 RX ADMIN — LEVETIRACETAM 250 MILLIGRAM(S): 250 TABLET, FILM COATED ORAL at 16:43

## 2018-08-21 RX ADMIN — Medication 58 MILLIGRAM(S): at 21:16

## 2018-08-21 RX ADMIN — LEVETIRACETAM 250 MILLIGRAM(S): 250 TABLET, FILM COATED ORAL at 06:49

## 2018-08-21 RX ADMIN — PANTOPRAZOLE SODIUM 40 MILLIGRAM(S): 20 TABLET, DELAYED RELEASE ORAL at 13:05

## 2018-08-21 RX ADMIN — Medication 0.2 MILLIGRAM(S): at 06:49

## 2018-08-21 RX ADMIN — HEPARIN SODIUM 5000 UNIT(S): 5000 INJECTION INTRAVENOUS; SUBCUTANEOUS at 21:16

## 2018-08-21 RX ADMIN — AMLODIPINE BESYLATE 10 MILLIGRAM(S): 2.5 TABLET ORAL at 07:32

## 2018-08-21 NOTE — PROGRESS NOTE ADULT - SUBJECTIVE AND OBJECTIVE BOX
OVERNIGHT EVENTS:    SUBJECTIVE / INTERVAL HPI: Patient seen and examined at bedside.     VITAL SIGNS:  Vital Signs Last 24 Hrs  T(C): 36.9 (21 Aug 2018 06:00), Max: 36.9 (21 Aug 2018 02:00)  T(F): 98.5 (21 Aug 2018 06:00), Max: 98.5 (21 Aug 2018 06:00)  HR: 62 (21 Aug 2018 00:10) (46 - 62)  BP: 203/92 (21 Aug 2018 00:10) (162/75 - 239/109)  BP(mean): 132 (21 Aug 2018 00:10) (108 - 157)  RR: 16 (21 Aug 2018 00:10) (14 - 18)  SpO2: 95% (21 Aug 2018 00:10) (95% - 98%)    PHYSICAL EXAM:    General: NAD, following commands, speaking in low volume.  HEENT: NC/AT; PERRL, clear conjunctiva. Dry mucus membranes  Neck: supple  Respiratory: CTA b/l. No wheezes, no rales, no rhonchi appreciated   Cardiovascular: +S1/S2; RRR  Abdomen: soft, NT/ND; +BS x4  Extremities: No erythema, no edema, no cyanosis b/l  Neurological exam limited to patient cooperation and condition.  Mental status: Awake and alert. Speech is fluent. Follows commands.  CN II: pupils equally round and reactive to light,   II, IV, VI: EOMI without nystagmus. Right gaze preferrence.   VII: Left facial droop  VIII: hearing is intact to finger rub  IX, X: Uvula is midline and soft palate rises symmetrically  Muscle strength: RUE 4/5, LUE 1-2/5, RLE 4/5, LLE 1-2/5. Moves RUE and RLE spontaneously.     MEDICATIONS:  MEDICATIONS  (STANDING):  amLODIPine   Tablet 10 milliGRAM(s) Oral daily  aspirin 325 milliGRAM(s) Oral daily  atorvastatin 80 milliGRAM(s) Oral at bedtime  BACItracin   Ointment 1 Application(s) Topical two times a day  cholecalciferol 1000 Unit(s) Oral daily  cloNIDine 0.2 milliGRAM(s) Oral daily  dextrose 5%. 1000 milliLiter(s) (50 mL/Hr) IV Continuous <Continuous>  dextrose 50% Injectable 12.5 Gram(s) IV Push once  dextrose 50% Injectable 25 Gram(s) IV Push once  dextrose 50% Injectable 25 Gram(s) IV Push once  heparin  Injectable 5000 Unit(s) SubCutaneous every 8 hours  hydrALAZINE 50 milliGRAM(s) Oral three times a day  insulin lispro (HumaLOG) corrective regimen sliding scale   SubCutaneous every 6 hours  levETIRAcetam 250 milliGRAM(s) Oral two times a day  methylPREDNISolone sodium succinate IVPB 1000 milliGRAM(s) IV Intermittent at bedtime  pantoprazole  Injectable 40 milliGRAM(s) IV Push daily  sertraline 50 milliGRAM(s) Oral daily  sodium chloride 0.9% Bolus 250 milliLiter(s) IV Bolus once    MEDICATIONS  (PRN):  atropine Injectable 0.5 milliGRAM(s) IV Push every 5 minutes PRN Symptomatic Bradycardia  dextrose 40% Gel 15 Gram(s) Oral once PRN Blood Glucose LESS THAN 70 milliGRAM(s)/deciliter  glucagon  Injectable 1 milliGRAM(s) IntraMuscular once PRN Glucose LESS THAN 70 milligrams/deciliter      ALLERGIES:  Allergies    No Known Allergies    Intolerances        LABS:                        11.1   11.0  )-----------( 378      ( 20 Aug 2018 06:30 )             34.7     08-20    141  |  106  |  22  ----------------------------<  90  3.9   |  21<L>  |  1.43<H>    Ca    9.4      20 Aug 2018 06:30  Mg     2.1     08-20          CAPILLARY BLOOD GLUCOSE      POCT Blood Glucose.: 133 mg/dL (20 Aug 2018 21:14)      RADIOLOGY & ADDITIONAL TESTS: Reviewed. Hospital Course  68 F PMHx HTN and Major Depressive Disorder, presented to the ED with recent history of self harm and depressed mood. Primary complaint was of progressive functional decline at home w/severe anhedonia, anorexia, poor sleep, suicidality (would cut herself and claims to have started cutting herself several months ago), often drinking alcohol at home to consol herself. Patient was admitted to [facility] for depressive episode - however, 1-2 hours after arriving onto the floor, a stroke code was called for L sided weakness and hypertension. NIHSS was 6 on initial assessment. On CTH - noted to have lacunar infarcts (basal ganglia and R/L thalami). On second assessment, NIHSS 1. Patient was brought back to the ED and re-worked up. EKG revealed profound bradycardia - Cardiology was consulted for HR 30s - asymptomatic. Neurosurgery also consulted for what appeared to be mild hydrocephalus on CT Scan.  No acute interventions initially, however Neurosurgery was reconsulted for findings of acute Rt PATRICIA region ischemic stroke in MRI and CTA finding of vertebrobasilar insufficiency. During exam patient noted to have new facial droop along with LLE plegia and LUE weakness. Stroke code was called and patient was taken to CT. Repeat CT showed no areas of infarction. CT perfusion showed no perfusion abnormality. Pressure dependent motor deficit was suspected and  patient was admitted to ICU for SBP augmentation under Dr. Strickland Out of window for tPA; Patient was monitored in ICU x 48 hours in stable neurologic condition.  Patient was on nicardipine drip to maintain -200.  Multiple oral agents started, now off nicardipine. Cardiology consulted to assist in stroke workup given concern of cardioembolic phenomenon.  KATHERINE performed on 8/14, negative for shunt.  Patient also presented with anterior chest wall wound, hemorrhagic in appearance.  Dermatology service consulted, recommending warm compresses twice daily along with bacitracin and keeping wound covered so patient does not continue to irritate it. 8/15    Patient transferred to [facility] for continued medical management. Psych following, d/cal 1:1, started on zoloft 25mg, uptitrated to 50mg. Patient started on multiple medications for refractory HTN in the 200s, although patient clinically more alert at higher BPs and recent stroke allowed for SBP goal 180s-200. Secondary HTN work up sent. Hypercoag work up sent. Patient with noted worsening MARYJANE ,started IV NS with gradual improvement. Cerebral angiogram ordered due to clinical suspicion for cerebral vasculitis given positive ESR and negative work up.  EEG demonstrated epileptiform activity, started keppra.  8/17 Cerebral angiogram positive for cerebral vasculitis, started on PO prednisone 80mg. Post-cerebral angiogram patient with concerning findings of decreased mental status and worsening left sided muscle weakness - repeat CT head noncon negative for acute changes from previous study. MRI 8/20 demonstrated new stroke in right medial temporal periventricular white matter, posterior inferior basal ganglia, and capsule. Rheum consulted for cerebral vasculitis work up and management. Rhuem work up sent, patient started on 1g solu-medrol at night for 3-5 days - must follow up final recs. Patient requiring lower BP goals given risk of hemorrhage with high dose steroids. Can give 100mg prednisone instead of 1g solumedrol pulse if BPs ~200s. Patient's BP regimen increased to current (hydralazine 50mg TID, clonidine 0.2mg; amlodipine 10mg). Patient will require MRI A/P vs. CT A/P vs. repeat doppler US of kidneys for further eval of renal vasculitis. Tonight is day 2/3 of solumedrol pulse. Patient will require a pulse of cytoxan prior to d/c.     OVERNIGHT EVENTS:1g Solumedrol pulse given. -756s overnight. Given extra Hydral 12.5mg    SUBJECTIVE / INTERVAL HPI: Patient seen and examined at bedside. Denies chest pain, SOB, fevers, chills, night sweats, n/v/d/c.  VITAL SIGNS:  Vital Signs Last 24 Hrs  T(C): 36.9 (21 Aug 2018 06:00), Max: 36.9 (21 Aug 2018 02:00)  T(F): 98.5 (21 Aug 2018 06:00), Max: 98.5 (21 Aug 2018 06:00)  HR: 62 (21 Aug 2018 00:10) (46 - 62)  BP: 203/92 (21 Aug 2018 00:10) (162/75 - 239/109)  BP(mean): 132 (21 Aug 2018 00:10) (108 - 157)  RR: 16 (21 Aug 2018 00:10) (14 - 18)  SpO2: 95% (21 Aug 2018 00:10) (95% - 98%)    PHYSICAL EXAM:    General: NAD, following commands, speaking in low volume, although seems more awake and alert with less depressed affect.  HEENT: NC/AT; PERRL, clear conjunctiva. Dry mucus membranes  Neck: supple  Respiratory: CTA b/l. No wheezes, no rales, no rhonchi appreciated   Cardiovascular: +S1/S2; RRR  Abdomen: soft, NT/ND; +BS x4  Extremities: No erythema, no edema, no cyanosis b/l  Neurological exam limited to patient cooperation and condition.  Mental status: Awake and alert. Speech is fluent. Follows commands.  CN II: pupils equally round and reactive to light,   II, IV, VI: EOMI without nystagmus. Right gaze preferrence.   VII: Left facial droop  VIII: hearing is intact to finger rub  IX, X: Uvula is midline and soft palate rises symmetrically  Muscle strength: RUE 4/5, LUE 1-2/5, RLE 4/5, LLE 1-2/5. Moves RUE, RLE, and LLE spontaneously.     MEDICATIONS:  MEDICATIONS  (STANDING):  amLODIPine   Tablet 10 milliGRAM(s) Oral daily  aspirin 325 milliGRAM(s) Oral daily  atorvastatin 80 milliGRAM(s) Oral at bedtime  BACItracin   Ointment 1 Application(s) Topical two times a day  cholecalciferol 1000 Unit(s) Oral daily  cloNIDine 0.2 milliGRAM(s) Oral daily  dextrose 5%. 1000 milliLiter(s) (50 mL/Hr) IV Continuous <Continuous>  dextrose 50% Injectable 12.5 Gram(s) IV Push once  dextrose 50% Injectable 25 Gram(s) IV Push once  dextrose 50% Injectable 25 Gram(s) IV Push once  heparin  Injectable 5000 Unit(s) SubCutaneous every 8 hours  hydrALAZINE 50 milliGRAM(s) Oral three times a day  insulin lispro (HumaLOG) corrective regimen sliding scale   SubCutaneous every 6 hours  levETIRAcetam 250 milliGRAM(s) Oral two times a day  methylPREDNISolone sodium succinate IVPB 1000 milliGRAM(s) IV Intermittent at bedtime  pantoprazole  Injectable 40 milliGRAM(s) IV Push daily  sertraline 50 milliGRAM(s) Oral daily  sodium chloride 0.9% Bolus 250 milliLiter(s) IV Bolus once    MEDICATIONS  (PRN):  atropine Injectable 0.5 milliGRAM(s) IV Push every 5 minutes PRN Symptomatic Bradycardia  dextrose 40% Gel 15 Gram(s) Oral once PRN Blood Glucose LESS THAN 70 milliGRAM(s)/deciliter  glucagon  Injectable 1 milliGRAM(s) IntraMuscular once PRN Glucose LESS THAN 70 milligrams/deciliter      ALLERGIES:  Allergies    No Known Allergies    Intolerances        LABS:                        11.1   11.0  )-----------( 378      ( 20 Aug 2018 06:30 )             34.7     08-20    141  |  106  |  22  ----------------------------<  90  3.9   |  21<L>  |  1.43<H>    Ca    9.4      20 Aug 2018 06:30  Mg     2.1     08-20          CAPILLARY BLOOD GLUCOSE      POCT Blood Glucose.: 133 mg/dL (20 Aug 2018 21:14)      RADIOLOGY & ADDITIONAL TESTS: Reviewed.  < from: MR Head No Cont (08.20.18 @ 16:48) >  IMPRESSION:     Compared to 8/12/2018, there is new infarction in the right medial   temporal periventricular white matter and posterior-inferior basal   ganglia and internal capsule. Also new is a punctate left frontal   subcortical infarction.    Other sites of recent ischemic injury are again seen in the right PATRICIA   territory and speckled in left cerebral deep white matter. Background of   small vessel ischemic change within the cerebral white matter and in the   presence of multiple chronic basal ganglia lacunes.    < from: US Retroperitoneal Complete (08.20.18 @ 17:08) >    IMPRESSION:  No evidence of hydroureteronephrosis.  Extrarenal pelvis on the right kidney.  Slightly diminished left renal size with smooth renal cortical mantle   thinning which may suggest underlying nephrosclerosis.  No post void residual could be obtained due to patient's inability to   void. No bladder wall thickening or intraluminal lesions are demonstrated.    < end of copied text >

## 2018-08-21 NOTE — PROGRESS NOTE ADULT - SUBJECTIVE AND OBJECTIVE BOX
INTERVAL HPI/OVERNIGHT EVENTS: Patient seen and examined at bedside. No acute events overnight.    VITAL SIGNS:  T(F): 97.6 (08-21-18 @ 13:38)  HR: 46 (08-21-18 @ 12:00)  BP: 151/70 (08-21-18 @ 12:00)  RR: 16 (08-21-18 @ 00:10)  SpO2: 99% (08-21-18 @ 12:00)  Wt(kg): --    PHYSICAL EXAM:    Constitutional: WDWN resting comfortably in bed; NAD  Head: NC/AT  Eyes: PERRL, EOMI, anicteric sclera  ENT: no nasal discharge; uvula midline, no oropharyngeal erythema or exudates; MMM  Neck: supple; no JVD or thyromegaly  Respiratory: CTA B/L; no W/R/R, no retractions  Cardiac: +S1/S2; RRR; no M/R/G; PMI non-displaced  Gastrointestinal: soft, NT/ND; no rebound or guarding; +BSx4  Genitourinary: normal external genitalia  Back: spine midline, no bony tenderness or step-offs; no CVAT B/L  Extremities: WWP, no clubbing or cyanosis; no peripheral edema  Musculoskeletal: NROM x4; no joint swelling, tenderness or erythema  Vascular: 2+ radial, femoral, DP/PT pulses B/L  Dermatologic: skin warm, dry and intact; no rashes, wounds, or scars  Lymphatic: no submandibular or cervical LAD  Neurologic: AAOx3; CNII-XII grossly intact; no focal deficits  Psychiatric: affect and characteristics of appearance, verbalizations, behaviors are appropriate    MEDICATIONS  (STANDING):  amLODIPine   Tablet 10 milliGRAM(s) Oral daily  aspirin 325 milliGRAM(s) Oral daily  atorvastatin 80 milliGRAM(s) Oral at bedtime  BACItracin   Ointment 1 Application(s) Topical two times a day  cholecalciferol 1000 Unit(s) Oral daily  cloNIDine 0.2 milliGRAM(s) Oral daily  dextrose 5%. 1000 milliLiter(s) (50 mL/Hr) IV Continuous <Continuous>  dextrose 50% Injectable 12.5 Gram(s) IV Push once  dextrose 50% Injectable 25 Gram(s) IV Push once  dextrose 50% Injectable 25 Gram(s) IV Push once  heparin  Injectable 5000 Unit(s) SubCutaneous every 8 hours  hydrALAZINE 50 milliGRAM(s) Oral three times a day  insulin lispro (HumaLOG) corrective regimen sliding scale   SubCutaneous every 6 hours  levETIRAcetam 250 milliGRAM(s) Oral two times a day  methylPREDNISolone sodium succinate IVPB 1000 milliGRAM(s) IV Intermittent at bedtime  pantoprazole  Injectable 40 milliGRAM(s) IV Push daily  sertraline 50 milliGRAM(s) Oral daily  sodium chloride 0.9% Bolus 250 milliLiter(s) IV Bolus once    MEDICATIONS  (PRN):  atropine Injectable 0.5 milliGRAM(s) IV Push every 5 minutes PRN Symptomatic Bradycardia  dextrose 40% Gel 15 Gram(s) Oral once PRN Blood Glucose LESS THAN 70 milliGRAM(s)/deciliter  glucagon  Injectable 1 milliGRAM(s) IntraMuscular once PRN Glucose LESS THAN 70 milligrams/deciliter      Allergies    No Known Allergies    Intolerances        LABS:                        11.1   9.1   )-----------( 354      ( 21 Aug 2018 06:47 )             33.6     08-21    138  |  102  |  27<H>  ----------------------------<  150<H>  4.3   |  22  |  1.59<H>    Ca    9.8      21 Aug 2018 06:47  Phos  3.7     08-21  Mg     2.3     08-21            RADIOLOGY & ADDITIONAL TESTS: INTERVAL HPI/OVERNIGHT EVENTS: Patient seen and examined at bedside. 1g Solumedrol pulse given. -351k overnight. Given extra Hydral 12.5mg    VITAL SIGNS:  T(F): 97.6 (08-21-18 @ 13:38)  HR: 46 (08-21-18 @ 12:00)  BP: 151/70 (08-21-18 @ 12:00)  RR: 16 (08-21-18 @ 00:10)  SpO2: 99% (08-21-18 @ 12:00)  Wt(kg): --    PHYSICAL EXAM:    Constitutional: frail, resting comfortably in bed; NAD  Head: NC/AT  Eyes: PERRL, EOMI, anicteric sclera  ENT: no nasal discharge; uvula midline, no oropharyngeal erythema or exudates; MMM  Neck: supple; no JVD or thyromegaly  Respiratory: CTA B/L; no W/R/R, no retractions  Cardiac: +S1/S2; RRR; no M/R/G; PMI non-displaced  Gastrointestinal: soft, NT/ND; no rebound or guarding; +BSx4  Extremities: WWP, no clubbing or cyanosis; no peripheral edema  Vascular: 2+ radial, femoral, DP/PT pulses B/L  Dermatologic: skin warm, dry and intact; no rashes, wounds, or scars  Lymphatic: no submandibular or cervical LAD  Neurologic: AAOx3; right gaze preference, left sided facial droop;  LUE 4/5, LLE 2/5, RUE+RLE 5/5    MEDICATIONS  (STANDING):  amLODIPine   Tablet 10 milliGRAM(s) Oral daily  aspirin 325 milliGRAM(s) Oral daily  atorvastatin 80 milliGRAM(s) Oral at bedtime  BACItracin   Ointment 1 Application(s) Topical two times a day  cholecalciferol 1000 Unit(s) Oral daily  cloNIDine 0.2 milliGRAM(s) Oral daily  dextrose 5%. 1000 milliLiter(s) (50 mL/Hr) IV Continuous <Continuous>  dextrose 50% Injectable 12.5 Gram(s) IV Push once  dextrose 50% Injectable 25 Gram(s) IV Push once  dextrose 50% Injectable 25 Gram(s) IV Push once  heparin  Injectable 5000 Unit(s) SubCutaneous every 8 hours  hydrALAZINE 50 milliGRAM(s) Oral three times a day  insulin lispro (HumaLOG) corrective regimen sliding scale   SubCutaneous every 6 hours  levETIRAcetam 250 milliGRAM(s) Oral two times a day  methylPREDNISolone sodium succinate IVPB 1000 milliGRAM(s) IV Intermittent at bedtime  pantoprazole  Injectable 40 milliGRAM(s) IV Push daily  sertraline 50 milliGRAM(s) Oral daily  sodium chloride 0.9% Bolus 250 milliLiter(s) IV Bolus once    MEDICATIONS  (PRN):  atropine Injectable 0.5 milliGRAM(s) IV Push every 5 minutes PRN Symptomatic Bradycardia  dextrose 40% Gel 15 Gram(s) Oral once PRN Blood Glucose LESS THAN 70 milliGRAM(s)/deciliter  glucagon  Injectable 1 milliGRAM(s) IntraMuscular once PRN Glucose LESS THAN 70 milligrams/deciliter      Allergies    No Known Allergies    Intolerances        LABS:                        11.1   9.1   )-----------( 354      ( 21 Aug 2018 06:47 )             33.6     08-21    138  |  102  |  27<H>  ----------------------------<  150<H>  4.3   |  22  |  1.59<H>    Ca    9.8      21 Aug 2018 06:47  Phos  3.7     08-21  Mg     2.3     08-21            RADIOLOGY & ADDITIONAL TESTS:

## 2018-08-21 NOTE — PROGRESS NOTE ADULT - ASSESSMENT
68 year old female with severe depression presents with multiple lacunar infarcts after being found down in her apartment.  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   Severe depression may also be explained by an inflammatory process and would be expected to improve with treatment.   Given systemic process involving multiple organs and concern for evolving strokes on high dose steroids have initiated pulse dose steroids 1g X 3 days - now on day 2.   Will consider a 5 day course pending clinical response.   BP still controlled at this time.   Likely to initiate CYC 8/22 or 8/23 - will need to discuss w/ her healthcare proxy, will contact her tomorrow.       Will follow up remaining serologies with you: ANCA, myeloperoxidase, proteinase 3, KORINA by IFA, JAMMIE, dsDNA, C3/C4, ssa/ssb, quantiferon, hepatitis C ab, HBsAg/HBsAb, Hep B core total, HIV, IgG/IgM/IgA, IgG4 subclasses, RPR, cyroglobulins     Feel free to call me with any questions

## 2018-08-21 NOTE — PROGRESS NOTE ADULT - PROBLEM SELECTOR PLAN 4
Patient initially admitted to psych for major depressive episode with inability to take care of self and SI. Patient subsequently had CVA.  -psych consulted. recs appreciated. No longer having SI, deemed not to be harm to self or others and d/cal 1:1.   -c/w zoloft to 50mg daily per psych recs   -psych recommending formal psych referral upon discharge

## 2018-08-21 NOTE — PROGRESS NOTE ADULT - SUBJECTIVE AND OBJECTIVE BOX
HEALTH ISSUES - PROBLEM Dx:  Vasculitis: Vasculitis  Depression, unspecified depression type: Depression, unspecified depression type  MARYJANE (acute kidney injury): MARYJANE (acute kidney injury)  Current severe episode of major depressive disorder with psychotic features without prior episode: Current severe episode of major depressive disorder with psychotic features without prior episode  Transition of care performed with sharing of clinical summary: Transition of care performed with sharing of clinical summary  Prophylactic measure: Prophylactic measure  Depression: Depression  Hypertension: Hypertension  Abrasion of chest wall: Abrasion of chest wall  UTI (urinary tract infection): UTI (urinary tract infection)  CVA (cerebral vascular accident): CVA (cerebral vascular accident)  Nutrition, metabolism, and development symptoms: Nutrition, metabolism, and development symptoms  Hypertensive emergency: Hypertensive emergency  Hydrocephalus: Hydrocephalus  Severe episode of recurrent major depressive disorder, without psychotic features: Severe episode of recurrent major depressive disorder, without psychotic features  Bradycardia, sinus: Bradycardia, sinus  Hypertensive urgency: Hypertensive urgency  Lacunar infarction: Lacunar infarction          CHEMOTHERAPY REGIMEN:        Day:                          Diet:  Protocol:                                    IVF:      MEDICATIONS  (STANDING):  amLODIPine   Tablet 10 milliGRAM(s) Oral daily  aspirin 325 milliGRAM(s) Oral daily  atorvastatin 80 milliGRAM(s) Oral at bedtime  BACItracin   Ointment 1 Application(s) Topical two times a day  cholecalciferol 1000 Unit(s) Oral daily  cloNIDine 0.2 milliGRAM(s) Oral daily  dextrose 5%. 1000 milliLiter(s) (50 mL/Hr) IV Continuous <Continuous>  dextrose 50% Injectable 12.5 Gram(s) IV Push once  dextrose 50% Injectable 25 Gram(s) IV Push once  dextrose 50% Injectable 25 Gram(s) IV Push once  heparin  Injectable 5000 Unit(s) SubCutaneous every 8 hours  hydrALAZINE 50 milliGRAM(s) Oral three times a day  insulin lispro (HumaLOG) corrective regimen sliding scale   SubCutaneous every 6 hours  levETIRAcetam 250 milliGRAM(s) Oral two times a day  methylPREDNISolone sodium succinate IVPB 1000 milliGRAM(s) IV Intermittent at bedtime  pantoprazole  Injectable 40 milliGRAM(s) IV Push daily  sertraline 50 milliGRAM(s) Oral daily  sodium chloride 0.9% Bolus 250 milliLiter(s) IV Bolus once    MEDICATIONS  (PRN):  atropine Injectable 0.5 milliGRAM(s) IV Push every 5 minutes PRN Symptomatic Bradycardia  dextrose 40% Gel 15 Gram(s) Oral once PRN Blood Glucose LESS THAN 70 milliGRAM(s)/deciliter  glucagon  Injectable 1 milliGRAM(s) IntraMuscular once PRN Glucose LESS THAN 70 milligrams/deciliter      Allergies    No Known Allergies    Intolerances        DVT Prophylaxis: [ ] YES [ ] NO      Antibiotics: [ ] YES [ ] NO    Pain Scale (1-10):       Location:    Vital Signs Last 24 Hrs  T(C): 36.4 (21 Aug 2018 17:41), Max: 36.9 (21 Aug 2018 02:00)  T(F): 97.6 (21 Aug 2018 17:41), Max: 98.5 (21 Aug 2018 06:00)  HR: 46 (21 Aug 2018 12:00) (46 - 62)  BP: 151/70 (21 Aug 2018 12:00) (151/70 - 235/97)  BP(mean): 101 (21 Aug 2018 12:00) (101 - 139)  RR: 16 (21 Aug 2018 00:10) (16 - 16)  SpO2: 99% (21 Aug 2018 12:00) (95% - 99%)    Drug Dosing Weight  Height (cm): 152.4 (11 Aug 2018 10:20)  Weight (kg): 60.5 (11 Aug 2018 10:20)  BMI (kg/m2): 26 (11 Aug 2018 10:20)  BSA (m2): 1.57 (11 Aug 2018 10:20)     Physical Exam:  · Constitutional	detailed exam  · Constitutional Details	well-developed; well-groomed  · Eyes	EOMI; PERRL; no drainage or redness  · ENMT Comments	dry mucous membranes  · Respiratory	detailed exam  · Respiratory Comments	normal breath sounds at the lung bases bilaterally  · Cardiovascular	Regular rate & rhythm, normal S1, S2; no murmurs, gallops or rubs; no S3, S4  · Abd-Soft non tender  ·Ext-no edema, clubbing or cyanosis    URINARY CATHETER: [ ] YES [ ] NO     LABS:  CBC Full  -  ( 21 Aug 2018 06:47 )  WBC Count : 9.1 K/uL  Hemoglobin : 11.1 g/dL  Hematocrit : 33.6 %  Platelet Count - Automated : 354 K/uL  Mean Cell Volume : 88.2 fL  Mean Cell Hemoglobin : 29.1 pg  Mean Cell Hemoglobin Concentration : 33.0 g/dL  Auto Neutrophil # : x  Auto Lymphocyte # : x  Auto Monocyte # : x  Auto Eosinophil # : x  Auto Basophil # : x  Auto Neutrophil % : x  Auto Lymphocyte % : x  Auto Monocyte % : x  Auto Eosinophil % : x  Auto Basophil % : x    08-21    138  |  102  |  27<H>  ----------------------------<  150<H>  4.3   |  22  |  1.59<H>    Ca    9.8      21 Aug 2018 06:47  Phos  3.7     08-21  Mg     2.3     08-21            CULTURES:    RADIOLOGY & ADDITIONAL STUDIES:

## 2018-08-21 NOTE — PROGRESS NOTE BEHAVIORAL HEALTH - NSBHCHARTREVIEWIMAGING_PSY_A_CORE FT
< from: CT Head No Cont (08.18.18 @ 01:54) >    FINDINGS:    VENTRICLES AND SULCI:  Parenchymal volume and sulci are appropriate for   the patient's age.  No hydrocephalus.  INTRA-AXIAL: No acute intracranial hemorrhage or midline shift is   present. There are multiple chronic lacunar infarctions within bilateral   basal ganglii. No evidence of acute transcortical infarct. There are   moderate patchy periventricular and subcortical hypodensities, likely the   sequela of chronic microangiopathic ischemic disease.  EXTRA-AXIAL: No extra-axial fluid collection is present.   VISUALIZED SINUSES: The visualized paranasal sinuses are predominantly   clear.  VISUALIZED MASTOIDS:  Clear.  CALVARIUM:  Normal.    IMPRESSION:    1.  No acute transcortical infarct or acute intracranial hemorrhage.  2.  Chronic microangiopathic ischemic disease.  3.  Chronic bilateral lacunar infarcts as above.    < end of copied text >
< from: CT Head No Cont (08.18.18 @ 01:54) >    FINDINGS:    VENTRICLES AND SULCI:  Parenchymal volume and sulci are appropriate for   the patient's age.  No hydrocephalus.  INTRA-AXIAL: No acute intracranial hemorrhage or midline shift is   present. There are multiple chronic lacunar infarctions within bilateral   basal ganglii. No evidence of acute transcortical infarct. There are   moderate patchy periventricular and subcortical hypodensities, likely the   sequela of chronic microangiopathic ischemic disease.  EXTRA-AXIAL: No extra-axial fluid collection is present.   VISUALIZED SINUSES: The visualized paranasal sinuses are predominantly   clear.  VISUALIZED MASTOIDS:  Clear.  CALVARIUM:  Normal.    IMPRESSION:    1.  No acute transcortical infarct or acute intracranial hemorrhage.  2.  Chronic microangiopathic ischemic disease.  3.  Chronic bilateral lacunar infarcts as above.    < end of copied text >

## 2018-08-21 NOTE — PROGRESS NOTE ADULT - ASSESSMENT
68 yr old female with a PMHx of HTN that presented to 8[facility] for psych stabilization, subsequently had a multi-focal stroke with L sided facial and UE/LE deficits. Course c/b persistent HTN requiring nicardipine drip now transition to PO BP regimen. 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 Rheumatology consulted. Medicine initially consulted for clearance prior to angiogram.    #acute CVA - likely 2/2 vasculitis. Patient with residual left sided deficits.  -on , 80 mg lipitor   -c/w permissive HTN per neuro recs    #MARYJANE vs CKD - Possibly 2/2 vasculitis vs CKD 2/2 hypertension vs ?   Patient presented with MARYJANE of 1.45; unclear baseline. Renal ultrasound showed slightly diminished left renal size with smooth renal cortical mantle thinning which may suggest underlying nephrosclerosis. Renal duplex not performed 2/2 patient agitation  - urine lytes pending; renal duplex vs MRA vs CTA pending   - please hold lisinopril; please avoid all nephrotoxic agents  - trend Cr    #HTN - persistently elevated w/SBPs in 180s - 200s  - currently on 4 drug regimen with lisinopril, clonidine, amlodipine, hydralazine  - HTN management per neurology     Medicine to follow

## 2018-08-21 NOTE — PROGRESS NOTE ADULT - PROBLEM SELECTOR PLAN 2
Lower BP goals given pulses of solumedrol. 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 other secondary cause  - c/w Hydralazine 50 TID, Norvasc 10mg daily, clonidine 0.2mg daily   -lisinopril held in setting of MARYJANE, consider restarting as renal function improving  -holding beta blocker in setting of labile HR, can become sinus paige (asymptomatic)   -if need to increase BP meds, consider up titrating hydral  -cardio recs appreciated. will consider isordil instead of clonidine to optimize BP, especially in setting of MARYJANE  -direct renin 135.7, aldosterone 10.2. 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 CT A/P vs. repeat US renal doppler for further work up of possible renal vasculitis causing MARYJANE and refractory HTN.

## 2018-08-21 NOTE — PROGRESS NOTE ADULT - ATTENDING COMMENTS
Kidney US results reviewed, possible nephrosclerosis, no e/o renal medical disease though. F/up urine lytes. Can dc IVF's.  Check duplex of kidneys r/o VIAN. Avoid nephrotoxic agents.  Stable sinus bradycardia.  Rest as above.

## 2018-08-21 NOTE — PROGRESS NOTE ADULT - ATTENDING COMMENTS
Patient seen and examined with Resident.  Agree with above.  Patient confused overall due to vasculitis that was confirmed on angiogram.    - She's supposedly better than last week.  Appreciate Rheumatology consult -   - Switched from Prednisone 80mg to Solumedrol 1gm x3-5 days depending on progress.  Will also consider Cytoxan.  Please given IV PPI and Vitamin D, as requested by Dr. Nation  - Please send the labs that were requested by Dr. Nation    Better blood pressure control, as also needed for pulse steroids.  Would consider increasing Hydralazine dose, rather than Clonidine. Avoid hypotension.  - Agree with finding out more about doppler portion of renal ultrasound.  Would discuss the different options with radiology, in terms of her CKD (though it's not that bad) whether MRA Abdomen/Pelvis is next appropriate test.    She will ultimately need acute rehab

## 2018-08-21 NOTE — PROGRESS NOTE BEHAVIORAL HEALTH - NSBHCHARTREVIEWINVESTIGATE_PSY_A_CORE FT
< from: 12 Lead ECG (08.11.18 @ 08:24) >    Ventricular Rate 47 BPM    Atrial Rate 47 BPM    P-R Interval 132 ms    QRS Duration 96 ms    Q-T Interval 474 ms    QTC Calculation(Bezet) 419 ms    P Axis 54 degrees    R Axis 34 degrees    T Axis 250 degrees    Diagnosis Line Marked sinus bradycardia with marked sinus arrhythmia  Left ventricular hypertrophy with repolarization abnormality    < end of copied text >
< from: 12 Lead ECG (08.11.18 @ 08:24) >    Ventricular Rate 47 BPM    Atrial Rate 47 BPM    P-R Interval 132 ms    QRS Duration 96 ms    Q-T Interval 474 ms    QTC Calculation(Bezet) 419 ms    P Axis 54 degrees    R Axis 34 degrees    T Axis 250 degrees    Diagnosis Line Marked sinus bradycardia with marked sinus arrhythmia  Left ventricular hypertrophy with repolarization abnormality    < end of copied text >

## 2018-08-21 NOTE — PROGRESS NOTE ADULT - PROBLEM SELECTOR PLAN 1
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 No atrial thrombus or shunt on KATHERINE, no afib. EF 55-60%. Patient sent for hypercoaguability workup- Hexagonal phase negative, DRVVT inhibitor screen 27.2 (WNL), DRVVT S/C screen LA neg. 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  - for antiplatelet and Atorvastatin 80 for - Secondary stroke prevention  -f/u rest of hypercoag workup  -rheum following, recs appreciated. c/w pulses of 1g solumedrol at night if BPs can tolerate. Continue pulses for 3-5 days pending clinical improvement (started 8/20). If BPs ~200s then can give PO prednisone 100mg instead of pulse 1g solumedrol to avoid risk of hemorrhage  -repeat MRI head 8/20 with new stroke, continue to manage cerebral vasculitis treatment as described   -f/u rheum workup   -will require MRI A/P vs. CT A/P vs. repeat US renal doppler for further work up of possible renal vasculitis causing MARYJANE and refractory HTN.  -Neurosurg following, recs appreciated  -medicine following, recs appreciated  -cardio recs appreciated.  -will consider isordil instead of clonidine to optimize BP control  -EEG read with epileptiform activity, c/w keppra 250 BID

## 2018-08-21 NOTE — CHART NOTE - NSCHARTNOTEFT_GEN_A_CORE
Admitting Diagnosis:   Patient is a 68y old  Female who presents with a chief complaint of Depression, Stroke Code Called in Unit (11 Aug 2018 10:04)      PAST MEDICAL & SURGICAL HISTORY:  Hypertension  Depression  No significant past surgical history      Current Nutrition Order:  DASH      PO Intake: Good (%) [   ]  Fair (50-75%) [ X  ] Poor (<25%) [   ]    GI Issues: No N/V endorsed but pt denies BM over past few days; encouraged fluid intake     Pain: No pain endorsed at this time     Skin Integrity: Calos 15, surgical wound    Labs:   08-21    138  |  102  |  27<H>  ----------------------------<  150<H>  4.3   |  22  |  1.59<H>    Ca    9.8      21 Aug 2018 06:47  Phos  3.7     08-21  Mg     2.3     08-21      CAPILLARY BLOOD GLUCOSE      POCT Blood Glucose.: 165 mg/dL (21 Aug 2018 11:34)  POCT Blood Glucose.: 169 mg/dL (21 Aug 2018 06:39)  POCT Blood Glucose.: 133 mg/dL (20 Aug 2018 21:14)  POCT Blood Glucose.: 124 mg/dL (20 Aug 2018 17:47)      Medications:  MEDICATIONS  (STANDING):  amLODIPine   Tablet 10 milliGRAM(s) Oral daily  aspirin 325 milliGRAM(s) Oral daily  atorvastatin 80 milliGRAM(s) Oral at bedtime  BACItracin   Ointment 1 Application(s) Topical two times a day  cholecalciferol 1000 Unit(s) Oral daily  cloNIDine 0.2 milliGRAM(s) Oral daily  dextrose 5%. 1000 milliLiter(s) (50 mL/Hr) IV Continuous <Continuous>  dextrose 50% Injectable 12.5 Gram(s) IV Push once  dextrose 50% Injectable 25 Gram(s) IV Push once  dextrose 50% Injectable 25 Gram(s) IV Push once  heparin  Injectable 5000 Unit(s) SubCutaneous every 8 hours  hydrALAZINE 50 milliGRAM(s) Oral three times a day  insulin lispro (HumaLOG) corrective regimen sliding scale   SubCutaneous every 6 hours  levETIRAcetam 250 milliGRAM(s) Oral two times a day  methylPREDNISolone sodium succinate IVPB 1000 milliGRAM(s) IV Intermittent at bedtime  pantoprazole  Injectable 40 milliGRAM(s) IV Push daily  sertraline 50 milliGRAM(s) Oral daily  sodium chloride 0.9% Bolus 250 milliLiter(s) IV Bolus once    MEDICATIONS  (PRN):  atropine Injectable 0.5 milliGRAM(s) IV Push every 5 minutes PRN Symptomatic Bradycardia  dextrose 40% Gel 15 Gram(s) Oral once PRN Blood Glucose LESS THAN 70 milliGRAM(s)/deciliter  glucagon  Injectable 1 milliGRAM(s) IntraMuscular once PRN Glucose LESS THAN 70 milligrams/deciliter    Weight: 60.5kg   Daily     Daily     Weight Change: No new weights recorded since admit     Estimated energy needs: Utilized IBW to calculate needs, pt >120% of IBW. Adjusted for age/stroke.  Calories: 25-30 kcal/kg = 7893-8814 kcal/day  Protein: 1.2-1.4 g/kg = 55-64g protein/day  Fluids: 30-35 mL/kg = 3061-7244 mL/day    Subjective: 69y/o F PMHx HTN and Major Depressive Disorder with hx of poor sleep, anorexia, alcohol use, and self-cutting, presented to the ED with recent history of self harm and depressed mood, admitted to inpatient psych ([facility]) for close monitoring. Stroke code called with imaging revealing acute infarct in L PATRICIA and subacute insular infarct. Pt stepped down to [facility]. S/p angiogram on 8/17, showing B/L carotid stenosis. MRI showing new CVA. Pt seen in room, awake, alert, verbally responsive in low voice today. She endorses eating well but did not discuss what she ate for breakfast. She denies N/V but has not had a bowel movement (not on bowel regimen). No pain reported. Obtained food preferences from patient and discussed w/ staff. Lytes WNL. POC , 133mg/dL. Pending rehab.    Previous Nutrition Diagnosis:  Increased nutrient needs RT increased demand for protein intake AEB s/p CVA    Active [ X  ]  Resolved [   ]    If resolved, new PES:     Goal: Pt will meet >75% of EER per day with good tolerance     Recommendations:  1. Recommend addition of Ensure Enlive BID (700 kcal, 40g protein, 360 mL free H2O) and MVI/B1/FA 2/2 EtOH use   *endorsed to MD again   2. Encourage PO intake   3. Monitor lytes and replete prn.   4. Consider starting bowel regimen     Education: Encouraged PO intake     Risk Level: High [   ] Moderate [  X ] Low [   ].

## 2018-08-21 NOTE — PROGRESS NOTE ADULT - SUBJECTIVE AND OBJECTIVE BOX
Rheumatology Follow Up Evaluation     68 year old female with severe depression presents with multiple lacunar infarcts after being found down in her apartment.      BP controlled overnight and patient given Solumedrol 1g (first of 3 day pulse)  No change in clinical status today, still speaking quietly and minimally interactive  BP remains controlled today  Serologies pending    Afebrile. /70  Physical exam notable for the following pertinent positives/negatives:  Minimally interactive  Comfortable  Non toxic   Hyperpigmentation over nasal bridge not sparing nasolabial folds  Slight periorbital erythema  No gottrens papules, malar rash or discoid lesions   Hair thinning  Dry mouth without pooling of saliva, no tongue furrowing   No parotid or lacrimal enlargement   CV bradycardic, reg rhythm no murmurs   Resp CTAB  Facial droop  GI soft non tender BS+ no HSM   No renal bruit appreciated   No livedo     Data reviewed, notable for:   CBC with mild leukocytosis on prednisone (normal prior)  BUN/Cr 22/1.43 trending down   Renin/rohan 135/10   AST/ALT WNL   ESR 45  LAC neg  Cardiolipin neg   RF neg     KATHERINE no valvular disease or pericardial effusion  CTH/MRA with strokes as described above   Angiogram CNS vasculitis   CTA with chronic L VA occlusion, R VA and basilar hypoplasia, carotid stenosis bilaterally   Renal US with asymmetric kidneys possibly representing nephrosclerosis

## 2018-08-21 NOTE — PROGRESS NOTE ADULT - PROBLEM SELECTOR PLAN 3
Patient presented with MARYJANE of Cr 1.5 with baseline unclear. Creatinine uptrend throughout patient's course. Etiology currently unclear, possibly prerenal in setting of decreased PO intake from depression vs. hypertensive nephropathy from persistently elevated BPs vs. renal vasculitis  -holding lisinopril in setting of MARYJANE, consider restarting as kidney function improving  - Cr uptrending  -continue to monitor

## 2018-08-21 NOTE — PROGRESS NOTE BEHAVIORAL HEALTH - NSBHFUPINTERVALHXFT_PSY_A_CORE
Patient seen and interviewed at bedtime. She presents sedated and confused. She reported that her mood was "ok". She was not able to recognize the writer.

## 2018-08-22 LAB
ANA TITR SER: NEGATIVE — SIGNIFICANT CHANGE UP
ANA TITR SER: NEGATIVE — SIGNIFICANT CHANGE UP
ANION GAP SERPL CALC-SCNC: 16 MMOL/L — SIGNIFICANT CHANGE UP (ref 5–17)
AUTO DIFF PNL BLD: NEGATIVE — SIGNIFICANT CHANGE UP
BUN SERPL-MCNC: 40 MG/DL — HIGH (ref 7–23)
C-ANCA SER-ACNC: NEGATIVE — SIGNIFICANT CHANGE UP
CALCIUM SERPL-MCNC: 9.2 MG/DL — SIGNIFICANT CHANGE UP (ref 8.4–10.5)
CHLORIDE SERPL-SCNC: 100 MMOL/L — SIGNIFICANT CHANGE UP (ref 96–108)
CO2 SERPL-SCNC: 21 MMOL/L — LOW (ref 22–31)
CREAT SERPL-MCNC: 1.64 MG/DL — HIGH (ref 0.5–1.3)
DSDNA AB FLD-ACNC: <0.2 AI — SIGNIFICANT CHANGE UP
ENA SS-A AB FLD IA-ACNC: <0.2 AI — SIGNIFICANT CHANGE UP
GLUCOSE SERPL-MCNC: 151 MG/DL — HIGH (ref 70–99)
HCT VFR BLD CALC: 30.7 % — LOW (ref 34.5–45)
HGB BLD-MCNC: 10.2 G/DL — LOW (ref 11.5–15.5)
MAGNESIUM SERPL-MCNC: 2 MG/DL — SIGNIFICANT CHANGE UP (ref 1.6–2.6)
MCHC RBC-ENTMCNC: 29.2 PG — SIGNIFICANT CHANGE UP (ref 27–34)
MCHC RBC-ENTMCNC: 33.2 G/DL — SIGNIFICANT CHANGE UP (ref 32–36)
MCV RBC AUTO: 88 FL — SIGNIFICANT CHANGE UP (ref 80–100)
P-ANCA SER-ACNC: NEGATIVE — SIGNIFICANT CHANGE UP
PLATELET # BLD AUTO: 271 K/UL — SIGNIFICANT CHANGE UP (ref 150–400)
POTASSIUM SERPL-MCNC: 4.2 MMOL/L — SIGNIFICANT CHANGE UP (ref 3.5–5.3)
POTASSIUM SERPL-SCNC: 4.2 MMOL/L — SIGNIFICANT CHANGE UP (ref 3.5–5.3)
RBC # BLD: 3.49 M/UL — LOW (ref 3.8–5.2)
RBC # FLD: 14.3 % — SIGNIFICANT CHANGE UP (ref 10.3–16.9)
SODIUM SERPL-SCNC: 137 MMOL/L — SIGNIFICANT CHANGE UP (ref 135–145)
T PALLIDUM AB TITR SER: NEGATIVE — SIGNIFICANT CHANGE UP
WBC # BLD: 7.3 K/UL — SIGNIFICANT CHANGE UP (ref 3.8–10.5)
WBC # FLD AUTO: 7.3 K/UL — SIGNIFICANT CHANGE UP (ref 3.8–10.5)

## 2018-08-22 PROCEDURE — 99233 SBSQ HOSP IP/OBS HIGH 50: CPT

## 2018-08-22 PROCEDURE — 99233 SBSQ HOSP IP/OBS HIGH 50: CPT | Mod: GC

## 2018-08-22 RX ORDER — THIAMINE MONONITRATE (VIT B1) 100 MG
100 TABLET ORAL DAILY
Qty: 0 | Refills: 0 | Status: DISCONTINUED | OUTPATIENT
Start: 2018-08-22 | End: 2018-08-27

## 2018-08-22 RX ORDER — HYDRALAZINE HCL 50 MG
75 TABLET ORAL EVERY 8 HOURS
Qty: 0 | Refills: 0 | Status: DISCONTINUED | OUTPATIENT
Start: 2018-08-22 | End: 2018-08-23

## 2018-08-22 RX ORDER — FOLIC ACID 0.8 MG
1 TABLET ORAL DAILY
Qty: 0 | Refills: 0 | Status: DISCONTINUED | OUTPATIENT
Start: 2018-08-22 | End: 2018-08-27

## 2018-08-22 RX ADMIN — Medication 1 APPLICATION(S): at 06:12

## 2018-08-22 RX ADMIN — Medication 1 APPLICATION(S): at 18:11

## 2018-08-22 RX ADMIN — Medication 75 MILLIGRAM(S): at 21:47

## 2018-08-22 RX ADMIN — HEPARIN SODIUM 5000 UNIT(S): 5000 INJECTION INTRAVENOUS; SUBCUTANEOUS at 21:48

## 2018-08-22 RX ADMIN — ATORVASTATIN CALCIUM 80 MILLIGRAM(S): 80 TABLET, FILM COATED ORAL at 21:47

## 2018-08-22 RX ADMIN — Medication 58 MILLIGRAM(S): at 21:47

## 2018-08-22 RX ADMIN — AMLODIPINE BESYLATE 10 MILLIGRAM(S): 2.5 TABLET ORAL at 06:11

## 2018-08-22 RX ADMIN — LEVETIRACETAM 250 MILLIGRAM(S): 250 TABLET, FILM COATED ORAL at 06:11

## 2018-08-22 RX ADMIN — LEVETIRACETAM 250 MILLIGRAM(S): 250 TABLET, FILM COATED ORAL at 18:11

## 2018-08-22 RX ADMIN — Medication 50 MILLIGRAM(S): at 06:12

## 2018-08-22 RX ADMIN — SERTRALINE 50 MILLIGRAM(S): 25 TABLET, FILM COATED ORAL at 11:54

## 2018-08-22 RX ADMIN — Medication 2: at 11:51

## 2018-08-22 RX ADMIN — Medication 1000 UNIT(S): at 11:54

## 2018-08-22 RX ADMIN — PANTOPRAZOLE SODIUM 40 MILLIGRAM(S): 20 TABLET, DELAYED RELEASE ORAL at 11:54

## 2018-08-22 RX ADMIN — Medication 0.2 MILLIGRAM(S): at 06:11

## 2018-08-22 RX ADMIN — Medication 75 MILLIGRAM(S): at 15:46

## 2018-08-22 RX ADMIN — HEPARIN SODIUM 5000 UNIT(S): 5000 INJECTION INTRAVENOUS; SUBCUTANEOUS at 06:11

## 2018-08-22 RX ADMIN — Medication 325 MILLIGRAM(S): at 11:54

## 2018-08-22 RX ADMIN — HEPARIN SODIUM 5000 UNIT(S): 5000 INJECTION INTRAVENOUS; SUBCUTANEOUS at 11:58

## 2018-08-22 NOTE — PROGRESS NOTE ADULT - ASSESSMENT
68 year old female with severe depression presents with multiple lacunar infarcts after being found down in her apartment.  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   Severe depression may also be explained by an inflammatory process and would be expected to improve with treatment.   Given systemic process involving multiple organs and concern for evolving strokes on high dose steroids have initiated pulse dose steroids 1g X 3 days - now on day3. Showing clinical improvement. Will transition to high dose steroids tomorrow and initiate CYC.    Will consider a 5 day course pending clinical response.   BP still slightly elevated.    Likely to initiate CYC 8/22 or 8/23 - will need to discuss w/ her healthcare proxy, have left two messages and am waiting to get consent.      Consider psych eval for baseline neurocognitive assessment prior to starting CYC.   Will follow up remaining serologies with you: , myeloperoxidase, proteinase 3, JAMMIE, dsDNA, C3/C4, ssa/ssb, quantiferon, hepatitis C ab, HBsAg/HBsAb, Hep B core total, HIV, IgG/IgM/IgA, IgG4 subclasses, RPR, cyroglobulins     Feel free to call me with any questions

## 2018-08-22 NOTE — PROGRESS NOTE ADULT - SUBJECTIVE AND OBJECTIVE BOX
INTERVAL HPI/OVERNIGHT EVENTS: Patient seen and examined at bedside. No acute events overnight.    VITAL SIGNS:  T(F): 98 (08-22-18 @ 16:00)  HR: 56 (08-22-18 @ 08:40)  BP: 180/83 (08-22-18 @ 08:40)  RR: 17 (08-22-18 @ 05:09)  SpO2: 96% (08-22-18 @ 08:40)  Wt(kg): --    PHYSICAL EXAM:    Constitutional: WDWN resting comfortably in bed; NAD  Head: NC/AT  Eyes: PERRL, EOMI, anicteric sclera  ENT: no nasal discharge; uvula midline, no oropharyngeal erythema or exudates; MMM  Neck: supple; no JVD or thyromegaly  Respiratory: CTA B/L; no W/R/R, no retractions  Cardiac: +S1/S2; RRR; no M/R/G; PMI non-displaced  Gastrointestinal: soft, NT/ND; no rebound or guarding; +BSx4  Genitourinary: normal external genitalia  Back: spine midline, no bony tenderness or step-offs; no CVAT B/L  Extremities: WWP, no clubbing or cyanosis; no peripheral edema  Musculoskeletal: NROM x4; no joint swelling, tenderness or erythema  Vascular: 2+ radial, femoral, DP/PT pulses B/L  Dermatologic: skin warm, dry and intact; no rashes, wounds, or scars  Lymphatic: no submandibular or cervical LAD  Neurologic: AAOx3; CNII-XII grossly intact; no focal deficits  Psychiatric: affect and characteristics of appearance, verbalizations, behaviors are appropriate    MEDICATIONS  (STANDING):  amLODIPine   Tablet 10 milliGRAM(s) Oral daily  aspirin 325 milliGRAM(s) Oral daily  atorvastatin 80 milliGRAM(s) Oral at bedtime  BACItracin   Ointment 1 Application(s) Topical two times a day  cholecalciferol 1000 Unit(s) Oral daily  dextrose 5%. 1000 milliLiter(s) (50 mL/Hr) IV Continuous <Continuous>  dextrose 50% Injectable 12.5 Gram(s) IV Push once  dextrose 50% Injectable 25 Gram(s) IV Push once  dextrose 50% Injectable 25 Gram(s) IV Push once  heparin  Injectable 5000 Unit(s) SubCutaneous every 8 hours  hydrALAZINE 75 milliGRAM(s) Oral every 8 hours  insulin lispro (HumaLOG) corrective regimen sliding scale   SubCutaneous every 6 hours  levETIRAcetam 250 milliGRAM(s) Oral two times a day  methylPREDNISolone sodium succinate IVPB 1000 milliGRAM(s) IV Intermittent at bedtime  pantoprazole  Injectable 40 milliGRAM(s) IV Push daily  sertraline 50 milliGRAM(s) Oral daily    MEDICATIONS  (PRN):  atropine Injectable 0.5 milliGRAM(s) IV Push every 5 minutes PRN Symptomatic Bradycardia  dextrose 40% Gel 15 Gram(s) Oral once PRN Blood Glucose LESS THAN 70 milliGRAM(s)/deciliter  glucagon  Injectable 1 milliGRAM(s) IntraMuscular once PRN Glucose LESS THAN 70 milligrams/deciliter      Allergies    No Known Allergies    Intolerances        LABS:                        10.2   7.3   )-----------( 271      ( 22 Aug 2018 06:03 )             30.7     08-22    137  |  100  |  40<H>  ----------------------------<  151<H>  4.2   |  21<L>  |  1.64<H>    Ca    9.2      22 Aug 2018 06:03  Phos  3.7     08-21  Mg     2.0     08-22            RADIOLOGY & ADDITIONAL TESTS: INTERVAL HPI/OVERNIGHT EVENTS: Patient seen and examined at bedside. SBP in 190s, extra 25mg Hydral PO given. solumedrol pulse given. SBPs 150-170s.    VITAL SIGNS:  T(F): 98 (08-22-18 @ 16:00)  HR: 56 (08-22-18 @ 08:40)  BP: 180/83 (08-22-18 @ 08:40)  RR: 17 (08-22-18 @ 05:09)  SpO2: 96% (08-22-18 @ 08:40)  Wt(kg): --    PHYSICAL EXAM:    Constitutional: frail, resting comfortably in bed; NAD  Head: NC/AT  Eyes: PERRL, EOMI, anicteric sclera  ENT: no nasal discharge; uvula midline, no oropharyngeal erythema or exudates; MMM  Neck: supple; no JVD or thyromegaly  Respiratory: CTA B/L; no W/R/R, no retractions  Cardiac: +S1/S2; RRR; no M/R/G; PMI non-displaced  Gastrointestinal: soft, NT/ND; no rebound or guarding; +BSx4  Extremities: WWP, no clubbing or cyanosis; no peripheral edema  Vascular: 2+ radial, femoral, DP/PT pulses B/L  Dermatologic: skin warm, dry and intact; no rashes, wounds, or scars  Lymphatic: no submandibular or cervical LAD  Neurologic: AAOx3; right gaze preference, left sided facial droop;  LUE 4/5, LLE 2/5, RUE+RLE 5/5    MEDICATIONS  (STANDING):  amLODIPine   Tablet 10 milliGRAM(s) Oral daily  aspirin 325 milliGRAM(s) Oral daily  atorvastatin 80 milliGRAM(s) Oral at bedtime  BACItracin   Ointment 1 Application(s) Topical two times a day  cholecalciferol 1000 Unit(s) Oral daily  dextrose 5%. 1000 milliLiter(s) (50 mL/Hr) IV Continuous <Continuous>  dextrose 50% Injectable 12.5 Gram(s) IV Push once  dextrose 50% Injectable 25 Gram(s) IV Push once  dextrose 50% Injectable 25 Gram(s) IV Push once  heparin  Injectable 5000 Unit(s) SubCutaneous every 8 hours  hydrALAZINE 75 milliGRAM(s) Oral every 8 hours  insulin lispro (HumaLOG) corrective regimen sliding scale   SubCutaneous every 6 hours  levETIRAcetam 250 milliGRAM(s) Oral two times a day  methylPREDNISolone sodium succinate IVPB 1000 milliGRAM(s) IV Intermittent at bedtime  pantoprazole  Injectable 40 milliGRAM(s) IV Push daily  sertraline 50 milliGRAM(s) Oral daily    MEDICATIONS  (PRN):  atropine Injectable 0.5 milliGRAM(s) IV Push every 5 minutes PRN Symptomatic Bradycardia  dextrose 40% Gel 15 Gram(s) Oral once PRN Blood Glucose LESS THAN 70 milliGRAM(s)/deciliter  glucagon  Injectable 1 milliGRAM(s) IntraMuscular once PRN Glucose LESS THAN 70 milligrams/deciliter      Allergies    No Known Allergies    Intolerances        LABS:                        10.2   7.3   )-----------( 271      ( 22 Aug 2018 06:03 )             30.7     08-22    137  |  100  |  40<H>  ----------------------------<  151<H>  4.2   |  21<L>  |  1.64<H>    Ca    9.2      22 Aug 2018 06:03  Phos  3.7     08-21  Mg     2.0     08-22            RADIOLOGY & ADDITIONAL TESTS:

## 2018-08-22 NOTE — PROGRESS NOTE ADULT - SUBJECTIVE AND OBJECTIVE BOX
INTERVAL HPI/OVERNIGHT EVENTS:  Patient was seen and examined at bedside. As per nurse and patient, no o/n events, patient resting comfortably. No complaints at this time. Patient denies: fever, chills, dizziness, weakness, HA, Changes in vision, CP, palpitations, SOB, cough, N/V/D/C, dysuria, changes in bowel movements, LE edema. ROS otherwise negative.    VITAL SIGNS:  T(F): 98.1 (08-22-18 @ 02:00)  HR: 46 (08-22-18 @ 05:09)  BP: 155/69 (08-22-18 @ 05:09)  RR: 17 (08-22-18 @ 05:09)  SpO2: 96% (08-22-18 @ 05:09)  Wt(kg): --    PHYSICAL EXAM:    General: NAD, following commands, speaking in low volume, although seems more awake and alert with less depressed affect.  HEENT: NC/AT; PERRL, clear conjunctiva. Dry mucus membranes  Neck: supple  Respiratory: CTA b/l. No wheezes, no rales, no rhonchi appreciated   Cardiovascular: +S1/S2; RRR  Abdomen: soft, NT/ND; +BS x4  Extremities: No erythema, no edema, no cyanosis b/l  Neurological exam limited to patient cooperation and condition.  Mental status: Awake and alert. Speech is fluent. Follows commands.  CN II: pupils equally round and reactive to light,   II, IV, VI: EOMI without nystagmus. Right gaze preferrence.   VII: Left facial droop  VIII: hearing is intact to finger rub  IX, X: Uvula is midline and soft palate rises symmetrically  Muscle strength: RUE 4/5, LUE 1-2/5, RLE 4/5, LLE 1-2/5. Moves RUE, RLE, and LLE spontaneously.       MEDICATIONS  (STANDING):  amLODIPine   Tablet 10 milliGRAM(s) Oral daily  aspirin 325 milliGRAM(s) Oral daily  atorvastatin 80 milliGRAM(s) Oral at bedtime  BACItracin   Ointment 1 Application(s) Topical two times a day  cholecalciferol 1000 Unit(s) Oral daily  cloNIDine 0.2 milliGRAM(s) Oral daily  dextrose 5%. 1000 milliLiter(s) (50 mL/Hr) IV Continuous <Continuous>  dextrose 50% Injectable 12.5 Gram(s) IV Push once  dextrose 50% Injectable 25 Gram(s) IV Push once  dextrose 50% Injectable 25 Gram(s) IV Push once  heparin  Injectable 5000 Unit(s) SubCutaneous every 8 hours  hydrALAZINE 50 milliGRAM(s) Oral three times a day  insulin lispro (HumaLOG) corrective regimen sliding scale   SubCutaneous every 6 hours  levETIRAcetam 250 milliGRAM(s) Oral two times a day  methylPREDNISolone sodium succinate IVPB 1000 milliGRAM(s) IV Intermittent at bedtime  pantoprazole  Injectable 40 milliGRAM(s) IV Push daily  sertraline 50 milliGRAM(s) Oral daily    MEDICATIONS  (PRN):  atropine Injectable 0.5 milliGRAM(s) IV Push every 5 minutes PRN Symptomatic Bradycardia  dextrose 40% Gel 15 Gram(s) Oral once PRN Blood Glucose LESS THAN 70 milliGRAM(s)/deciliter  glucagon  Injectable 1 milliGRAM(s) IntraMuscular once PRN Glucose LESS THAN 70 milligrams/deciliter      Allergies    No Known Allergies    Intolerances        LABS:                        11.1   9.1   )-----------( 354      ( 21 Aug 2018 06:47 )             33.6     08-21    138  |  102  |  27<H>  ----------------------------<  150<H>  4.3   |  22  |  1.59<H>    Ca    9.8      21 Aug 2018 06:47  Phos  3.7     08-21  Mg     2.3     08-21            RADIOLOGY & ADDITIONAL TESTS:  Reviewed INTERVAL HPI/OVERNIGHT EVENTS:  Patient was seen and examined at bedside. O/N, BP to 219/102. 25mg hydral given. Hydralazine dose increased from 50TID to 75 TID. No complaints at this time.    VITAL SIGNS:  T(F): 98.1 (08-22-18 @ 02:00)  HR: 46 (08-22-18 @ 05:09)  BP: 155/69 (08-22-18 @ 05:09)  RR: 17 (08-22-18 @ 05:09)  SpO2: 96% (08-22-18 @ 05:09)  Wt(kg): --    PHYSICAL EXAM:    General: NAD, following commands, speaking in low volume  HEENT: NC/AT; PERRL, clear conjunctiva. Dry mucus membranes  Neck: supple  Respiratory: CTA b/l. No wheezes, no rales, no rhonchi appreciated   Cardiovascular: +S1/S2; RRR  Abdomen: soft, NT/ND; +BS x4  Extremities: No erythema, no edema, no cyanosis b/l  Neurological exam limited to patient cooperation and condition.  Mental status: Awake and alert. Speech is fluent. Follows commands.  CN II: pupils equally round and reactive to light,   II, IV, VI: EOMI without nystagmus. Right gaze preferrence.   VII: Left facial droop  VIII: hearing is intact to finger rub  IX, X: Uvula is midline and soft palate rises symmetrically  Muscle strength: RUE 4/5, LUE 1-2/5, RLE 4/5, LLE 1-2/5. Moves RUE, RLE, and LLE spontaneously.       MEDICATIONS  (STANDING):  amLODIPine   Tablet 10 milliGRAM(s) Oral daily  aspirin 325 milliGRAM(s) Oral daily  atorvastatin 80 milliGRAM(s) Oral at bedtime  BACItracin   Ointment 1 Application(s) Topical two times a day  cholecalciferol 1000 Unit(s) Oral daily  cloNIDine 0.2 milliGRAM(s) Oral daily  dextrose 5%. 1000 milliLiter(s) (50 mL/Hr) IV Continuous <Continuous>  dextrose 50% Injectable 12.5 Gram(s) IV Push once  dextrose 50% Injectable 25 Gram(s) IV Push once  dextrose 50% Injectable 25 Gram(s) IV Push once  heparin  Injectable 5000 Unit(s) SubCutaneous every 8 hours  hydrALAZINE 50 milliGRAM(s) Oral three times a day  insulin lispro (HumaLOG) corrective regimen sliding scale   SubCutaneous every 6 hours  levETIRAcetam 250 milliGRAM(s) Oral two times a day  methylPREDNISolone sodium succinate IVPB 1000 milliGRAM(s) IV Intermittent at bedtime  pantoprazole  Injectable 40 milliGRAM(s) IV Push daily  sertraline 50 milliGRAM(s) Oral daily    MEDICATIONS  (PRN):  atropine Injectable 0.5 milliGRAM(s) IV Push every 5 minutes PRN Symptomatic Bradycardia  dextrose 40% Gel 15 Gram(s) Oral once PRN Blood Glucose LESS THAN 70 milliGRAM(s)/deciliter  glucagon  Injectable 1 milliGRAM(s) IntraMuscular once PRN Glucose LESS THAN 70 milligrams/deciliter      Allergies    No Known Allergies    Intolerances        LABS:                        11.1   9.1   )-----------( 354      ( 21 Aug 2018 06:47 )             33.6     08-21    138  |  102  |  27<H>  ----------------------------<  150<H>  4.3   |  22  |  1.59<H>    Ca    9.8      21 Aug 2018 06:47  Phos  3.7     08-21  Mg     2.3     08-21            RADIOLOGY & ADDITIONAL TESTS:  Reviewed INTERVAL HPI/OVERNIGHT EVENTS:  Patient was seen and examined at bedside. O/N, BP to 219/102. 25mg hydral given. Hydralazine dose increased from 50TID to 75 TID. No complaints at this time.    VITAL SIGNS:  T(F): 98.1 (08-22-18 @ 02:00)  HR: 46 (08-22-18 @ 05:09)  BP: 155/69 (08-22-18 @ 05:09)  RR: 17 (08-22-18 @ 05:09)  SpO2: 96% (08-22-18 @ 05:09)    PHYSICAL EXAM:    General: NAD, following commands, speaking in low volume  HEENT: NC/AT; PERRL, clear conjunctiva. Dry mucus membranes  Neck: supple  Respiratory: CTA b/l. No wheezes, no rales, no rhonchi appreciated   Cardiovascular: +S1/S2; RRR  Abdomen: soft, NT/ND; +BS x4  Extremities: No erythema, no edema, no cyanosis b/l  Neurological exam limited to patient cooperation and condition.  Mental status: Awake and alert. Speech is fluent. Follows commands.  CN II: pupils equally round and reactive to light,   II, IV, VI: EOMI without nystagmus. Right gaze preferrence.   VII: Left facial droop  VIII: hearing is intact to finger rub  IX, X: Uvula is midline and soft palate rises symmetrically  Muscle strength: RUE 4/5, LUE 1-2/5, RLE 4/5, LLE 1-2/5. Moves RUE, RLE, and LLE spontaneously.       MEDICATIONS  (STANDING):  amLODIPine   Tablet 10 milliGRAM(s) Oral daily  aspirin 325 milliGRAM(s) Oral daily  atorvastatin 80 milliGRAM(s) Oral at bedtime  BACItracin   Ointment 1 Application(s) Topical two times a day  cholecalciferol 1000 Unit(s) Oral daily  cloNIDine 0.2 milliGRAM(s) Oral daily  dextrose 5%. 1000 milliLiter(s) (50 mL/Hr) IV Continuous <Continuous>  dextrose 50% Injectable 12.5 Gram(s) IV Push once  dextrose 50% Injectable 25 Gram(s) IV Push once  dextrose 50% Injectable 25 Gram(s) IV Push once  heparin  Injectable 5000 Unit(s) SubCutaneous every 8 hours  hydrALAZINE 50 milliGRAM(s) Oral three times a day  insulin lispro (HumaLOG) corrective regimen sliding scale   SubCutaneous every 6 hours  levETIRAcetam 250 milliGRAM(s) Oral two times a day  methylPREDNISolone sodium succinate IVPB 1000 milliGRAM(s) IV Intermittent at bedtime  pantoprazole  Injectable 40 milliGRAM(s) IV Push daily  sertraline 50 milliGRAM(s) Oral daily    MEDICATIONS  (PRN):  atropine Injectable 0.5 milliGRAM(s) IV Push every 5 minutes PRN Symptomatic Bradycardia  dextrose 40% Gel 15 Gram(s) Oral once PRN Blood Glucose LESS THAN 70 milliGRAM(s)/deciliter  glucagon  Injectable 1 milliGRAM(s) IntraMuscular once PRN Glucose LESS THAN 70 milligrams/deciliter      Allergies    No Known Allergies    Intolerances        LABS:                        11.1   9.1   )-----------( 354      ( 21 Aug 2018 06:47 )             33.6     08-21    138  |  102  |  27<H>  ----------------------------<  150<H>  4.3   |  22  |  1.59<H>    Ca    9.8      21 Aug 2018 06:47  Phos  3.7     08-21  Mg     2.3     08-21            RADIOLOGY & ADDITIONAL TESTS:  Reviewed

## 2018-08-22 NOTE — PROGRESS NOTE ADULT - PROBLEM SELECTOR PLAN 4
Patient initially admitted to psych for major depressive episode with inability to take care of self and SI. Patient subsequently had CVA.  -psych consulted. recs appreciated. No longer having SI, deemed not to be harm to self or others and d/cal 1:1.   -c/w zoloft to 50mg daily per psych recs   -psych recommending formal psych referral upon discharge Patient initially admitted to psych for major depressive episode with inability to take care of self and SI. Patient subsequently had CVA.  -psych consulted. No longer having SI, deemed not to be harm to self or others and d/cal 1:1  -c/w zoloft to 50mg daily per psych recs   -psych recommending formal psych referral upon discharge

## 2018-08-22 NOTE — PROGRESS NOTE ADULT - ASSESSMENT
68F PMH MDD, found down in her apartment. Stroke code called on 8/11 for L facial droop and weakness. CT then showed multiple lacunar infarcts, age indeterminate, in basal ganglia and thalamus. CTA showed chronic L VA occlusion, R VA and basilar hypoplasia, carotid stenosis bilaterally. No tPA given. Symptoms improved, then 8/12 another stroke code was called for L facial droop and left UE/LE weakness, CT unchanged. MRI showed acute right PATRICIA infarct with scattered smaller areas of infarct. Patient hemodynamically stable and transferred to  now being worked up for cerebral vasculitis 68F PMH MDD, found down in her apartment. Stroke code called on 8/11 for L facial droop and weakness. CT then showed multiple lacunar infarcts, age indeterminate, in basal ganglia and thalamus. CTA showed chronic L VA occlusion, R VA and basilar hypoplasia, carotid stenosis bilaterally. No tPA given. Symptoms improved, then 8/12 another stroke code was called for L facial droop and left UE/LE weakness, CT unchanged. MRI showed acute right PATRICIA infarct with scattered smaller areas of infarct. Patient hemodynamically stable and transferred to  now being treated for cerebral vasculitis

## 2018-08-22 NOTE — PROGRESS NOTE ADULT - PROBLEM SELECTOR PLAN 1
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 No atrial thrombus or shunt on KATHERINE, no afib. EF 55-60%. Patient sent for hypercoaguability workup- Hexagonal phase negative, DRVVT inhibitor screen 27.2 (WNL), DRVVT S/C screen LA neg. 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  - for antiplatelet and Atorvastatin 80 for - Secondary stroke prevention  -f/u rest of hypercoag workup  -rheum following, recs appreciated. c/w pulses of 1g solumedrol at night if BPs can tolerate. Continue pulses for 3-5 days pending clinical improvement (started 8/20). If BPs ~200s then can give PO prednisone 100mg instead of pulse 1g solumedrol to avoid risk of hemorrhage  -repeat MRI head 8/20 with new stroke, continue to manage cerebral vasculitis treatment as described   -f/u rheum workup   -will require MRI A/P vs. CT A/P vs. repeat US renal doppler for further work up of possible renal vasculitis causing MARYJANE and refractory HTN.  -Neurosurg following, recs appreciated  -medicine following, recs appreciated  -cardio recs appreciated.  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 No atrial thrombus or shunt on KATHERINE, no afib. EF 55-60%. Hypercoaguability workup negative- Hexagonal phase negative, DRVVT inhibitor screen 27.2 (WNL), DRVVT S/C screen LA neg. 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  - for antiplatelet and Atorvastatin 80 for - Secondary stroke prevention  -rheum following, recs appreciated. c/w pulses of 1g solumedrol at night if BPs can tolerate. Continue pulses for 3-5 days pending clinical improvement (started 8/20). If BPs ~200s then can give PO prednisone 100mg instead of pulse 1g solumedrol to avoid risk of hemorrhage  -repeat MRI head 8/20 with new stroke, continue to manage cerebral vasculitis treatment as described   -f/u rheum workup   -will require MRI A/P vs. CT A/P vs. repeat US renal doppler for further work up of possible renal vasculitis causing MARYJANE and refractory HTN.  -Neurosurg following, recs appreciated  -medicine following, recs appreciated  -cardio recs appreciated.  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 No atrial thrombus or shunt on KATHERINE, no afib. EF 55-60%. Hypercoaguability workup negative- Hexagonal phase negative, DRVVT inhibitor screen 27.2 (WNL), DRVVT S/C screen LA neg. 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  - for antiplatelet and Atorvastatin 80 for - Secondary stroke prevention  -rheum following, recs appreciated. c/w pulses of 1g solumedrol at night if BPs can tolerate. Continue pulses for 3-5 days pending clinical improvement (started 8/20). If BPs ~200s then can give PO prednisone 100mg instead of pulse 1g solumedrol to avoid risk of hemorrhage  -repeat MRI head 8/20 with new stroke, continue to manage cerebral vasculitis treatment as described   -f/u rheum workup   -will require MRI A/P vs. CT A/P vs.   -f/u repeat US renal doppler for further work up of possible renal vasculitis causing MARYJANE and refractory HTN.  -Neurosurg following, recs appreciated  -medicine following, recs appreciated  -cardio recs appreciated.  -will consider isordil instead of clonidine to optimize BP control  -EEG read with epileptiform activity, c/w keppra 250 BID

## 2018-08-22 NOTE — PROGRESS NOTE ADULT - SUBJECTIVE AND OBJECTIVE BOX
Rheumatology Follow Up Evaluation     68 year old female with severe depression presents with multiple lacunar infarcts after being found down in her apartment.      BP still elevated today.   Day 3/3 pulse steroids  Slightly more interactive    Afebrile. /83  Physical exam notable for the following pertinent positives/negatives:  Slightly more interactive. Eye contact. Speaking a bit louder.   Comfortable  Non toxic   Hyperpigmentation over nasal bridge not sparing nasolabial folds  Slight periorbital erythema  No gottrens papules, malar rash or discoid lesions   Hair thinning  Dry mouth without pooling of saliva, no tongue furrowing   No parotid or lacrimal enlargement   CV bradycardic, reg rhythm no murmurs   Resp CTAB  Facial droop  GI soft non tender BS+ no HSM   No renal bruit appreciated   No livedo     Data reviewed, notable for:   CBC with mild leukocytosis on prednisone (normal prior)  BUN/Cr 22/1.43 trending down   Renin/rohan 135/10   AST/ALT WNL   ESR 45  LAC neg  Cardiolipin neg   RF neg   ANCA neg  KORINA neg     KATHERINE no valvular disease or pericardial effusion  CTH/MRA with strokes as described above   Angiogram CNS vasculitis   CTA with chronic L VA occlusion, R VA and basilar hypoplasia, carotid stenosis bilaterally   Renal US with asymmetric kidneys possibly representing nephrosclerosis

## 2018-08-22 NOTE — PROGRESS NOTE ADULT - PROBLEM SELECTOR PLAN 2
Lower BP goals given pulses of solumedrol. 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 other secondary cause  - c/w Hydralazine 50 TID, Norvasc 10mg daily, clonidine 0.2mg daily   -lisinopril held in setting of MARYJANE, consider restarting as renal function improving  -holding beta blocker in setting of labile HR, can become sinus paige (asymptomatic)   -if need to increase BP meds, consider up titrating hydral  -cardio recs appreciated. will consider isordil instead of clonidine to optimize BP, especially in setting of MARYJANE  -direct renin 135.7, aldosterone 10.2. 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 CT A/P vs. repeat US renal doppler for further work up of possible renal vasculitis causing MARYJANE and refractory HTN. Lower BP goals given pulses of solumedrol. 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 other secondary cause  - increased Hydralazine to 75 TID, Norvasc 10mg daily, clonidine 0.2mg daily   - lisinopril held in setting of MARYJANE, consider restarting as renal function improving  - holding beta blocker in setting of labile HR, can become sinus paige (asymptomatic)   - if need to increase BP meds, consider up titrating hydral  - cardio recs appreciated. will consider isordil instead of clonidine to optimize BP, especially in setting of MARYJANE  - direct renin 135.7, aldosterone 10.2. 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 CT A/P vs. repeat US renal doppler for further work up of possible renal vasculitis causing MARYJANE and refractory HTN.

## 2018-08-22 NOTE — PROGRESS NOTE ADULT - ATTENDING COMMENTS
Patient seen and examined with Resident.  Agree with above.  Patient confused overall due to vasculitis that was confirmed on angiogram.    - She needs stimulation to wake up but then follows simple commands.  As per Rheumatology consult -   - On Solumedrol 1gm x 3 days and then probably Cytoxan.  Also on IV PPI and Vitamin D, as requested by Dr. Nation.  All labs requested by Dr. Nation sent.    Blood pressure better controlled, as also needed for pulse steroids.  Increase Hydralazine to 75mg today.  Avoid hypotension.  - Agree with finding out more about doppler portion of renal ultrasound.  Would discuss the different options with radiology, in terms of her CKD (though it's not that bad) whether MRA Abdomen/Pelvis is next appropriate test.    She will ultimately need acute rehab Patient seen and examined with Resident.  Agree with above.  Patient confused overall due to vasculitis that was confirmed on angiogram.    - She needs stimulation to wake up but then follows simple commands.  As per Rheumatology consult -   - On Solumedrol 1gm x 3 days and then probably Cytoxan.  Also on IV PPI and Vitamin D, as requested by Dr. Nation.  All labs requested by Dr. Nation sent.    Blood pressure needs better controlled, as also needed for pulse steroids.  Increase Hydralazine to 75mg today.  Avoid hypotension.  - Agree with finding out more about doppler portion of renal ultrasound.      She will ultimately need acute rehab

## 2018-08-22 NOTE — PROGRESS NOTE ADULT - PROBLEM SELECTOR PLAN 3
Patient presented with MARYJANE of Cr 1.5 with baseline unclear. Creatinine uptrend throughout patient's course. Etiology currently unclear, possibly prerenal in setting of decreased PO intake from depression vs. hypertensive nephropathy from persistently elevated BPs vs. renal vasculitis  -holding lisinopril in setting of MARYJANE, consider restarting as kidney function improving  - Cr uptrending  -continue to monitor MARYJANE on CKD stage 3. Patient presented with MARYJANE of Cr 1.5 with baseline unclear. Creatinine uptrend throughout patient's course. Etiology currently unclear, possibly prerenal in setting of decreased PO intake from depression vs. hypertensive nephropathy from persistently elevated BPs vs. renal vasculitis  -holding lisinopril in setting of MARYJANE, consider restarting as kidney function improving  - Cr uptrending  -continue to monitor  - for renal US doppler today to assess for IVAN MARYJANE on CKD stage 3. Patient presented with MARYJANE of Cr 1.5 with baseline unclear. Creatinine uptrend throughout patient's course. Etiology currently unclear, possibly prerenal in setting of decreased PO intake from depression vs. hypertensive nephropathy from persistently elevated BPs vs. renal vasculitis  - holding lisinopril in setting of MARYJANE, consider restarting as kidney function improving  - Cr uptrending  - continue to monitor  - for renal US doppler today to assess for IVAN

## 2018-08-23 LAB
ANION GAP SERPL CALC-SCNC: 18 MMOL/L — HIGH (ref 5–17)
ANTI-RIBONUCLEAR PROTEIN: <0.2 AI — SIGNIFICANT CHANGE UP
BUN SERPL-MCNC: 46 MG/DL — HIGH (ref 7–23)
CALCIUM SERPL-MCNC: 9.2 MG/DL — SIGNIFICANT CHANGE UP (ref 8.4–10.5)
CHLORIDE SERPL-SCNC: 97 MMOL/L — SIGNIFICANT CHANGE UP (ref 96–108)
CO2 SERPL-SCNC: 20 MMOL/L — LOW (ref 22–31)
CREAT SERPL-MCNC: 1.6 MG/DL — HIGH (ref 0.5–1.3)
ENA SM AB FLD QL: <0.2 AI — SIGNIFICANT CHANGE UP
GLUCOSE SERPL-MCNC: 216 MG/DL — HIGH (ref 70–99)
HCT VFR BLD CALC: 33.3 % — LOW (ref 34.5–45)
HGB BLD-MCNC: 11 G/DL — LOW (ref 11.5–15.5)
IGG SERPL-MCNC: 604 MG/DL — LOW (ref 700–1600)
IGG1 SER-MCNC: 317 MG/DL — SIGNIFICANT CHANGE UP (ref 248–810)
IGG2 SER-MCNC: 220 MG/DL — SIGNIFICANT CHANGE UP (ref 130–555)
IGG3 SER-MCNC: 31 MG/DL — SIGNIFICANT CHANGE UP (ref 15–102)
IGG4 SER-MCNC: 47 MG/DL — SIGNIFICANT CHANGE UP (ref 2–96)
MAGNESIUM SERPL-MCNC: 2.1 MG/DL — SIGNIFICANT CHANGE UP (ref 1.6–2.6)
MCHC RBC-ENTMCNC: 29 PG — SIGNIFICANT CHANGE UP (ref 27–34)
MCHC RBC-ENTMCNC: 33 G/DL — SIGNIFICANT CHANGE UP (ref 32–36)
MCV RBC AUTO: 87.9 FL — SIGNIFICANT CHANGE UP (ref 80–100)
PHOSPHATE SERPL-MCNC: 3.9 MG/DL — SIGNIFICANT CHANGE UP (ref 2.5–4.5)
PLATELET # BLD AUTO: 274 K/UL — SIGNIFICANT CHANGE UP (ref 150–400)
POTASSIUM SERPL-MCNC: 4 MMOL/L — SIGNIFICANT CHANGE UP (ref 3.5–5.3)
POTASSIUM SERPL-SCNC: 4 MMOL/L — SIGNIFICANT CHANGE UP (ref 3.5–5.3)
RBC # BLD: 3.79 M/UL — LOW (ref 3.8–5.2)
RBC # FLD: 14.4 % — SIGNIFICANT CHANGE UP (ref 10.3–16.9)
SODIUM SERPL-SCNC: 135 MMOL/L — SIGNIFICANT CHANGE UP (ref 135–145)
WBC # BLD: 9.8 K/UL — SIGNIFICANT CHANGE UP (ref 3.8–10.5)
WBC # FLD AUTO: 9.8 K/UL — SIGNIFICANT CHANGE UP (ref 3.8–10.5)

## 2018-08-23 PROCEDURE — 99233 SBSQ HOSP IP/OBS HIGH 50: CPT

## 2018-08-23 PROCEDURE — 99233 SBSQ HOSP IP/OBS HIGH 50: CPT | Mod: GC

## 2018-08-23 RX ORDER — HYDRALAZINE HCL 50 MG
25 TABLET ORAL ONCE
Qty: 0 | Refills: 0 | Status: DISCONTINUED | OUTPATIENT
Start: 2018-08-23 | End: 2018-08-23

## 2018-08-23 RX ORDER — SODIUM CHLORIDE 9 MG/ML
500 INJECTION INTRAMUSCULAR; INTRAVENOUS; SUBCUTANEOUS ONCE
Qty: 0 | Refills: 0 | Status: COMPLETED | OUTPATIENT
Start: 2018-08-23 | End: 2018-08-24

## 2018-08-23 RX ORDER — ACETAMINOPHEN 500 MG
325 TABLET ORAL EVERY 4 HOURS
Qty: 0 | Refills: 0 | Status: DISCONTINUED | OUTPATIENT
Start: 2018-08-23 | End: 2018-08-26

## 2018-08-23 RX ORDER — HYDRALAZINE HCL 50 MG
5 TABLET ORAL ONCE
Qty: 0 | Refills: 0 | Status: COMPLETED | OUTPATIENT
Start: 2018-08-23 | End: 2018-08-23

## 2018-08-23 RX ORDER — HYDRALAZINE HCL 50 MG
100 TABLET ORAL EVERY 8 HOURS
Qty: 0 | Refills: 0 | Status: DISCONTINUED | OUTPATIENT
Start: 2018-08-23 | End: 2018-08-27

## 2018-08-23 RX ORDER — DIPHENHYDRAMINE HCL 50 MG
25 CAPSULE ORAL EVERY 4 HOURS
Qty: 0 | Refills: 0 | Status: DISCONTINUED | OUTPATIENT
Start: 2018-08-23 | End: 2018-08-27

## 2018-08-23 RX ORDER — INSULIN GLARGINE 100 [IU]/ML
5 INJECTION, SOLUTION SUBCUTANEOUS AT BEDTIME
Qty: 0 | Refills: 0 | Status: DISCONTINUED | OUTPATIENT
Start: 2018-08-23 | End: 2018-08-30

## 2018-08-23 RX ORDER — CYCLOPHOSPHAMIDE 100 MG
1177 VIAL (EA) INTRAVENOUS ONCE
Qty: 0 | Refills: 0 | Status: COMPLETED | OUTPATIENT
Start: 2018-08-23 | End: 2018-08-25

## 2018-08-23 RX ADMIN — HEPARIN SODIUM 5000 UNIT(S): 5000 INJECTION INTRAVENOUS; SUBCUTANEOUS at 22:25

## 2018-08-23 RX ADMIN — Medication 1 MILLIGRAM(S): at 11:53

## 2018-08-23 RX ADMIN — Medication 325 MILLIGRAM(S): at 11:53

## 2018-08-23 RX ADMIN — Medication 30 MILLIGRAM(S): at 12:59

## 2018-08-23 RX ADMIN — AMLODIPINE BESYLATE 10 MILLIGRAM(S): 2.5 TABLET ORAL at 06:17

## 2018-08-23 RX ADMIN — Medication 1000 UNIT(S): at 11:53

## 2018-08-23 RX ADMIN — LEVETIRACETAM 250 MILLIGRAM(S): 250 TABLET, FILM COATED ORAL at 18:11

## 2018-08-23 RX ADMIN — Medication 100 MILLIGRAM(S): at 11:54

## 2018-08-23 RX ADMIN — Medication 60 MILLIGRAM(S): at 06:16

## 2018-08-23 RX ADMIN — HEPARIN SODIUM 5000 UNIT(S): 5000 INJECTION INTRAVENOUS; SUBCUTANEOUS at 06:16

## 2018-08-23 RX ADMIN — Medication 1 APPLICATION(S): at 18:22

## 2018-08-23 RX ADMIN — Medication 2: at 00:30

## 2018-08-23 RX ADMIN — Medication 1 APPLICATION(S): at 07:36

## 2018-08-23 RX ADMIN — Medication 0.1 MILLIGRAM(S): at 06:16

## 2018-08-23 RX ADMIN — INSULIN GLARGINE 5 UNIT(S): 100 INJECTION, SOLUTION SUBCUTANEOUS at 22:25

## 2018-08-23 RX ADMIN — SERTRALINE 50 MILLIGRAM(S): 25 TABLET, FILM COATED ORAL at 11:54

## 2018-08-23 RX ADMIN — Medication 5 MILLIGRAM(S): at 18:22

## 2018-08-23 RX ADMIN — ATORVASTATIN CALCIUM 80 MILLIGRAM(S): 80 TABLET, FILM COATED ORAL at 22:27

## 2018-08-23 RX ADMIN — HEPARIN SODIUM 5000 UNIT(S): 5000 INJECTION INTRAVENOUS; SUBCUTANEOUS at 14:23

## 2018-08-23 RX ADMIN — Medication 1 TABLET(S): at 11:53

## 2018-08-23 RX ADMIN — Medication 10: at 11:53

## 2018-08-23 RX ADMIN — LEVETIRACETAM 250 MILLIGRAM(S): 250 TABLET, FILM COATED ORAL at 06:17

## 2018-08-23 RX ADMIN — Medication 100 MILLIGRAM(S): at 22:27

## 2018-08-23 RX ADMIN — Medication 75 MILLIGRAM(S): at 12:35

## 2018-08-23 RX ADMIN — PANTOPRAZOLE SODIUM 40 MILLIGRAM(S): 20 TABLET, DELAYED RELEASE ORAL at 11:53

## 2018-08-23 RX ADMIN — Medication 75 MILLIGRAM(S): at 06:17

## 2018-08-23 RX ADMIN — Medication 0.1 MILLIGRAM(S): at 17:10

## 2018-08-23 NOTE — PROGRESS NOTE BEHAVIORAL HEALTH - NSBHCHARTREVIEWLAB_PSY_A_CORE FT
Basic Metabolic Panel in AM (08.20.18 @ 06:30)    Sodium, Serum: 141 mmol/L    Potassium, Serum: 3.9 mmol/L    Chloride, Serum: 106 mmol/L    Carbon Dioxide, Serum: 21 mmol/L    Anion Gap, Serum: 14 mmol/L    Blood Urea Nitrogen, Serum: 22 mg/dL    Creatinine, Serum: 1.43 mg/dL    Glucose, Serum: 90 mg/dL    Calcium, Total Serum: 9.4 mg/dL
CBC Full  -  ( 23 Aug 2018 08:37 )  WBC Count : 9.8 K/uL  Hemoglobin : 11.0 g/dL  Hematocrit : 33.3 %  Platelet Count - Automated : 274 K/uL  Mean Cell Volume : 87.9 fL  Mean Cell Hemoglobin : 29.0 pg  Mean Cell Hemoglobin Concentration : 33.0 g/dL  Auto Neutrophil # : x  Auto Lymphocyte # : x  Auto Monocyte # : x  Auto Eosinophil # : x  Auto Basophil # : x  Auto Neutrophil % : x  Auto Lymphocyte % : x  Auto Monocyte % : x  Auto Eosinophil % : x  Auto Basophil % : x  08-23    135  |  97  |  46<H>  ----------------------------<  216<H>  4.0   |  20<L>  |  1.60<H>    Ca    9.2      23 Aug 2018 08:37  Phos  3.9     08-23  Mg     2.1     08-23
Basic Metabolic Panel in AM (08.20.18 @ 06:30)    Sodium, Serum: 141 mmol/L    Potassium, Serum: 3.9 mmol/L    Chloride, Serum: 106 mmol/L    Carbon Dioxide, Serum: 21 mmol/L    Anion Gap, Serum: 14 mmol/L    Blood Urea Nitrogen, Serum: 22 mg/dL    Creatinine, Serum: 1.43 mg/dL    Glucose, Serum: 90 mg/dL    Calcium, Total Serum: 9.4 mg/dL

## 2018-08-23 NOTE — PROGRESS NOTE BEHAVIORAL HEALTH - NSBHCHARTREVIEWVS_PSY_A_CORE FT
Vital Signs Last 24 Hrs  T(C): 36.8 (20 Aug 2018 09:59), Max: 36.8 (20 Aug 2018 09:59)  T(F): 98.3 (20 Aug 2018 09:59), Max: 98.3 (20 Aug 2018 09:59)  HR: 50 (20 Aug 2018 08:24) (50 - 64)  BP: 176/78 (20 Aug 2018 08:24) (176/78 - 218/100)  BP(mean): 112 (20 Aug 2018 08:24) (112 - 143)  RR: 18 (20 Aug 2018 08:24) (11 - 20)  SpO2: 97% (20 Aug 2018 08:24) (97% - 98%)
Vital Signs Last 24 Hrs  T(C): 36.3 (23 Aug 2018 09:50), Max: 37 (22 Aug 2018 21:55)  T(F): 97.3 (23 Aug 2018 09:50), Max: 98.6 (22 Aug 2018 21:55)  HR: 48 (23 Aug 2018 12:34) (46 - 54)  BP: 210/86 (23 Aug 2018 12:34) (169/77 - 210/86)  BP(mean): 123 (23 Aug 2018 12:34) (110 - 123)  RR: 18 (23 Aug 2018 08:24) (16 - 18)  SpO2: 95% (23 Aug 2018 04:24) (95% - 95%)
Vital Signs Last 24 Hrs  T(C): 36.8 (20 Aug 2018 09:59), Max: 36.8 (20 Aug 2018 09:59)  T(F): 98.3 (20 Aug 2018 09:59), Max: 98.3 (20 Aug 2018 09:59)  HR: 50 (20 Aug 2018 08:24) (50 - 64)  BP: 176/78 (20 Aug 2018 08:24) (176/78 - 218/100)  BP(mean): 112 (20 Aug 2018 08:24) (112 - 143)  RR: 18 (20 Aug 2018 08:24) (11 - 20)  SpO2: 97% (20 Aug 2018 08:24) (97% - 98%)

## 2018-08-23 NOTE — PROGRESS NOTE ADULT - ATTENDING COMMENTS
Patient seen and examined with Resident.  Agree with above.  Patient confused overall due to vasculitis that was confirmed on angiogram.    - She needs stimulation to wake up but then follows simple commands.  As per Rheumatology consult -   - On Solumedrol 1gm x 3 days and then Cytoxan tomorrow.  Also on IV PPI and Vitamin D, as requested by Dr. Nation.  All labs requested by Dr. Nation sent.  Defer results to Dr. Nation.    Blood pressure needs better controlled, as also needed for pulse steroids.  Increase Hydralazine to 100mg today.  Avoid hypotension.  - Agree with finding out more about doppler portion of renal ultrasound.      She will ultimately need acute rehab

## 2018-08-23 NOTE — PROGRESS NOTE BEHAVIORAL HEALTH - NSBHCONSULTFOLLOWAFTERCARE_PSY_A_CORE FT
At discharge would benefit from formal psych referral ([facility]??).

## 2018-08-23 NOTE — PROGRESS NOTE ADULT - SUBJECTIVE AND OBJECTIVE BOX
Rheumatology Follow Up Evaluation     68 year old female with severe depression presents with multiple lacunar infarcts after being found down in her apartment.      BP still elevated today.   Completed pulse steroids X 3d, now transitioned to 1mg/kg daily   Slightly more interactive    Afebrile. /70  Physical exam notable for the following pertinent positives/negatives:  Much more interactive. Eye contact. Speaking a bit louder. Answering questions mostly appropriately.   Comfortable  Non toxic   Hyperpigmentation over nasal bridge not sparing nasolabial folds  Slight periorbital erythema  No gottrens papules, malar rash or discoid lesions   Hair thinning  Dry mouth without pooling of saliva, no tongue furrowing   No parotid or lacrimal enlargement   CV bradycardic, reg rhythm no murmurs   Resp CTAB  Facial droop  GI soft non tender BS+ no HSM   No renal bruit appreciated   No livedo     Data reviewed, notable for:   CBC with mild leukocytosis on prednisone (normal prior)  BUN/Cr 22/1.43 trending down   Renin/rohan 135/10   AST/ALT WNL   ESR 45  LAC neg  Cardiolipin neg   RF neg   ANCA neg  KORINA neg   sm/RNP neg  IgG4 subclasses normal     KATHERINE no valvular disease or pericardial effusion  CTH/MRA with strokes as described above   Angiogram CNS vasculitis   CTA with chronic L VA occlusion, R VA and basilar hypoplasia, carotid stenosis bilaterally   Renal US with asymmetric kidneys possibly representing nephrosclerosis

## 2018-08-23 NOTE — PROGRESS NOTE ADULT - ASSESSMENT
68 yr old female with a PMHx of HTN that presented to [facility] for psych stabilization, subsequently had a multi-focal stroke with L sided facial and UE/LE deficits. Course c/b persistent HTN requiring nicardipine drip now transition to PO BP regimen. 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 Rheumatology consulted. Medicine initially consulted for clearance prior to angiogram.    #acute CVA - likely 2/2 vasculitis. Patient with residual left sided deficits.  -on , 80 mg lipitor   - mgmt per stroke service    #MARYJANE vs CKD - Possibly 2/2 vasculitis vs CKD 2/2 hypertension vs ?   Patient presented with MARYJANE of 1.45; unclear baseline. Renal ultrasound showed slightly diminished left renal size with smooth renal cortical mantle thinning which may suggest underlying nephrosclerosis. Renal duplex not performed 2/2 patient agitation  - urine lytes pending; renal duplex pending   - please hold lisinopril; please avoid all nephrotoxic agents  - trend Cr    #HTN - persistently elevated w/SBPs in 160s - 180s  - currently on 3 drug regimen with clonidine, amlodipine, hydralazine  - HTN management per neurology     Medicine to follow

## 2018-08-23 NOTE — PROGRESS NOTE ADULT - PROBLEM SELECTOR PLAN 2
Lower BP goals given pulses of solumedrol. Direct renin 135.7, aldosterone 10.2. 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 other secondary cause. Patient hypertensive to >200 SBP after transfer to [facility], s/p IV hydral 5mg x1, increased standing IV hydral to 100mg TID  - c/w  hydral 100 TID, Norvasc 10mg daily, clonidine 0.1mg BID  - lisinopril held in setting of MARYJANE, consider restarting as renal function improving  - holding beta blocker in setting of labile HR, can become sinus paige (asymptomatic)   - cardio recs appreciated. will consider isordil instead of clonidine to optimize BP, especially in setting of MARYJANE  - f/u duplex abdomen/pelvis US possible renal vasculitis vs. IVAN causing MARYJANE and refractory HTN.

## 2018-08-23 NOTE — PROGRESS NOTE ADULT - SUBJECTIVE AND OBJECTIVE BOX
PGY-1 Transfer Acceptance Note   [address] [place], [place] [address]an to [address] [place] [address] Course  68 F PMHx HTN and Major Depressive Disorder, presented to the ED with recent history of self harm and depressed mood. Primary complaint was of progressive functional decline at home w/severe anhedonia, anorexia, poor sleep, suicidality (would cut herself and claims to have started cutting herself several months ago), often drinking alcohol at home to consol herself. Patient was admitted to [facility] for depressive episode - however, 1-2 hours after arriving onto the floor, a stroke code was called for L sided weakness and hypertension. NIHSS was 6 on initial assessment. On CTH - noted to have lacunar infarcts (basal ganglia and R/L thalami). On second assessment, NIHSS 1. Patient was brought back to the ED and re-worked up. EKG revealed profound bradycardia - Cardiology was consulted for HR 30s - asymptomatic. Neurosurgery also consulted for what appeared to be mild hydrocephalus on CT Scan.  No acute interventions initially, however Neurosurgery was reconsulted for findings of acute Rt PATRICIA region ischemic stroke in MRI and CTA finding of vertebrobasilar insufficiency. During exam patient noted to have new facial droop along with LLE plegia and LUE weakness. Stroke code was called and patient was taken to CT. Repeat CT showed no areas of infarction. CT perfusion showed no perfusion abnormality. Pressure dependent motor deficit was suspected and  patient was admitted to ICU for SBP augmentation under Dr. Strickland Out of window for tPA; Patient was monitored in ICU x 48 hours in stable neurologic condition.  Patient was on nicardipine drip to maintain -200.  Multiple oral agents started, now off nicardipine. Cardiology consulted to assist in stroke workup given concern of cardioembolic phenomenon.  KATHERINE performed on 8/14, negative for shunt.  Patient also presented with anterior chest wall wound, hemorrhagic in appearance.  Dermatology service consulted, recommending warm compresses twice daily along with bacitracin and keeping wound covered so patient does not continue to irritate it. 8/15    Patient transferred to [facility] for continued medical management. Psych following, d/cal 1:1, started on zoloft 25mg, uptitrated to 50mg. Patient started on multiple medications for refractory HTN in the 200s, although patient clinically more alert at higher BPs and recent stroke allowed for SBP goal 180s-200. Secondary HTN work up sent. Hypercoag work up sent. Patient with noted worsening MARYJANE ,started IV NS with gradual improvement. Cerebral angiogram ordered due to clinical suspicion for cerebral vasculitis given positive ESR and negative work up.  EEG demonstrated epileptiform activity, started keppra.  8/17 Cerebral angiogram positive for cerebral vasculitis, started on PO prednisone 80mg. Post-cerebral angiogram patient with concerning findings of decreased mental status and worsening left sided muscle weakness - repeat CT head noncon negative for acute changes from previous study. MRI 8/20 demonstrated new stroke in right medial temporal periventricular white matter, posterior inferior basal ganglia, and capsule. Rheum consulted for cerebral vasculitis work up and management. Rhuem work up sent, patient received 1g solu-medrol pulsed at night for 3 days. Patient requiring lower BP goals given risk of hemorrhage with high dose steroids. Patient's BP regimen increased to current (hydralazine 75mg TID, clonidine 0.1mg BID; amlodipine 10mg). Patient ordered for repeat doppler US of kidneys for further eval of renal vasculitis (poss IVAN causing secondary HTN). Pt ordered for cytoxan and solu-medrol 30 BID for 2 more days.    OVERNIGHT EVENTS: NEETU    SUBJECTIVE / INTERVAL HPI: Patient seen and examined at bedside. Patient denied chest pain, SOB, fever, chills, n/v/d/c.     VITAL SIGNS:  Vital Signs Last 24 Hrs  T(C): 37.1 (23 Aug 2018 13:55), Max: 37.1 (23 Aug 2018 13:55)  T(F): 98.7 (23 Aug 2018 13:55), Max: 98.7 (23 Aug 2018 13:55)  HR: 48 (23 Aug 2018 14:13) (46 - 54)  BP: 177/75 (23 Aug 2018 14:13) (169/77 - 210/86)  BP(mean): 108 (23 Aug 2018 14:13) (108 - 123)  RR: 17 (23 Aug 2018 14:13) (16 - 18)  SpO2: 95% (23 Aug 2018 04:24) (95% - 95%)    PHYSICAL EXAM:    General: NAD, following commands, speaking in low volume.  HEENT: NC/AT; PERRL, clear conjunctiva. MMM  Neck: supple  Respiratory: Rhonchi appreciated b/l. No wheezing, no rales b/l  Cardiovascular: +S1/S2; RRR  Abdomen: soft, NT/ND; +BS x4  Extremities: No erythema, no edema, no cyanosis b/l  Neurological exam limited to patient cooperation and condition.  Mental status: Awake and alert. Follows commands.  CN II: pupils equally round and reactive to light,   II, IV, VI: EOMI without nystagmus. Right gaze preferrence.   VII: Left facial droop  VIII: hearing is intact to finger rub  IX, X: Uvula is midline and soft palate rises symmetrically  Muscle strength: RUE 4/5, LUE 3/5, RLE 4/5, LLE 1/5.      MEDICATIONS:  MEDICATIONS  (STANDING):  amLODIPine   Tablet 10 milliGRAM(s) Oral daily  aspirin 325 milliGRAM(s) Oral daily  atorvastatin 80 milliGRAM(s) Oral at bedtime  BACItracin   Ointment 1 Application(s) Topical two times a day  cholecalciferol 1000 Unit(s) Oral daily  cloNIDine 0.1 milliGRAM(s) Oral every 12 hours  cyclophosphamide IVPB (Non - oncologic) 1177 milliGRAM(s) IV Intermittent once  dextrose 5%. 1000 milliLiter(s) (50 mL/Hr) IV Continuous <Continuous>  dextrose 50% Injectable 12.5 Gram(s) IV Push once  dextrose 50% Injectable 25 Gram(s) IV Push once  dextrose 50% Injectable 25 Gram(s) IV Push once  folic acid 1 milliGRAM(s) Oral daily  heparin  Injectable 5000 Unit(s) SubCutaneous every 8 hours  hydrALAZINE 75 milliGRAM(s) Oral every 8 hours  insulin glargine Injectable (LANTUS) 5 Unit(s) SubCutaneous at bedtime  insulin lispro (HumaLOG) corrective regimen sliding scale   SubCutaneous every 6 hours  levETIRAcetam 250 milliGRAM(s) Oral two times a day  multivitamin 1 Tablet(s) Oral daily  pantoprazole  Injectable 40 milliGRAM(s) IV Push daily  sertraline 50 milliGRAM(s) Oral daily  sodium chloride 0.9% Bolus 500 milliLiter(s) IV Bolus once  thiamine 100 milliGRAM(s) Oral daily    MEDICATIONS  (PRN):  acetaminophen   Tablet 325 milliGRAM(s) Oral every 4 hours PRN For Temp greater than 38 C (100.4 F)  atropine Injectable 0.5 milliGRAM(s) IV Push every 5 minutes PRN Symptomatic Bradycardia  dextrose 40% Gel 15 Gram(s) Oral once PRN Blood Glucose LESS THAN 70 milliGRAM(s)/deciliter  diphenhydrAMINE   Capsule 25 milliGRAM(s) Oral every 4 hours PRN Rash and/or Itching  glucagon  Injectable 1 milliGRAM(s) IntraMuscular once PRN Glucose LESS THAN 70 milligrams/deciliter      ALLERGIES:  Allergies    No Known Allergies    Intolerances        LABS:                        11.0   9.8   )-----------( 274      ( 23 Aug 2018 08:37 )             33.3     08-23    135  |  97  |  46<H>  ----------------------------<  216<H>  4.0   |  20<L>  |  1.60<H>    Ca    9.2      23 Aug 2018 08:37  Phos  3.9     08-23  Mg     2.1     08-23          CAPILLARY BLOOD GLUCOSE      POCT Blood Glucose.: 352 mg/dL (23 Aug 2018 11:35)      RADIOLOGY & ADDITIONAL TESTS: Reviewed.

## 2018-08-23 NOTE — PROGRESS NOTE BEHAVIORAL HEALTH - NSBHFUPINTERVALHXFT_PSY_A_CORE
Patient seen and interviewed at bedtime. She presented in bed, alert; she appeared to be watching TV. she reports that her mood continues to be low because her "head is not working well". she denies any changes in her sleep. she denies any Si/HI and reports motivation to improve her physical health (PT). She cannot recall the circumstances that brought her to the hospital or that she was initially admitted to psychiatry.  Patient continues to present very detached, however she is more alert and has brighter affect.

## 2018-08-23 NOTE — PROGRESS NOTE ADULT - PROBLEM SELECTOR PLAN 2
Lower BP goals given pulses of solumedrol. 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 other secondary cause  - increased Hydralazine to 75 TID, Norvasc 10mg daily, clonidine 0.2mg daily   - lisinopril held in setting of MARYJANE, consider restarting as renal function improving  - holding beta blocker in setting of labile HR, can become sinus paige (asymptomatic)   - if need to increase BP meds, consider up titrating hydral  - cardio recs appreciated. will consider isordil instead of clonidine to optimize BP, especially in setting of MARYJANE  - direct renin 135.7, aldosterone 10.2. 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 CT A/P vs. repeat US renal doppler for further work up of possible renal vasculitis causing MARYJANE and refractory HTN. Lower BP goals given pulses of solumedrol. 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 other secondary cause  - c/w Hydralazine to 75 TID, Norvasc 10mg daily, clonidine 0.1mg BID   - lisinopril held in setting of MARYJANE, consider restarting as renal function improving  - holding beta blocker in setting of labile HR, can become sinus paige (asymptomatic)   - if need to increase BP meds, consider up titrating hydral  - cardio recs appreciated. will consider isordil instead of clonidine to optimize BP, especially in setting of MARYJANE  - direct renin 135.7, aldosterone 10.2. 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

## 2018-08-23 NOTE — PROGRESS NOTE ADULT - ASSESSMENT
68F PMH MDD, found down in her apartment. Stroke code called on 8/11 for L facial droop and weakness. CT then showed multiple lacunar infarcts, age indeterminate, in basal ganglia and thalamus. CTA showed chronic L VA occlusion, R VA and basilar hypoplasia, carotid stenosis bilaterally. No tPA given. Symptoms improved, then 8/12 another stroke code was called for L facial droop and left UE/LE weakness, CT unchanged. MRI showed acute right PATRICIA infarct with scattered smaller areas of infarct. Patient hemodynamically stable and transferred to  now being treated for cerebral vasculitis 68F PMH MDD, found down in her apartment. Stroke code called on 8/11 for L facial droop and weakness. CT then showed multiple lacunar infarcts, age indeterminate, in basal ganglia and thalamus. CTA showed chronic L VA occlusion, R VA and basilar hypoplasia, carotid stenosis bilaterally. No tPA given. Symptoms improved, then 8/12 another stroke code was called for L facial droop and left UE/LE weakness, CT unchanged. MRI showed acute right PATRICIA infarct with scattered smaller areas of infarct. Patient on treatment for cerebral vasculitis, stable for transfer to [address] [place] [address]

## 2018-08-23 NOTE — PROGRESS NOTE ADULT - SUBJECTIVE AND OBJECTIVE BOX
INTERVAL HPI/OVERNIGHT EVENTS:  Patient was seen and examined at bedside. As per nurse and patient, no o/n events, patient resting comfortably. No complaints at this time. Patient denies: fever, chills, dizziness, weakness, HA, Changes in vision, CP, palpitations, SOB, cough, N/V/D/C, dysuria, changes in bowel movements, LE edema. ROS otherwise negative.    VITAL SIGNS:  T(F): 98.5 (08-23-18 @ 01:58)  HR: 54 (08-23-18 @ 04:24)  BP: 175/77 (08-23-18 @ 04:24)  RR: 18 (08-23-18 @ 04:24)  SpO2: 95% (08-23-18 @ 04:24)  Wt(kg): --    PHYSICAL EXAM:    General: NAD, following commands, speaking in low volume  HEENT: NC/AT; PERRL, clear conjunctiva. Dry mucus membranes  Neck: supple  Respiratory: CTA b/l. No wheezes, no rales, no rhonchi appreciated   Cardiovascular: +S1/S2; RRR  Abdomen: soft, NT/ND; +BS x4  Extremities: No erythema, no edema, no cyanosis b/l  Neurological exam limited to patient cooperation and condition.  Mental status: Awake and alert. Speech is fluent. Follows commands.  CN II: pupils equally round and reactive to light,   II, IV, VI: EOMI without nystagmus. Right gaze preferrence.   VII: Left facial droop  VIII: hearing is intact to finger rub  IX, X: Uvula is midline and soft palate rises symmetrically  Muscle strength: RUE 4/5, LUE 1-2/5, RLE 4/5, LLE 1-2/5. Moves RUE, RLE, and LLE spontaneously.     MEDICATIONS  (STANDING):  amLODIPine   Tablet 10 milliGRAM(s) Oral daily  aspirin 325 milliGRAM(s) Oral daily  atorvastatin 80 milliGRAM(s) Oral at bedtime  BACItracin   Ointment 1 Application(s) Topical two times a day  cholecalciferol 1000 Unit(s) Oral daily  cloNIDine 0.1 milliGRAM(s) Oral every 12 hours  dextrose 5%. 1000 milliLiter(s) (50 mL/Hr) IV Continuous <Continuous>  dextrose 50% Injectable 12.5 Gram(s) IV Push once  dextrose 50% Injectable 25 Gram(s) IV Push once  dextrose 50% Injectable 25 Gram(s) IV Push once  folic acid 1 milliGRAM(s) Oral daily  heparin  Injectable 5000 Unit(s) SubCutaneous every 8 hours  hydrALAZINE 75 milliGRAM(s) Oral every 8 hours  insulin lispro (HumaLOG) corrective regimen sliding scale   SubCutaneous every 6 hours  levETIRAcetam 250 milliGRAM(s) Oral two times a day  methylPREDNISolone sodium succinate Injectable 60 milliGRAM(s) IV Push every 12 hours  multivitamin 1 Tablet(s) Oral daily  pantoprazole  Injectable 40 milliGRAM(s) IV Push daily  sertraline 50 milliGRAM(s) Oral daily  thiamine 100 milliGRAM(s) Oral daily    MEDICATIONS  (PRN):  atropine Injectable 0.5 milliGRAM(s) IV Push every 5 minutes PRN Symptomatic Bradycardia  dextrose 40% Gel 15 Gram(s) Oral once PRN Blood Glucose LESS THAN 70 milliGRAM(s)/deciliter  glucagon  Injectable 1 milliGRAM(s) IntraMuscular once PRN Glucose LESS THAN 70 milligrams/deciliter      Allergies    No Known Allergies    Intolerances        LABS:                        10.2   7.3   )-----------( 271      ( 22 Aug 2018 06:03 )             30.7     08-22    137  |  100  |  40<H>  ----------------------------<  151<H>  4.2   |  21<L>  |  1.64<H>    Ca    9.2      22 Aug 2018 06:03  Phos  3.7     08-21  Mg     2.0     08-22            RADIOLOGY & ADDITIONAL TESTS:  Reviewed Hospital Course  68 F PMHx HTN and Major Depressive Disorder, presented to the ED with recent history of self harm and depressed mood. Primary complaint was of progressive functional decline at home w/severe anhedonia, anorexia, poor sleep, suicidality (would cut herself and claims to have started cutting herself several months ago), often drinking alcohol at home to consol herself. Patient was admitted to [facility] for depressive episode - however, 1-2 hours after arriving onto the floor, a stroke code was called for L sided weakness and hypertension. NIHSS was 6 on initial assessment. On CTH - noted to have lacunar infarcts (basal ganglia and R/L thalami). On second assessment, NIHSS 1. Patient was brought back to the ED and re-worked up. EKG revealed profound bradycardia - Cardiology was consulted for HR 30s - asymptomatic. Neurosurgery also consulted for what appeared to be mild hydrocephalus on CT Scan.  No acute interventions initially, however Neurosurgery was reconsulted for findings of acute Rt PATRICIA region ischemic stroke in MRI and CTA finding of vertebrobasilar insufficiency. During exam patient noted to have new facial droop along with LLE plegia and LUE weakness. Stroke code was called and patient was taken to CT. Repeat CT showed no areas of infarction. CT perfusion showed no perfusion abnormality. Pressure dependent motor deficit was suspected and  patient was admitted to ICU for SBP augmentation under Dr. Strickland Out of window for tPA; Patient was monitored in ICU x 48 hours in stable neurologic condition.  Patient was on nicardipine drip to maintain -200.  Multiple oral agents started, now off nicardipine. Cardiology consulted to assist in stroke workup given concern of cardioembolic phenomenon.  KATHERINE performed on 8/14, negative for shunt.  Patient also presented with anterior chest wall wound, hemorrhagic in appearance.  Dermatology service consulted, recommending warm compresses twice daily along with bacitracin and keeping wound covered so patient does not continue to irritate it. 8/15    Patient transferred to [facility] for continued medical management. Psych following, d/cal 1:1, started on zoloft 25mg, uptitrated to 50mg. Patient started on multiple medications for refractory HTN in the 200s, although patient clinically more alert at higher BPs and recent stroke allowed for SBP goal 180s-200. Secondary HTN work up sent. Hypercoag work up sent. Patient with noted worsening MARYJANE ,started IV NS with gradual improvement. Cerebral angiogram ordered due to clinical suspicion for cerebral vasculitis given positive ESR and negative work up.  EEG demonstrated epileptiform activity, started keppra.  8/17 Cerebral angiogram positive for cerebral vasculitis, started on PO prednisone 80mg. Post-cerebral angiogram patient with concerning findings of decreased mental status and worsening left sided muscle weakness - repeat CT head noncon negative for acute changes from previous study. MRI 8/20 demonstrated new stroke in right medial temporal periventricular white matter, posterior inferior basal ganglia, and capsule. Rheum consulted for cerebral vasculitis work up and management. Rhuem work up sent, patient received 1g solu-medrol pulsed at night for 3 days. Patient requiring lower BP goals given risk of hemorrhage with high dose steroids. Patient's BP regimen increased to current (hydralazine 75mg TID, clonidine 0.1mg BID; amlodipine 10mg). Patient ordered for repeat doppler US of kidneys for further eval of renal vasculitis (poss IVAN causing secondary HTN). Pt ordered for cytoxan and solu-medrol 30 BID for 2 more days.    INTERVAL HPI/OVERNIGHT EVENTS:  Patient was seen and examined at bedside. As per nurse and patient, no o/n events, patient resting comfortably. No complaints at this time. Patient denies: fever, chills, dizziness, weakness, HA, Changes in vision, CP, palpitations, SOB, cough, N/V/D/C, dysuria, changes in bowel movements, LE edema. ROS otherwise negative.    VITAL SIGNS:  T(F): 98.5 (08-23-18 @ 01:58)  HR: 54 (08-23-18 @ 04:24)  BP: 175/77 (08-23-18 @ 04:24)  RR: 18 (08-23-18 @ 04:24)  SpO2: 95% (08-23-18 @ 04:24)  Wt(kg): --    PHYSICAL EXAM:    General: NAD, following commands, speaking in low volume  HEENT: NC/AT; PERRL, clear conjunctiva. Dry mucus membranes  Neck: supple  Respiratory: CTA b/l. No wheezes, no rales, no rhonchi appreciated   Cardiovascular: +S1/S2; RRR  Abdomen: soft, NT/ND; +BS x4  Extremities: No erythema, no edema, no cyanosis b/l  Neurological exam limited to patient cooperation and condition.  Mental status: Awake and alert. Speech is fluent. Follows commands.  CN II: pupils equally round and reactive to light,   II, IV, VI: EOMI without nystagmus. Right gaze preferrence.   VII: Left facial droop  VIII: hearing is intact to finger rub  IX, X: Uvula is midline and soft palate rises symmetrically  Muscle strength: RUE 4/5, LUE 1-2/5, RLE 4/5, LLE 1-2/5. Moves RUE, RLE, LUE and LLE spontaneously.     MEDICATIONS  (STANDING):  amLODIPine   Tablet 10 milliGRAM(s) Oral daily  aspirin 325 milliGRAM(s) Oral daily  atorvastatin 80 milliGRAM(s) Oral at bedtime  BACItracin   Ointment 1 Application(s) Topical two times a day  cholecalciferol 1000 Unit(s) Oral daily  cloNIDine 0.1 milliGRAM(s) Oral every 12 hours  dextrose 5%. 1000 milliLiter(s) (50 mL/Hr) IV Continuous <Continuous>  dextrose 50% Injectable 12.5 Gram(s) IV Push once  dextrose 50% Injectable 25 Gram(s) IV Push once  dextrose 50% Injectable 25 Gram(s) IV Push once  folic acid 1 milliGRAM(s) Oral daily  heparin  Injectable 5000 Unit(s) SubCutaneous every 8 hours  hydrALAZINE 75 milliGRAM(s) Oral every 8 hours  insulin lispro (HumaLOG) corrective regimen sliding scale   SubCutaneous every 6 hours  levETIRAcetam 250 milliGRAM(s) Oral two times a day  methylPREDNISolone sodium succinate Injectable 60 milliGRAM(s) IV Push every 12 hours  multivitamin 1 Tablet(s) Oral daily  pantoprazole  Injectable 40 milliGRAM(s) IV Push daily  sertraline 50 milliGRAM(s) Oral daily  thiamine 100 milliGRAM(s) Oral daily    MEDICATIONS  (PRN):  atropine Injectable 0.5 milliGRAM(s) IV Push every 5 minutes PRN Symptomatic Bradycardia  dextrose 40% Gel 15 Gram(s) Oral once PRN Blood Glucose LESS THAN 70 milliGRAM(s)/deciliter  glucagon  Injectable 1 milliGRAM(s) IntraMuscular once PRN Glucose LESS THAN 70 milligrams/deciliter      Allergies    No Known Allergies    Intolerances        LABS:                        10.2   7.3   )-----------( 271      ( 22 Aug 2018 06:03 )             30.7     08-22    137  |  100  |  40<H>  ----------------------------<  151<H>  4.2   |  21<L>  |  1.64<H>    Ca    9.2      22 Aug 2018 06:03  Phos  3.7     08-21  Mg     2.0     08-22            RADIOLOGY & ADDITIONAL TESTS:  Reviewed

## 2018-08-23 NOTE — PROGRESS NOTE ADULT - PROBLEM SELECTOR PLAN 1
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 No atrial thrombus or shunt on KATHERINE, no afib. EF 55-60%. Hypercoaguability workup negative- Hexagonal phase negative, DRVVT inhibitor screen 27.2 (WNL), DRVVT S/C screen LA neg. 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 Repeat MRI head 8/20 with new stroke.  - for antiplatelet and Atorvastatin 80 for - Secondary stroke prevention  -rheum following, recs appreciated.   -s/p solu-medrol pulsing for cerebral vasculitis. Patient to receive cytoxan   -c/w solu-medrol IV 30mg BID for a total of 4 doses, stop on 8/25   -f/u rheum blood workup     -f/u repeat US renal doppler for further work up of possible renal vasculitis causing MARYJANE and refractory HTN.  -Neurosurg following, recs appreciated  -cardio recs appreciated.  -EEG read with epileptiform activity, c/w keppra 250 BID

## 2018-08-23 NOTE — PROGRESS NOTE ADULT - PROBLEM SELECTOR PLAN 3
MARYJANE on CKD stage 3. Patient presented with MARYJANE of Cr 1.5 with baseline unclear. Creatinine uptrend throughout patient's course. Etiology currently unclear, possibly prerenal in setting of decreased PO intake from depression vs. hypertensive nephropathy from persistently elevated BPs vs. renal vasculitis  - holding lisinopril in setting of MARYJANE, consider restarting as kidney function improving  - Cr uptrending  - continue to monitor  - for renal US doppler today to assess for IVAN MARYJANE on CKD stage 3. Patient presented with MARYJANE of Cr 1.5 with baseline unclear. Creatinine uptrend throughout patient's course. Etiology currently unclear, possibly prerenal in setting of decreased PO intake from depression vs. hypertensive nephropathy from persistently elevated BPs vs. renal vasculitis  - holding lisinopril in setting of MARYJANE, consider restarting as kidney function improves after renal doppler  - continue to monitor  - for renal US doppler today to assess for IVAN

## 2018-08-23 NOTE — PROGRESS NOTE ADULT - ASSESSMENT
68F PMH MDD, found down in her apartment. Stroke code called on 8/11 for L facial droop and weakness. CT then showed multiple lacunar infarcts, age indeterminate, in basal ganglia and thalamus. CTA showed chronic L VA occlusion, R VA and basilar hypoplasia, carotid stenosis bilaterally. No tPA given. Symptoms improved, then 8/12 another stroke code was called for L facial droop and left UE/LE weakness, CT unchanged. MRI showed acute right PATRICIA infarct with scattered smaller areas of infarct. Patient hemodynamically stable and transferred to  now being treated for cerebral vasculitis

## 2018-08-23 NOTE — PROGRESS NOTE ADULT - PROBLEM SELECTOR PLAN 1
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 No atrial thrombus or shunt on KATHERINE, no afib. EF 55-60%. Hypercoaguability workup negative- Hexagonal phase negative, DRVVT inhibitor screen 27.2 (WNL), DRVVT S/C screen LA neg. 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  - for antiplatelet and Atorvastatin 80 for - Secondary stroke prevention  -rheum following, recs appreciated. c/w pulses of 1g solumedrol at night if BPs can tolerate. Continue pulses for 3-5 days pending clinical improvement (started 8/20). If BPs ~200s then can give PO prednisone 100mg instead of pulse 1g solumedrol to avoid risk of hemorrhage  -repeat MRI head 8/20 with new stroke, continue to manage cerebral vasculitis treatment as described   -f/u rheum workup   -will require MRI A/P vs. CT A/P vs.   -f/u repeat US renal doppler for further work up of possible renal vasculitis causing MARYJANE and refractory HTN.  -Neurosurg following, recs appreciated  -medicine following, recs appreciated  -cardio recs appreciated.  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 No atrial thrombus or shunt on KATHERINE, no afib. EF 55-60%. Hypercoaguability workup negative- Hexagonal phase negative, DRVVT inhibitor screen 27.2 (WNL), DRVVT S/C screen LA neg. 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  - for antiplatelet and Atorvastatin 80 for - Secondary stroke prevention  -rheum following, recs appreciated. received 3 pulses of 1g solumedrol at night with clinical improvement. Today, received 90mg solumedrol. To received 30mg q12 of solumedrol for 2 days starting 8/24  -repeat MRI head 8/20 with new stroke, continue to manage cerebral vasculitis treatment as described   -f/u rheum workup   -f/u repeat US renal doppler for further work up of possible renal vasculitis causing MARYJANE and refractory HTN.  -Neurosurg following, recs appreciated  -medicine following, recs appreciated  -cardio recs appreciated.  -will consider isordil instead of clonidine to optimize BP control  -EEG read with epileptiform activity, c/w keppra 250 BID

## 2018-08-23 NOTE — PROGRESS NOTE ADULT - SUBJECTIVE AND OBJECTIVE BOX
INTERVAL HPI/OVERNIGHT EVENTS: Patient seen and examined at bedside. No acute events overnight.    VITAL SIGNS:  T(F): 97.3 (08-23-18 @ 06:13)  HR: 54 (08-23-18 @ 04:24)  BP: 175/77 (08-23-18 @ 04:24)  RR: 18 (08-23-18 @ 04:24)  SpO2: 95% (08-23-18 @ 04:24)  Wt(kg): --    PHYSICAL EXAM:    Constitutional: WDWN resting comfortably in bed; NAD  Head: NC/AT  Eyes: PERRL, EOMI, anicteric sclera  ENT: no nasal discharge; uvula midline, no oropharyngeal erythema or exudates; MMM  Neck: supple; no JVD or thyromegaly  Respiratory: CTA B/L; no W/R/R, no retractions  Cardiac: +S1/S2; RRR; no M/R/G; PMI non-displaced  Gastrointestinal: soft, NT/ND; no rebound or guarding; +BSx4  Genitourinary: normal external genitalia  Back: spine midline, no bony tenderness or step-offs; no CVAT B/L  Extremities: WWP, no clubbing or cyanosis; no peripheral edema  Musculoskeletal: NROM x4; no joint swelling, tenderness or erythema  Vascular: 2+ radial, femoral, DP/PT pulses B/L  Dermatologic: skin warm, dry and intact; no rashes, wounds, or scars  Lymphatic: no submandibular or cervical LAD  Neurologic: AAOx3; CNII-XII grossly intact; no focal deficits  Psychiatric: affect and characteristics of appearance, verbalizations, behaviors are appropriate    MEDICATIONS  (STANDING):  amLODIPine   Tablet 10 milliGRAM(s) Oral daily  aspirin 325 milliGRAM(s) Oral daily  atorvastatin 80 milliGRAM(s) Oral at bedtime  BACItracin   Ointment 1 Application(s) Topical two times a day  cholecalciferol 1000 Unit(s) Oral daily  cloNIDine 0.1 milliGRAM(s) Oral every 12 hours  dextrose 5%. 1000 milliLiter(s) (50 mL/Hr) IV Continuous <Continuous>  dextrose 50% Injectable 12.5 Gram(s) IV Push once  dextrose 50% Injectable 25 Gram(s) IV Push once  dextrose 50% Injectable 25 Gram(s) IV Push once  folic acid 1 milliGRAM(s) Oral daily  heparin  Injectable 5000 Unit(s) SubCutaneous every 8 hours  hydrALAZINE 75 milliGRAM(s) Oral every 8 hours  insulin lispro (HumaLOG) corrective regimen sliding scale   SubCutaneous every 6 hours  levETIRAcetam 250 milliGRAM(s) Oral two times a day  multivitamin 1 Tablet(s) Oral daily  pantoprazole  Injectable 40 milliGRAM(s) IV Push daily  sertraline 50 milliGRAM(s) Oral daily  thiamine 100 milliGRAM(s) Oral daily    MEDICATIONS  (PRN):  atropine Injectable 0.5 milliGRAM(s) IV Push every 5 minutes PRN Symptomatic Bradycardia  dextrose 40% Gel 15 Gram(s) Oral once PRN Blood Glucose LESS THAN 70 milliGRAM(s)/deciliter  glucagon  Injectable 1 milliGRAM(s) IntraMuscular once PRN Glucose LESS THAN 70 milligrams/deciliter      Allergies    No Known Allergies    Intolerances        LABS:                        11.0   9.8   )-----------( 274      ( 23 Aug 2018 08:37 )             33.3     08-22    137  |  100  |  40<H>  ----------------------------<  151<H>  4.2   |  21<L>  |  1.64<H>    Ca    9.2      22 Aug 2018 06:03  Mg     2.0     08-22            RADIOLOGY & ADDITIONAL TESTS: INTERVAL HPI/OVERNIGHT EVENTS: Patient seen and examined at bedside. No acute events overnight. Patient more alert and responsive this AM in comparison to previous days; states her "brain is messed up". Denies any pain, fevers, chills, nausea, vomiting, chest pain, palpitations, abdominal pain diarrhea.    VITAL SIGNS:  T(F): 97.3 (08-23-18 @ 06:13)  HR: 54 (08-23-18 @ 04:24)  BP: 175/77 (08-23-18 @ 04:24)  RR: 18 (08-23-18 @ 04:24)  SpO2: 95% (08-23-18 @ 04:24)  Wt(kg): --    PHYSICAL EXAM:    Constitutional: frail, resting comfortably in bed; NAD  Head: NC/AT  Eyes: PERRL, EOMI, anicteric sclera  ENT: no nasal discharge; uvula midline, no oropharyngeal erythema or exudates; MMM  Neck: supple; no JVD or thyromegaly  Respiratory: CTA B/L; no W/R/R, no retractions  Cardiac: +S1/S2; RRR; no M/R/G; PMI non-displaced  Gastrointestinal: soft, NT/ND; no rebound or guarding; +BSx4  Extremities: WWP, no clubbing or cyanosis; no peripheral edema  Vascular: 2+ radial, femoral, DP/PT pulses B/L  Dermatologic: skin warm, dry and intact; no rashes, wounds, or scars  Lymphatic: no submandibular or cervical LAD  Neurologic: AAOx3; right gaze preference,  LUE 3/5, LLE 2/5, RUE+RLE 5/5    MEDICATIONS  (STANDING):  amLODIPine   Tablet 10 milliGRAM(s) Oral daily  aspirin 325 milliGRAM(s) Oral daily  atorvastatin 80 milliGRAM(s) Oral at bedtime  BACItracin   Ointment 1 Application(s) Topical two times a day  cholecalciferol 1000 Unit(s) Oral daily  cloNIDine 0.1 milliGRAM(s) Oral every 12 hours  dextrose 5%. 1000 milliLiter(s) (50 mL/Hr) IV Continuous <Continuous>  dextrose 50% Injectable 12.5 Gram(s) IV Push once  dextrose 50% Injectable 25 Gram(s) IV Push once  dextrose 50% Injectable 25 Gram(s) IV Push once  folic acid 1 milliGRAM(s) Oral daily  heparin  Injectable 5000 Unit(s) SubCutaneous every 8 hours  hydrALAZINE 75 milliGRAM(s) Oral every 8 hours  insulin lispro (HumaLOG) corrective regimen sliding scale   SubCutaneous every 6 hours  levETIRAcetam 250 milliGRAM(s) Oral two times a day  multivitamin 1 Tablet(s) Oral daily  pantoprazole  Injectable 40 milliGRAM(s) IV Push daily  sertraline 50 milliGRAM(s) Oral daily  thiamine 100 milliGRAM(s) Oral daily    MEDICATIONS  (PRN):  atropine Injectable 0.5 milliGRAM(s) IV Push every 5 minutes PRN Symptomatic Bradycardia  dextrose 40% Gel 15 Gram(s) Oral once PRN Blood Glucose LESS THAN 70 milliGRAM(s)/deciliter  glucagon  Injectable 1 milliGRAM(s) IntraMuscular once PRN Glucose LESS THAN 70 milligrams/deciliter      Allergies    No Known Allergies    Intolerances        LABS:                        11.0   9.8   )-----------( 274      ( 23 Aug 2018 08:37 )             33.3     08-22    137  |  100  |  40<H>  ----------------------------<  151<H>  4.2   |  21<L>  |  1.64<H>    Ca    9.2      22 Aug 2018 06:03  Mg     2.0     08-22            RADIOLOGY & ADDITIONAL TESTS:

## 2018-08-23 NOTE — PROGRESS NOTE ADULT - PROBLEM SELECTOR PLAN 3
MARYJANE on CKD stage 3. Patient presented with MARYJANE of Cr 1.5 with baseline unclear. Creatinine uptrend throughout patient's course. Etiology currently unclear, possibly prerenal in setting of decreased PO intake from depression vs. hypertensive nephropathy from persistently elevated BPs vs. renal vasculitis  - holding lisinopril in setting of MARYJANE, consider restarting as kidney function improving  - Cr uptrending  - monitor urinary output  -avoid nephrotoxic agents  - continue to monitor  - f/u renal US doppler to assess for IVAN

## 2018-08-23 NOTE — PROGRESS NOTE ADULT - PROBLEM SELECTOR PLAN 4
Patient initially admitted to psych for major depressive episode with inability to take care of self and SI. Patient subsequently had CVA.  -psych consulted. No longer having SI, deemed not to be harm to self or others and d/cal 1:1  -c/w zoloft to 50mg daily per psych recs   -psych recommending formal psych referral upon discharge

## 2018-08-23 NOTE — PROGRESS NOTE ADULT - ASSESSMENT
68 year old female with severe depression presents with multiple lacunar infarcts after being found down in her apartment.  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   Severe depression may also be explained by an inflammatory process and would be expected to improve with treatment.   Given systemic process involving multiple organs and concern for evolving strokes on high dose steroids patient given pulse dose steroids. Will transition to high dose today. Showing clinical improvement.   CYC tomorrow, orders in and discussed w/ pharmacy. Consent by healthcare proxy Leyla Lizama placed in chart.   BP still slightly elevated. Needs to be controlled prior to CYC infusion tomorrow.     Consider psych eval for baseline neurocognitive assessment prior to starting CYC.   Will follow up remaining serologies with you: , myeloperoxidase, proteinase 3, dsDNA, C3/C4, ssa/ssb, quantiferon, HIV, cyroglobulins     Feel free to call me with any questions  68 year old female with severe depression presents with multiple lacunar infarcts after being found down in her apartment.  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   Severe depression may also be explained by an inflammatory process and would be expected to improve with treatment.   Given systemic process involving multiple organs and concern for evolving strokes on high dose steroids patient given pulse dose steroids. Will transition to high dose today. Showing clinical improvement.   CYC tomorrow, orders in and discussed w/ pharmacy. Consent by healthcare proxy Leyla Lizama placed in chart.   BP still slightly elevated. Needs to be controlled prior to CYC infusion tomorrow.   Solumedrol 30mg IV BID   Consider psych eval for baseline neurocognitive assessment prior to starting CYC.   Will follow up remaining serologies with you: , myeloperoxidase, proteinase 3, dsDNA, C3/C4, ssa/ssb, quantiferon, HIV, cyroglobulins     Feel free to call me with any questions

## 2018-08-23 NOTE — PROGRESS NOTE BEHAVIORAL HEALTH - NSBHCONSULTPRIMARYDISCUSSYES_PSY_A_CORE FT
Note: I will not be in [facility] and able to follow patient.  I will notify team she would benefit from support from psych MD or PhD.  Please call with acute issues.
Note: I will not be in [facility] and able to follow patient.  I will notify team she would benefit from support from psych MD or PhD.  Please call with acute issues.
Note: I will not be in [facility] and able to follow patient.  I will notify team she would benefit from support from psych MD or PhD.  Please call with acute issues.

## 2018-08-24 LAB
ANION GAP SERPL CALC-SCNC: 15 MMOL/L — SIGNIFICANT CHANGE UP (ref 5–17)
APPEARANCE UR: CLEAR — SIGNIFICANT CHANGE UP
BILIRUB UR-MCNC: NEGATIVE — SIGNIFICANT CHANGE UP
BLD GP AB SCN SERPL QL: NEGATIVE — SIGNIFICANT CHANGE UP
BUN SERPL-MCNC: 43 MG/DL — HIGH (ref 7–23)
CALCIUM SERPL-MCNC: 9 MG/DL — SIGNIFICANT CHANGE UP (ref 8.4–10.5)
CHLORIDE SERPL-SCNC: 99 MMOL/L — SIGNIFICANT CHANGE UP (ref 96–108)
CO2 SERPL-SCNC: 23 MMOL/L — SIGNIFICANT CHANGE UP (ref 22–31)
COLOR SPEC: YELLOW — SIGNIFICANT CHANGE UP
CREAT ?TM UR-MCNC: 62 MG/DL — SIGNIFICANT CHANGE UP
CREAT SERPL-MCNC: 1.56 MG/DL — HIGH (ref 0.5–1.3)
DIFF PNL FLD: NEGATIVE — SIGNIFICANT CHANGE UP
DSDNA AB SER-ACNC: <12 IU/ML — SIGNIFICANT CHANGE UP
GLUCOSE SERPL-MCNC: 96 MG/DL — SIGNIFICANT CHANGE UP (ref 70–99)
GLUCOSE UR QL: NEGATIVE — SIGNIFICANT CHANGE UP
HCT VFR BLD CALC: 29.9 % — LOW (ref 34.5–45)
HGB BLD-MCNC: 10 G/DL — LOW (ref 11.5–15.5)
KETONES UR-MCNC: NEGATIVE — SIGNIFICANT CHANGE UP
LEUKOCYTE ESTERASE UR-ACNC: NEGATIVE — SIGNIFICANT CHANGE UP
MAGNESIUM SERPL-MCNC: 2.2 MG/DL — SIGNIFICANT CHANGE UP (ref 1.6–2.6)
MCHC RBC-ENTMCNC: 29.1 PG — SIGNIFICANT CHANGE UP (ref 27–34)
MCHC RBC-ENTMCNC: 33.4 G/DL — SIGNIFICANT CHANGE UP (ref 32–36)
MCV RBC AUTO: 86.9 FL — SIGNIFICANT CHANGE UP (ref 80–100)
NITRITE UR-MCNC: NEGATIVE — SIGNIFICANT CHANGE UP
PH UR: 6 — SIGNIFICANT CHANGE UP (ref 5–8)
PHOSPHATE SERPL-MCNC: 3 MG/DL — SIGNIFICANT CHANGE UP (ref 2.5–4.5)
PLATELET # BLD AUTO: 250 K/UL — SIGNIFICANT CHANGE UP (ref 150–400)
POTASSIUM SERPL-MCNC: 3.7 MMOL/L — SIGNIFICANT CHANGE UP (ref 3.5–5.3)
POTASSIUM SERPL-SCNC: 3.7 MMOL/L — SIGNIFICANT CHANGE UP (ref 3.5–5.3)
PROT UR-MCNC: 100 MG/DL
RBC # BLD: 3.44 M/UL — LOW (ref 3.8–5.2)
RBC # FLD: 13.9 % — SIGNIFICANT CHANGE UP (ref 10.3–16.9)
RH IG SCN BLD-IMP: POSITIVE — SIGNIFICANT CHANGE UP
SODIUM SERPL-SCNC: 137 MMOL/L — SIGNIFICANT CHANGE UP (ref 135–145)
SODIUM UR-SCNC: 75 MMOL/L — SIGNIFICANT CHANGE UP
SP GR SPEC: 1.02 — SIGNIFICANT CHANGE UP (ref 1–1.03)
UROBILINOGEN FLD QL: 0.2 E.U./DL — SIGNIFICANT CHANGE UP
WBC # BLD: 11.6 K/UL — HIGH (ref 3.8–10.5)
WBC # FLD AUTO: 11.6 K/UL — HIGH (ref 3.8–10.5)

## 2018-08-24 PROCEDURE — 93976 VASCULAR STUDY: CPT | Mod: 26

## 2018-08-24 PROCEDURE — 99233 SBSQ HOSP IP/OBS HIGH 50: CPT

## 2018-08-24 PROCEDURE — 99233 SBSQ HOSP IP/OBS HIGH 50: CPT | Mod: GC

## 2018-08-24 PROCEDURE — 71045 X-RAY EXAM CHEST 1 VIEW: CPT | Mod: 26

## 2018-08-24 RX ORDER — ISOSORBIDE DINITRATE 5 MG/1
20 TABLET ORAL THREE TIMES A DAY
Qty: 0 | Refills: 0 | Status: DISCONTINUED | OUTPATIENT
Start: 2018-08-24 | End: 2018-08-26

## 2018-08-24 RX ORDER — ACETAMINOPHEN 500 MG
325 TABLET ORAL ONCE
Qty: 0 | Refills: 0 | Status: COMPLETED | OUTPATIENT
Start: 2018-08-24 | End: 2018-08-24

## 2018-08-24 RX ORDER — ISOSORBIDE DINITRATE 5 MG/1
10 TABLET ORAL THREE TIMES A DAY
Qty: 0 | Refills: 0 | Status: DISCONTINUED | OUTPATIENT
Start: 2018-08-24 | End: 2018-08-24

## 2018-08-24 RX ORDER — POTASSIUM CHLORIDE 20 MEQ
20 PACKET (EA) ORAL ONCE
Qty: 0 | Refills: 0 | Status: COMPLETED | OUTPATIENT
Start: 2018-08-24 | End: 2018-08-24

## 2018-08-24 RX ORDER — DIPHENHYDRAMINE HCL 50 MG
25 CAPSULE ORAL ONCE
Qty: 0 | Refills: 0 | Status: COMPLETED | OUTPATIENT
Start: 2018-08-24 | End: 2018-08-24

## 2018-08-24 RX ADMIN — Medication 325 MILLIGRAM(S): at 17:19

## 2018-08-24 RX ADMIN — Medication 1 TABLET(S): at 14:54

## 2018-08-24 RX ADMIN — Medication 100 MILLIGRAM(S): at 14:53

## 2018-08-24 RX ADMIN — ISOSORBIDE DINITRATE 20 MILLIGRAM(S): 5 TABLET ORAL at 15:04

## 2018-08-24 RX ADMIN — Medication 30 MILLIGRAM(S): at 07:27

## 2018-08-24 RX ADMIN — Medication 0.1 MILLIGRAM(S): at 17:20

## 2018-08-24 RX ADMIN — Medication 325 MILLIGRAM(S): at 17:48

## 2018-08-24 RX ADMIN — SERTRALINE 50 MILLIGRAM(S): 25 TABLET, FILM COATED ORAL at 14:54

## 2018-08-24 RX ADMIN — HEPARIN SODIUM 5000 UNIT(S): 5000 INJECTION INTRAVENOUS; SUBCUTANEOUS at 22:25

## 2018-08-24 RX ADMIN — Medication 325 MILLIGRAM(S): at 14:54

## 2018-08-24 RX ADMIN — LEVETIRACETAM 250 MILLIGRAM(S): 250 TABLET, FILM COATED ORAL at 17:20

## 2018-08-24 RX ADMIN — Medication 1 APPLICATION(S): at 07:28

## 2018-08-24 RX ADMIN — Medication 100 MILLIGRAM(S): at 22:22

## 2018-08-24 RX ADMIN — HEPARIN SODIUM 5000 UNIT(S): 5000 INJECTION INTRAVENOUS; SUBCUTANEOUS at 14:54

## 2018-08-24 RX ADMIN — ISOSORBIDE DINITRATE 10 MILLIGRAM(S): 5 TABLET ORAL at 02:52

## 2018-08-24 RX ADMIN — Medication 0.1 MILLIGRAM(S): at 07:12

## 2018-08-24 RX ADMIN — Medication 1 MILLIGRAM(S): at 14:54

## 2018-08-24 RX ADMIN — HEPARIN SODIUM 5000 UNIT(S): 5000 INJECTION INTRAVENOUS; SUBCUTANEOUS at 07:13

## 2018-08-24 RX ADMIN — Medication 1 APPLICATION(S): at 17:21

## 2018-08-24 RX ADMIN — SODIUM CHLORIDE 1000 MILLILITER(S): 9 INJECTION INTRAMUSCULAR; INTRAVENOUS; SUBCUTANEOUS at 17:48

## 2018-08-24 RX ADMIN — AMLODIPINE BESYLATE 10 MILLIGRAM(S): 2.5 TABLET ORAL at 07:12

## 2018-08-24 RX ADMIN — LEVETIRACETAM 250 MILLIGRAM(S): 250 TABLET, FILM COATED ORAL at 07:12

## 2018-08-24 RX ADMIN — ISOSORBIDE DINITRATE 20 MILLIGRAM(S): 5 TABLET ORAL at 23:23

## 2018-08-24 RX ADMIN — ATORVASTATIN CALCIUM 80 MILLIGRAM(S): 80 TABLET, FILM COATED ORAL at 22:23

## 2018-08-24 RX ADMIN — INSULIN GLARGINE 5 UNIT(S): 100 INJECTION, SOLUTION SUBCUTANEOUS at 22:23

## 2018-08-24 RX ADMIN — Medication 25 MILLIGRAM(S): at 17:20

## 2018-08-24 RX ADMIN — Medication 1000 UNIT(S): at 14:54

## 2018-08-24 RX ADMIN — PANTOPRAZOLE SODIUM 40 MILLIGRAM(S): 20 TABLET, DELAYED RELEASE ORAL at 14:54

## 2018-08-24 RX ADMIN — Medication 100 MILLIGRAM(S): at 07:12

## 2018-08-24 RX ADMIN — Medication 100 MILLIGRAM(S): at 14:54

## 2018-08-24 RX ADMIN — Medication 30 MILLIGRAM(S): at 17:20

## 2018-08-24 NOTE — PROGRESS NOTE ADULT - SUBJECTIVE AND OBJECTIVE BOX
Rheumatology Follow Up Evaluation     68 year old female with severe depression presents with multiple lacunar infarcts after being found down in her apartment.      BP still elevated today.   Continues on Solumedrol 30mg IV BID   Slightly more interactive    Afebrile. /70  Physical exam notable for the following pertinent positives/negatives:  Much more interactive. Eye contact. Speaking a bit louder. Answering questions mostly appropriately.   Comfortable  Non toxic   Hyperpigmentation over nasal bridge not sparing nasolabial folds  Slight periorbital erythema  No gottrens papules, malar rash or discoid lesions   Hair thinning  Dry mouth without pooling of saliva, no tongue furrowing   No parotid or lacrimal enlargement   CV bradycardic, reg rhythm no murmurs   Resp CTAB  Facial droop  GI soft non tender BS+ no HSM   No renal bruit appreciated   No livedo     Data reviewed, notable for:   CBC with mild leukocytosis on prednisone (normal prior)  BUN/Cr 22/1.43 trending down   Renin/rohan 135/10   AST/ALT WNL   ESR 45  LAC neg  Cardiolipin neg   RF neg   ANCA neg  KORINA neg   sm/RNP neg  IgG4 subclasses normal     KATHERINE no valvular disease or pericardial effusion  CTH/MRA with strokes as described above   Angiogram CNS vasculitis   CTA with chronic L VA occlusion, R VA and basilar hypoplasia, carotid stenosis bilaterally   Renal US with asymmetric kidneys possibly representing nephrosclerosis Rheumatology Follow Up Evaluation     68 year old female with severe depression presents with multiple lacunar infarcts after being found down in her apartment.      BP still elevated today but slightly more controlled since overnight readings    Continues on Solumedrol 30mg IV BID   Plan for CYC today   Indeterminate quantiferon, CXR nml  More interactive  Urinary incontinence    Afebrile. /87  Physical exam notable for the following pertinent positives/negatives:  Much more interactive. Eye contact. Speaking a bit louder. Answering questions mostly appropriately.   Comfortable  Non toxic   Hyperpigmentation over nasal bridge not sparing nasolabial folds  Slight periorbital erythema  No gottrens papules, malar rash or discoid lesions   Hair thinning  Dry mouth without pooling of saliva, no tongue furrowing   No parotid or lacrimal enlargement   CV bradycardic, reg rhythm no murmurs   Resp CTAB  Facial droop  GI soft non tender BS+ no HSM   No renal bruit appreciated   No livedo     Data reviewed, notable for:   CBC with mild leukocytosis on prednisone (normal prior)  BUN/Cr stable   Renin/rohan 135/10   AST/ALT WNL   ESR 45  LAC neg  Cardiolipin neg   RF neg   ANCA neg  KORINA neg   sm/RNP neg  IgG4 subclasses normal   Quantiferon indeterminate     KATHERINE no valvular disease or pericardial effusion  CTH/MRA with strokes as described above   Angiogram CNS vasculitis   CTA with chronic L VA occlusion, R VA and basilar hypoplasia, carotid stenosis bilaterally   Renal US with asymmetric kidneys possibly representing nephrosclerosis   CXR nml

## 2018-08-24 NOTE — PROGRESS NOTE ADULT - ATTENDING COMMENTS
Patient seen and examined with Resident.  Agree with above.  Patient more focused today but probably not back to her baseline.      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     - Plan as per Rheumatology Dr. Nation - post Solumedrol 1gm x 3 days and then lower dose Solumedrol now.  Will start Cytoxan today.  Also on IV PPI and Vitamin D, as requested by Dr. Nation.  All labs requested by Dr. Nation sent.  Defer results to Dr. Nation.    Blood pressure needs better controlled even though on Hydralazine 100mg now, as also needed for pulse steroids.   - Agree with finding out more about doppler portion of renal ultrasound.      She will ultimately need acute rehab .

## 2018-08-24 NOTE — PROGRESS NOTE ADULT - PROBLEM SELECTOR PLAN 3
MARYJANE on CKD stage 3. Patient presented with MARYJANE of Cr 1.5 with baseline unclear. Creatinine uptrend throughout patient's course. Etiology currently unclear, possibly prerenal in setting of decreased PO intake from depression vs. hypertensive nephropathy from persistently elevated BPs vs. renal vasculitis  - holding lisinopril in setting of MARYJANE, consider restarting as kidney function improves after renal doppler  - continue to monitor  - Had US renal doppler exam today to evaluate possible IVAN, f/u results  -f/u UA  -f/u urine lytes

## 2018-08-24 NOTE — PROGRESS NOTE ADULT - SUBJECTIVE AND OBJECTIVE BOX
INTERVAL HPI/OVERNIGHT EVENTS: Patient seen and examined at bedside. No acute events overnight.    VITAL SIGNS:  T(F): 98 (08-24-18 @ 04:38)  HR: 52 (08-24-18 @ 04:38)  BP: 177/87 (08-24-18 @ 04:38)  RR: 16 (08-24-18 @ 04:38)  SpO2: 96% (08-24-18 @ 04:38)  Wt(kg): --    PHYSICAL EXAM:    Constitutional: WDWN resting comfortably in bed; NAD  Head: NC/AT  Eyes: PERRL, EOMI, anicteric sclera  ENT: no nasal discharge; uvula midline, no oropharyngeal erythema or exudates; MMM  Neck: supple; no JVD or thyromegaly  Respiratory: CTA B/L; no W/R/R, no retractions  Cardiac: +S1/S2; RRR; no M/R/G; PMI non-displaced  Gastrointestinal: soft, NT/ND; no rebound or guarding; +BSx4  Genitourinary: normal external genitalia  Back: spine midline, no bony tenderness or step-offs; no CVAT B/L  Extremities: WWP, no clubbing or cyanosis; no peripheral edema  Musculoskeletal: NROM x4; no joint swelling, tenderness or erythema  Vascular: 2+ radial, femoral, DP/PT pulses B/L  Dermatologic: skin warm, dry and intact; no rashes, wounds, or scars  Lymphatic: no submandibular or cervical LAD  Neurologic: AAOx3; CNII-XII grossly intact; no focal deficits  Psychiatric: affect and characteristics of appearance, verbalizations, behaviors are appropriate    MEDICATIONS  (STANDING):  amLODIPine   Tablet 10 milliGRAM(s) Oral daily  aspirin 325 milliGRAM(s) Oral daily  atorvastatin 80 milliGRAM(s) Oral at bedtime  BACItracin   Ointment 1 Application(s) Topical two times a day  cholecalciferol 1000 Unit(s) Oral daily  cloNIDine 0.1 milliGRAM(s) Oral every 12 hours  cyclophosphamide IVPB (Non - oncologic) 1177 milliGRAM(s) IV Intermittent once  dextrose 5%. 1000 milliLiter(s) (50 mL/Hr) IV Continuous <Continuous>  dextrose 50% Injectable 12.5 Gram(s) IV Push once  dextrose 50% Injectable 25 Gram(s) IV Push once  dextrose 50% Injectable 25 Gram(s) IV Push once  folic acid 1 milliGRAM(s) Oral daily  heparin  Injectable 5000 Unit(s) SubCutaneous every 8 hours  hydrALAZINE 100 milliGRAM(s) Oral every 8 hours  insulin glargine Injectable (LANTUS) 5 Unit(s) SubCutaneous at bedtime  insulin lispro (HumaLOG) corrective regimen sliding scale   SubCutaneous every 6 hours  isosorbide   dinitrate Tablet (ISORDIL) 10 milliGRAM(s) Oral three times a day  levETIRAcetam 250 milliGRAM(s) Oral two times a day  methylPREDNISolone sodium succinate Injectable 30 milliGRAM(s) IV Push every 12 hours  multivitamin 1 Tablet(s) Oral daily  pantoprazole  Injectable 40 milliGRAM(s) IV Push daily  potassium chloride   Powder 20 milliEquivalent(s) Oral once  sertraline 50 milliGRAM(s) Oral daily  sodium chloride 0.9% Bolus 500 milliLiter(s) IV Bolus once  thiamine 100 milliGRAM(s) Oral daily    MEDICATIONS  (PRN):  acetaminophen   Tablet 325 milliGRAM(s) Oral every 4 hours PRN For Temp greater than 38 C (100.4 F)  atropine Injectable 0.5 milliGRAM(s) IV Push every 5 minutes PRN Symptomatic Bradycardia  dextrose 40% Gel 15 Gram(s) Oral once PRN Blood Glucose LESS THAN 70 milliGRAM(s)/deciliter  diphenhydrAMINE   Capsule 25 milliGRAM(s) Oral every 4 hours PRN Rash and/or Itching  glucagon  Injectable 1 milliGRAM(s) IntraMuscular once PRN Glucose LESS THAN 70 milligrams/deciliter      Allergies    No Known Allergies    Intolerances        LABS:                        10.0   11.6  )-----------( 250      ( 24 Aug 2018 08:08 )             29.9     08-24    137  |  99  |  43<H>  ----------------------------<  96  3.7   |  23  |  1.56<H>    Ca    9.0      24 Aug 2018 08:08  Phos  3.0     08-24  Mg     2.2     08-24            RADIOLOGY & ADDITIONAL TESTS: INTERVAL HPI/OVERNIGHT EVENTS: Patient seen and examined at bedside. Hypertensive to 213/88, isordil 10mg given. Started Isordil 10mg TID    VITAL SIGNS:  T(F): 98 (08-24-18 @ 04:38)  HR: 52 (08-24-18 @ 04:38)  BP: 177/87 (08-24-18 @ 04:38)  RR: 16 (08-24-18 @ 04:38)  SpO2: 96% (08-24-18 @ 04:38)  Wt(kg): --    PHYSICAL EXAM:    Constitutional: frail, resting comfortably in bed; NAD  Head: NC/AT  Eyes: PERRL, EOMI, anicteric sclera  ENT: no nasal discharge; uvula midline, no oropharyngeal erythema or exudates; MMM  Neck: supple; no JVD or thyromegaly  Respiratory: CTA B/L; no W/R/R, no retractions  Cardiac: +S1/S2; RRR; no M/R/G; PMI non-displaced  Gastrointestinal: soft, NT/ND; no rebound or guarding; +BSx4  Extremities: WWP, no clubbing or cyanosis; no peripheral edema  Vascular: 2+ radial, femoral, DP/PT pulses B/L  Dermatologic: skin warm, dry and intact; no rashes, wounds, or scars  Lymphatic: no submandibular or cervical LAD  Neurologic: AAOx3; CN II-XII grossly intact, LUE 3/5, LLE 2/5, RUE+RLE 5/5    MEDICATIONS  (STANDING):  amLODIPine   Tablet 10 milliGRAM(s) Oral daily  aspirin 325 milliGRAM(s) Oral daily  atorvastatin 80 milliGRAM(s) Oral at bedtime  BACItracin   Ointment 1 Application(s) Topical two times a day  cholecalciferol 1000 Unit(s) Oral daily  cloNIDine 0.1 milliGRAM(s) Oral every 12 hours  cyclophosphamide IVPB (Non - oncologic) 1177 milliGRAM(s) IV Intermittent once  dextrose 5%. 1000 milliLiter(s) (50 mL/Hr) IV Continuous <Continuous>  dextrose 50% Injectable 12.5 Gram(s) IV Push once  dextrose 50% Injectable 25 Gram(s) IV Push once  dextrose 50% Injectable 25 Gram(s) IV Push once  folic acid 1 milliGRAM(s) Oral daily  heparin  Injectable 5000 Unit(s) SubCutaneous every 8 hours  hydrALAZINE 100 milliGRAM(s) Oral every 8 hours  insulin glargine Injectable (LANTUS) 5 Unit(s) SubCutaneous at bedtime  insulin lispro (HumaLOG) corrective regimen sliding scale   SubCutaneous every 6 hours  isosorbide   dinitrate Tablet (ISORDIL) 10 milliGRAM(s) Oral three times a day  levETIRAcetam 250 milliGRAM(s) Oral two times a day  methylPREDNISolone sodium succinate Injectable 30 milliGRAM(s) IV Push every 12 hours  multivitamin 1 Tablet(s) Oral daily  pantoprazole  Injectable 40 milliGRAM(s) IV Push daily  potassium chloride   Powder 20 milliEquivalent(s) Oral once  sertraline 50 milliGRAM(s) Oral daily  sodium chloride 0.9% Bolus 500 milliLiter(s) IV Bolus once  thiamine 100 milliGRAM(s) Oral daily    MEDICATIONS  (PRN):  acetaminophen   Tablet 325 milliGRAM(s) Oral every 4 hours PRN For Temp greater than 38 C (100.4 F)  atropine Injectable 0.5 milliGRAM(s) IV Push every 5 minutes PRN Symptomatic Bradycardia  dextrose 40% Gel 15 Gram(s) Oral once PRN Blood Glucose LESS THAN 70 milliGRAM(s)/deciliter  diphenhydrAMINE   Capsule 25 milliGRAM(s) Oral every 4 hours PRN Rash and/or Itching  glucagon  Injectable 1 milliGRAM(s) IntraMuscular once PRN Glucose LESS THAN 70 milligrams/deciliter      Allergies    No Known Allergies    Intolerances        LABS:                        10.0   11.6  )-----------( 250      ( 24 Aug 2018 08:08 )             29.9     08-24    137  |  99  |  43<H>  ----------------------------<  96  3.7   |  23  |  1.56<H>    Ca    9.0      24 Aug 2018 08:08  Phos  3.0     08-24  Mg     2.2     08-24            RADIOLOGY & ADDITIONAL TESTS:

## 2018-08-24 NOTE — PROGRESS NOTE ADULT - ASSESSMENT
68 year old female with severe depression presents with multiple lacunar infarcts after being found down in her apartment.  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   Severe depression may also be explained by an inflammatory process and would be expected to improve with treatment.   Given systemic process involving multiple organs and concern for evolving strokes on high dose steroids patient given pulse dose steroids. Will transition to high dose today. Showing clinical improvement.   CYC tomorrow, orders in and discussed w/ pharmacy. Consent by healthcare proxy Leyla Lizama placed in chart.   BP still slightly elevated. Needs to be controlled prior to CYC infusion tomorrow.   Solumedrol 30mg IV BID   Consider psych eval for baseline neurocognitive assessment prior to starting CYC.   Will follow up remaining serologies with you: , myeloperoxidase, proteinase 3, dsDNA, C3/C4, ssa/ssb, quantiferon, HIV, cyroglobulins     Feel free to call me with any questions  68 year old female with severe depression presents with multiple lacunar infarcts after being found down in her apartment.  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   Severe depression may also be explained by an inflammatory process and would be expected to improve with treatment.   Given systemic process involving multiple organs and concern for evolving strokes on high dose steroids patient given pulse dose steroids. Now on high dose steroids. Showing clinical improvement.   CYC today, orders in and discussed w/ pharmacy.   Consent by healthcare proxy Leyla Lizama placed in chart.   BP still slightly elevated. Needs to be controlled prior to CYC infusion.  Please collect UA and ensure no UTI prior to CYC given new urinary incontinence.    Solumedrol 30mg IV BID   Consider psych eval for baseline neurocognitive assessment prior to starting CYC.   Will follow up remaining serologies with you: , myeloperoxidase, proteinase 3, dsDNA, C3/C4, ssa/ssb, quantiferon, HIV, cyroglobulins     Feel free to call me with any questions

## 2018-08-24 NOTE — PROGRESS NOTE ADULT - ASSESSMENT
68 yr old female with a PMHx of HTN that presented to 8U for psych stabilization, subsequently had a multi-focal stroke with L sided facial and UE/LE deficits. Course c/b persistent HTN requiring nicardipine drip now transition to PO BP regimen. 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 Rheumatology consulted. Medicine initially consulted for clearance prior to angiogram.    #acute CVA - likely 2/2 vasculitis. Patient with residual left sided deficits.  -on , 80 mg lipitor   - mgmt per stroke service    #MARYJANE vs CKD - Possibly 2/2 vasculitis vs CKD 2/2 hypertension vs ?   Patient presented with MARYJANE of 1.45; unclear baseline. Renal ultrasound showed slightly diminished left renal size with smooth renal cortical mantle thinning which may suggest underlying nephrosclerosis. Renal duplex performed today  - urine lytes pending; renal duplex read pending   - please hold lisinopril; please avoid all nephrotoxic agents  - trend Cr    #HTN - persistently elevated w/SBPs in 160s - 180s  - currently on 3 drug regimen with clonidine, amlodipine, hydralazine  - HTN management per neurology     Medicine to follow

## 2018-08-24 NOTE — PROGRESS NOTE ADULT - PROBLEM SELECTOR PLAN 2
Lower BP goals given pulses of solumedrol (<180/100). 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 other secondary cause  - c/w Hydralazine to 100 TID, Norvasc 10mg daily, clonidine 0.1mg BID, isordil 10mg TID. Consider d/cing clonidine as now started isordil  - lisinopril held in setting of MARYJANE, consider restarting as renal function improving  - holding beta blocker in setting of labile HR, can become sinus paige (asymptomatic)   - if need to increase BP meds, consider up titrating hydral  - cardio recs appreciated. will consider isordil instead of clonidine to optimize BP, especially in setting of MARYJANE  - direct renin 135.7, aldosterone 10.2. Unlikely HTN 2/2 Conn syndrome  - Had US renal doppler exam today, f/u results Lower BP goals given pulses of solumedrol (<180/100). 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 other secondary cause  - c/w Hydralazine to 100 TID, Norvasc 10mg daily, clonidine 0.1mg BID, isordil 10mg TID. Consider d/cing clonidine as now started isordil  - lisinopril held in setting of MARYJANE, consider restarting as renal function improving  - holding beta blocker in setting of labile HR, can become sinus paige (asymptomatic)   - if need to increase BP meds, consider up titrating hydralazine  - cardio recs appreciated. will consider isordil instead of clonidine to optimize BP, especially in setting of MARYJANE  - direct renin 135.7, aldosterone 10.2. 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

## 2018-08-24 NOTE — CONSULT NOTE ADULT - SUBJECTIVE AND OBJECTIVE BOX
Medicine requested a central venous catheter to accommodate chemotherapy for the CNS vasculitis. The history of  strokes and depression are noted along with the cxr and data.  On   ultrasound exam, the right int jugular and subclavian veins are normal. The procedure was explained and consented.

## 2018-08-24 NOTE — PROGRESS NOTE ADULT - ASSESSMENT
68F PMH MDD, found down in her apartment. Stroke code called on 8/11 for L facial droop and weakness. CT then showed multiple lacunar infarcts, age indeterminate, in basal ganglia and thalamus. CTA showed chronic L VA occlusion, R VA and basilar hypoplasia, carotid stenosis bilaterally. No tPA given. Symptoms improved, then 8/12 another stroke code was called for L facial droop and left UE/LE weakness, CT unchanged. MRI showed acute right PATRICIA infarct with scattered smaller areas of infarct. Patient on treatment for cerebral vasculitis. Transferred to [address] [place] [address] for cytoxan therapy.

## 2018-08-24 NOTE — PROGRESS NOTE ADULT - SUBJECTIVE AND OBJECTIVE BOX
OVERNIGHT EVENTS: Hypertensive to 213/88, isordil 10mg given. Started Isordil 10mg TID    SUBJECTIVE / INTERVAL HPI: Patient seen and examined at bedside. Patient less somnolent, more responsive to questioning. Denies fevers, chills, night sweats, chest pain, SOB, HA, n/v/d/c    VITAL SIGNS:  Vital Signs Last 24 Hrs  T(C): 36.7 (24 Aug 2018 04:38), Max: 36.7 (23 Aug 2018 16:55)  T(F): 98 (24 Aug 2018 04:38), Max: 98 (23 Aug 2018 16:55)  HR: 52 (24 Aug 2018 04:38) (48 - 56)  BP: 177/87 (24 Aug 2018 04:38) (177/75 - 214/70)  BP(mean): 108 (23 Aug 2018 14:13) (108 - 108)  RR: 16 (24 Aug 2018 04:38) (16 - 17)  SpO2: 96% (24 Aug 2018 04:38) (96% - 96%)    PHYSICAL EXAM:    General: NAD, following commands, speaking in low volume  HEENT: PERRL, clear conjunctiva. Dry mucus membranes  Neck: supple  Respiratory: CTA b/l. No wheezes, no rales, no rhonchi appreciated   Cardiovascular: +S1/S2; RRR  Abdomen: soft, NT/ND; +BS x4  Extremities: No erythema, no edema, no cyanosis b/l  Neurological exam limited to patient cooperation and condition.  Mental status: Awake and alert. Speech is fluent. Follows commands. Appears less somnolent than prior.  CN II: pupils equally round and reactive to light,   II, IV, VI: EOMI without nystagmus. Right gaze preference   VII: Left facial droop  VIII: hearing is intact to finger rub  IX, X: Uvula is midline and soft palate rises symmetrically  Muscle strength: RUE 4/5, LUE 2/5, RLE 4/5, LLE 2/5. Moves RUE, RLE, LUE and LLE spontaneously.     MEDICATIONS:  MEDICATIONS  (STANDING):  amLODIPine   Tablet 10 milliGRAM(s) Oral daily  aspirin 325 milliGRAM(s) Oral daily  atorvastatin 80 milliGRAM(s) Oral at bedtime  BACItracin   Ointment 1 Application(s) Topical two times a day  cholecalciferol 1000 Unit(s) Oral daily  cloNIDine 0.1 milliGRAM(s) Oral every 12 hours  cyclophosphamide IVPB (Non - oncologic) 1177 milliGRAM(s) IV Intermittent once  dextrose 5%. 1000 milliLiter(s) (50 mL/Hr) IV Continuous <Continuous>  dextrose 50% Injectable 12.5 Gram(s) IV Push once  dextrose 50% Injectable 25 Gram(s) IV Push once  dextrose 50% Injectable 25 Gram(s) IV Push once  folic acid 1 milliGRAM(s) Oral daily  heparin  Injectable 5000 Unit(s) SubCutaneous every 8 hours  hydrALAZINE 100 milliGRAM(s) Oral every 8 hours  insulin glargine Injectable (LANTUS) 5 Unit(s) SubCutaneous at bedtime  insulin lispro (HumaLOG) corrective regimen sliding scale   SubCutaneous every 6 hours  isosorbide   dinitrate Tablet (ISORDIL) 10 milliGRAM(s) Oral three times a day  levETIRAcetam 250 milliGRAM(s) Oral two times a day  methylPREDNISolone sodium succinate Injectable 30 milliGRAM(s) IV Push every 12 hours  multivitamin 1 Tablet(s) Oral daily  pantoprazole  Injectable 40 milliGRAM(s) IV Push daily  potassium chloride   Powder 20 milliEquivalent(s) Oral once  sertraline 50 milliGRAM(s) Oral daily  sodium chloride 0.9% Bolus 500 milliLiter(s) IV Bolus once  thiamine 100 milliGRAM(s) Oral daily    MEDICATIONS  (PRN):  acetaminophen   Tablet 325 milliGRAM(s) Oral every 4 hours PRN For Temp greater than 38 C (100.4 F)  atropine Injectable 0.5 milliGRAM(s) IV Push every 5 minutes PRN Symptomatic Bradycardia  dextrose 40% Gel 15 Gram(s) Oral once PRN Blood Glucose LESS THAN 70 milliGRAM(s)/deciliter  diphenhydrAMINE   Capsule 25 milliGRAM(s) Oral every 4 hours PRN Rash and/or Itching  glucagon  Injectable 1 milliGRAM(s) IntraMuscular once PRN Glucose LESS THAN 70 milligrams/deciliter      ALLERGIES:  Allergies    No Known Allergies    Intolerances        LABS:                        10.0   11.6  )-----------( 250      ( 24 Aug 2018 08:08 )             29.9     08-24    137  |  99  |  43<H>  ----------------------------<  96  3.7   |  23  |  1.56<H>    Ca    9.0      24 Aug 2018 08:08  Phos  3.0     08-24  Mg     2.2     08-24    TPro  5.6<L>  /  Alb  3.3  /  TBili  0.2  /  DBili  <0.2  /  AST  18  /  ALT  44  /  AlkPhos  48  08-24        CAPILLARY BLOOD GLUCOSE      POCT Blood Glucose.: 98 mg/dL (24 Aug 2018 07:50)      RADIOLOGY & ADDITIONAL TESTS: Reviewed.    < from: Xray Chest 1 View-PORTABLE IMMEDIATE (08.24.18 @ 12:08) >    IMPRESSION:   Clear lungs.

## 2018-08-24 NOTE — PROGRESS NOTE ADULT - PROBLEM SELECTOR PLAN 1
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 No atrial thrombus or shunt on KATHERINE, no afib. EF 55-60%. Hypercoaguability workup negative- Hexagonal phase negative, DRVVT inhibitor screen 27.2 (WNL), DRVVT S/C screen LA neg. 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  - for antiplatelet and Atorvastatin 80 for - Secondary stroke prevention  -rheum following, recs appreciated. received 3 pulses of 1g solumedrol at night with clinical improvement. Received 90mg solumedrol yesterday.   -c/w solumedrol 30mg q12 for 2 days starting 8/24  -repeat MRI head 8/20 with new stroke, continue to manage cerebral vasculitis treatment as described   -f/u rheum workup   -f/u repeat US renal doppler for further work up of possible renal vasculitis causing MARYJANE and refractory HTN.  -Neurosurg following, recs appreciated  -medicine following, recs appreciated  -cardio recs appreciated.  -started isordil 10mg TID  -EEG read with epileptiform activity, c/w keppra 250 BID  -plan to start cytoxan 1177mg IV today, BP goal <180/100 prior to induction. As quantiferon TB test indeterminate, CXR obtained which was negative.   -f/u UA prior to cytoxan to assess for possibility of infection

## 2018-08-25 LAB
ANION GAP SERPL CALC-SCNC: 13 MMOL/L — SIGNIFICANT CHANGE UP (ref 5–17)
BUN SERPL-MCNC: 38 MG/DL — HIGH (ref 7–23)
CALCIUM SERPL-MCNC: 8.7 MG/DL — SIGNIFICANT CHANGE UP (ref 8.4–10.5)
CHLORIDE SERPL-SCNC: 102 MMOL/L — SIGNIFICANT CHANGE UP (ref 96–108)
CO2 SERPL-SCNC: 22 MMOL/L — SIGNIFICANT CHANGE UP (ref 22–31)
CREAT SERPL-MCNC: 1.52 MG/DL — HIGH (ref 0.5–1.3)
CRYOGLOB SERPL-MCNC: NEGATIVE — SIGNIFICANT CHANGE UP
GLUCOSE SERPL-MCNC: 90 MG/DL — SIGNIFICANT CHANGE UP (ref 70–99)
HCT VFR BLD CALC: 30.3 % — LOW (ref 34.5–45)
HGB BLD-MCNC: 10 G/DL — LOW (ref 11.5–15.5)
MCHC RBC-ENTMCNC: 28.9 PG — SIGNIFICANT CHANGE UP (ref 27–34)
MCHC RBC-ENTMCNC: 33 G/DL — SIGNIFICANT CHANGE UP (ref 32–36)
MCV RBC AUTO: 87.6 FL — SIGNIFICANT CHANGE UP (ref 80–100)
PLATELET # BLD AUTO: 238 K/UL — SIGNIFICANT CHANGE UP (ref 150–400)
POTASSIUM SERPL-MCNC: 4 MMOL/L — SIGNIFICANT CHANGE UP (ref 3.5–5.3)
POTASSIUM SERPL-SCNC: 4 MMOL/L — SIGNIFICANT CHANGE UP (ref 3.5–5.3)
RBC # BLD: 3.46 M/UL — LOW (ref 3.8–5.2)
RBC # FLD: 14.2 % — SIGNIFICANT CHANGE UP (ref 10.3–16.9)
SODIUM SERPL-SCNC: 137 MMOL/L — SIGNIFICANT CHANGE UP (ref 135–145)
WBC # BLD: 9.9 K/UL — SIGNIFICANT CHANGE UP (ref 3.8–10.5)
WBC # FLD AUTO: 9.9 K/UL — SIGNIFICANT CHANGE UP (ref 3.8–10.5)

## 2018-08-25 PROCEDURE — 71045 X-RAY EXAM CHEST 1 VIEW: CPT | Mod: 26

## 2018-08-25 PROCEDURE — 99232 SBSQ HOSP IP/OBS MODERATE 35: CPT

## 2018-08-25 PROCEDURE — 99233 SBSQ HOSP IP/OBS HIGH 50: CPT | Mod: GC

## 2018-08-25 RX ORDER — DIPHENHYDRAMINE HCL 50 MG
25 CAPSULE ORAL ONCE
Qty: 0 | Refills: 0 | Status: COMPLETED | OUTPATIENT
Start: 2018-08-25 | End: 2018-08-25

## 2018-08-25 RX ORDER — ACETAMINOPHEN 500 MG
325 TABLET ORAL ONCE
Qty: 0 | Refills: 0 | Status: COMPLETED | OUTPATIENT
Start: 2018-08-25 | End: 2018-08-25

## 2018-08-25 RX ORDER — SERTRALINE 25 MG/1
75 TABLET, FILM COATED ORAL DAILY
Qty: 0 | Refills: 0 | Status: DISCONTINUED | OUTPATIENT
Start: 2018-08-25 | End: 2018-08-27

## 2018-08-25 RX ADMIN — PANTOPRAZOLE SODIUM 40 MILLIGRAM(S): 20 TABLET, DELAYED RELEASE ORAL at 12:47

## 2018-08-25 RX ADMIN — Medication 1 APPLICATION(S): at 08:25

## 2018-08-25 RX ADMIN — ATORVASTATIN CALCIUM 80 MILLIGRAM(S): 80 TABLET, FILM COATED ORAL at 21:45

## 2018-08-25 RX ADMIN — Medication 30 MILLIGRAM(S): at 06:39

## 2018-08-25 RX ADMIN — Medication 0.1 MILLIGRAM(S): at 06:39

## 2018-08-25 RX ADMIN — Medication 1 MILLIGRAM(S): at 12:46

## 2018-08-25 RX ADMIN — LEVETIRACETAM 250 MILLIGRAM(S): 250 TABLET, FILM COATED ORAL at 06:39

## 2018-08-25 RX ADMIN — Medication 25 MILLIGRAM(S): at 01:08

## 2018-08-25 RX ADMIN — SERTRALINE 50 MILLIGRAM(S): 25 TABLET, FILM COATED ORAL at 12:46

## 2018-08-25 RX ADMIN — Medication 1000 UNIT(S): at 12:46

## 2018-08-25 RX ADMIN — Medication 325 MILLIGRAM(S): at 12:46

## 2018-08-25 RX ADMIN — INSULIN GLARGINE 5 UNIT(S): 100 INJECTION, SOLUTION SUBCUTANEOUS at 21:44

## 2018-08-25 RX ADMIN — Medication 1 TABLET(S): at 12:46

## 2018-08-25 RX ADMIN — Medication 325 MILLIGRAM(S): at 02:00

## 2018-08-25 RX ADMIN — Medication 0.1 MILLIGRAM(S): at 17:42

## 2018-08-25 RX ADMIN — HEPARIN SODIUM 5000 UNIT(S): 5000 INJECTION INTRAVENOUS; SUBCUTANEOUS at 21:44

## 2018-08-25 RX ADMIN — AMLODIPINE BESYLATE 10 MILLIGRAM(S): 2.5 TABLET ORAL at 06:38

## 2018-08-25 RX ADMIN — Medication 139.71 MILLIGRAM(S): at 02:00

## 2018-08-25 RX ADMIN — Medication 100 MILLIGRAM(S): at 14:09

## 2018-08-25 RX ADMIN — Medication 100 MILLIGRAM(S): at 21:45

## 2018-08-25 RX ADMIN — HEPARIN SODIUM 5000 UNIT(S): 5000 INJECTION INTRAVENOUS; SUBCUTANEOUS at 14:08

## 2018-08-25 RX ADMIN — ISOSORBIDE DINITRATE 20 MILLIGRAM(S): 5 TABLET ORAL at 21:45

## 2018-08-25 RX ADMIN — LEVETIRACETAM 250 MILLIGRAM(S): 250 TABLET, FILM COATED ORAL at 17:42

## 2018-08-25 RX ADMIN — Medication 100 MILLIGRAM(S): at 06:39

## 2018-08-25 RX ADMIN — ISOSORBIDE DINITRATE 20 MILLIGRAM(S): 5 TABLET ORAL at 06:38

## 2018-08-25 RX ADMIN — HEPARIN SODIUM 5000 UNIT(S): 5000 INJECTION INTRAVENOUS; SUBCUTANEOUS at 06:39

## 2018-08-25 RX ADMIN — Medication 1 APPLICATION(S): at 17:43

## 2018-08-25 RX ADMIN — Medication 325 MILLIGRAM(S): at 01:00

## 2018-08-25 RX ADMIN — ISOSORBIDE DINITRATE 20 MILLIGRAM(S): 5 TABLET ORAL at 14:08

## 2018-08-25 RX ADMIN — Medication 100 UNIT(S): at 12:46

## 2018-08-25 RX ADMIN — Medication 30 MILLIGRAM(S): at 17:42

## 2018-08-25 RX ADMIN — Medication 100 MILLIGRAM(S): at 12:47

## 2018-08-25 NOTE — PROGRESS NOTE ADULT - PROBLEM SELECTOR PLAN 4
Patient initially admitted to psych for major depressive episode with inability to take care of self and SI. Patient subsequently had CVA.  - Later in evening 8/25 patient complained of suicidal ideation with possible intent/plan - patient described she could jump out of the window but later stated she could not because she was too weak. Psych consulted, recs appreciated - recommended increasing zoloft to 75mg daily, chaplaincy, pet therapy and music therapy, possible other integrative measures. No 1:1 deemed necessary as patient had limited means given limited mobility.   -c/w zoloft to 75mg daily per psych recs   -psych recommending formal psych referral upon discharge

## 2018-08-25 NOTE — PROGRESS NOTE ADULT - PROBLEM SELECTOR PROBLEM 8
Transition of care performed with sharing of clinical summary

## 2018-08-25 NOTE — PROGRESS NOTE ADULT - SUBJECTIVE AND OBJECTIVE BOX
INTERVAL HPI/OVERNIGHT EVENTS: Patient seen and examined at bedside. No acute events overnight.    VITAL SIGNS:  T(F): 98.3 (18 @ 05:15)  HR: 55 (18 @ 05:15)  BP: 144/85 (18 @ 05:15)  RR: 14 (18 @ 05:15)  SpO2: 96% (18 @ 05:15)  Wt(kg): --    PHYSICAL EXAM:    Constitutional: WDWN resting comfortably in bed; NAD  Head: NC/AT  Eyes: PERRL, EOMI, anicteric sclera  ENT: no nasal discharge; uvula midline, no oropharyngeal erythema or exudates; MMM  Neck: supple; no JVD or thyromegaly  Respiratory: CTA B/L; no W/R/R, no retractions  Cardiac: +S1/S2; RRR; no M/R/G; PMI non-displaced  Gastrointestinal: soft, NT/ND; no rebound or guarding; +BSx4  Genitourinary: normal external genitalia  Back: spine midline, no bony tenderness or step-offs; no CVAT B/L  Extremities: WWP, no clubbing or cyanosis; no peripheral edema  Musculoskeletal: NROM x4; no joint swelling, tenderness or erythema  Vascular: 2+ radial, femoral, DP/PT pulses B/L  Dermatologic: skin warm, dry and intact; no rashes, wounds, or scars  Lymphatic: no submandibular or cervical LAD  Neurologic: AAOx3; CNII-XII grossly intact; no focal deficits  Psychiatric: affect and characteristics of appearance, verbalizations, behaviors are appropriate    MEDICATIONS  (STANDING):  amLODIPine   Tablet 10 milliGRAM(s) Oral daily  aspirin 325 milliGRAM(s) Oral daily  atorvastatin 80 milliGRAM(s) Oral at bedtime  BACItracin   Ointment 1 Application(s) Topical two times a day  cholecalciferol 1000 Unit(s) Oral daily  cloNIDine 0.1 milliGRAM(s) Oral every 12 hours  dextrose 5%. 1000 milliLiter(s) (50 mL/Hr) IV Continuous <Continuous>  dextrose 50% Injectable 12.5 Gram(s) IV Push once  dextrose 50% Injectable 25 Gram(s) IV Push once  dextrose 50% Injectable 25 Gram(s) IV Push once  folic acid 1 milliGRAM(s) Oral daily  heparin  flush 100 Units/mL Injectable 100 Unit(s) IV Push every other day  heparin  Injectable 5000 Unit(s) SubCutaneous every 8 hours  hydrALAZINE 100 milliGRAM(s) Oral every 8 hours  insulin glargine Injectable (LANTUS) 5 Unit(s) SubCutaneous at bedtime  insulin lispro (HumaLOG) corrective regimen sliding scale   SubCutaneous every 6 hours  isosorbide   dinitrate Tablet (ISORDIL) 20 milliGRAM(s) Oral three times a day  levETIRAcetam 250 milliGRAM(s) Oral two times a day  methylPREDNISolone sodium succinate Injectable 30 milliGRAM(s) IV Push every 12 hours  multivitamin 1 Tablet(s) Oral daily  pantoprazole  Injectable 40 milliGRAM(s) IV Push daily  sertraline 50 milliGRAM(s) Oral daily  thiamine 100 milliGRAM(s) Oral daily    MEDICATIONS  (PRN):  acetaminophen   Tablet 325 milliGRAM(s) Oral every 4 hours PRN For Temp greater than 38 C (100.4 F)  atropine Injectable 0.5 milliGRAM(s) IV Push every 5 minutes PRN Symptomatic Bradycardia  dextrose 40% Gel 15 Gram(s) Oral once PRN Blood Glucose LESS THAN 70 milliGRAM(s)/deciliter  diphenhydrAMINE   Capsule 25 milliGRAM(s) Oral every 4 hours PRN Rash and/or Itching  glucagon  Injectable 1 milliGRAM(s) IntraMuscular once PRN Glucose LESS THAN 70 milligrams/deciliter      Allergies    No Known Allergies    Intolerances        LABS:                        10.0   11.6  )-----------( 250      ( 24 Aug 2018 08:08 )             29.9     08-24    137  |  99  |  43<H>  ----------------------------<  96  3.7   |  23  |  1.56<H>    Ca    9.0      24 Aug 2018 08:08  Phos  3.0     08-24  Mg     2.2     -24    TPro  5.6<L>  /  Alb  3.3  /  TBili  0.2  /  DBili  <0.2  /  AST  18  /  ALT  44  /  AlkPhos  48  08-      Urinalysis Basic - ( 24 Aug 2018 16:19 )    Color: Yellow / Appearance: Clear / S.020 / pH: x  Gluc: x / Ketone: NEGATIVE  / Bili: Negative / Urobili: 0.2 E.U./dL   Blood: x / Protein: 100 mg/dL / Nitrite: NEGATIVE   Leuk Esterase: NEGATIVE / RBC: < 5 /HPF / WBC < 5 /HPF   Sq Epi: x / Non Sq Epi: 0-5 /HPF / Bacteria: Present /HPF        RADIOLOGY & ADDITIONAL TESTS: INTERVAL HPI/OVERNIGHT EVENTS: Patient seen and examined at bedside. No acute events overnight.    VITAL SIGNS:  T(F): 98.3 (18 @ 05:15)  HR: 55 (18 @ 05:15)  BP: 144/85 (18 @ 05:15)  RR: 14 (18 @ 05:15)  SpO2: 96% (18 @ 05:15)  Wt(kg): --    PHYSICAL EXAM:    Constitutional: frail, resting comfortably in bed; NAD  Head: NC/AT  Eyes: PERRL, EOMI, anicteric sclera  ENT: no nasal discharge; uvula midline, no oropharyngeal erythema or exudates; MMM  Neck: supple; no JVD or thyromegaly  Respiratory: CTA B/L; no W/R/R, no retractions  Cardiac: +S1/S2; RRR; no M/R/G; PMI non-displaced  Gastrointestinal: soft, NT/ND; no rebound or guarding; +BSx4  Extremities: WWP, no clubbing or cyanosis; no peripheral edema  Vascular: 2+ radial, femoral, DP/PT pulses B/L  Dermatologic: skin warm, dry and intact; no rashes, wounds, or scars  Lymphatic: no submandibular or cervical LAD  Neurologic: AAOx3; CN II-XII grossly intact, LUE 3/5, LLE 2/5, RUE+RLE 5/5    MEDICATIONS  (STANDING):  amLODIPine   Tablet 10 milliGRAM(s) Oral daily  aspirin 325 milliGRAM(s) Oral daily  atorvastatin 80 milliGRAM(s) Oral at bedtime  BACItracin   Ointment 1 Application(s) Topical two times a day  cholecalciferol 1000 Unit(s) Oral daily  cloNIDine 0.1 milliGRAM(s) Oral every 12 hours  dextrose 5%. 1000 milliLiter(s) (50 mL/Hr) IV Continuous <Continuous>  dextrose 50% Injectable 12.5 Gram(s) IV Push once  dextrose 50% Injectable 25 Gram(s) IV Push once  dextrose 50% Injectable 25 Gram(s) IV Push once  folic acid 1 milliGRAM(s) Oral daily  heparin  flush 100 Units/mL Injectable 100 Unit(s) IV Push every other day  heparin  Injectable 5000 Unit(s) SubCutaneous every 8 hours  hydrALAZINE 100 milliGRAM(s) Oral every 8 hours  insulin glargine Injectable (LANTUS) 5 Unit(s) SubCutaneous at bedtime  insulin lispro (HumaLOG) corrective regimen sliding scale   SubCutaneous every 6 hours  isosorbide   dinitrate Tablet (ISORDIL) 20 milliGRAM(s) Oral three times a day  levETIRAcetam 250 milliGRAM(s) Oral two times a day  methylPREDNISolone sodium succinate Injectable 30 milliGRAM(s) IV Push every 12 hours  multivitamin 1 Tablet(s) Oral daily  pantoprazole  Injectable 40 milliGRAM(s) IV Push daily  sertraline 50 milliGRAM(s) Oral daily  thiamine 100 milliGRAM(s) Oral daily    MEDICATIONS  (PRN):  acetaminophen   Tablet 325 milliGRAM(s) Oral every 4 hours PRN For Temp greater than 38 C (100.4 F)  atropine Injectable 0.5 milliGRAM(s) IV Push every 5 minutes PRN Symptomatic Bradycardia  dextrose 40% Gel 15 Gram(s) Oral once PRN Blood Glucose LESS THAN 70 milliGRAM(s)/deciliter  diphenhydrAMINE   Capsule 25 milliGRAM(s) Oral every 4 hours PRN Rash and/or Itching  glucagon  Injectable 1 milliGRAM(s) IntraMuscular once PRN Glucose LESS THAN 70 milligrams/deciliter      Allergies    No Known Allergies    Intolerances        LABS:                        10.0   11.6  )-----------( 250      ( 24 Aug 2018 08:08 )             29.9     08-24    137  |  99  |  43<H>  ----------------------------<  96  3.7   |  23  |  1.56<H>    Ca    9.0      24 Aug 2018 08:08  Phos  3.0     08-24  Mg     2.2     08-24    TPro  5.6<L>  /  Alb  3.3  /  TBili  0.2  /  DBili  <0.2  /  AST  18  /  ALT  44  /  AlkPhos  48  -      Urinalysis Basic - ( 24 Aug 2018 16:19 )    Color: Yellow / Appearance: Clear / S.020 / pH: x  Gluc: x / Ketone: NEGATIVE  / Bili: Negative / Urobili: 0.2 E.U./dL   Blood: x / Protein: 100 mg/dL / Nitrite: NEGATIVE   Leuk Esterase: NEGATIVE / RBC: < 5 /HPF / WBC < 5 /HPF   Sq Epi: x / Non Sq Epi: 0-5 /HPF / Bacteria: Present /HPF        RADIOLOGY & ADDITIONAL TESTS:

## 2018-08-25 NOTE — PROGRESS NOTE BEHAVIORAL HEALTH - NSBHFUPINTERVALHXFT_PSY_A_CORE
Psychiatry reconsulted as patient expressed active suicidal ideation with plan to jump out of the window to her medical team. The patient also told them that she would actually do it if she wasn't bed bound. Upon interview, the patient was resting comfortably in bed watching CNN. She reported she does not remember how long she has been in the hospital. She has a friend named Leyla who has visited her. They have been friends for many years and met at their job at the . She reported she has family but they are not in communication because "they are too difficult." The patient reported she does have active suicidal ideation with plan, but she is not able to act on it. She denied other plans. She reported she feels scared of death because she does not think she will survive her hospitalization. She is scared that "there is nothing" after death. She would reported she would be interested in speaking with a chaplaincy, though she is not [employer] and has prior "shitty" experiences with chaplaincy, while smiling. She reported she loves dogs and would be amenable to pet therapy, also while smiling. The patient reported she might feel better if she had a "drink", such as a "devonte."

## 2018-08-25 NOTE — PROGRESS NOTE ADULT - ASSESSMENT
68F PMH MDD, found down in her apartment. Stroke code called on 8/11 for L facial droop and weakness. CT then showed multiple lacunar infarcts, age indeterminate, in basal ganglia and thalamus. CTA showed chronic L VA occlusion, R VA and basilar hypoplasia, carotid stenosis bilaterally. No tPA given. Symptoms improved, then 8/12 another stroke code was called for L facial droop and left UE/LE weakness, CT unchanged. MRI showed acute right PATRICIA infarct with scattered smaller areas of infarct. Patient on treatment for cerebral vasculitis. Transferred to [address] [place] [address] for cytoxan therapy.

## 2018-08-25 NOTE — PROGRESS NOTE ADULT - PROBLEM SELECTOR PLAN 7
DVT: Heparin SQ TID and SCDs  GI: no indication    [facility]po [facility]  Activity [facility]  CODE: FULL
DVT: Heparin SQ TID and SCDs  GI: no indication    [facility]  Activity [facility]  CODE: FULL
DVT: Heparin SQ TID and SCDs  GI: no indication    Dispo [facility]  Activity [facility]  CODE: FULL

## 2018-08-25 NOTE — PROGRESS NOTE ADULT - PROBLEM SELECTOR PLAN 1
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 No atrial thrombus or shunt on KATHERINE, no afib. EF 55-60%. Hypercoaguability workup negative- Hexagonal phase negative, DRVVT inhibitor screen 27.2 (WNL), DRVVT S/C screen LA neg. 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  - for antiplatelet and Atorvastatin 80 for - Secondary stroke prevention  -rheum following, recs appreciated. received 3 pulses of 1g solumedrol at night with clinical improvement.  -s/p 2 day solumedrol 30mg q12 for 2 days, last dose was today  -repeat MRI head 8/20 with new stroke, continue to manage cerebral vasculitis treatment as described   -f/u rheum workup   -retroperitoneal doppler US with right renal artery <60% stenosis  -Neurosurg following, recs appreciated  -medicine following, recs appreciated  -cardio recs appreciated.  -EEG read with epileptiform activity, c/w keppra 250 BID  -s/p cytoxan 1177mg IV O/n, BP goal <180/100. CXR and UA negative prior to induction.

## 2018-08-25 NOTE — PROGRESS NOTE BEHAVIORAL HEALTH - DETAILS
see HPI, recent CVA with residual weakness

## 2018-08-25 NOTE — PROGRESS NOTE ADULT - PROBLEM SELECTOR PLAN 2
Lower BP goals given pulses of solumedrol (<180/100). Refractory HTN possibly due to renal vasculitis. Direct renin 135.7, aldosterone 10.2. 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 Consider d/cing clonidine as now started isordil  - lisinopril held in setting of MARYJANE, consider restarting as renal function improving  - holding beta blocker in setting of labile HR, can become sinus paige (asymptomatic)   - if need to increase BP meds, consider up titrating isordil

## 2018-08-25 NOTE — PROGRESS NOTE ADULT - PROBLEM SELECTOR PLAN 3
MARYJANE on CKD stage 3. Patient presented with MARYJANE of Cr 1.5 with baseline unclear. Creatinine uptrend throughout patient's course. Etiology currently unclear, possibly prerenal in setting of decreased PO intake from depression vs. hypertensive nephropathy from persistently elevated BPs vs. renal vasculitis  - holding lisinopril in setting of MARYJANE, consider restarting as kidney function improves after renal doppler  - continue to monitor  -retroperitoneal doppler US with right renal artery <60% stenosis  -f/u urine lytes

## 2018-08-25 NOTE — PROGRESS NOTE ADULT - ASSESSMENT
68 yr old female with a PMHx of HTN that presented to [facility] for psych stabilization, subsequently had a multi-focal stroke with L sided facial and UE/LE deficits. Course c/b persistent HTN requiring nicardipine drip now transition to PO BP regimen. 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 Rheumatology consulted. Medicine initially consulted for clearance prior to angiogram.    #acute CVA - likely 2/2 vasculitis. Patient with residual left sided deficits.  -on , 80 mg lipitor   - mgmt per stroke service    #MARYJANE vs CKD - Possibly 2/2 vasculitis vs CKD 2/2 hypertension vs ?   Patient presented with MARYJANE of 1.45; unclear baseline. Renal ultrasound showed slightly diminished left renal size with smooth renal cortical mantle thinning which may suggest underlying nephrosclerosis. Renal duplex performed 8/24, showing <60% stenosis of the right renal artery at its origin. Urine lytes revealing FeNa - 1.4%, indicating intrinsic renal disease.   - please hold lisinopril; please avoid all nephrotoxic agents  - trend Cr    #HTN - persistently elevated w/SBPs in 160s - 180s  - currently on 3 drug regimen with clonidine, amlodipine, hydralazine  - HTN management per neurology     Medicine to follow

## 2018-08-25 NOTE — PROGRESS NOTE ADULT - ATTENDING COMMENTS
Patient seen and examined with Resident.  Agree with above.    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     - Plan as per Rheumatology Dr. Nation - post Solumedrol 1gm x 3 days and then lower dose Solumedrol now.  Cytoxan given late last night without complications.  Also on IV PPI and Vitamin D, as requested by Dr. Nation.  All labs requested by Dr. Nation sent.  Defer results to Dr. Nation.    Blood pressure still needs better controlled even though on Hydralazine 100mg now so will consider adding additional antihypertensive.  - Agree with finding out more about doppler portion of renal ultrasound.      She will ultimately need acute rehab .

## 2018-08-25 NOTE — PROGRESS NOTE ADULT - SUBJECTIVE AND OBJECTIVE BOX
OVERNIGHT EVENTS: right Subclavian triple lumen central line placed as no peripheral access could be obtained. Necessary as patient required cytoxan, which she later received after central line was placed.    SUBJECTIVE / INTERVAL HPI: Patient seen and examined at bedside. Patient with no complaints in AM. Denied fever, chills, night sweets, Chest pain, SOB, n/v/d/c. Later in evening patient complained of suicidal ideation with possible intent/plan - patient described she could jump out of the window but later stated she could not because she was too weak. Psych consulted - recommended increasing zoloft to 75mg daily,  chaplaincy, pet therapy and music therapy. No 1:1 deemed necessary as patient had limited means given limited mobility.     VITAL SIGNS:  Vital Signs Last 24 Hrs  T(C): 36.8 (25 Aug 2018 21:53), Max: 37.2 (25 Aug 2018 17:41)  T(F): 98.2 (25 Aug 2018 21:53), Max: 98.9 (25 Aug 2018 17:41)  HR: 62 (25 Aug 2018 21:53) (49 - 62)  BP: 193/77 (25 Aug 2018 21:53) (144/85 - 193/77)  BP(mean): --  RR: 16 (25 Aug 2018 21:53) (14 - 16)  SpO2: 94% (25 Aug 2018 21:53) (94% - 98%)    PHYSICAL EXAM:    General: NAD, following commands, speaking in low volume  HEENT: PERRL, clear conjunctiva. Dry mucus membranes  Neck: supple  Respiratory: CTA b/l. No wheezes, no rales, no rhonchi appreciated   Cardiovascular: +S1/S2; RRR  Abdomen: soft, NT/ND; +BS x4  Extremities: No erythema, no edema, no cyanosis b/l  Neurological exam limited to patient cooperation and condition.  Mental status: Awake and alert. Speech is fluent. Follows commands. Patient increasingly more compliant.   CN II: pupils equally round and reactive to light,   II, IV, VI: EOMI without nystagmus. Right gaze preference   VII: Left facial droop  VIII: hearing is intact to finger rub  IX, X: Uvula is midline and soft palate rises symmetrically  Muscle strength: RUE 4/5, LUE 2-3/5, RLE 4/5, LLE 2/5. Moves RUE, RLE, LUE and LLE spontaneously.   Sensation: grossly intact throughout.    MEDICATIONS:  MEDICATIONS  (STANDING):  amLODIPine   Tablet 10 milliGRAM(s) Oral daily  aspirin 325 milliGRAM(s) Oral daily  atorvastatin 80 milliGRAM(s) Oral at bedtime  BACItracin   Ointment 1 Application(s) Topical two times a day  cholecalciferol 1000 Unit(s) Oral daily  cloNIDine 0.1 milliGRAM(s) Oral every 12 hours  dextrose 5%. 1000 milliLiter(s) (50 mL/Hr) IV Continuous <Continuous>  dextrose 50% Injectable 12.5 Gram(s) IV Push once  dextrose 50% Injectable 25 Gram(s) IV Push once  dextrose 50% Injectable 25 Gram(s) IV Push once  folic acid 1 milliGRAM(s) Oral daily  heparin  flush 100 Units/mL Injectable 100 Unit(s) IV Push every other day  heparin  Injectable 5000 Unit(s) SubCutaneous every 8 hours  hydrALAZINE 100 milliGRAM(s) Oral every 8 hours  insulin glargine Injectable (LANTUS) 5 Unit(s) SubCutaneous at bedtime  insulin lispro (HumaLOG) corrective regimen sliding scale   SubCutaneous every 6 hours  isosorbide   dinitrate Tablet (ISORDIL) 20 milliGRAM(s) Oral three times a day  levETIRAcetam 250 milliGRAM(s) Oral two times a day  multivitamin 1 Tablet(s) Oral daily  pantoprazole  Injectable 40 milliGRAM(s) IV Push daily  sertraline 75 milliGRAM(s) Oral daily  thiamine 100 milliGRAM(s) Oral daily    MEDICATIONS  (PRN):  acetaminophen   Tablet 325 milliGRAM(s) Oral every 4 hours PRN For Temp greater than 38 C (100.4 F)  atropine Injectable 0.5 milliGRAM(s) IV Push every 5 minutes PRN Symptomatic Bradycardia  dextrose 40% Gel 15 Gram(s) Oral once PRN Blood Glucose LESS THAN 70 milliGRAM(s)/deciliter  diphenhydrAMINE   Capsule 25 milliGRAM(s) Oral every 4 hours PRN Rash and/or Itching  glucagon  Injectable 1 milliGRAM(s) IntraMuscular once PRN Glucose LESS THAN 70 milligrams/deciliter      ALLERGIES:  Allergies    No Known Allergies    Intolerances        LABS:                        10.0   9.9   )-----------( 238      ( 25 Aug 2018 08:14 )             30.3         137  |  102  |  38<H>  ----------------------------<  90  4.0   |  22  |  1.52<H>    Ca    8.7      25 Aug 2018 08:14  Phos  3.0       Mg     2.2         TPro  5.6<L>  /  Alb  3.3  /  TBili  0.2  /  DBili  <0.2  /  AST  18  /  ALT  44  /  AlkPhos  48        Urinalysis Basic - ( 24 Aug 2018 16:19 )    Color: Yellow / Appearance: Clear / S.020 / pH: x  Gluc: x / Ketone: NEGATIVE  / Bili: Negative / Urobili: 0.2 E.U./dL   Blood: x / Protein: 100 mg/dL / Nitrite: NEGATIVE   Leuk Esterase: NEGATIVE / RBC: < 5 /HPF / WBC < 5 /HPF   Sq Epi: x / Non Sq Epi: 0-5 /HPF / Bacteria: Present /HPF      CAPILLARY BLOOD GLUCOSE      POCT Blood Glucose.: 152 mg/dL (25 Aug 2018 21:10)      RADIOLOGY & ADDITIONAL TESTS: Reviewed.    < from: Xray Chest 1 View AP/PA (18 @ 00:34) >  Impression: No acute infiltrates

## 2018-08-26 LAB
ANION GAP SERPL CALC-SCNC: 13 MMOL/L — SIGNIFICANT CHANGE UP (ref 5–17)
APTT BLD: 26.3 SEC — LOW (ref 27.5–37.4)
BASE EXCESS BLDA CALC-SCNC: 0.7 MMOL/L — SIGNIFICANT CHANGE UP (ref -2–3)
BASE EXCESS BLDA CALC-SCNC: 1 MMOL/L — SIGNIFICANT CHANGE UP (ref -2–3)
BASOPHILS NFR BLD AUTO: 0.1 % — SIGNIFICANT CHANGE UP (ref 0–2)
BLD GP AB SCN SERPL QL: NEGATIVE — SIGNIFICANT CHANGE UP
BLD GP AB SCN SERPL QL: NEGATIVE — SIGNIFICANT CHANGE UP
BUN SERPL-MCNC: 30 MG/DL — HIGH (ref 7–23)
CALCIUM SERPL-MCNC: 8.7 MG/DL — SIGNIFICANT CHANGE UP (ref 8.4–10.5)
CHLORIDE SERPL-SCNC: 102 MMOL/L — SIGNIFICANT CHANGE UP (ref 96–108)
CO2 SERPL-SCNC: 22 MMOL/L — SIGNIFICANT CHANGE UP (ref 22–31)
CREAT SERPL-MCNC: 1.35 MG/DL — HIGH (ref 0.5–1.3)
EOSINOPHIL NFR BLD AUTO: 0.4 % — SIGNIFICANT CHANGE UP (ref 0–6)
GAS PNL BLDA: SIGNIFICANT CHANGE UP
GAS PNL BLDA: SIGNIFICANT CHANGE UP
GLUCOSE SERPL-MCNC: 86 MG/DL — SIGNIFICANT CHANGE UP (ref 70–99)
HCO3 BLDA-SCNC: 24 MMOL/L — SIGNIFICANT CHANGE UP (ref 21–28)
HCO3 BLDA-SCNC: 24 MMOL/L — SIGNIFICANT CHANGE UP (ref 21–28)
HCT VFR BLD CALC: 32.5 % — LOW (ref 34.5–45)
HCT VFR BLD CALC: 33.2 % — LOW (ref 34.5–45)
HGB BLD-MCNC: 10.9 G/DL — LOW (ref 11.5–15.5)
HGB BLD-MCNC: 11.3 G/DL — LOW (ref 11.5–15.5)
INR BLD: 0.96 — SIGNIFICANT CHANGE UP (ref 0.88–1.16)
LACTATE SERPL-SCNC: 0.9 MMOL/L — SIGNIFICANT CHANGE UP (ref 0.5–2)
LYMPHOCYTES # BLD AUTO: 15.2 % — SIGNIFICANT CHANGE UP (ref 13–44)
MAGNESIUM SERPL-MCNC: 2 MG/DL — SIGNIFICANT CHANGE UP (ref 1.6–2.6)
MAGNESIUM SERPL-MCNC: 2.1 MG/DL — SIGNIFICANT CHANGE UP (ref 1.6–2.6)
MCHC RBC-ENTMCNC: 29 PG — SIGNIFICANT CHANGE UP (ref 27–34)
MCHC RBC-ENTMCNC: 29.7 PG — SIGNIFICANT CHANGE UP (ref 27–34)
MCHC RBC-ENTMCNC: 33.5 G/DL — SIGNIFICANT CHANGE UP (ref 32–36)
MCHC RBC-ENTMCNC: 34 G/DL — SIGNIFICANT CHANGE UP (ref 32–36)
MCV RBC AUTO: 86.4 FL — SIGNIFICANT CHANGE UP (ref 80–100)
MCV RBC AUTO: 87.4 FL — SIGNIFICANT CHANGE UP (ref 80–100)
MONOCYTES NFR BLD AUTO: 7.3 % — SIGNIFICANT CHANGE UP (ref 2–14)
NEUTROPHILS NFR BLD AUTO: 77 % — SIGNIFICANT CHANGE UP (ref 43–77)
PCO2 BLDA: 32 MMHG — SIGNIFICANT CHANGE UP (ref 32–45)
PCO2 BLDA: 34 MMHG — SIGNIFICANT CHANGE UP (ref 32–45)
PH BLDA: 7.47 — HIGH (ref 7.35–7.45)
PH BLDA: 7.48 — HIGH (ref 7.35–7.45)
PHOSPHATE SERPL-MCNC: 2.7 MG/DL — SIGNIFICANT CHANGE UP (ref 2.5–4.5)
PHOSPHATE SERPL-MCNC: 2.8 MG/DL — SIGNIFICANT CHANGE UP (ref 2.5–4.5)
PLATELET # BLD AUTO: 269 K/UL — SIGNIFICANT CHANGE UP (ref 150–400)
PLATELET # BLD AUTO: 297 K/UL — SIGNIFICANT CHANGE UP (ref 150–400)
PO2 BLDA: 101 MMHG — SIGNIFICANT CHANGE UP (ref 83–108)
PO2 BLDA: 201 MMHG — HIGH (ref 83–108)
POTASSIUM SERPL-MCNC: 3.8 MMOL/L — SIGNIFICANT CHANGE UP (ref 3.5–5.3)
POTASSIUM SERPL-SCNC: 3.8 MMOL/L — SIGNIFICANT CHANGE UP (ref 3.5–5.3)
PROTHROM AB SERPL-ACNC: 10.7 SEC — SIGNIFICANT CHANGE UP (ref 9.8–12.7)
RBC # BLD: 3.76 M/UL — LOW (ref 3.8–5.2)
RBC # BLD: 3.8 M/UL — SIGNIFICANT CHANGE UP (ref 3.8–5.2)
RBC # FLD: 14.4 % — SIGNIFICANT CHANGE UP (ref 10.3–16.9)
RBC # FLD: 14.5 % — SIGNIFICANT CHANGE UP (ref 10.3–16.9)
RH IG SCN BLD-IMP: POSITIVE — SIGNIFICANT CHANGE UP
RH IG SCN BLD-IMP: POSITIVE — SIGNIFICANT CHANGE UP
SAO2 % BLDA: 98 % — SIGNIFICANT CHANGE UP (ref 95–100)
SAO2 % BLDA: 99 % — SIGNIFICANT CHANGE UP (ref 95–100)
SODIUM SERPL-SCNC: 137 MMOL/L — SIGNIFICANT CHANGE UP (ref 135–145)
WBC # BLD: 10.7 K/UL — HIGH (ref 3.8–10.5)
WBC # BLD: 19.1 K/UL — HIGH (ref 3.8–10.5)
WBC # FLD AUTO: 10.7 K/UL — HIGH (ref 3.8–10.5)
WBC # FLD AUTO: 19.1 K/UL — HIGH (ref 3.8–10.5)

## 2018-08-26 PROCEDURE — 93010 ELECTROCARDIOGRAM REPORT: CPT

## 2018-08-26 PROCEDURE — 99233 SBSQ HOSP IP/OBS HIGH 50: CPT

## 2018-08-26 PROCEDURE — 93010 ELECTROCARDIOGRAM REPORT: CPT | Mod: 77

## 2018-08-26 PROCEDURE — 71045 X-RAY EXAM CHEST 1 VIEW: CPT | Mod: 26,76

## 2018-08-26 PROCEDURE — 70450 CT HEAD/BRAIN W/O DYE: CPT | Mod: 26

## 2018-08-26 PROCEDURE — 99291 CRITICAL CARE FIRST HOUR: CPT

## 2018-08-26 RX ORDER — ONDANSETRON 8 MG/1
4 TABLET, FILM COATED ORAL ONCE
Qty: 0 | Refills: 0 | Status: COMPLETED | OUTPATIENT
Start: 2018-08-26 | End: 2018-08-26

## 2018-08-26 RX ORDER — CHLORHEXIDINE GLUCONATE 213 G/1000ML
1 SOLUTION TOPICAL DAILY
Qty: 0 | Refills: 0 | Status: DISCONTINUED | OUTPATIENT
Start: 2018-08-26 | End: 2018-09-05

## 2018-08-26 RX ORDER — PROPOFOL 10 MG/ML
13.77 INJECTION, EMULSION INTRAVENOUS
Qty: 1000 | Refills: 0 | Status: DISCONTINUED | OUTPATIENT
Start: 2018-08-26 | End: 2018-08-30

## 2018-08-26 RX ORDER — NICARDIPINE HYDROCHLORIDE 30 MG/1
5 CAPSULE, EXTENDED RELEASE ORAL
Qty: 40 | Refills: 0 | Status: DISCONTINUED | OUTPATIENT
Start: 2018-08-26 | End: 2018-09-01

## 2018-08-26 RX ORDER — ISOSORBIDE DINITRATE 5 MG/1
30 TABLET ORAL THREE TIMES A DAY
Qty: 0 | Refills: 0 | Status: DISCONTINUED | OUTPATIENT
Start: 2018-08-26 | End: 2018-08-27

## 2018-08-26 RX ORDER — LEVETIRACETAM 250 MG/1
250 TABLET, FILM COATED ORAL EVERY 12 HOURS
Qty: 0 | Refills: 0 | Status: DISCONTINUED | OUTPATIENT
Start: 2018-08-26 | End: 2018-08-29

## 2018-08-26 RX ORDER — POTASSIUM CHLORIDE 20 MEQ
10 PACKET (EA) ORAL
Qty: 0 | Refills: 0 | Status: DISCONTINUED | OUTPATIENT
Start: 2018-08-26 | End: 2018-08-26

## 2018-08-26 RX ORDER — ACETAMINOPHEN 500 MG
650 TABLET ORAL EVERY 6 HOURS
Qty: 0 | Refills: 0 | Status: DISCONTINUED | OUTPATIENT
Start: 2018-08-26 | End: 2018-08-27

## 2018-08-26 RX ORDER — ONDANSETRON 8 MG/1
4 TABLET, FILM COATED ORAL EVERY 6 HOURS
Qty: 0 | Refills: 0 | Status: DISCONTINUED | OUTPATIENT
Start: 2018-08-26 | End: 2018-08-26

## 2018-08-26 RX ORDER — HYDRALAZINE HCL 50 MG
10 TABLET ORAL ONCE
Qty: 0 | Refills: 0 | Status: COMPLETED | OUTPATIENT
Start: 2018-08-26 | End: 2018-08-26

## 2018-08-26 RX ORDER — NICARDIPINE HYDROCHLORIDE 30 MG/1
5 CAPSULE, EXTENDED RELEASE ORAL
Qty: 40 | Refills: 0 | Status: DISCONTINUED | OUTPATIENT
Start: 2018-08-26 | End: 2018-08-26

## 2018-08-26 RX ORDER — BACITRACIN ZINC 500 UNIT/G
1 OINTMENT IN PACKET (EA) TOPICAL DAILY
Qty: 0 | Refills: 0 | Status: DISCONTINUED | OUTPATIENT
Start: 2018-08-26 | End: 2018-09-05

## 2018-08-26 RX ORDER — PANTOPRAZOLE SODIUM 20 MG/1
40 TABLET, DELAYED RELEASE ORAL DAILY
Qty: 0 | Refills: 0 | Status: DISCONTINUED | OUTPATIENT
Start: 2018-08-26 | End: 2018-09-05

## 2018-08-26 RX ORDER — ASPIRIN/CALCIUM CARB/MAGNESIUM 324 MG
300 TABLET ORAL DAILY
Qty: 0 | Refills: 0 | Status: DISCONTINUED | OUTPATIENT
Start: 2018-08-26 | End: 2018-09-01

## 2018-08-26 RX ADMIN — Medication 1 APPLICATION(S): at 12:41

## 2018-08-26 RX ADMIN — INSULIN GLARGINE 5 UNIT(S): 100 INJECTION, SOLUTION SUBCUTANEOUS at 23:08

## 2018-08-26 RX ADMIN — Medication 2: at 23:12

## 2018-08-26 RX ADMIN — Medication 100 MILLIGRAM(S): at 23:00

## 2018-08-26 RX ADMIN — LEVETIRACETAM 400 MILLIGRAM(S): 250 TABLET, FILM COATED ORAL at 17:12

## 2018-08-26 RX ADMIN — NICARDIPINE HYDROCHLORIDE 25 MG/HR: 30 CAPSULE, EXTENDED RELEASE ORAL at 12:42

## 2018-08-26 RX ADMIN — Medication 300 MILLIGRAM(S): at 17:13

## 2018-08-26 RX ADMIN — ONDANSETRON 4 MILLIGRAM(S): 8 TABLET, FILM COATED ORAL at 09:52

## 2018-08-26 RX ADMIN — LEVETIRACETAM 250 MILLIGRAM(S): 250 TABLET, FILM COATED ORAL at 06:15

## 2018-08-26 RX ADMIN — HEPARIN SODIUM 5000 UNIT(S): 5000 INJECTION INTRAVENOUS; SUBCUTANEOUS at 06:18

## 2018-08-26 RX ADMIN — Medication 100 MILLIGRAM(S): at 06:16

## 2018-08-26 RX ADMIN — Medication 100 MILLIEQUIVALENT(S): at 10:10

## 2018-08-26 RX ADMIN — AMLODIPINE BESYLATE 10 MILLIGRAM(S): 2.5 TABLET ORAL at 06:15

## 2018-08-26 RX ADMIN — ONDANSETRON 4 MILLIGRAM(S): 8 TABLET, FILM COATED ORAL at 12:42

## 2018-08-26 RX ADMIN — Medication 100 MILLIGRAM(S): at 13:01

## 2018-08-26 RX ADMIN — ISOSORBIDE DINITRATE 30 MILLIGRAM(S): 5 TABLET ORAL at 23:01

## 2018-08-26 RX ADMIN — PANTOPRAZOLE SODIUM 40 MILLIGRAM(S): 20 TABLET, DELAYED RELEASE ORAL at 12:42

## 2018-08-26 RX ADMIN — NICARDIPINE HYDROCHLORIDE 25 MG/HR: 30 CAPSULE, EXTENDED RELEASE ORAL at 18:47

## 2018-08-26 RX ADMIN — Medication 0.1 MILLIGRAM(S): at 06:16

## 2018-08-26 RX ADMIN — HEPARIN SODIUM 5000 UNIT(S): 5000 INJECTION INTRAVENOUS; SUBCUTANEOUS at 13:00

## 2018-08-26 RX ADMIN — ISOSORBIDE DINITRATE 30 MILLIGRAM(S): 5 TABLET ORAL at 07:55

## 2018-08-26 RX ADMIN — ATORVASTATIN CALCIUM 80 MILLIGRAM(S): 80 TABLET, FILM COATED ORAL at 23:13

## 2018-08-26 RX ADMIN — HEPARIN SODIUM 5000 UNIT(S): 5000 INJECTION INTRAVENOUS; SUBCUTANEOUS at 23:00

## 2018-08-26 RX ADMIN — Medication 10 MILLIGRAM(S): at 09:52

## 2018-08-26 NOTE — PROGRESS NOTE BEHAVIORAL HEALTH - NSBHFUPINTERVALHXFT_PSY_A_CORE
Psychiatry was reconsulted yesterday as patient had expressed active suicidal ideation with plan to jump out of the window to her medical team. Writer attempted to interview patient today, however patient was noted to be sleeping, per staff noted to be lethargic and minimally responsive to questioning. Interview deferred at this time.

## 2018-08-26 NOTE — STROKE CODE NOTE - NIH STROKE SCALE: 9. BEST LANGUAGE, QM
(0) No aphasia; normal
(0) No aphasia; normal
(1) Mild-to-moderate aphasia; some obvious loss of fluency or facility of comprehension, without significant limitation on ideas expressed or form of expression. Reduction of speech and/or comprehension, however, makes conversation about provided material difficult or impossible. For example, in conversation about provided materials, examiner can identify picture or naming card content from patient's response.

## 2018-08-26 NOTE — STROKE CODE NOTE - NIH STROKE SCALE: 4. FACIAL PALSY, QM
(2) Partial paralysis (total or near-total paralysis of lower face)
(0) Normal symmetrical movements
(1) Minor paralysis (flattened nasolabial fold, asymmetry on smiling)

## 2018-08-26 NOTE — CHART NOTE - NSCHARTNOTEFT_GEN_A_CORE
At 8:15pm, pt was noted to be desaturating to 80s on nasal canula and gurgling with large amount of secretions. Pt increasingly lethargic and minimally arousable. Pt intubated due to inability to protect airway. Dobhoff removed and sump NGT placed for decompression with aspiration of gastric contents. Proper location of ETT and sump confirmed with radiologist Dr. Giraldo. Will continue to monitor.

## 2018-08-26 NOTE — STROKE CODE NOTE - CT READING
Other/No acute findings new infarcts since MRI earlier this month/Other new infarcts since MRI earlier this month/Subtle signs of infarction/Other

## 2018-08-26 NOTE — PROGRESS NOTE ADULT - ASSESSMENT
68 yr old female with a PMHx of HTN that presented to [facility] for psych stabilization, subsequently had a multi-focal stroke with L sided facial and UE/LE deficits. Course c/b persistent HTN requiring nicardipine drip now transition to PO BP regimen. 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 Rheumatology consulted. Medicine initially consulted for clearance prior to angiogram.    #acute CVA - likely 2/2 vasculitis. Patient with residual left sided deficits.  -on , 80 mg lipitor   - mgmt per stroke service    #MARYJANE vs CKD - Possibly 2/2 vasculitis vs CKD 2/2 hypertension vs ?   Patient presented with MARYJANE of 1.45; unclear baseline. Renal ultrasound showed slightly diminished left renal size with smooth renal cortical mantle thinning which may suggest underlying nephrosclerosis. Renal duplex performed 8/24, showing <60% stenosis of the right renal artery at its origin. Urine lytes revealing FeNa - 1.4%, indicating intrinsic renal disease.   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     #acute CVA - likely 2/2 vasculitis. Patient with residual left sided deficits.  -on , 80 mg lipitor   -Cyclophosphamide per rheum. Got one dose yesterday.   -stroke team on board.   -serial neuro exams.     #HTN urgency / emergency -  uncontrolled. Episodes of vomiting noted from possible hypertensive encephelopathy. CTH per Dr. Allen with some new strokes noted. No ICH, edema, or midline shift.   -norvasc 10 mg po daily, clonidine 0.1 mg po bid, hydral 100 mg po tid, and isordil 30 mg tid.  -Admit to [facility] for nicardipine drip  -Serial neuro exams.     #MARYJANE vs CKD - Possibly 2/2 vasculitis vs CKD 2/2 hypertension vs ?   Patient presented with MARYJANE of 1.45; unclear baseline. Renal ultrasound showed slightly diminished left renal size with smooth renal cortical mantle thinning which may suggest underlying nephrosclerosis. Renal duplex performed 8/24, showing <60% stenosis of the right renal artery at its origin. Urine lytes revealing FeNa - 1.4%, indicating intrinsic renal disease.   - please avoid all nephrotoxic agents  - trend Cr

## 2018-08-26 NOTE — CONSULT NOTE ADULT - SUBJECTIVE AND OBJECTIVE BOX
ICU CONSULT    68F PMHx MDD who was transferred from [facility] after having AMS and L sided weakness 2/2 CVA. Imaging showed acute R PATRICIA territory infarction; L centrum semiovale, L subcortical insular, medial R temporal horn. Chronic small lacunar infarctions B basal ganglia, corona radiata, bilateral thalami, wilfred. 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 She had a KATHERINE which did not show any intra-atrial clot or any clot in the atrial appendage. Patient was transferred to [address] [place] [address] for initiation of Cyclophosphamide for cerebral vasculitis. Given 1 dose of cyclophosphamide yesterday. Of note, patient has had refractory hypertension and had been on Nicardipine in the past. Patient's anti-HTN have been up-titrated. Patient was on Norvasc 10 mg PO daily, Clonidine 0.1 mg PO BID, Hydralazine 100 mg PO TID, and Isordil 30 mg TID. ACE/ARB was not used 2/2 MARYJANE and BB not used because of sinus bradycardia. Secondary causes of HTN workup include normal TSH, renal sono with <60% stenosis of right renal artery; urine metanephrine currently pending in the lab. HTN suspected to be related to vasculitis. This AM, patient was noted to be hypertensive to SBP 200s despite getting all AM mediations. Hydral 10 mg IV x2 given without improvement. Patient had 2 episodes of vomiting and appeared more lethargic than prior. Stroke code called. CT head without ICH, however addendum stating signs of evolving infarct. Patient reportedly complained of nausea, but no abd pain, headache, blurry vision, chest pain, or shortness of breath. Of note, her oxygen sat did drop to low 90s after her vomiting episode and CXR did not show any acute consolidation.       PHYSICAL EXAM   Vital Signs Last 24 Hrs  T(C): 36.9 (26 Aug 2018 06:06), Max: 37.2 (25 Aug 2018 17:41)  T(F): 98.5 (26 Aug 2018 06:06), Max: 98.9 (25 Aug 2018 17:41)  HR: 82 (26 Aug 2018 13:00) (59 - 82)  BP: 144/80 (26 Aug 2018 13:00) (144/80 - 218/90)  BP(mean): 116 (26 Aug 2018 13:00) (116 - 139)  RR: 15 (26 Aug 2018 13:00) (15 - 21)  SpO2: 95% (26 Aug 2018 13:00) (94% - 99%)      General: Appears lethargic; following minimal basic commands  HEENT: PERRL, clear conjunctiva. EOMI. Dry mucus membranes  Respiratory:  Rhonchi noted in RLL, no wheezing  Cardiovascular:  RR with no mrg.   Abdomen:  soft, NT/ND.  Extremities: No erythema, no edema, no cyanosis b/l  Neurological Exam:  Mental status: Lethargic but arousable. Follows some basic commands.  CN II: pupils equally round and reactive to light,   II, IV, VI: EOMI without nystagmus. Right gaze preference   VII: Left facial droop  VIII: hearing is intact to finger rub  IX, X: Uvula is midline and soft palate rises symmetrically  Muscle strength: RUE 4/5, LUE 2-3/5, RLE 4/5, LLE 2/5. Moves RUE, RLE, LUE and LLE spontaneously.   Sensation: grossly intact throughout.  Reflexes: moderate left leg clonus noted. mild right leg clonus.       LABS                        11.3   19.1  )-----------( 297      ( 26 Aug 2018 11:15 )             33.2         135  |  97  |  28<H>  ----------------------------<  129<H>  4.0   |  24  |  1.31<H>    Ca    9.0      26 Aug 2018 11:15  Phos  2.7       Mg     2.0         TPro  6.2  /  Alb  4.0  /  TBili  0.4  /  DBili  x   /  AST  17  /  ALT  41  /  AlkPhos  60      PT/INR - ( 26 Aug 2018 11:15 )   PT: 10.7 sec;   INR: 0.96          PTT - ( 26 Aug 2018 11:15 )  PTT:26.3 sec  Lactate, Blood: 0.9 mmoL/L (18 @ 11:08)    Urinalysis Basic - ( 24 Aug 2018 16:19 )    Color: Yellow / Appearance: Clear / S.020 / pH: x  Gluc: x / Ketone: NEGATIVE  / Bili: Negative / Urobili: 0.2 E.U./dL   Blood: x / Protein: 100 mg/dL / Nitrite: NEGATIVE   Leuk Esterase: NEGATIVE / RBC: < 5 /HPF / WBC < 5 /HPF   Sq Epi: x / Non Sq Epi: 0-5 /HPF / Bacteria: Present /HPF        IMAGING - Reviewed ICU CONSULT    68F PMHx MDD who was transferred from [facility] after having AMS and L sided weakness 2/2 CVA. Imaging showed acute R PATRICIA territory infarction; L centrum semiovale, L subcortical insular, medial R temporal horn. Chronic small lacunar infarctions B basal ganglia, corona radiata, bilateral thalami, wilfred. 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 She had a KATHERINE which did not show any intra-atrial clot or any clot in the atrial appendage. Patient was transferred to [address] [place] [address] for initiation of Cyclophosphamide for cerebral vasculitis. Given 1 dose of cyclophosphamide yesterday. Of note, patient has had refractory hypertension and had been on Nicardipine in the past. Patient's anti-HTN have been up-titrated. Patient was on Norvasc 10 mg PO daily, Clonidine 0.1 mg PO BID, Hydralazine 100 mg PO TID, and Isordil 30 mg TID. ACE/ARB was not used 2/2 MARYJANE and BB not used because of sinus bradycardia. Secondary causes of HTN workup include normal TSH, renal sono with <60% stenosis of right renal artery; urine metanephrine currently pending in the lab. HTN suspected to be related to vasculitis. This AM, patient was noted to be hypertensive to SBP 200s despite getting all AM mediations. Hydral 10 mg IV x2 given without improvement. Patient had 2 episodes of vomiting and appeared more lethargic than prior. Stroke code called. CT head without ICH, however addendum stating signs of evolving infarct. Patient reportedly complained of nausea, but no abd pain, headache, blurry vision, chest pain, or shortness of breath. Of note, her oxygen sat did drop to low 90s after her vomiting episode and CXR did not show any acute consolidation.       PHYSICAL EXAM   Vital Signs Last 24 Hrs  T(C): 36.9 (26 Aug 2018 06:06), Max: 37.2 (25 Aug 2018 17:41)  T(F): 98.5 (26 Aug 2018 06:06), Max: 98.9 (25 Aug 2018 17:41)  HR: 82 (26 Aug 2018 13:00) (59 - 82)  BP: 144/80 (26 Aug 2018 13:00) (144/80 - 218/90)  BP(mean): 116 (26 Aug 2018 13:00) (116 - 139)  RR: 15 (26 Aug 2018 13:00) (15 - 21)  SpO2: 95% (26 Aug 2018 13:00) (94% - 99%)      General: Appears lethargic; following minimal basic commands  HEENT: PERRL, clear conjunctiva. EOMI. Dry mucus membranes  Respiratory:  Rhonchi noted in RLL, no wheezing  Cardiovascular:  RR with no mrg.   Abdomen:  soft, NT/ND.  Extremities: No erythema, no edema, no cyanosis b/l  Vasc: 1+ DP pulses  Skin: no rash  MSK: OA  Neurological Exam:  Mental status: Lethargic but arousable. Follows some basic commands.  CN II: pupils equally round and reactive to light,   II, IV, VI: EOMI without nystagmus. Right gaze preference   VII: Left facial droop  VIII: hearing is intact to finger rub  IX, X: Uvula is midline and soft palate rises symmetrically  Muscle strength: RUE 4/5, LUE 2-3/5, RLE 4/5, LLE 2/5. Moves RUE, RLE, LUE and LLE spontaneously.   Sensation: grossly intact throughout.  Reflexes: moderate left leg clonus noted. mild right leg clonus.       LABS                        11.3   19.1  )-----------( 297      ( 26 Aug 2018 11:15 )             33.2         135  |  97  |  28<H>  ----------------------------<  129<H>  4.0   |  24  |  1.31<H>    Ca    9.0      26 Aug 2018 11:15  Phos  2.7       Mg     2.0         TPro  6.2  /  Alb  4.0  /  TBili  0.4  /  DBili  x   /  AST  17  /  ALT  41  /  AlkPhos  60      PT/INR - ( 26 Aug 2018 11:15 )   PT: 10.7 sec;   INR: 0.96          PTT - ( 26 Aug 2018 11:15 )  PTT:26.3 sec  Lactate, Blood: 0.9 mmoL/L (18 @ 11:08)    Urinalysis Basic - ( 24 Aug 2018 16:19 )    Color: Yellow / Appearance: Clear / S.020 / pH: x  Gluc: x / Ketone: NEGATIVE  / Bili: Negative / Urobili: 0.2 E.U./dL   Blood: x / Protein: 100 mg/dL / Nitrite: NEGATIVE   Leuk Esterase: NEGATIVE / RBC: < 5 /HPF / WBC < 5 /HPF   Sq Epi: x / Non Sq Epi: 0-5 /HPF / Bacteria: Present /HPF        IMAGING - Reviewed

## 2018-08-26 NOTE — PROGRESS NOTE ADULT - SUBJECTIVE AND OBJECTIVE BOX
TRANSFER NOTE TO MICU:     68F PMHx MDD who was transferred from [facility] after having AMS and left sided weakness. Imaging shows acute R PATRICIA territory infarction; L centrum semiovale, L subcortical insular, medial R temporal horn. chronic small lacunar infarctions B basal ganglia, corona radiata, bilateral thalami, wilfred. 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 She had a KATHERINE which did not show any intra-atrial clot or any clot in the atrial appendage. Patient was transferred to [address] [place] [address] for initiation of cyclophosphamide for cerebral vasculitis. Given 1 dose of cyclophosphamide yesterday. Of note, patient has had resistance hypertension and was at one point even on nicardipine drip. Patient's anti-HTN have been uptitrated. Patient was on norvasc 5 mg po daily, clonidine 0.1 mg po bid, hydral 100 mg po tid, and isordil 30 mg tid. ACE/ARB was not used b/c of MARYJANE and BB not used because of sinus bradycardia. Secondary causes of HTN workup include normal TSH. Renal US showed <60% stenosis of right renal artery; urine metanephrine at the lab. HTN suspected to be related to vasculitis. This AM, patient was noted to be hypertensive to SBP 200s despite getting all AM mediations. Hydral 10 mg ivp x2 given without mild improvement to -190s. Patient had 2 episodes of vomiting and appeared more lethargic. Stroke code called and patient's CT head showed no ICH, edema, or midline shift. However Dr. Allen does feel there may be more strokes. ICU was consulted and accepted patient to MICU for nicardipine drip.  Patient reported nausea, but no abd pain, headache, blurry vision, chest pain, or shortness of breath. Of note, her o2 sat did drop to low 90s after her vomiting episode and CXR did not show any acute consolidation.     MEDICATIONS  (STANDING):  amLODIPine   Tablet 10 milliGRAM(s) Oral daily  aspirin 325 milliGRAM(s) Oral daily  atorvastatin 80 milliGRAM(s) Oral at bedtime  BACItracin   Ointment 1 Application(s) Topical daily  cholecalciferol 1000 Unit(s) Oral daily  cloNIDine 0.1 milliGRAM(s) Oral every 12 hours  dextrose 5%. 1000 milliLiter(s) (50 mL/Hr) IV Continuous <Continuous>  dextrose 50% Injectable 12.5 Gram(s) IV Push once  dextrose 50% Injectable 25 Gram(s) IV Push once  dextrose 50% Injectable 25 Gram(s) IV Push once  folic acid 1 milliGRAM(s) Oral daily  heparin  flush 100 Units/mL Injectable 100 Unit(s) IV Push every other day  heparin  Injectable 5000 Unit(s) SubCutaneous every 8 hours  hydrALAZINE 100 milliGRAM(s) Oral every 8 hours  insulin glargine Injectable (LANTUS) 5 Unit(s) SubCutaneous at bedtime  insulin lispro (HumaLOG) corrective regimen sliding scale   SubCutaneous every 6 hours  isosorbide   dinitrate Tablet (ISORDIL) 30 milliGRAM(s) Oral three times a day  levETIRAcetam 250 milliGRAM(s) Oral two times a day  methylPREDNISolone sodium succinate IVPB 250 milliGRAM(s) IV Intermittent two times a day  multivitamin 1 Tablet(s) Oral daily  niCARdipine Infusion 5 mG/Hr (25 mL/Hr) IV Continuous <Continuous>  pantoprazole  Injectable 40 milliGRAM(s) IV Push daily  sertraline 75 milliGRAM(s) Oral daily  thiamine 100 milliGRAM(s) Oral daily    MEDICATIONS  (PRN):  acetaminophen   Tablet 325 milliGRAM(s) Oral every 4 hours PRN For Temp greater than 38 C (100.4 F)  atropine Injectable 0.5 milliGRAM(s) IV Push every 5 minutes PRN Symptomatic Bradycardia  dextrose 40% Gel 15 Gram(s) Oral once PRN Blood Glucose LESS THAN 70 milliGRAM(s)/deciliter  diphenhydrAMINE   Capsule 25 milliGRAM(s) Oral every 4 hours PRN Rash and/or Itching  glucagon  Injectable 1 milliGRAM(s) IntraMuscular once PRN Glucose LESS THAN 70 milligrams/deciliter    Allergies  No Known Allergies    Vital Signs Last 24 Hrs  T(C): 36.9 (26 Aug 2018 06:06), Max: 37.2 (25 Aug 2018 17:41)  T(F): 98.5 (26 Aug 2018 06:06), Max: 98.9 (25 Aug 2018 17:41)  HR: 61 (26 Aug 2018 07:45) (59 - 62)  BP: 192/88 (26 Aug 2018 07:45) (167/77 - 210/85)  BP(mean): --  RR: 15 (26 Aug 2018 06:06) (15 - 16)  SpO2: 97% (26 Aug 2018 06:06) (94% - 98%)    PHYSICAL EXAM:  General: Appears mildly lethargic and diaphoretic; following commands, speaking in low volume  HEENT: PERRL, clear conjunctiva. EOMI. Dry mucus membranes  Respiratory:  Diffuse rhonchi noted b/l. NO wheezing or rales b/l.   Cardiovascular:  RR with no mrg.   Abdomen:  soft, NT/ND.  Extremities: No erythema, no edema, no cyanosis b/l  Neurological Exam:  Mental status: Awake. Appears lethargic but alert and following commands. Speech is fluent. Follows commands. Patient increasingly more compliant.   CN II: pupils equally round and reactive to light,   II, IV, VI: EOMI without nystagmus. Right gaze preference   VII: Left facial droop  VIII: hearing is intact to finger rub  IX, X: Uvula is midline and soft palate rises symmetrically  Muscle strength: RUE 4/5, LUE 2-3/5, RLE 4/5, LLE 2/5. Moves RUE, RLE, LUE and LLE spontaneously.   Sensation: grossly intact throughout.    LABS                        11.3   19.1  )-----------( 297      ( 26 Aug 2018 11:15 )             33.2         135  |  97  |  28<H>  ----------------------------<  129<H>  4.0   |  24  |  1.31<H>    Ca    9.0      26 Aug 2018 11:15  Phos  2.7       Mg     2.0         TPro  6.2  /  Alb  4.0  /  TBili  0.4  /  DBili  x   /  AST  17  /  ALT  41  /  AlkPhos  60      PT/INR - ( 26 Aug 2018 11:15 )   PT: 10.7 sec;   INR: 0.96       PTT - ( 26 Aug 2018 11:15 )  PTT:26.3 sec  Urinalysis Basic - ( 24 Aug 2018 16:19 )    Color: Yellow / Appearance: Clear / S.020 / pH: x  Gluc: x / Ketone: NEGATIVE  / Bili: Negative / Urobili: 0.2 E.U./dL   Blood: x / Protein: 100 mg/dL / Nitrite: NEGATIVE   Leuk Esterase: NEGATIVE / RBC: < 5 /HPF / WBC < 5 /HPF   Sq Epi: x / Non Sq Epi: 0-5 /HPF / Bacteria: Present /HPF TRANSFER NOTE TO MICU:     68F PMHx MDD who was transferred from [facility] after having AMS and left sided weakness. Imaging shows acute R PATRICIA territory infarction; L centrum semiovale, L subcortical insular, medial R temporal horn. chronic small lacunar infarctions B basal ganglia, corona radiata, bilateral thalami, wilfred. 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 She had a KATHERINE which did not show any intra-atrial clot or any clot in the atrial appendage. Patient was transferred to [address] [place] [address] for initiation of cyclophosphamide for cerebral vasculitis. Given 1 dose of cyclophosphamide yesterday. Of note, patient has had resistance hypertension and was at one point even on nicardipine drip. Patient's anti-HTN have been uptitrated. Patient was on norvasc 10 mg po daily, clonidine 0.1 mg po bid, hydral 100 mg po tid, and isordil 30 mg tid. ACE/ARB was not used b/c of MARYJANE and BB not used because of sinus bradycardia. Secondary causes of HTN workup include normal TSH. Renal US showed <60% stenosis of right renal artery; urine metanephrine at the lab. HTN suspected to be related to vasculitis. This AM, patient was noted to be hypertensive to SBP 200s despite getting all AM mediations. Hydral 10 mg ivp x2 given without mild improvement to -190s. Patient had 2 episodes of vomiting and appeared more lethargic. Stroke code called and patient's CT head showed no ICH, edema, or midline shift. However Dr. Allen does feel there may be more strokes. ICU was consulted and accepted patient to MICU for nicardipine drip.  Patient reported nausea, but no abd pain, headache, blurry vision, chest pain, or shortness of breath. Of note, her o2 sat did drop to low 90s after her vomiting episode and CXR did not show any acute consolidation.     MEDICATIONS  (STANDING):  amLODIPine   Tablet 10 milliGRAM(s) Oral daily  aspirin 325 milliGRAM(s) Oral daily  atorvastatin 80 milliGRAM(s) Oral at bedtime  BACItracin   Ointment 1 Application(s) Topical daily  cholecalciferol 1000 Unit(s) Oral daily  cloNIDine 0.1 milliGRAM(s) Oral every 12 hours  dextrose 5%. 1000 milliLiter(s) (50 mL/Hr) IV Continuous <Continuous>  dextrose 50% Injectable 12.5 Gram(s) IV Push once  dextrose 50% Injectable 25 Gram(s) IV Push once  dextrose 50% Injectable 25 Gram(s) IV Push once  folic acid 1 milliGRAM(s) Oral daily  heparin  flush 100 Units/mL Injectable 100 Unit(s) IV Push every other day  heparin  Injectable 5000 Unit(s) SubCutaneous every 8 hours  hydrALAZINE 100 milliGRAM(s) Oral every 8 hours  insulin glargine Injectable (LANTUS) 5 Unit(s) SubCutaneous at bedtime  insulin lispro (HumaLOG) corrective regimen sliding scale   SubCutaneous every 6 hours  isosorbide   dinitrate Tablet (ISORDIL) 30 milliGRAM(s) Oral three times a day  levETIRAcetam 250 milliGRAM(s) Oral two times a day  methylPREDNISolone sodium succinate IVPB 250 milliGRAM(s) IV Intermittent two times a day  multivitamin 1 Tablet(s) Oral daily  niCARdipine Infusion 5 mG/Hr (25 mL/Hr) IV Continuous <Continuous>  pantoprazole  Injectable 40 milliGRAM(s) IV Push daily  sertraline 75 milliGRAM(s) Oral daily  thiamine 100 milliGRAM(s) Oral daily    MEDICATIONS  (PRN):  acetaminophen   Tablet 325 milliGRAM(s) Oral every 4 hours PRN For Temp greater than 38 C (100.4 F)  atropine Injectable 0.5 milliGRAM(s) IV Push every 5 minutes PRN Symptomatic Bradycardia  dextrose 40% Gel 15 Gram(s) Oral once PRN Blood Glucose LESS THAN 70 milliGRAM(s)/deciliter  diphenhydrAMINE   Capsule 25 milliGRAM(s) Oral every 4 hours PRN Rash and/or Itching  glucagon  Injectable 1 milliGRAM(s) IntraMuscular once PRN Glucose LESS THAN 70 milligrams/deciliter    Allergies  No Known Allergies    Vital Signs Last 24 Hrs  T(C): 36.9 (26 Aug 2018 06:06), Max: 37.2 (25 Aug 2018 17:41)  T(F): 98.5 (26 Aug 2018 06:06), Max: 98.9 (25 Aug 2018 17:41)  HR: 61 (26 Aug 2018 07:45) (59 - 62)  BP: 192/88 (26 Aug 2018 07:45) (167/77 - 210/85)  RR: 15 (26 Aug 2018 06:06) (15 - 16)  SpO2: 97% (26 Aug 2018 06:06) (94% - 98%)    PHYSICAL EXAM:  General: Appears mildly lethargic and diaphoretic; following commands, speaking in low volume  HEENT: PERRL, clear conjunctiva. EOMI. Dry mucus membranes  Respiratory:  Diffuse rhonchi noted b/l. NO wheezing or rales b/l.   Cardiovascular:  RR with no mrg.   Abdomen:  soft, NT/ND.  Extremities: No erythema, no edema, no cyanosis b/l  Neurological Exam:  Mental status: Awake. Appears lethargic but alert and following commands. Speech is fluent. Follows commands. Patient increasingly more compliant.   CN II: pupils equally round and reactive to light,   II, IV, VI: EOMI without nystagmus. Right gaze preference   VII: Left facial droop  VIII: hearing is intact to finger rub  IX, X: Uvula is midline and soft palate rises symmetrically  Muscle strength: RUE 4/5, LUE 2-3/5, RLE 4/5, LLE 2/5. Moves RUE, RLE, LUE and LLE spontaneously.   Sensation: grossly intact throughout.  Reflexes: moderate left leg clonus noted. mild right leg clonus.     LABS                        11.3   19.1  )-----------( 297      ( 26 Aug 2018 11:15 )             33.2     08-    135  |  97  |  28<H>  ----------------------------<  129<H>  4.0   |  24  |  1.31<H>    Ca    9.0      26 Aug 2018 11:15  Phos  2.7       Mg     2.0         TPro  6.2  /  Alb  4.0  /  TBili  0.4  /  DBili  x   /  AST  17  /  ALT  41  /  AlkPhos  60      PT/INR - ( 26 Aug 2018 11:15 )   PT: 10.7 sec;   INR: 0.96       PTT - ( 26 Aug 2018 11:15 )  PTT:26.3 sec  Urinalysis Basic - ( 24 Aug 2018 16:19 )    Color: Yellow / Appearance: Clear / S.020 / pH: x  Gluc: x / Ketone: NEGATIVE  / Bili: Negative / Urobili: 0.2 E.U./dL   Blood: x / Protein: 100 mg/dL / Nitrite: NEGATIVE   Leuk Esterase: NEGATIVE / RBC: < 5 /HPF / WBC < 5 /HPF   Sq Epi: x / Non Sq Epi: 0-5 /HPF / Bacteria: Present /HPF

## 2018-08-26 NOTE — PROVIDER CONTACT NOTE (CHANGE IN STATUS NOTIFICATION) - ASSESSMENT
Pt stated that she feels horrible, decreased AMS, vomiting, blood pressure in 200/100 range after 10mg of hydralazine IV push. No gag reflex noted, unsure if patient is properly protecting her airway.

## 2018-08-26 NOTE — CONSULT NOTE ADULT - ASSESSMENT
ASSESSMENT AND PLAN:  68F PMHx MDD here with acute CVA likely 2/2 cerebral vasculitis, with worsening refractory hypertension, vomiting, and increased lethargy concerning for new stroke     #acute CVA - likely 2/2 vasculitis. Patient with residual left sided deficits.  -on , 80 mg lipitor   -Cyclophosphamide per rheum. Got one dose yesterday.   -stroke team on board.   -serial neuro exams.     #HTN urgency / emergency -  uncontrolled. Episodes of vomiting noted from possible hypertensive encephelopathy. CTH per Dr. Allen with some new strokes noted. No ICH, edema, or midline shift.   -norvasc 10 mg po daily, clonidine 0.1 mg po bid, hydral 100 mg po tid, and isordil 30 mg tid.  -Admit to micu for nicardipine drip  -Serial neuro exams.     #MARYJANE vs CKD - Possibly 2/2 vasculitis vs CKD 2/2 hypertension vs ?   Patient presented with MARYJANE of 1.45; unclear baseline. Renal ultrasound showed slightly diminished left renal size with smooth renal cortical mantle thinning which may suggest underlying nephrosclerosis. Renal duplex performed 8/24, showing <60% stenosis of the right renal artery at its origin. Urine lytes revealing FeNa - 1.4%, indicating intrinsic renal disease.   - please avoid all nephrotoxic agents  - trend Cr ASSESSMENT AND PLAN:  68F PMHx MDD here with acute CVA likely 2/2 cerebral vasculitis, with worsening refractory hypertension, vomiting, and increased lethargy concerning for new stroke possibly from malignant hypertension vs vasculitis    #acute CVA - likely 2/2 vasculitis. Patient with residual left sided deficits.  -solumedrol 250 mg BID  -on , 80 mg lipitor   -Cyclophosphamide per rheum. Got one dose yesterday.   -stroke team on board.   -serial neuro exams.     #HTN urgency / emergency -  uncontrolled. Episodes of vomiting noted from possible hypertensive encephelopathy. CTH per Dr. Allen with some new strokes noted. No ICH, edema, or midline shift.   -norvasc 10 mg po daily, clonidine 0.1 mg po bid, hydral 100 mg po tid, and isordil 30 mg tid when can take po.  -Admit to micu for nicardipine drip  -Serial neuro exams.     #MARYJANE vs CKD - Possibly 2/2 vasculitis vs CKD 2/2 hypertension vs ?   Patient presented with MARYJANE of 1.45; unclear baseline. Renal ultrasound showed slightly diminished left renal size with smooth renal cortical mantle thinning which may suggest underlying nephrosclerosis. Renal duplex performed 8/24, showing <60% stenosis of the right renal artery at its origin. Urine lytes revealing FeNa - 1.4%, indicating intrinsic renal disease.   - please avoid all nephrotoxic agents  - trend Cr    GI  #vomiting  add zofran    PPX SQ heparin, SCD, pantoprazole    Critical care time rendered 40 minutes ASSESSMENT AND PLAN:  68F PMHx MDD here with acute CVA likely 2/2 cerebral vasculitis, with worsening refractory hypertension, vomiting, and increased lethargy concerning for new stroke possibly from malignant hypertension vs vasculitis    Neuro  #Acute CVA - 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 Patient with residual left sided deficits with new lethargy, CTH showing new infarcts.  - Solumedrol 250mg IV BID x4 doses, to reassess tomorrow  - c/w  rectally  - Atorvastatin 80 when able (though currently NPO)  - Cyclophosphamide per rheum - first dose yesterday.   - f/u Neuro recs  - Q1H Neuro checks    Cardiac  #Hypertensive emergency/urgency - Episodes of vomiting noted from possible hypertensive encephelopathy. CTH with no ICH, edema, or midline shift, though showing evolving infarcts.    - Nicardipine drip with goal SBP between 160-180 per Neuro  - c/w Norvasc 10mg QD, Clonidine 0.1mg BID, Hydral 100mg TID, and Isordil 30mg TID when able to tolerate PO or if NG tube placed  - Q1H Neuro exams    Pulmonary  #Concern for aspiration - Patient with vomiting and AMS in setting of likely hypertensive encephalopathy, CXR without infiltrate  - Currently protecting airway  - Breathing comfortably on 3L NC  - Continue to monitor airway, if concern for compromise low threshold for intubation    Renal  #MARYJANE vs CKD - Possibly 2/2 vasculitis vs CKD 2/2 hypertensive nephropathy - Patient presented with MARYJANE of 1.45; unknown baseline. Renal ultrasound showed slightly diminished left renal size with smooth renal cortical mantle thinning which may suggest underlying nephrosclerosis. Renal duplex performed 8/24, showing <60% stenosis of the right renal artery at its origin. Urine lytes revealing FeNa - 1.4%, indicating intrinsic renal disease. Currently downtrending.  - Avoiding nephrotoxic agents  - Trend Cr    GI  #Vomiting - patient with vomiting at onset of hypertension and alteration of mental status, possible hypertensive encephalopathy  - c/w HTN treatment as above  - Zofran 4mg IV PRN  - NPO    ID  No active issues    PPx  HSQ, SCDs, PPI    Dispo  Admit to MICU  Case discussed with ICU attending    Critical care time rendered 40 minutes ASSESSMENT AND PLAN:  68F PMHx MDD here with acute CVA likely 2/2 cerebral vasculitis, with worsening refractory hypertension, vomiting, and increased lethargy concerning for new stroke possibly from malignant hypertension vs vasculitis    Neuro  #Acute CVA - 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 Patient with residual left sided deficits with new lethargy, CTH showing new infarcts.  - Solumedrol 250mg IV BID x4 doses, to reassess tomorrow  - c/w  rectally while NPO  - Atorvastatin 80 when able as NPO  - Cyclophosphamide per rheum - first dose yesterday   - f/u Neuro recs  - Q2H neuro checks  - c/w Keppra 250mg IV BID    Cardiac  #Hypertensive emergency/urgency - Episodes of vomiting noted from possible hypertensive encephelopathy. CTH with no ICH, edema, or midline shift, though showing evolving infarcts.    - Nicardipine drip with goal SBP between 160-180 per Neuro  - c/w Norvasc 10mg QD, Clonidine 0.1mg BID, Hydral 100mg TID, and Isordil 30mg TID when able to tolerate PO or if NG tube placed  - Q2H neuro exams    Pulmonary  #Concern for aspiration - Patient with vomiting and AMS in setting of likely hypertensive encephalopathy, CXR without infiltrate  - Currently protecting airway  - Breathing comfortably on 3L NC  - Continue to monitor airway, if concern for compromise low threshold for intubation    Renal  #MARYJANE vs CKD - Possibly 2/2 vasculitis vs CKD 2/2 hypertensive nephropathy - Patient presented with MARYJANE of 1.45; unknown baseline. Renal ultrasound showed slightly diminished left renal size with smooth renal cortical mantle thinning which may suggest underlying nephrosclerosis. Renal duplex performed 8/24, showing <60% stenosis of the right renal artery at its origin. Urine lytes revealing FeNa - 1.4%, indicating intrinsic renal disease. Currently downtrending.  - Avoiding nephrotoxic agents  - Trend Cr    GI  #Vomiting - patient with vomiting at onset of hypertension and alteration of mental status, possible hypertensive encephalopathy  - c/w HTN treatment as above  - Zofran 4mg IV PRN  - NPO    ID  No active issues    PPx  HSQ, SCDs, PPI IV  MVT, Thiamine, Folic Acid    Dispo  Admit to MICU  Case discussed with ICU attending    Critical care time rendered 40 minutes

## 2018-08-26 NOTE — PROGRESS NOTE ADULT - SUBJECTIVE AND OBJECTIVE BOX
INTERVAL HPI: Pt is examined at bedside. Pt appears to be lethargic but responsive to some commands. Unable to obtain ROS. Pt currently on aerosol mask sating well.     ICU Vital Signs Last 24 Hrs  T(C): 36.8 (26 Aug 2018 14:45), Max: 37.2 (25 Aug 2018 17:41)  T(F): 98.2 (26 Aug 2018 14:45), Max: 98.9 (25 Aug 2018 17:41)  HR: 78 (26 Aug 2018 16:00) (59 - 120)  BP: 176/81 (26 Aug 2018 16:00) (144/80 - 221/78)  BP(mean): 106 (26 Aug 2018 16:00) (106 - 139)  ABP: --  ABP(mean): --  RR: 13 (26 Aug 2018 16:00) (13 - 21)  SpO2: 97% (26 Aug 2018 16:00) (92% - 99%)    I&O's Summary    26 Aug 2018 07:01  -  26 Aug 2018 16:37  --------------------------------------------------------  IN: 115 mL / OUT: 455 mL / NET: -340 mL          LABS:                        11.3   19.1  )-----------( 297      ( 26 Aug 2018 11:15 )             33.2     08-26    135  |  97  |  28<H>  ----------------------------<  129<H>  4.0   |  24  |  1.31<H>    Ca    9.0      26 Aug 2018 11:15  Phos  2.7     08-26  Mg     2.0     08-26    TPro  6.2  /  Alb  4.0  /  TBili  0.4  /  DBili  x   /  AST  17  /  ALT  41  /  AlkPhos  60  08-26    PT/INR - ( 26 Aug 2018 11:15 )   PT: 10.7 sec;   INR: 0.96          PTT - ( 26 Aug 2018 11:15 )  PTT:26.3 sec    CAPILLARY BLOOD GLUCOSE  152 (26 Aug 2018 12:13)      POCT Blood Glucose.: 152 mg/dL (26 Aug 2018 11:51)  POCT Blood Glucose.: 127 mg/dL (26 Aug 2018 11:05)  POCT Blood Glucose.: 96 mg/dL (26 Aug 2018 08:18)  POCT Blood Glucose.: 127 mg/dL (26 Aug 2018 00:49)  POCT Blood Glucose.: 152 mg/dL (25 Aug 2018 21:10)  POCT Blood Glucose.: 116 mg/dL (25 Aug 2018 17:42)        RADIOLOGY & ADDITIONAL TESTS:    Consultant(s) Notes Reviewed:  [x ] YES  [ ] NO    MEDICATIONS  (STANDING):  amLODIPine   Tablet 10 milliGRAM(s) Oral daily  aspirin Suppository 300 milliGRAM(s) Rectal daily  atorvastatin 80 milliGRAM(s) Oral at bedtime  BACItracin   Ointment 1 Application(s) Topical daily  cholecalciferol 1000 Unit(s) Oral daily  cloNIDine 0.1 milliGRAM(s) Oral every 12 hours  dextrose 5%. 1000 milliLiter(s) (50 mL/Hr) IV Continuous <Continuous>  dextrose 50% Injectable 12.5 Gram(s) IV Push once  dextrose 50% Injectable 25 Gram(s) IV Push once  dextrose 50% Injectable 25 Gram(s) IV Push once  folic acid 1 milliGRAM(s) Oral daily  heparin  flush 100 Units/mL Injectable 100 Unit(s) IV Push every other day  heparin  Injectable 5000 Unit(s) SubCutaneous every 8 hours  hydrALAZINE 100 milliGRAM(s) Oral every 8 hours  insulin glargine Injectable (LANTUS) 5 Unit(s) SubCutaneous at bedtime  insulin lispro (HumaLOG) corrective regimen sliding scale   SubCutaneous every 6 hours  isosorbide   dinitrate Tablet (ISORDIL) 30 milliGRAM(s) Oral three times a day  levETIRAcetam  IVPB 250 milliGRAM(s) IV Intermittent every 12 hours  methylPREDNISolone sodium succinate IVPB 250 milliGRAM(s) IV Intermittent two times a day  multivitamin 1 Tablet(s) Oral daily  niCARdipine Infusion 5 mG/Hr (25 mL/Hr) IV Continuous <Continuous>  pantoprazole  Injectable 40 milliGRAM(s) IV Push daily  sertraline 75 milliGRAM(s) Oral daily  thiamine 100 milliGRAM(s) Oral daily    MEDICATIONS  (PRN):  atropine Injectable 0.5 milliGRAM(s) IV Push every 5 minutes PRN Symptomatic Bradycardia  dextrose 40% Gel 15 Gram(s) Oral once PRN Blood Glucose LESS THAN 70 milliGRAM(s)/deciliter  diphenhydrAMINE   Capsule 25 milliGRAM(s) Oral every 4 hours PRN Rash and/or Itching  glucagon  Injectable 1 milliGRAM(s) IntraMuscular once PRN Glucose LESS THAN 70 milligrams/deciliter      PHYSICAL EXAM:  GENERAL: Appears lethargic, responsive to some commands only.   HEAD:  Atraumatic, Normocephalic  EYES: EOMI, PERRLA, conjunctiva and sclera clear  NECK: Supple, No JVD, Normal thyroid, no enlarged nodes  NERVOUS SYSTEM: Pt not alert/oriented x3, pupils reactive to light. mildly responsive to commands, right gaze preference noted. Left facial droop.   CHEST/LUNG: B/L lower lobe rhonchi, no wheezing.   HEART: S1S2 normal, no S3, Regular rate and rhythm; No murmurs, rubs, or gallops  ABDOMEN: Soft, Nontender, Nondistended; Bowel sounds present  EXTREMITIES:  2+ Peripheral Pulses, No clubbing, cyanosis, or edema  LYMPH: No lymphadenopathy noted  SKIN: No rashes or lesions    Care Discussed with Consultants/Other Providers [ x] YES  [ ] NO

## 2018-08-26 NOTE — PROGRESS NOTE ADULT - ASSESSMENT
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 Patient with residual left sided deficits with new lethargy, CTH showing new infarcts.  - Solumedrol 250mg IV BID x4 doses, to reassess tomorrow  - c/w  rectally while NPO  - Atorvastatin 80 when able as NPO  - Cyclophosphamide per rheum - first dose yesterday   - f/u Neuro recs  - Q2H neuro checks  - c/w Keppra 250mg IV BID    Cardiac  #Hypertensive emergency/urgency - Episodes of vomiting noted from possible hypertensive encephelopathy. CTH with no ICH, edema, or midline shift, though showing evolving infarcts.    - Nicardipine drip with goal SBP between 160-180 per Neuro  - c/w Norvasc 10mg QD, Clonidine 0.1mg BID, Hydral 100mg TID, and Isordil 30mg TID when able to tolerate PO or if NG tube placed  - Q2H neuro exams    Pulmonary  #Concern for aspiration - Patient with vomiting and AMS in setting of likely hypertensive encephalopathy, CXR without infiltrate  - Currently protecting airway  - Breathing comfortably on 3L NC  - Continue to monitor airway, if concern for compromise low threshold for intubation    Renal  #MARYJANE vs CKD - Possibly 2/2 vasculitis vs CKD 2/2 hypertensive nephropathy - Patient presented with MARYJANE of 1.45; unknown baseline. Renal ultrasound showed slightly diminished left renal size with smooth renal cortical mantle thinning which may suggest underlying nephrosclerosis. Renal duplex performed 8/24, showing <60% stenosis of the right renal artery at its origin. Urine lytes revealing FeNa - 1.4%, indicating intrinsic renal disease. Currently downtrending.  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 Patient with residual left sided deficits with new lethargy, CTH showing new infarcts.  - Solumedrol 250mg IV BID x4 doses, to reassess tomorrow  - c/w  rectally while NPO  - Atorvastatin 80 when able as NPO  - Cyclophosphamide per rheum - first dose yesterday   - f/u Neuro recs  - Q2H neuro checks  - c/w Keppra 250mg IV BID    Cardiac  #Hypertensive emergency/urgency - Episodes of vomiting noted from possible hypertensive encephelopathy. CTH with no ICH, edema, or midline shift, though showing evolving infarcts.    - Nicardipine drip with goal SBP between 160-180 per Neuro  - c/w Norvasc 10mg QD, Clonidine 0.1mg BID, Hydral 100mg TID, and Isordil 30mg TID when able to tolerate PO or if NG tube placed  - Q2H neuro exams    Pulmonary  #Concern for aspiration - Patient with vomiting and AMS in setting of likely hypertensive encephalopathy, CXR without infiltrate  - Currently protecting airway  - Breathing comfortably on 3L NC  - Continue to monitor airway, if concern for compromise low threshold for intubation    Renal  #MARYJANE vs CKD - Possibly 2/2 vasculitis vs CKD 2/2 hypertensive nephropathy - Patient presented with MARYJANE of 1.45; unknown baseline. Renal ultrasound showed slightly diminished left renal size with smooth renal cortical mantle thinning which may suggest underlying nephrosclerosis. Renal duplex performed 8/24, showing <60% stenosis of the right renal artery at its origin. Urine lytes revealing FeNa - 1.4%, indicating intrinsic renal disease. Currently downtrending.  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 Patient with residual left sided deficits with new lethargy, CTH showing new infarcts.  - Solumedrol 250mg IV BID x4 doses, to reassess tomorrow  - c/w  rectally while NPO  - Atorvastatin 80 when able as NPO  - Cyclophosphamide per rheum - first dose yesterday   - f/u Neuro recs  - Q2H neuro checks  - c/w Keppra 250mg IV BID    Cardiac  #Hypertensive emergency/urgency - Episodes of vomiting noted from possible hypertensive encephelopathy. CTH with no ICH, edema, or midline shift, though showing evolving infarcts.    - Nicardipine drip with goal SBP between 160-180 per Neuro  - c/w Norvasc 10mg QD, Clonidine 0.1mg BID, Hydral 100mg TID, and Isordil 30mg TID when able to tolerate PO or if NG tube placed  - Q2H neuro exams    Pulmonary  #Concern for aspiration - Patient with vomiting and AMS in setting of likely hypertensive encephalopathy, CXR without infiltrate  - Currently protecting airway  - Breathing comfortably on 3L NC  - Continue to monitor airway, if concern for compromise low threshold for intubation    Renal  #MARYJANE vs CKD - Possibly 2/2 vasculitis vs CKD 2/2 hypertensive nephropathy - Patient presented with MARYJANE of 1.45; unknown baseline. Renal ultrasound showed slightly diminished left renal size with smooth renal cortical mantle thinning which may suggest underlying nephrosclerosis. Renal duplex performed 8/24, showing <60% stenosis of the right renal artery at its origin. Urine lytes revealing FeNa - 1.4%, indicating intrinsic renal disease. Currently downtrending.  - Avoiding nephrotoxic agents  - Trend Cr    GI  #Vomiting - patient with vomiting at onset of hypertension and alteration of mental status, possible hypertensive encephalopathy  - c/w HTN treatment as above  - Zofran 4mg IV PRN  - NPO    PPx  HSQ, SCDs, PPI IV  MVT, Thiamine, Folic Acid    Dispo  Admit to MICU  Critical care time rendered 55 minutes

## 2018-08-26 NOTE — STROKE CODE NOTE - NIH STROKE SCALE: 1A. LEVEL OF CONSCIOUSNESS, QM
(0) Alert; keenly responsive
(0) Alert; keenly responsive
(1) Not alert; but arousable by minor stimulation to obey, answer, or respond

## 2018-08-26 NOTE — STROKE CODE NOTE - TYPE OF STROKE
Ischemic Stroke/multiple infarcts, similar to prior exam, Chronic mild small vessel ischemic disease./Other Ischemic Stroke

## 2018-08-26 NOTE — PROGRESS NOTE ADULT - SUBJECTIVE AND OBJECTIVE BOX
Patient is a 68y old  Female who presents with a chief complaint of Depression, Stroke Code Called in Unit (11 Aug 2018 10:04)      INTERVAL HPI/OVERNIGHT EVENTS:  Per RN pt had x 2 episodes of vomiting this morning. Didn't have any breakfast and is less responsive than before. Very high BP readings.  Received IV cytoxan yesterday.    Review of Systems: 12 point review of systems otherwise negative    MEDICATIONS  (STANDING):  amLODIPine   Tablet 10 milliGRAM(s) Oral daily  aspirin Suppository 300 milliGRAM(s) Rectal daily  atorvastatin 80 milliGRAM(s) Oral at bedtime  BACItracin   Ointment 1 Application(s) Topical daily  cholecalciferol 1000 Unit(s) Oral daily  cloNIDine 0.1 milliGRAM(s) Oral every 12 hours  dextrose 5%. 1000 milliLiter(s) (50 mL/Hr) IV Continuous <Continuous>  dextrose 50% Injectable 12.5 Gram(s) IV Push once  dextrose 50% Injectable 25 Gram(s) IV Push once  dextrose 50% Injectable 25 Gram(s) IV Push once  folic acid 1 milliGRAM(s) Oral daily  heparin  flush 100 Units/mL Injectable 100 Unit(s) IV Push every other day  heparin  Injectable 5000 Unit(s) SubCutaneous every 8 hours  hydrALAZINE 100 milliGRAM(s) Oral every 8 hours  insulin glargine Injectable (LANTUS) 5 Unit(s) SubCutaneous at bedtime  insulin lispro (HumaLOG) corrective regimen sliding scale   SubCutaneous every 6 hours  isosorbide   dinitrate Tablet (ISORDIL) 30 milliGRAM(s) Oral three times a day  levETIRAcetam  IVPB 250 milliGRAM(s) IV Intermittent every 12 hours  methylPREDNISolone sodium succinate IVPB 250 milliGRAM(s) IV Intermittent two times a day  multivitamin 1 Tablet(s) Oral daily  niCARdipine Infusion 5 mG/Hr (25 mL/Hr) IV Continuous <Continuous>  ondansetron Injectable 4 milliGRAM(s) IV Push every 6 hours  pantoprazole  Injectable 40 milliGRAM(s) IV Push daily  sertraline 75 milliGRAM(s) Oral daily  thiamine 100 milliGRAM(s) Oral daily    MEDICATIONS  (PRN):  atropine Injectable 0.5 milliGRAM(s) IV Push every 5 minutes PRN Symptomatic Bradycardia  dextrose 40% Gel 15 Gram(s) Oral once PRN Blood Glucose LESS THAN 70 milliGRAM(s)/deciliter  diphenhydrAMINE   Capsule 25 milliGRAM(s) Oral every 4 hours PRN Rash and/or Itching  glucagon  Injectable 1 milliGRAM(s) IntraMuscular once PRN Glucose LESS THAN 70 milligrams/deciliter      Allergies    No Known Allergies    Intolerances          Vital Signs Last 24 Hrs  T(C): 37.1 (26 Aug 2018 19:00), Max: 37.1 (26 Aug 2018 19:00)  T(F): 98.7 (26 Aug 2018 19:00), Max: 98.7 (26 Aug 2018 19:00)  HR: 94 (26 Aug 2018 20:00) (59 - 120)  BP: 174/76 (26 Aug 2018 19:00) (144/80 - 221/78)  BP(mean): 107 (26 Aug 2018 19:00) (106 - 139)  RR: 21 (26 Aug 2018 20:00) (13 - 23)  SpO2: 94% (26 Aug 2018 20:00) (90% - 99%)  CAPILLARY BLOOD GLUCOSE  152 (26 Aug 2018 12:13)      POCT Blood Glucose.: 156 mg/dL (26 Aug 2018 17:30)  POCT Blood Glucose.: 152 mg/dL (26 Aug 2018 11:51)  POCT Blood Glucose.: 127 mg/dL (26 Aug 2018 11:05)  POCT Blood Glucose.: 96 mg/dL (26 Aug 2018 08:18)  POCT Blood Glucose.: 127 mg/dL (26 Aug 2018 00:49)  POCT Blood Glucose.: 152 mg/dL (25 Aug 2018 21:10)      08-26 @ 07:01  -  08-26 @ 20:20  --------------------------------------------------------  IN: 385 mL / OUT: 950 mL / NET: -565 mL        Physical Exam:    Daily     Daily   General: NAD, not cachetic, less responsive than yesterday  HEENT:  Nonicteric, PERRLA  CV:  RRR, no murmur, no JVD  Lungs:  CTA B/L, no wheezes, rales, rhonchi  Abdomen:  Soft, non-tender, no distended, positive BS, no hepatosplenomegaly  Extremities:  2+ pulses, no c/c, no edema  Skin:  Warm and dry, no rashes  :  No maradiaga  No Restraints    LABS:                        11.3   19.1  )-----------( 297      ( 26 Aug 2018 11:15 )             33.2     08-26    135  |  97  |  28<H>  ----------------------------<  129<H>  4.0   |  24  |  1.31<H>    Ca    9.0      26 Aug 2018 11:15  Phos  2.7     08-26  Mg     2.0     08-26    TPro  6.2  /  Alb  4.0  /  TBili  0.4  /  DBili  x   /  AST  17  /  ALT  41  /  AlkPhos  60  08-26    PT/INR - ( 26 Aug 2018 11:15 )   PT: 10.7 sec;   INR: 0.96          PTT - ( 26 Aug 2018 11:15 )  PTT:26.3 sec        RADIOLOGY & ADDITIONAL TESTS:

## 2018-08-26 NOTE — CONSULT NOTE ADULT - SUBJECTIVE AND OBJECTIVE BOX
NEUROLOGY CONSULT NOTE    CHIEF COMPLAINT:      HPI:  67yo Female, PMHx HTN and Major Depressive Disorder, presented to the ED with recent history of self harm and depressed mood. Primary complaint was of progressive functional decline at home w/severe anhedonia, anorexia, poor sleep, suicidality (would cut herself and claims to have started cutting herself several months ago), often drinking alcohol at home to consol herself. She states that her depression began after retiring from her job at the [employer] 1.5yrs ago. Patient was admitted to [facility] for depressive episode - however, 1-2 hours after arriving onto the floor, a stroke code was called for L sided weakness and hypertension. NIHSS was 6 on initial assessment. On CTH - noted to have lacunar infarcts (basal ganglia and R/L thalami). On second assessment, NIHSS 1. Patient was brought back to the ED and re-worked up. EKG revealed profound bradycardia - Cardiology was consulted for HR 30s - asymptomatic. Neurosurgery also consulted for what appeared to be mild hydrocephalus on CT Scan.     SBP in the 200s - allowing for permissive HTN, SBP 180s.     Patient admitted to [facility] for further workup. (11 Aug 2018 10:04)      PAST MEDICAL & SURGICAL HISTORY:  Hypertension  Depression  No significant past surgical history      REVIEW OF SYSTEMS:  As per HPI, otherwise negative for Constitutional, Eyes, Ears/Nose/Mouth/Throat, Neck, Cardiovascular, Respiratory, Gastrointestinal, Genitourinary, Skin, Endocrine, Musculoskeletal, Psychiatric, and Hematologic/Lymphatic.    MEDICATIONS  (STANDING):  amLODIPine   Tablet 10 milliGRAM(s) Oral daily  aspirin 325 milliGRAM(s) Oral daily  atorvastatin 80 milliGRAM(s) Oral at bedtime  BACItracin   Ointment 1 Application(s) Topical daily  cholecalciferol 1000 Unit(s) Oral daily  cloNIDine 0.1 milliGRAM(s) Oral every 12 hours  dextrose 5%. 1000 milliLiter(s) (50 mL/Hr) IV Continuous <Continuous>  dextrose 50% Injectable 12.5 Gram(s) IV Push once  dextrose 50% Injectable 25 Gram(s) IV Push once  dextrose 50% Injectable 25 Gram(s) IV Push once  folic acid 1 milliGRAM(s) Oral daily  heparin  flush 100 Units/mL Injectable 100 Unit(s) IV Push every other day  heparin  Injectable 5000 Unit(s) SubCutaneous every 8 hours  hydrALAZINE 100 milliGRAM(s) Oral every 8 hours  insulin glargine Injectable (LANTUS) 5 Unit(s) SubCutaneous at bedtime  insulin lispro (HumaLOG) corrective regimen sliding scale   SubCutaneous every 6 hours  isosorbide   dinitrate Tablet (ISORDIL) 30 milliGRAM(s) Oral three times a day  levETIRAcetam 250 milliGRAM(s) Oral two times a day  multivitamin 1 Tablet(s) Oral daily  niCARdipine Infusion 5 mG/Hr (25 mL/Hr) IV Continuous <Continuous>  ondansetron Injectable 4 milliGRAM(s) IV Push once  pantoprazole  Injectable 40 milliGRAM(s) IV Push daily  potassium chloride  10 mEq/100 mL IVPB 10 milliEquivalent(s) IV Intermittent every 1 hour  sertraline 75 milliGRAM(s) Oral daily  thiamine 100 milliGRAM(s) Oral daily    MEDICATIONS  (PRN):  acetaminophen   Tablet 325 milliGRAM(s) Oral every 4 hours PRN For Temp greater than 38 C (100.4 F)  atropine Injectable 0.5 milliGRAM(s) IV Push every 5 minutes PRN Symptomatic Bradycardia  dextrose 40% Gel 15 Gram(s) Oral once PRN Blood Glucose LESS THAN 70 milliGRAM(s)/deciliter  diphenhydrAMINE   Capsule 25 milliGRAM(s) Oral every 4 hours PRN Rash and/or Itching  glucagon  Injectable 1 milliGRAM(s) IntraMuscular once PRN Glucose LESS THAN 70 milligrams/deciliter      Allergies    No Known Allergies    Intolerances        FAMILY HISTORY:  No pertinent family history in first degree relatives      SOCIAL HISTORY:  Living Situation:  Occupation:  Tobacco:  Alcohol:   Drug use:      VITAL SIGNS:  Vital Signs Last 24 Hrs  T(C): 36.9 (26 Aug 2018 06:06), Max: 37.2 (25 Aug 2018 17:41)  T(F): 98.5 (26 Aug 2018 06:06), Max: 98.9 (25 Aug 2018 17:41)  HR: 61 (26 Aug 2018 07:45) (53 - 62)  BP: 192/88 (26 Aug 2018 07:45) (167/77 - 210/85)  BP(mean): --  RR: 15 (26 Aug 2018 06:06) (14 - 16)  SpO2: 97% (26 Aug 2018 06:06) (94% - 98%)    PHYSICAL EXAMINATION:  Constitutional: no acute distress; appears stated age   Eyes: Conjunctiva and sclera clear.  Cardiovascular: Regular rate and rhythm; S1 and S2 Normal; No murmurs, gallops or rubs.  Lungs: clear to auscultation bilaterally  Abdomen: soft, non-tender, non-distended; normal bowel sounds     Neurologic:  - Mental Status:  AAOx3; speech is fluent with intact naming, repetition, and comprehension; immediate recall is 3/3 words and delayed recall is 3/3 words at 5 minutes; able to spell WORLD backwards and perform serial 7 subtraction; able to read and write a sentence; able to copy a cube; Good overall fund of knowledge.  - Cranial Nerves II-XII:    II:  Visual acuity is 20/20 bilaterally; Visual fields are full to confrontation; Fundoscopic exam is normal with sharp discs; Pupils are equal, round, and reactive to light.  III, IV, VI:  Extraocular movements are intact without nystagmus.  V:  Facial sensation is intact in the V1-V3 distribution bilaterally.  VII:  Face is symmetric with normal eye closure and smile  VIII:  Hearing is intact to finger rub.  IX, X:  Uvula is midline and soft palate rises symmetrically  XI:  Head turning and shoulder shrug are intact.  XII:  Tongue protrudes in the midline.  - Motor:  Strength is 5/5 throughout.  There is no pronator drift.  Normal muscle bulk and tone throughout.  - Reflexes:  2+ and symmetric at the biceps, triceps, brachioradialis, knees, and ankles.  Plantar responses flexor.  - Sensory:  Intact to light touch, pin prick, vibration, and joint-position sense throughout.  - Coordination:  Finger-nose-finger and heel-knee-shin intact without dysmetria.  Rapid alternating hand movements intact.  - Gait:   Normal steps, base, arm swing, and turning.  Heel and toe walking are normal.  Tandem gait is normal.  Romberg testing is negative.    LABS:                        11.3   19.1  )-----------( 297      ( 26 Aug 2018 11:15 )             33.2         135  |  97  |  28<H>  ----------------------------<  129<H>  4.0   |  24  |  1.31<H>    Ca    9.0      26 Aug 2018 11:15  Phos  2.7       Mg     2.0         TPro  6.2  /  Alb  4.0  /  TBili  0.4  /  DBili  x   /  AST  17  /  ALT  41  /  AlkPhos  60      PT/INR - ( 26 Aug 2018 11:15 )   PT: 10.7 sec;   INR: 0.96          PTT - ( 26 Aug 2018 11:15 )  PTT:26.3 sec  Urinalysis Basic - ( 24 Aug 2018 16:19 )    Color: Yellow / Appearance: Clear / S.020 / pH: x  Gluc: x / Ketone: NEGATIVE  / Bili: Negative / Urobili: 0.2 E.U./dL   Blood: x / Protein: 100 mg/dL / Nitrite: NEGATIVE   Leuk Esterase: NEGATIVE / RBC: < 5 /HPF / WBC < 5 /HPF   Sq Epi: x / Non Sq Epi: 0-5 /HPF / Bacteria: Present /HPF        RADIOLOGY & ADDITIONAL STUDIES:      IMPRESSION & RECOMMENDATIONS: NEUROLOGY CONSULT NOTE    CHIEF COMPLAINT:      HPI:  69yo Female, PMHx HTN and Major Depressive Disorder, presented to the ED with recent history of self harm and depressed mood. Primary complaint was of progressive functional decline at home w/severe anhedonia, anorexia, poor sleep, suicidality (would cut herself and claims to have started cutting herself several months ago), often drinking alcohol at home to console herself. She states that her depression began after retiring from her job at the [employer] 1.5yrs ago. Patient was admitted to [facility] for depressive episode - however, 1-2 hours after arriving onto the floor, a stroke code was called for L sided weakness and hypertension. NIHSS was 6 on initial assessment. On CTH - noted to have lacunar infarcts (basal ganglia and R/L thalami). On second assessment, NIHSS 1. Patient was brought back to the ED and re-worked up. EKG revealed profound bradycardia - Cardiology was consulted for HR 30s - asymptomatic. Neurosurgery also consulted for what appeared to be mild hydrocephalus on CT Scan.     SBP in the 200s - allowing for permissive HTN, SBP 180s.     Patient admitted to [facility] for further workup. (11 Aug 2018 10:04)      PAST MEDICAL & SURGICAL HISTORY:  Hypertension  Depression  No significant past surgical history      REVIEW OF SYSTEMS:  As per HPI, otherwise negative for Constitutional, Eyes, Ears/Nose/Mouth/Throat, Neck, Cardiovascular, Respiratory, Gastrointestinal, Genitourinary, Skin, Endocrine, Musculoskeletal, Psychiatric, and Hematologic/Lymphatic.    MEDICATIONS  (STANDING):  amLODIPine   Tablet 10 milliGRAM(s) Oral daily  aspirin 325 milliGRAM(s) Oral daily  atorvastatin 80 milliGRAM(s) Oral at bedtime  BACItracin   Ointment 1 Application(s) Topical daily  cholecalciferol 1000 Unit(s) Oral daily  cloNIDine 0.1 milliGRAM(s) Oral every 12 hours  dextrose 5%. 1000 milliLiter(s) (50 mL/Hr) IV Continuous <Continuous>  dextrose 50% Injectable 12.5 Gram(s) IV Push once  dextrose 50% Injectable 25 Gram(s) IV Push once  dextrose 50% Injectable 25 Gram(s) IV Push once  folic acid 1 milliGRAM(s) Oral daily  heparin  flush 100 Units/mL Injectable 100 Unit(s) IV Push every other day  heparin  Injectable 5000 Unit(s) SubCutaneous every 8 hours  hydrALAZINE 100 milliGRAM(s) Oral every 8 hours  insulin glargine Injectable (LANTUS) 5 Unit(s) SubCutaneous at bedtime  insulin lispro (HumaLOG) corrective regimen sliding scale   SubCutaneous every 6 hours  isosorbide   dinitrate Tablet (ISORDIL) 30 milliGRAM(s) Oral three times a day  levETIRAcetam 250 milliGRAM(s) Oral two times a day  multivitamin 1 Tablet(s) Oral daily  niCARdipine Infusion 5 mG/Hr (25 mL/Hr) IV Continuous <Continuous>  ondansetron Injectable 4 milliGRAM(s) IV Push once  pantoprazole  Injectable 40 milliGRAM(s) IV Push daily  potassium chloride  10 mEq/100 mL IVPB 10 milliEquivalent(s) IV Intermittent every 1 hour  sertraline 75 milliGRAM(s) Oral daily  thiamine 100 milliGRAM(s) Oral daily    MEDICATIONS  (PRN):  acetaminophen   Tablet 325 milliGRAM(s) Oral every 4 hours PRN For Temp greater than 38 C (100.4 F)  atropine Injectable 0.5 milliGRAM(s) IV Push every 5 minutes PRN Symptomatic Bradycardia  dextrose 40% Gel 15 Gram(s) Oral once PRN Blood Glucose LESS THAN 70 milliGRAM(s)/deciliter  diphenhydrAMINE   Capsule 25 milliGRAM(s) Oral every 4 hours PRN Rash and/or Itching  glucagon  Injectable 1 milliGRAM(s) IntraMuscular once PRN Glucose LESS THAN 70 milligrams/deciliter      Allergies    No Known Allergies    Intolerances        FAMILY HISTORY:  No pertinent family history in first degree relatives      SOCIAL HISTORY:  Living Situation:  Occupation:  Tobacco:  Alcohol:   Drug use:      VITAL SIGNS:  Vital Signs Last 24 Hrs  T(C): 36.9 (26 Aug 2018 06:06), Max: 37.2 (25 Aug 2018 17:41)  T(F): 98.5 (26 Aug 2018 06:06), Max: 98.9 (25 Aug 2018 17:41)  HR: 61 (26 Aug 2018 07:45) (53 - 62)  BP: 192/88 (26 Aug 2018 07:45) (167/77 - 210/85)  BP(mean): --  RR: 15 (26 Aug 2018 06:06) (14 - 16)  SpO2: 97% (26 Aug 2018 06:06) (94% - 98%)    PHYSICAL EXAMINATION:  Constitutional: no acute distress; appears stated age   Eyes: Conjunctiva and sclera clear.  Cardiovascular: Regular rate and rhythm; S1 and S2 Normal; No murmurs, gallops or rubs.  Lungs: clear to auscultation bilaterally  Abdomen: soft, non-tender, non-distended; normal bowel sounds     Neurologic:  - Mental Status:  AAOx3; speech is fluent with intact naming, repetition, and comprehension; immediate recall is 3/3 words and delayed recall is 3/3 words at 5 minutes; able to spell WORLD backwards and perform serial 7 subtraction; able to read and write a sentence; able to copy a cube; Good overall fund of knowledge.  - Cranial Nerves II-XII:    II:  Visual acuity is 20/20 bilaterally; Visual fields are full to confrontation; Fundoscopic exam is normal with sharp discs; Pupils are equal, round, and reactive to light.  III, IV, VI:  Extraocular movements are intact without nystagmus.  V:  Facial sensation is intact in the V1-V3 distribution bilaterally.  VII:  Face is symmetric with normal eye closure and smile  VIII:  Hearing is intact to finger rub.  IX, X:  Uvula is midline and soft palate rises symmetrically  XI:  Head turning and shoulder shrug are intact.  XII:  Tongue protrudes in the midline.  - Motor:  Strength is 5/5 throughout.  There is no pronator drift.  Normal muscle bulk and tone throughout.  - Reflexes:  2+ and symmetric at the biceps, triceps, brachioradialis, knees, and ankles.  Plantar responses flexor.  - Sensory:  Intact to light touch, pin prick, vibration, and joint-position sense throughout.  - Coordination:  Finger-nose-finger and heel-knee-shin intact without dysmetria.  Rapid alternating hand movements intact.  - Gait:   Normal steps, base, arm swing, and turning.  Heel and toe walking are normal.  Tandem gait is normal.  Romberg testing is negative.    LABS:                        11.3   19.1  )-----------( 297      ( 26 Aug 2018 11:15 )             33.2         135  |  97  |  28<H>  ----------------------------<  129<H>  4.0   |  24  |  1.31<H>    Ca    9.0      26 Aug 2018 11:15  Phos  2.7       Mg     2.0         TPro  6.2  /  Alb  4.0  /  TBili  0.4  /  DBili  x   /  AST  17  /  ALT  41  /  AlkPhos  60      PT/INR - ( 26 Aug 2018 11:15 )   PT: 10.7 sec;   INR: 0.96          PTT - ( 26 Aug 2018 11:15 )  PTT:26.3 sec  Urinalysis Basic - ( 24 Aug 2018 16:19 )    Color: Yellow / Appearance: Clear / S.020 / pH: x  Gluc: x / Ketone: NEGATIVE  / Bili: Negative / Urobili: 0.2 E.U./dL   Blood: x / Protein: 100 mg/dL / Nitrite: NEGATIVE   Leuk Esterase: NEGATIVE / RBC: < 5 /HPF / WBC < 5 /HPF   Sq Epi: x / Non Sq Epi: 0-5 /HPF / Bacteria: Present /HPF        RADIOLOGY & ADDITIONAL STUDIES:      IMPRESSION & RECOMMENDATIONS: NEUROLOGY CONSULT NOTE    CHIEF COMPLAINT:  altered mental status     HPI:  68F PMH MDD admitted on 18 after being found down in her apartment. Stroke code called on admission for L facial droop and weakness. CT then showed multiple lacunar infarcts, age indeterminate, in basal ganglia and thalamus. CTA showed chronic L VA occlusion, R VA and basilar hypoplasia, carotid stenosis bilaterally. No tPA given. Symptoms improved, then  another stroke code was called for L facial droop and left UE/LE weakness, CT unchanged. MRI showed acute right PATRICIA infarct with scattered smaller areas of infarct. Patient on treatment for cerebral vasculitis. She was transferred to [address] [place] [address] for cytoxan therapy.    Stroke code called on morning of 18 for altered mental status. Patient primary team, patient had been AAOx3 the previous day, but this morning starting around 9 AM, she became lethargic, had minimally audible speech, and was oriented x0. On exam, she was arousable with moderate stimuli but required repeated arousal to main attention. Did not appear to be in distress. Patient sent to CT scan. Vomiting once during CT scan. SBP in the morning was in 190s.    PAST MEDICAL & SURGICAL HISTORY:  Hypertension  Depression  No significant past surgical history      REVIEW OF SYSTEMS:  unable to obtain; patient lethargic     MEDICATIONS  (STANDING):  amLODIPine   Tablet 10 milliGRAM(s) Oral daily  aspirin 325 milliGRAM(s) Oral daily  atorvastatin 80 milliGRAM(s) Oral at bedtime  BACItracin   Ointment 1 Application(s) Topical daily  cholecalciferol 1000 Unit(s) Oral daily  cloNIDine 0.1 milliGRAM(s) Oral every 12 hours  dextrose 5%. 1000 milliLiter(s) (50 mL/Hr) IV Continuous <Continuous>  dextrose 50% Injectable 12.5 Gram(s) IV Push once  dextrose 50% Injectable 25 Gram(s) IV Push once  dextrose 50% Injectable 25 Gram(s) IV Push once  folic acid 1 milliGRAM(s) Oral daily  heparin  flush 100 Units/mL Injectable 100 Unit(s) IV Push every other day  heparin  Injectable 5000 Unit(s) SubCutaneous every 8 hours  hydrALAZINE 100 milliGRAM(s) Oral every 8 hours  insulin glargine Injectable (LANTUS) 5 Unit(s) SubCutaneous at bedtime  insulin lispro (HumaLOG) corrective regimen sliding scale   SubCutaneous every 6 hours  isosorbide   dinitrate Tablet (ISORDIL) 30 milliGRAM(s) Oral three times a day  levETIRAcetam 250 milliGRAM(s) Oral two times a day  multivitamin 1 Tablet(s) Oral daily  niCARdipine Infusion 5 mG/Hr (25 mL/Hr) IV Continuous <Continuous>  ondansetron Injectable 4 milliGRAM(s) IV Push once  pantoprazole  Injectable 40 milliGRAM(s) IV Push daily  potassium chloride  10 mEq/100 mL IVPB 10 milliEquivalent(s) IV Intermittent every 1 hour  sertraline 75 milliGRAM(s) Oral daily  thiamine 100 milliGRAM(s) Oral daily    MEDICATIONS  (PRN):  acetaminophen   Tablet 325 milliGRAM(s) Oral every 4 hours PRN For Temp greater than 38 C (100.4 F)  atropine Injectable 0.5 milliGRAM(s) IV Push every 5 minutes PRN Symptomatic Bradycardia  dextrose 40% Gel 15 Gram(s) Oral once PRN Blood Glucose LESS THAN 70 milliGRAM(s)/deciliter  diphenhydrAMINE   Capsule 25 milliGRAM(s) Oral every 4 hours PRN Rash and/or Itching  glucagon  Injectable 1 milliGRAM(s) IntraMuscular once PRN Glucose LESS THAN 70 milligrams/deciliter      Allergies    No Known Allergies    Intolerances        FAMILY HISTORY:  No pertinent family history in first degree relatives      SOCIAL HISTORY:  Occupation: worked for the [employer], but now not working  Tobacco: denies  Alcohol: admits to previous alcohol use  Drug use: denies    VITAL SIGNS:  Vital Signs Last 24 Hrs  T(C): 36.9 (26 Aug 2018 06:06), Max: 37.2 (25 Aug 2018 17:41)  T(F): 98.5 (26 Aug 2018 06:06), Max: 98.9 (25 Aug 2018 17:41)  HR: 61 (26 Aug 2018 07:45) (53 - 62)  BP: 192/88 (26 Aug 2018 07:45) (167/77 - 210/85)  BP(mean): --  RR: 15 (26 Aug 2018 06:06) (14 - 16)  SpO2: 97% (26 Aug 2018 06:06) (94% - 98%)    PHYSICAL EXAMINATION:  General: NAD; awake but lethargic   HEENT: PERRL, clear conjunctiva. Dry mucus membranes  Neck: supple  Respiratory: CTA b/l. No wheezes, no rales, no rhonchi appreciated   Cardiovascular: +S1/S2; RRR  Abdomen: soft, non-tender, non-distended; +BS x4  Extremities: No erythema, no edema, no cyanosis b/l    Neurological exam limited as patient lethargic  Mental status: Awake but somnolent and oriented x0. Slurry speech.   CN II: pupils equally round and reactive to light,   II, IV, VI: EOMI without nystagmus. Right gaze preference   VII: Left facial droop  VIII: hearing is intact to finger rub  IX, X: Uvula is midline and soft palate rises symmetrically  XI: normal shoulder shrug and SCM  XII: tongue non-deviated   Muscle strength: RUE 4/5, LUE 2/5, RLE 4/5, LLE 2/5. Moves all 4 extremities spontaneously.   Sensation: grossly intact throughout.      LABS:                        11.3   19.1  )-----------( 297      ( 26 Aug 2018 11:15 )             33.2         135  |  97  |  28<H>  ----------------------------<  129<H>  4.0   |  24  |  1.31<H>    Ca    9.0      26 Aug 2018 11:15  Phos  2.7       Mg     2.0         TPro  6.2  /  Alb  4.0  /  TBili  0.4  /  DBili  x   /  AST  17  /  ALT  41  /  AlkPhos  60      PT/INR - ( 26 Aug 2018 11:15 )   PT: 10.7 sec;   INR: 0.96          PTT - ( 26 Aug 2018 11:15 )  PTT:26.3 sec  Urinalysis Basic - ( 24 Aug 2018 16:19 )    Color: Yellow / Appearance: Clear / S.020 / pH: x  Gluc: x / Ketone: NEGATIVE  / Bili: Negative / Urobili: 0.2 E.U./dL   Blood: x / Protein: 100 mg/dL / Nitrite: NEGATIVE   Leuk Esterase: NEGATIVE / RBC: < 5 /HPF / WBC < 5 /HPF   Sq Epi: x / Non Sq Epi: 0-5 /HPF / Bacteria: Present /HPF        RADIOLOGY & ADDITIONAL STUDIES:      EXAM: CT BRAIN STROKE PROTOCOL     PROCEDURE DATE: 2018         INTERPRETATION: I, Fabiana King MD, have reviewed the images and the   report and agree with the findings, with the following modification:     Comparison is also made with the patient's MRI from 2018.     There are bilateral lacunar infarcts involving the thalamus bilaterally.     There are multiple areas of hypodensity within the right corona radiata and   paramedian right frontal lobe which are better delineated on the current CT   study correlating with right PATRICIA and MCA territory infarcts when compared   with the CT from . Acute ischemia in the setting of multiple   acute/subacute infarcts would be better evaluated with MRI.     The above findings were discussed with Dr. Allen at 12:15 PM.     ******PRELIMINARY REPORT******     INDICATIONS: Altered mental status in the setting of vasculitis. Somnolence.   Clonus.     TECHNIQUE: Serial axial images were obtained from the skull base to the   vertex without the use of intravenous contrast. Imaging is performed using   helical low-dose technique, and sagittal and coronal reformations are   provided.     COMPARISON EXAMINATION: CT head from 2018.     FINDINGS:   VENTRICLES AND SULCI: There is mild enlargement of the ventricles out of   proportion to sulcal enlargement, similar to prior exam, and can be   secondary to normal pressure hydrocephalus.   INTRA-AXIAL: There are multiple lacunar infarcts in the basal ganglia   bilaterally. There is a linear hypodensity in the right thalamus, which   appears more well defined on the current examination. There is mild patchy   hypodensity within the periventricular white matter, which is nonspecific   and may represent the sequela small vessel ischemic disease. The gray-white   differentiation is preserved without acute transcortical infarction. No   hemorrhage. No mass effect or midline shift.   EXTRA-AXIAL: No extra-axial fluid collection is present.   VISUALIZED SINUSES: No air-fluid levels are identified.   VISUALIZED MASTOIDS: Well developed and aerated bilaterally.   CALVARIUM: No calvarial fracture.     IMPRESSION:   1. No acute intracranial hemorrhage, transcortical infarction or mass   effect.   2. Multiple bilateral basal ganglia and right thalamus lacunar infarctions,   grossly similar to prior exam.   3. Chronic mild small vessel ischemic disease.     The study was performed at 11:26 AM at 2018 and the above findings were   discussed with Dr. Solitario, on 2018 at 11:33 AM.     IV-tPA (Y/N): N   Reason IV-tPA not given: patient not in time window, history of recent strokes **STROKE CODE CONSULT NOTE**    Last known well time/Time of onset of symptoms: 18, 9 AM    HPI:  68F PMH MDD admitted on 18 after being found down in her apartment. Stroke code called on admission for L facial droop and weakness. CT then showed multiple lacunar infarcts, age indeterminate, in basal ganglia and thalamus. CTA showed chronic L VA occlusion, R VA and basilar hypoplasia, carotid stenosis bilaterally. No tPA given. Symptoms improved, then  another stroke code was called for L facial droop and left UE/LE weakness, CT unchanged. MRI showed acute right PATRICIA infarct with scattered smaller areas of infarct. Patient on treatment for cerebral vasculitis. She was transferred to [address] [place] [address] for cytoxan therapy.    Stroke code called on morning of 18 for altered mental status. Patient primary team, patient had been AAOx3 the previous day, but this morning starting around 9 AM, she became lethargic, had minimally audible speech, and was oriented x0. On exam, she was arousable with moderate stimuli but required repeated arousal to main attention. Did not appear to be in distress. Patient sent to CT scan. Vomiting once during CT scan. SBP in the morning was in 190s.    PAST MEDICAL & SURGICAL HISTORY:  Hypertension  Depression  No significant past surgical history      REVIEW OF SYSTEMS:  unable to obtain; patient lethargic     MEDICATIONS  (STANDING):  amLODIPine   Tablet 10 milliGRAM(s) Oral daily  aspirin 325 milliGRAM(s) Oral daily  atorvastatin 80 milliGRAM(s) Oral at bedtime  BACItracin   Ointment 1 Application(s) Topical daily  cholecalciferol 1000 Unit(s) Oral daily  cloNIDine 0.1 milliGRAM(s) Oral every 12 hours  dextrose 5%. 1000 milliLiter(s) (50 mL/Hr) IV Continuous <Continuous>  dextrose 50% Injectable 12.5 Gram(s) IV Push once  dextrose 50% Injectable 25 Gram(s) IV Push once  dextrose 50% Injectable 25 Gram(s) IV Push once  folic acid 1 milliGRAM(s) Oral daily  heparin  flush 100 Units/mL Injectable 100 Unit(s) IV Push every other day  heparin  Injectable 5000 Unit(s) SubCutaneous every 8 hours  hydrALAZINE 100 milliGRAM(s) Oral every 8 hours  insulin glargine Injectable (LANTUS) 5 Unit(s) SubCutaneous at bedtime  insulin lispro (HumaLOG) corrective regimen sliding scale   SubCutaneous every 6 hours  isosorbide   dinitrate Tablet (ISORDIL) 30 milliGRAM(s) Oral three times a day  levETIRAcetam 250 milliGRAM(s) Oral two times a day  multivitamin 1 Tablet(s) Oral daily  niCARdipine Infusion 5 mG/Hr (25 mL/Hr) IV Continuous <Continuous>  ondansetron Injectable 4 milliGRAM(s) IV Push once  pantoprazole  Injectable 40 milliGRAM(s) IV Push daily  potassium chloride  10 mEq/100 mL IVPB 10 milliEquivalent(s) IV Intermittent every 1 hour  sertraline 75 milliGRAM(s) Oral daily  thiamine 100 milliGRAM(s) Oral daily    MEDICATIONS  (PRN):  acetaminophen   Tablet 325 milliGRAM(s) Oral every 4 hours PRN For Temp greater than 38 C (100.4 F)  atropine Injectable 0.5 milliGRAM(s) IV Push every 5 minutes PRN Symptomatic Bradycardia  dextrose 40% Gel 15 Gram(s) Oral once PRN Blood Glucose LESS THAN 70 milliGRAM(s)/deciliter  diphenhydrAMINE   Capsule 25 milliGRAM(s) Oral every 4 hours PRN Rash and/or Itching  glucagon  Injectable 1 milliGRAM(s) IntraMuscular once PRN Glucose LESS THAN 70 milligrams/deciliter      Allergies    No Known Allergies    Intolerances        FAMILY HISTORY:  No pertinent family history in first degree relatives      SOCIAL HISTORY:  Occupation: worked for the [employer], but now not working  Tobacco: denies  Alcohol: admits to previous alcohol use  Drug use: denies    VITAL SIGNS:  Vital Signs Last 24 Hrs  T(C): 36.9 (26 Aug 2018 06:06), Max: 37.2 (25 Aug 2018 17:41)  T(F): 98.5 (26 Aug 2018 06:06), Max: 98.9 (25 Aug 2018 17:41)  HR: 61 (26 Aug 2018 07:45) (53 - 62)  BP: 192/88 (26 Aug 2018 07:45) (167/77 - 210/85)  BP(mean): --  RR: 15 (26 Aug 2018 06:06) (14 - 16)  SpO2: 97% (26 Aug 2018 06:06) (94% - 98%)    PHYSICAL EXAMINATION:  General: NAD; awake but lethargic   HEENT: PERRL, clear conjunctiva. Dry mucus membranes  Neck: supple  Respiratory: CTA b/l. No wheezes, no rales, no rhonchi appreciated   Cardiovascular: +S1/S2; RRR  Abdomen: soft, non-tender, non-distended; +BS x4  Extremities: No erythema, no edema, no cyanosis b/l    Neurological exam limited as patient lethargic  Mental status: Awake but somnolent and oriented x0. Slurry speech.   CN II: pupils equally round and reactive to light,   II, IV, VI: EOMI without nystagmus. Right gaze preference   VII: Left facial droop  VIII: hearing is intact to finger rub  IX, X: Uvula is midline and soft palate rises symmetrically  XI: normal shoulder shrug and SCM  XII: tongue non-deviated   Muscle strength: RUE 4/5, LUE 2/5, RLE 4/5, LLE 2/5. Moves all 4 extremities spontaneously.   Sensation: grossly intact throughout.      LABS:                        11.3   19.1  )-----------( 297      ( 26 Aug 2018 11:15 )             33.2     -    135  |  97  |  28<H>  ----------------------------<  129<H>  4.0   |  24  |  1.31<H>    Ca    9.0      26 Aug 2018 11:15  Phos  2.7       Mg     2.0         TPro  6.2  /  Alb  4.0  /  TBili  0.4  /  DBili  x   /  AST  17  /  ALT  41  /  AlkPhos  60      PT/INR - ( 26 Aug 2018 11:15 )   PT: 10.7 sec;   INR: 0.96          PTT - ( 26 Aug 2018 11:15 )  PTT:26.3 sec  Urinalysis Basic - ( 24 Aug 2018 16:19 )    Color: Yellow / Appearance: Clear / S.020 / pH: x  Gluc: x / Ketone: NEGATIVE  / Bili: Negative / Urobili: 0.2 E.U./dL   Blood: x / Protein: 100 mg/dL / Nitrite: NEGATIVE   Leuk Esterase: NEGATIVE / RBC: < 5 /HPF / WBC < 5 /HPF   Sq Epi: x / Non Sq Epi: 0-5 /HPF / Bacteria: Present /HPF      NIHSS: 14    Fingerstick Blood Glucose: CAPILLARY BLOOD GLUCOSE  152 (26 Aug 2018 12:13)    POCT Blood Glucose.: 152 mg/dL (26 Aug 2018 11:51)    RADIOLOGY & ADDITIONAL STUDIES:      EXAM: CT BRAIN STROKE PROTOCOL     PROCEDURE DATE: 2018         INTERPRETATION: IFabiana MD, have reviewed the images and the   report and agree with the findings, with the following modification:     Comparison is also made with the patient's MRI from 2018.     There are bilateral lacunar infarcts involving the thalamus bilaterally.     There are multiple areas of hypodensity within the right corona radiata and   paramedian right frontal lobe which are better delineated on the current CT   study correlating with right PATRICIA and MCA territory infarcts when compared   with the CT from . Acute ischemia in the setting of multiple   acute/subacute infarcts would be better evaluated with MRI.     The above findings were discussed with Dr. Allen at 12:15 PM.     ******PRELIMINARY REPORT******     INDICATIONS: Altered mental status in the setting of vasculitis. Somnolence.   Clonus.     TECHNIQUE: Serial axial images were obtained from the skull base to the   vertex without the use of intravenous contrast. Imaging is performed using   helical low-dose technique, and sagittal and coronal reformations are   provided.     COMPARISON EXAMINATION: CT head from 2018.     FINDINGS:   VENTRICLES AND SULCI: There is mild enlargement of the ventricles out of   proportion to sulcal enlargement, similar to prior exam, and can be   secondary to normal pressure hydrocephalus.   INTRA-AXIAL: There are multiple lacunar infarcts in the basal ganglia   bilaterally. There is a linear hypodensity in the right thalamus, which   appears more well defined on the current examination. There is mild patchy   hypodensity within the periventricular white matter, which is nonspecific   and may represent the sequela small vessel ischemic disease. The gray-white   differentiation is preserved without acute transcortical infarction. No   hemorrhage. No mass effect or midline shift.   EXTRA-AXIAL: No extra-axial fluid collection is present.   VISUALIZED SINUSES: No air-fluid levels are identified.   VISUALIZED MASTOIDS: Well developed and aerated bilaterally.   CALVARIUM: No calvarial fracture.     IMPRESSION:   1. No acute intracranial hemorrhage, transcortical infarction or mass   effect.   2. Multiple bilateral basal ganglia and right thalamus lacunar infarctions,   grossly similar to prior exam.   3. Chronic mild small vessel ischemic disease.     The study was performed at 11:26 AM at 2018 and the above findings were   discussed with Dr. Solitario, on 2018 at 11:33 AM.     IV-tPA (Y/N): N   Reason IV-tPA not given: patient not in time window, history of recent strokes

## 2018-08-26 NOTE — PROGRESS NOTE ADULT - ASSESSMENT
68 yr old female with a PMHx of HTN that presented to 8U for psych stabilization, subsequently had a multi-focal stroke with L sided facial and UE/LE deficits. Course c/b persistent HTN requiring nicardipine drip now transition to PO BP regimen. 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 Rheumatology consulted. Medicine initially consulted for clearance prior to angiogram.    1. Change in MS  Possibly due to hypertensive urgency. Stroke code called this morning when I was in room with patient. Patient was taken down to CT and then transferred to ICU to begin nicardipine drip.    2. acute CVA - likely 2/2 vasculitis. Patient with residual left sided deficits.  -on , 80 mg lipitor   - mgmt per stroke/rheum service. Received IV solumedrol and most recently cytoxan yesterday.    3. MARYJANE vs CKD - Possibly 2/2 vasculitis vs CKD 2/2 hypertension vs ?   Patient presented with MARYJANE of 1.45; unclear baseline. Renal ultrasound showed slightly diminished left renal size with smooth renal cortical mantle thinning which may suggest underlying nephrosclerosis. Renal duplex performed 8/24, showing <60% stenosis of the right renal artery at its origin. Urine lytes revealing FeNa - 1.4%, indicating intrinsic renal disease.   Overall stable renal function, probably at baseline now-CKD stage 3  please avoid all nephrotoxic agents, f/up BMP.    4. HTN - persistently elevated  currently on 3 drug regimen with clonidine, amlodipine, hydralazine  HTN management per neurology   Being transferred to ICU as above.    Will follow up once out of ICU.  D/w Neuro team. 68 yr old female with a PMHx of HTN that presented to 8U for psych stabilization, subsequently had a multi-focal stroke with L sided facial and UE/LE deficits. Course c/b persistent HTN requiring nicardipine drip now transition to PO BP regimen. 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 Rheumatology consulted. Medicine initially consulted for clearance prior to angiogram.    1. Change in MS/Encephalopathy  Possibly due to hypertensive urgency. Stroke code called this morning when I was in room with patient. Patient was taken down to CT and then transferred to ICU to begin nicardipine drip.    2. acute CVA - likely 2/2 vasculitis. Patient with residual left sided deficits.  -on , 80 mg lipitor   - mgmt per stroke/rheum service. Received IV solumedrol and most recently cytoxan yesterday.    3. MARYJANE vs CKD - Possibly 2/2 vasculitis vs CKD 2/2 hypertension vs ?   Patient presented with MARYJANE of 1.45; unclear baseline. Renal ultrasound showed slightly diminished left renal size with smooth renal cortical mantle thinning which may suggest underlying nephrosclerosis. Renal duplex performed 8/24, showing <60% stenosis of the right renal artery at its origin. Urine lytes revealing FeNa - 1.4%, indicating intrinsic renal disease.   Overall stable renal function, probably at baseline now-CKD stage 3  please avoid all nephrotoxic agents, f/up BMP.    4. HTN - persistently elevated  currently on 3 drug regimen with clonidine, amlodipine, hydralazine  HTN management per neurology   Being transferred to ICU as above.    5. Bradycardia- chronic, asymptomatic.    Will follow up once out of ICU.  D/w Neuro team.

## 2018-08-26 NOTE — STROKE CODE NOTE - NIH STROKE SCALE: 8. SENSORY, QM
(0) Normal; no sensory loss
(2) Severe to total sensory loss; patient is not aware of being touched in the face, arm, and leg
(0) Normal; no sensory loss

## 2018-08-26 NOTE — CONSULT NOTE ADULT - ASSESSMENT
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 Stroke code called at 11:07 AM on 8/26/18 on  but now with uncontrolled hypertension, vomiting, and increased lethargy. Transferring to MICU for nicardipine drip.   #acute CVA   - likely 2/2 vasculitis.   - CT head on 8/26/18 showing some new strokes in bilateral thalamus, right corona radiata, and paramedian R frontal lobe. No ICH, edema, or midline shift.   - Patient now lethargic, and with continued residual left sided deficits.  -Patient not a candidate for TPA.     -Transfer to MICU  -Continue , atorvastatin 80 mg   -Cyclophosphamide per rheum. Got one dose yesterday.   -serial neuro exams.     #HTN urgency / emergency -    - uncontrolled.   Episodes of vomiting today from possible hypertensive encephelopathy.   Continue home BP meds: amlopdine 10 mg po daily, clonidine 0.1 mg po bid, hydralazine 100 mg po tid, and isordil 30 mg tid.  -Transfer to MICU.  -Start nicardipine drip.  -Serial neuro exams. 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 Stroke code called at 11:07 AM on 8/26/18 on  but now with uncontrolled hypertension, vomiting, and increased lethargy. Transferring to MICU for nicardipine drip.   #acute CVA   - likely 2/2 vasculitis.   - CT head on 8/26/18 showing some new strokes in bilateral thalamus, right corona radiata, and paramedian R frontal lobe. No ICH, edema, or midline shift.   - Patient now lethargic, and with continued residual left sided deficits.  -Patient not a candidate for TPA.   -NIHSS 14 today     -Transfer to MICU  -Continue , atorvastatin 80 mg   -Cyclophosphamide per rheum. Got one dose yesterday.   -serial neuro exams.     #HTN  Emergency -  - uncontrolled.   Episodes of vomiting today from possible hypertensive encephelopathy.   Continue home BP meds: amlodipine 10 mg po daily, clonidine 0.1 mg po bid, hydralazine 100 mg po tid, and isordil 30 mg tid.  -Transfer to MICU.  -Start nicardipine drip.  -Serial neuro exams.

## 2018-08-26 NOTE — CHART NOTE - NSCHARTNOTEFT_GEN_A_CORE
Patient not able to tolerate PO medications at this time. Need for NG tube for continued oral anti-hypertensives. NGT placed by PGY-3 with auscultation of agitated stomach contents. X-ray confirming placement within the stomach, confirmed by on-call radiology resident.

## 2018-08-27 PROBLEM — Z00.00 ENCOUNTER FOR PREVENTIVE HEALTH EXAMINATION: Status: ACTIVE | Noted: 2018-08-27

## 2018-08-27 LAB
ALBUMIN SERPL ELPH-MCNC: 3.6 G/DL — SIGNIFICANT CHANGE UP (ref 3.3–5)
ALP SERPL-CCNC: 64 U/L — SIGNIFICANT CHANGE UP (ref 40–120)
ALT FLD-CCNC: 32 U/L — SIGNIFICANT CHANGE UP (ref 10–45)
ANION GAP SERPL CALC-SCNC: 13 MMOL/L — SIGNIFICANT CHANGE UP (ref 5–17)
AST SERPL-CCNC: 14 U/L — SIGNIFICANT CHANGE UP (ref 10–40)
BASE EXCESS BLDA CALC-SCNC: 2 MMOL/L — SIGNIFICANT CHANGE UP (ref -2–3)
BASE EXCESS BLDA CALC-SCNC: 2.6 MMOL/L — SIGNIFICANT CHANGE UP (ref -2–3)
BILIRUB SERPL-MCNC: 0.5 MG/DL — SIGNIFICANT CHANGE UP (ref 0.2–1.2)
BUN SERPL-MCNC: 25 MG/DL — HIGH (ref 7–23)
CALCIUM SERPL-MCNC: 9.3 MG/DL — SIGNIFICANT CHANGE UP (ref 8.4–10.5)
CHLORIDE SERPL-SCNC: 100 MMOL/L — SIGNIFICANT CHANGE UP (ref 96–108)
CO2 SERPL-SCNC: 25 MMOL/L — SIGNIFICANT CHANGE UP (ref 22–31)
CREAT ?TM UR-MCNC: 34 MG/DL — SIGNIFICANT CHANGE UP
CREAT SERPL-MCNC: 1.37 MG/DL — HIGH (ref 0.5–1.3)
GAS PNL BLDA: SIGNIFICANT CHANGE UP
GAS PNL BLDA: SIGNIFICANT CHANGE UP
GLUCOSE SERPL-MCNC: 127 MG/DL — HIGH (ref 70–99)
HCO3 BLDA-SCNC: 25 MMOL/L — SIGNIFICANT CHANGE UP (ref 21–28)
HCO3 BLDA-SCNC: 26 MMOL/L — SIGNIFICANT CHANGE UP (ref 21–28)
HCT VFR BLD CALC: 31.6 % — LOW (ref 34.5–45)
HGB BLD-MCNC: 10.8 G/DL — LOW (ref 11.5–15.5)
LYMPHOCYTES # BLD AUTO: 1.3 % — LOW (ref 13–44)
MAGNESIUM SERPL-MCNC: 2.2 MG/DL — SIGNIFICANT CHANGE UP (ref 1.6–2.6)
MCHC RBC-ENTMCNC: 29.7 PG — SIGNIFICANT CHANGE UP (ref 27–34)
MCHC RBC-ENTMCNC: 34.2 G/DL — SIGNIFICANT CHANGE UP (ref 32–36)
MCV RBC AUTO: 86.8 FL — SIGNIFICANT CHANGE UP (ref 80–100)
MONOCYTES NFR BLD AUTO: 2.9 % — SIGNIFICANT CHANGE UP (ref 2–14)
NEUTROPHILS NFR BLD AUTO: 95.8 % — HIGH (ref 43–77)
OSMOLALITY UR: 375 MOSMOL/KG — SIGNIFICANT CHANGE UP (ref 100–650)
PCO2 BLDA: 34 MMHG — SIGNIFICANT CHANGE UP (ref 32–45)
PCO2 BLDA: 35 MMHG — SIGNIFICANT CHANGE UP (ref 32–45)
PH BLDA: 7.48 — HIGH (ref 7.35–7.45)
PH BLDA: 7.48 — HIGH (ref 7.35–7.45)
PHOSPHATE SERPL-MCNC: 3.4 MG/DL — SIGNIFICANT CHANGE UP (ref 2.5–4.5)
PLATELET # BLD AUTO: 236 K/UL — SIGNIFICANT CHANGE UP (ref 150–400)
PO2 BLDA: 140 MMHG — HIGH (ref 83–108)
PO2 BLDA: 191 MMHG — HIGH (ref 83–108)
POTASSIUM SERPL-MCNC: 3.9 MMOL/L — SIGNIFICANT CHANGE UP (ref 3.5–5.3)
POTASSIUM SERPL-SCNC: 3.9 MMOL/L — SIGNIFICANT CHANGE UP (ref 3.5–5.3)
PROT SERPL-MCNC: 6.5 G/DL — SIGNIFICANT CHANGE UP (ref 6–8.3)
RBC # BLD: 3.64 M/UL — LOW (ref 3.8–5.2)
RBC # FLD: 14.6 % — SIGNIFICANT CHANGE UP (ref 10.3–16.9)
SAO2 % BLDA: 99 % — SIGNIFICANT CHANGE UP (ref 95–100)
SAO2 % BLDA: 99 % — SIGNIFICANT CHANGE UP (ref 95–100)
SODIUM SERPL-SCNC: 138 MMOL/L — SIGNIFICANT CHANGE UP (ref 135–145)
SODIUM UR-SCNC: 91 MMOL/L — SIGNIFICANT CHANGE UP
UUN UR-MCNC: 337 MG/DL — SIGNIFICANT CHANGE UP
WBC # BLD: 19.7 K/UL — HIGH (ref 3.8–10.5)
WBC # FLD AUTO: 19.7 K/UL — HIGH (ref 3.8–10.5)

## 2018-08-27 PROCEDURE — 99231 SBSQ HOSP IP/OBS SF/LOW 25: CPT

## 2018-08-27 PROCEDURE — 99291 CRITICAL CARE FIRST HOUR: CPT

## 2018-08-27 PROCEDURE — 99233 SBSQ HOSP IP/OBS HIGH 50: CPT

## 2018-08-27 PROCEDURE — 93010 ELECTROCARDIOGRAM REPORT: CPT

## 2018-08-27 PROCEDURE — 71045 X-RAY EXAM CHEST 1 VIEW: CPT | Mod: 26

## 2018-08-27 RX ORDER — THIAMINE MONONITRATE (VIT B1) 100 MG
100 TABLET ORAL DAILY
Qty: 0 | Refills: 0 | Status: DISCONTINUED | OUTPATIENT
Start: 2018-08-27 | End: 2018-09-01

## 2018-08-27 RX ORDER — CHLORHEXIDINE GLUCONATE 213 G/1000ML
15 SOLUTION TOPICAL
Qty: 0 | Refills: 0 | Status: DISCONTINUED | OUTPATIENT
Start: 2018-08-27 | End: 2018-09-05

## 2018-08-27 RX ORDER — POTASSIUM CHLORIDE 20 MEQ
10 PACKET (EA) ORAL ONCE
Qty: 0 | Refills: 0 | Status: COMPLETED | OUTPATIENT
Start: 2018-08-27 | End: 2018-08-27

## 2018-08-27 RX ORDER — ONDANSETRON 8 MG/1
4 TABLET, FILM COATED ORAL EVERY 6 HOURS
Qty: 0 | Refills: 0 | Status: DISCONTINUED | OUTPATIENT
Start: 2018-08-27 | End: 2018-09-03

## 2018-08-27 RX ORDER — FOLIC ACID 0.8 MG
1 TABLET ORAL DAILY
Qty: 0 | Refills: 0 | Status: DISCONTINUED | OUTPATIENT
Start: 2018-08-27 | End: 2018-09-01

## 2018-08-27 RX ADMIN — ONDANSETRON 4 MILLIGRAM(S): 8 TABLET, FILM COATED ORAL at 18:38

## 2018-08-27 RX ADMIN — HEPARIN SODIUM 5000 UNIT(S): 5000 INJECTION INTRAVENOUS; SUBCUTANEOUS at 23:12

## 2018-08-27 RX ADMIN — INSULIN GLARGINE 5 UNIT(S): 100 INJECTION, SOLUTION SUBCUTANEOUS at 23:12

## 2018-08-27 RX ADMIN — NICARDIPINE HYDROCHLORIDE 25 MG/HR: 30 CAPSULE, EXTENDED RELEASE ORAL at 12:27

## 2018-08-27 RX ADMIN — AMLODIPINE BESYLATE 10 MILLIGRAM(S): 2.5 TABLET ORAL at 06:28

## 2018-08-27 RX ADMIN — NICARDIPINE HYDROCHLORIDE 25 MG/HR: 30 CAPSULE, EXTENDED RELEASE ORAL at 19:36

## 2018-08-27 RX ADMIN — Medication 2: at 19:30

## 2018-08-27 RX ADMIN — ONDANSETRON 4 MILLIGRAM(S): 8 TABLET, FILM COATED ORAL at 13:57

## 2018-08-27 RX ADMIN — Medication 100 MILLIGRAM(S): at 18:38

## 2018-08-27 RX ADMIN — Medication 100 UNIT(S): at 13:58

## 2018-08-27 RX ADMIN — Medication 100 MILLIGRAM(S): at 13:59

## 2018-08-27 RX ADMIN — Medication 300 MILLIGRAM(S): at 13:58

## 2018-08-27 RX ADMIN — HEPARIN SODIUM 5000 UNIT(S): 5000 INJECTION INTRAVENOUS; SUBCUTANEOUS at 06:27

## 2018-08-27 RX ADMIN — Medication 650 MILLIGRAM(S): at 00:17

## 2018-08-27 RX ADMIN — PANTOPRAZOLE SODIUM 40 MILLIGRAM(S): 20 TABLET, DELAYED RELEASE ORAL at 13:57

## 2018-08-27 RX ADMIN — HEPARIN SODIUM 5000 UNIT(S): 5000 INJECTION INTRAVENOUS; SUBCUTANEOUS at 13:58

## 2018-08-27 RX ADMIN — ISOSORBIDE DINITRATE 30 MILLIGRAM(S): 5 TABLET ORAL at 06:28

## 2018-08-27 RX ADMIN — ONDANSETRON 4 MILLIGRAM(S): 8 TABLET, FILM COATED ORAL at 23:14

## 2018-08-27 RX ADMIN — CHLORHEXIDINE GLUCONATE 15 MILLILITER(S): 213 SOLUTION TOPICAL at 18:38

## 2018-08-27 RX ADMIN — LEVETIRACETAM 400 MILLIGRAM(S): 250 TABLET, FILM COATED ORAL at 18:38

## 2018-08-27 RX ADMIN — Medication 1 APPLICATION(S): at 13:58

## 2018-08-27 RX ADMIN — Medication 100 MILLIEQUIVALENT(S): at 07:01

## 2018-08-27 RX ADMIN — NICARDIPINE HYDROCHLORIDE 25 MG/HR: 30 CAPSULE, EXTENDED RELEASE ORAL at 23:14

## 2018-08-27 RX ADMIN — Medication 100 MILLIGRAM(S): at 06:27

## 2018-08-27 RX ADMIN — Medication 100 MILLIGRAM(S): at 06:28

## 2018-08-27 RX ADMIN — LEVETIRACETAM 400 MILLIGRAM(S): 250 TABLET, FILM COATED ORAL at 06:27

## 2018-08-27 RX ADMIN — PROPOFOL 5 MICROGRAM(S)/KG/MIN: 10 INJECTION, EMULSION INTRAVENOUS at 23:14

## 2018-08-27 RX ADMIN — Medication 0.1 MILLIGRAM(S): at 06:40

## 2018-08-27 NOTE — PROGRESS NOTE ADULT - ATTENDING COMMENTS
Patient got intubated last night due to worsening mental status and not being able to handle her secretions.  Her WBC is stable and her CT head yesterday only shows 2 more infarcts.   Exam - sedated on propofol  left hemiplegia  localizes with the right side    imp  -   1. severe vasculitis with recurrent strokes - case discussed with rheum and critical care.  increase solumedrol to 500 q12h x 72 h  keep  -180  cont antiplt regimen    2. Resp failure - no evidence of infection but probably secondary to multiple strokes   wbc stable

## 2018-08-27 NOTE — PROGRESS NOTE BEHAVIORAL HEALTH - NSBHFUPINTERVALCCFT_PSY_A_CORE
"The nurse told me you were coming."
"depression"
depression
depression, suicidal ideation
Unable
depression

## 2018-08-27 NOTE — PROGRESS NOTE ADULT - PROBLEM SELECTOR PLAN 1
- Stroke Code called for Left sided weakness and diminished sensation, NIHSS 6 on first assessment   - CTH reveals lacunar infarcts (basal ganglia, b/l thalami) of indeterminate age   - TPA not given, NIHSS on follow up was 1   - Patient admitted to [facility] for further stroke workup.   - C/w ASA, Lipitor 40mg per nocte   - Allow for permissive HTN first 24 hours - SBP < 200mmHg   - Follow up MRI Brain     - PT/OT Consult - Stroke Code called for Left sided weakness and diminished sensation, NIHSS 6 on first assessment   - CTH reveals lacunar infarcts (basal ganglia, b/l thalami) of indeterminate age   - TPA not given, NIHSS on follow up was 1. Pt admitted to  for further CVA workup  - C/w ASA, Lipitor 40mg per note. PT/OT when appropriate.

## 2018-08-27 NOTE — PROGRESS NOTE ADULT - SUBJECTIVE AND OBJECTIVE BOX
Rheumatology Follow Up Evaluation     68 year old female with severe depression presents with multiple lacunar infarcts after being found down in her apartment.      Received CYC 8/24/18 Sunday am noted to be hypertensive with SBP ~ 200, vomiting and AMS, stroke protocol, found to have new lesions   Transferred to ICU, now intubated   Solumedrol increased to 250mg BID over the weekend to control vasculitis     Afebrile. /62  Physical exam notable for the following pertinent positives/negatives:  Much more interactive. Eye contact. Speaking a bit louder. Answering questions mostly appropriately.   Comfortable  Non toxic   Hyperpigmentation over nasal bridge not sparing nasolabial folds  Slight periorbital erythema  No gottrens papules, malar rash or discoid lesions   Hair thinning  Dry mouth without pooling of saliva, no tongue furrowing   No parotid or lacrimal enlargement   CV bradycardic, reg rhythm no murmurs   Resp CTAB  Facial droop  GI soft non tender BS+ no HSM   No renal bruit appreciated   No livedo     Data reviewed, notable for:   CBC with worsening leukocytosis    BUN/Cr improving  Renin/rohan 135/10   AST/ALT WNL   ESR 45  LAC neg  Cardiolipin neg   RF neg   ANCA neg  KORINA neg   sm/RNP neg  ssa/ssb neg   IgG4 subclasses normal   Quantiferon indeterminate     KATHERINE no valvular disease or pericardial effusion  CTH/MRA with progressing strokes as described above   Angiogram CNS vasculitis   CTA with chronic L VA occlusion, R VA and basilar hypoplasia, carotid stenosis bilaterally   Renal US with asymmetric kidneys possibly representing nephrosclerosis   CXR nml

## 2018-08-27 NOTE — PROGRESS NOTE ADULT - SUBJECTIVE AND OBJECTIVE BOX
On interval followup, the right subclavian venous catheter site is clean, dry and non-inflamed.  T 100 range. Notes, cultures and events are followed.

## 2018-08-27 NOTE — PROGRESS NOTE ADULT - SUBJECTIVE AND OBJECTIVE BOX
INTERVAL HPI/OVERNIGHT EVENTS:    SUBJECTIVE: Patient seen and examined at bedside.       OBJECTIVE:    VITAL SIGNS:  ICU Vital Signs Last 24 Hrs  T(C): 37.6 (27 Aug 2018 04:05), Max: 37.9 (26 Aug 2018 22:00)  T(F): 99.6 (27 Aug 2018 04:05), Max: 100.2 (26 Aug 2018 22:00)  HR: 82 (27 Aug 2018 08:30) (62 - 120)  BP: 147/71 (27 Aug 2018 01:00) (144/80 - 221/78)  BP(mean): 95 (27 Aug 2018 01:00) (95 - 139)  ABP: 164/66 (27 Aug 2018 08:00) (148/64 - 226/100)  ABP(mean): 94 (27 Aug 2018 08:00) (88 - 136)  RR: 13 (27 Aug 2018 08:30) (12 - 26)  SpO2: 98% (27 Aug 2018 08:30) (84% - 100%)    Mode: CPAP with PS, FiO2: 40, PEEP: 5, PS: 5, MAP: 6.4, PIP: 11    08-26 @ 07:01 - 08-27 @ 07:00  --------------------------------------------------------  IN: 1305 mL / OUT: 2870 mL / NET: -1565 mL    08-27 @ 07:01 - 08-27 @ 09:05  --------------------------------------------------------  IN: 35 mL / OUT: 0 mL / NET: 35 mL      CAPILLARY BLOOD GLUCOSE  152 (26 Aug 2018 12:13)      POCT Blood Glucose.: 128 mg/dL (27 Aug 2018 06:44)      PHYSICAL EXAM:    General: NAD  HEENT: NC/AT; PERRL, clear conjunctiva  Neck: supple  Respiratory: CTA b/l  Cardiovascular: +S1/S2; RRR  Abdomen: soft, NT/ND; +BS x4  Extremities: WWP, 2+ peripheral pulses b/l; no LE edema  Skin: normal color and turgor; no rash  Neurological:    MEDICATIONS:  MEDICATIONS  (STANDING):  amLODIPine   Tablet 10 milliGRAM(s) Oral daily  aspirin Suppository 300 milliGRAM(s) Rectal daily  atorvastatin 80 milliGRAM(s) Oral at bedtime  BACItracin   Ointment 1 Application(s) Topical daily  chlorhexidine 2% Cloths 1 Application(s) Topical daily  cholecalciferol 1000 Unit(s) Oral daily  cloNIDine 0.1 milliGRAM(s) Oral every 12 hours  dextrose 5%. 1000 milliLiter(s) (50 mL/Hr) IV Continuous <Continuous>  dextrose 50% Injectable 12.5 Gram(s) IV Push once  dextrose 50% Injectable 25 Gram(s) IV Push once  dextrose 50% Injectable 25 Gram(s) IV Push once  folic acid 1 milliGRAM(s) Oral daily  heparin  flush 100 Units/mL Injectable 100 Unit(s) IV Push every other day  heparin  Injectable 5000 Unit(s) SubCutaneous every 8 hours  hydrALAZINE 100 milliGRAM(s) Oral every 8 hours  insulin glargine Injectable (LANTUS) 5 Unit(s) SubCutaneous at bedtime  insulin lispro (HumaLOG) corrective regimen sliding scale   SubCutaneous every 6 hours  isosorbide   dinitrate Tablet (ISORDIL) 30 milliGRAM(s) Oral three times a day  levETIRAcetam  IVPB 250 milliGRAM(s) IV Intermittent every 12 hours  methylPREDNISolone sodium succinate IVPB 250 milliGRAM(s) IV Intermittent two times a day  multivitamin 1 Tablet(s) Oral daily  niCARdipine Infusion 5 mG/Hr (25 mL/Hr) IV Continuous <Continuous>  pantoprazole  Injectable 40 milliGRAM(s) IV Push daily  propofol Infusion 13.774 MICROgram(s)/kG/Min (5 mL/Hr) IV Continuous <Continuous>  sertraline 75 milliGRAM(s) Oral daily  thiamine 100 milliGRAM(s) Oral daily    MEDICATIONS  (PRN):  acetaminophen    Suspension 650 milliGRAM(s) Oral every 6 hours PRN For Temp greater than 38 C (100.4 F)  acetaminophen    Suspension. 650 milliGRAM(s) Oral every 6 hours PRN Moderate Pain (4 - 6)  atropine Injectable 0.5 milliGRAM(s) IV Push every 5 minutes PRN Symptomatic Bradycardia  dextrose 40% Gel 15 Gram(s) Oral once PRN Blood Glucose LESS THAN 70 milliGRAM(s)/deciliter  diphenhydrAMINE   Capsule 25 milliGRAM(s) Oral every 4 hours PRN Rash and/or Itching  glucagon  Injectable 1 milliGRAM(s) IntraMuscular once PRN Glucose LESS THAN 70 milligrams/deciliter      ALLERGIES:  Allergies    No Known Allergies    Intolerances        LABS:                        10.8   19.7  )-----------( 236      ( 27 Aug 2018 05:58 )             31.6     08-27    138  |  100  |  25<H>  ----------------------------<  127<H>  3.9   |  25  |  1.37<H>    Ca    9.3      27 Aug 2018 05:58  Phos  3.4     08-27  Mg     2.2     08-27    TPro  6.5  /  Alb  3.6  /  TBili  0.5  /  DBili  x   /  AST  14  /  ALT  32  /  AlkPhos  64  08-27    PT/INR - ( 26 Aug 2018 11:15 )   PT: 10.7 sec;   INR: 0.96          PTT - ( 26 Aug 2018 11:15 )  PTT:26.3 sec      RADIOLOGY & ADDITIONAL TESTS: Reviewed. INTERVAL HPI/OVERNIGHT EVENTS: desatted last night to 84    SUBJECTIVE: Patient seen and examined at bedside.       OBJECTIVE:    VITAL SIGNS:  ICU Vital Signs Last 24 Hrs  T(C): 37.6 (27 Aug 2018 04:05), Max: 37.9 (26 Aug 2018 22:00)  T(F): 99.6 (27 Aug 2018 04:05), Max: 100.2 (26 Aug 2018 22:00)  HR: 82 (27 Aug 2018 08:30) (62 - 120)  BP: 147/71 (27 Aug 2018 01:00) (144/80 - 221/78)  BP(mean): 95 (27 Aug 2018 01:00) (95 - 139)  ABP: 164/66 (27 Aug 2018 08:00) (148/64 - 226/100)  ABP(mean): 94 (27 Aug 2018 08:00) (88 - 136)  RR: 13 (27 Aug 2018 08:30) (12 - 26)  SpO2: 98% (27 Aug 2018 08:30) (84% - 100%)    Mode: CPAP with PS, FiO2: 40, PEEP: 5, PS: 5, MAP: 6.4, PIP: 11    08-26 @ 07:01  -  08-27 @ 07:00  --------------------------------------------------------  IN: 1305 mL / OUT: 2870 mL / NET: -1565 mL    08-27 @ 07:01 - 08-27 @ 09:05  --------------------------------------------------------  IN: 35 mL / OUT: 0 mL / NET: 35 mL      CAPILLARY BLOOD GLUCOSE  152 (26 Aug 2018 12:13)      POCT Blood Glucose.: 128 mg/dL (27 Aug 2018 06:44)      PHYSICAL EXAM:    General: NAD  HEENT: NC/AT; PERRL, clear conjunctiva  Neck: supple  Respiratory: CTA b/l  Cardiovascular: +S1/S2; RRR  Abdomen: soft, NT/ND; +BS x4  Extremities: WWP, 2+ peripheral pulses b/l; no LE edema  Skin: normal color and turgor; no rash  Neurological:    MEDICATIONS:  MEDICATIONS  (STANDING):  amLODIPine   Tablet 10 milliGRAM(s) Oral daily  aspirin Suppository 300 milliGRAM(s) Rectal daily  atorvastatin 80 milliGRAM(s) Oral at bedtime  BACItracin   Ointment 1 Application(s) Topical daily  chlorhexidine 2% Cloths 1 Application(s) Topical daily  cholecalciferol 1000 Unit(s) Oral daily  cloNIDine 0.1 milliGRAM(s) Oral every 12 hours  dextrose 5%. 1000 milliLiter(s) (50 mL/Hr) IV Continuous <Continuous>  dextrose 50% Injectable 12.5 Gram(s) IV Push once  dextrose 50% Injectable 25 Gram(s) IV Push once  dextrose 50% Injectable 25 Gram(s) IV Push once  folic acid 1 milliGRAM(s) Oral daily  heparin  flush 100 Units/mL Injectable 100 Unit(s) IV Push every other day  heparin  Injectable 5000 Unit(s) SubCutaneous every 8 hours  hydrALAZINE 100 milliGRAM(s) Oral every 8 hours  insulin glargine Injectable (LANTUS) 5 Unit(s) SubCutaneous at bedtime  insulin lispro (HumaLOG) corrective regimen sliding scale   SubCutaneous every 6 hours  isosorbide   dinitrate Tablet (ISORDIL) 30 milliGRAM(s) Oral three times a day  levETIRAcetam  IVPB 250 milliGRAM(s) IV Intermittent every 12 hours  methylPREDNISolone sodium succinate IVPB 250 milliGRAM(s) IV Intermittent two times a day  multivitamin 1 Tablet(s) Oral daily  niCARdipine Infusion 5 mG/Hr (25 mL/Hr) IV Continuous <Continuous>  pantoprazole  Injectable 40 milliGRAM(s) IV Push daily  propofol Infusion 13.774 MICROgram(s)/kG/Min (5 mL/Hr) IV Continuous <Continuous>  sertraline 75 milliGRAM(s) Oral daily  thiamine 100 milliGRAM(s) Oral daily    MEDICATIONS  (PRN):  acetaminophen    Suspension 650 milliGRAM(s) Oral every 6 hours PRN For Temp greater than 38 C (100.4 F)  acetaminophen    Suspension. 650 milliGRAM(s) Oral every 6 hours PRN Moderate Pain (4 - 6)  atropine Injectable 0.5 milliGRAM(s) IV Push every 5 minutes PRN Symptomatic Bradycardia  dextrose 40% Gel 15 Gram(s) Oral once PRN Blood Glucose LESS THAN 70 milliGRAM(s)/deciliter  diphenhydrAMINE   Capsule 25 milliGRAM(s) Oral every 4 hours PRN Rash and/or Itching  glucagon  Injectable 1 milliGRAM(s) IntraMuscular once PRN Glucose LESS THAN 70 milligrams/deciliter      ALLERGIES:  Allergies    No Known Allergies    Intolerances        LABS:                        10.8   19.7  )-----------( 236      ( 27 Aug 2018 05:58 )             31.6     08-27    138  |  100  |  25<H>  ----------------------------<  127<H>  3.9   |  25  |  1.37<H>    Ca    9.3      27 Aug 2018 05:58  Phos  3.4     08-27  Mg     2.2     08-27    TPro  6.5  /  Alb  3.6  /  TBili  0.5  /  DBili  x   /  AST  14  /  ALT  32  /  AlkPhos  64  08-27    PT/INR - ( 26 Aug 2018 11:15 )   PT: 10.7 sec;   INR: 0.96          PTT - ( 26 Aug 2018 11:15 )  PTT:26.3 sec      RADIOLOGY & ADDITIONAL TESTS: Reviewed. INTERVAL HPI/OVERNIGHT EVENTS: desatted last night to 84, ABG shows resp alkalosis 7.48, pCO2 35, [id number], HCO3 26    SUBJECTIVE: Patient seen and examined at bedside. Pt not awake, not conversant. Moves RLE spontaneously.       OBJECTIVE:    VITAL SIGNS:  ICU Vital Signs Last 24 Hrs  T(C): 37.6 (27 Aug 2018 04:05), Max: 37.9 (26 Aug 2018 22:00)  T(F): 99.6 (27 Aug 2018 04:05), Max: 100.2 (26 Aug 2018 22:00)  HR: 82 (27 Aug 2018 08:30) (62 - 120)  BP: 147/71 (27 Aug 2018 01:00) (144/80 - 221/78)  BP(mean): 95 (27 Aug 2018 01:00) (95 - 139)  ABP: 164/66 (27 Aug 2018 08:00) (148/64 - 226/100)  ABP(mean): 94 (27 Aug 2018 08:00) (88 - 136)  RR: 13 (27 Aug 2018 08:30) (12 - 26)  SpO2: 98% (27 Aug 2018 08:30) (84% - 100%)    Mode: CPAP with PS, FiO2: 40, PEEP: 5, PS: 5, MAP: 6.4, PIP: 11    08-26 @ 07:01 - 08-27 @ 07:00  --------------------------------------------------------  IN: 1305 mL / OUT: 2870 mL / NET: -1565 mL    08-27 @ 07:01 - 08-27 @ 09:05  --------------------------------------------------------  IN: 35 mL / OUT: 0 mL / NET: 35 mL      CAPILLARY BLOOD GLUCOSE  152 (26 Aug 2018 12:13)      POCT Blood Glucose.: 128 mg/dL (27 Aug 2018 06:44)      PHYSICAL EXAM:    General: NAD  HEENT: NC/AT; PERRL, clear conjunctiva  Neck: supple  Respiratory: Rhonchi noted b/l  Cardiovascular: regular, systolic murmur noted   Abdomen: soft, NT/ND; +BS x4  Extremities: WWP, 2+ peripheral pulses b/l; no LE edema  Skin: normal color and turgor; no rash  Neurological: A&Ox0. Both pupils reactive to light, equal. Right gaze preference. Right eye rotated right relative to left eye. Pt moves RLE spontaneously. Retracts all 4 extremities to pain    MEDICATIONS:  MEDICATIONS  (STANDING):  amLODIPine   Tablet 10 milliGRAM(s) Oral daily  aspirin Suppository 300 milliGRAM(s) Rectal daily  atorvastatin 80 milliGRAM(s) Oral at bedtime  BACItracin   Ointment 1 Application(s) Topical daily  chlorhexidine 2% Cloths 1 Application(s) Topical daily  cholecalciferol 1000 Unit(s) Oral daily  cloNIDine 0.1 milliGRAM(s) Oral every 12 hours  dextrose 5%. 1000 milliLiter(s) (50 mL/Hr) IV Continuous <Continuous>  dextrose 50% Injectable 12.5 Gram(s) IV Push once  dextrose 50% Injectable 25 Gram(s) IV Push once  dextrose 50% Injectable 25 Gram(s) IV Push once  folic acid 1 milliGRAM(s) Oral daily  heparin  flush 100 Units/mL Injectable 100 Unit(s) IV Push every other day  heparin  Injectable 5000 Unit(s) SubCutaneous every 8 hours  hydrALAZINE 100 milliGRAM(s) Oral every 8 hours  insulin glargine Injectable (LANTUS) 5 Unit(s) SubCutaneous at bedtime  insulin lispro (HumaLOG) corrective regimen sliding scale   SubCutaneous every 6 hours  isosorbide   dinitrate Tablet (ISORDIL) 30 milliGRAM(s) Oral three times a day  levETIRAcetam  IVPB 250 milliGRAM(s) IV Intermittent every 12 hours  methylPREDNISolone sodium succinate IVPB 250 milliGRAM(s) IV Intermittent two times a day  multivitamin 1 Tablet(s) Oral daily  niCARdipine Infusion 5 mG/Hr (25 mL/Hr) IV Continuous <Continuous>  pantoprazole  Injectable 40 milliGRAM(s) IV Push daily  propofol Infusion 13.774 MICROgram(s)/kG/Min (5 mL/Hr) IV Continuous <Continuous>  sertraline 75 milliGRAM(s) Oral daily  thiamine 100 milliGRAM(s) Oral daily    MEDICATIONS  (PRN):  acetaminophen    Suspension 650 milliGRAM(s) Oral every 6 hours PRN For Temp greater than 38 C (100.4 F)  acetaminophen    Suspension. 650 milliGRAM(s) Oral every 6 hours PRN Moderate Pain (4 - 6)  atropine Injectable 0.5 milliGRAM(s) IV Push every 5 minutes PRN Symptomatic Bradycardia  dextrose 40% Gel 15 Gram(s) Oral once PRN Blood Glucose LESS THAN 70 milliGRAM(s)/deciliter  diphenhydrAMINE   Capsule 25 milliGRAM(s) Oral every 4 hours PRN Rash and/or Itching  glucagon  Injectable 1 milliGRAM(s) IntraMuscular once PRN Glucose LESS THAN 70 milligrams/deciliter      ALLERGIES:  Allergies    No Known Allergies    Intolerances        LABS:                        10.8   19.7  )-----------( 236      ( 27 Aug 2018 05:58 )             31.6     08-27    138  |  100  |  25<H>  ----------------------------<  127<H>  3.9   |  25  |  1.37<H>    Ca    9.3      27 Aug 2018 05:58  Phos  3.4     08-27  Mg     2.2     08-27    TPro  6.5  /  Alb  3.6  /  TBili  0.5  /  DBili  x   /  AST  14  /  ALT  32  /  AlkPhos  64  08-27    PT/INR - ( 26 Aug 2018 11:15 )   PT: 10.7 sec;   INR: 0.96          PTT - ( 26 Aug 2018 11:15 )  PTT:26.3 sec      RADIOLOGY & ADDITIONAL TESTS: Reviewed. INTERVAL HPI/OVERNIGHT EVENTS: desatted last night to 84, ABG shows resp alkalosis 7.48, pCO2 35, [id number], HCO3 26    SUBJECTIVE: Patient seen and examined at bedside. Pt not awake, not conversant. Moves RLE spontaneously.     ROS not obtainable  OBJECTIVE:    VITAL SIGNS:  ICU Vital Signs Last 24 Hrs  T(C): 37.6 (27 Aug 2018 04:05), Max: 37.9 (26 Aug 2018 22:00)  T(F): 99.6 (27 Aug 2018 04:05), Max: 100.2 (26 Aug 2018 22:00)  HR: 82 (27 Aug 2018 08:30) (62 - 120)  BP: 147/71 (27 Aug 2018 01:00) (144/80 - 221/78)  BP(mean): 95 (27 Aug 2018 01:00) (95 - 139)  ABP: 164/66 (27 Aug 2018 08:00) (148/64 - 226/100)  ABP(mean): 94 (27 Aug 2018 08:00) (88 - 136)  RR: 13 (27 Aug 2018 08:30) (12 - 26)  SpO2: 98% (27 Aug 2018 08:30) (84% - 100%)    Mode: CPAP with PS, FiO2: 40, PEEP: 5, PS: 5, MAP: 6.4, PIP: 11    08-26 @ 07:01 - 08-27 @ 07:00  --------------------------------------------------------  IN: 1305 mL / OUT: 2870 mL / NET: -1565 mL    08-27 @ 07:01 - 08-27 @ 09:05  --------------------------------------------------------  IN: 35 mL / OUT: 0 mL / NET: 35 mL      CAPILLARY BLOOD GLUCOSE  152 (26 Aug 2018 12:13)      POCT Blood Glucose.: 128 mg/dL (27 Aug 2018 06:44)      PHYSICAL EXAM:    General: NAD  HEENT: NC/AT; PERRL, clear conjunctiva  Neck: supple  Respiratory: Rhonchi noted b/l  Cardiovascular: regular, systolic murmur noted   Abdomen: soft, NT/ND; +BS x4  Extremities: WWP,  no LE edema  Vasc: 2+ peripheral pulses b/l  Skin: normal color and turgor; no rash  Neurological: A&Ox0. Both pupils reactive to light, equal. Right gaze preference. Right eye rotated right relative to left eye. Pt moves RLE spontaneously. Retracts all 4 extremities to pain    MEDICATIONS:  MEDICATIONS  (STANDING):  amLODIPine   Tablet 10 milliGRAM(s) Oral daily  aspirin Suppository 300 milliGRAM(s) Rectal daily  atorvastatin 80 milliGRAM(s) Oral at bedtime  BACItracin   Ointment 1 Application(s) Topical daily  chlorhexidine 2% Cloths 1 Application(s) Topical daily  cholecalciferol 1000 Unit(s) Oral daily  cloNIDine 0.1 milliGRAM(s) Oral every 12 hours  dextrose 5%. 1000 milliLiter(s) (50 mL/Hr) IV Continuous <Continuous>  dextrose 50% Injectable 12.5 Gram(s) IV Push once  dextrose 50% Injectable 25 Gram(s) IV Push once  dextrose 50% Injectable 25 Gram(s) IV Push once  folic acid 1 milliGRAM(s) Oral daily  heparin  flush 100 Units/mL Injectable 100 Unit(s) IV Push every other day  heparin  Injectable 5000 Unit(s) SubCutaneous every 8 hours  hydrALAZINE 100 milliGRAM(s) Oral every 8 hours  insulin glargine Injectable (LANTUS) 5 Unit(s) SubCutaneous at bedtime  insulin lispro (HumaLOG) corrective regimen sliding scale   SubCutaneous every 6 hours  isosorbide   dinitrate Tablet (ISORDIL) 30 milliGRAM(s) Oral three times a day  levETIRAcetam  IVPB 250 milliGRAM(s) IV Intermittent every 12 hours  methylPREDNISolone sodium succinate IVPB 250 milliGRAM(s) IV Intermittent two times a day  multivitamin 1 Tablet(s) Oral daily  niCARdipine Infusion 5 mG/Hr (25 mL/Hr) IV Continuous <Continuous>  pantoprazole  Injectable 40 milliGRAM(s) IV Push daily  propofol Infusion 13.774 MICROgram(s)/kG/Min (5 mL/Hr) IV Continuous <Continuous>  sertraline 75 milliGRAM(s) Oral daily  thiamine 100 milliGRAM(s) Oral daily    MEDICATIONS  (PRN):  acetaminophen    Suspension 650 milliGRAM(s) Oral every 6 hours PRN For Temp greater than 38 C (100.4 F)  acetaminophen    Suspension. 650 milliGRAM(s) Oral every 6 hours PRN Moderate Pain (4 - 6)  atropine Injectable 0.5 milliGRAM(s) IV Push every 5 minutes PRN Symptomatic Bradycardia  dextrose 40% Gel 15 Gram(s) Oral once PRN Blood Glucose LESS THAN 70 milliGRAM(s)/deciliter  diphenhydrAMINE   Capsule 25 milliGRAM(s) Oral every 4 hours PRN Rash and/or Itching  glucagon  Injectable 1 milliGRAM(s) IntraMuscular once PRN Glucose LESS THAN 70 milligrams/deciliter      ALLERGIES:  Allergies    No Known Allergies    Intolerances        LABS:                        10.8   19.7  )-----------( 236      ( 27 Aug 2018 05:58 )             31.6     08-27    138  |  100  |  25<H>  ----------------------------<  127<H>  3.9   |  25  |  1.37<H>    Ca    9.3      27 Aug 2018 05:58  Phos  3.4     08-27  Mg     2.2     08-27    TPro  6.5  /  Alb  3.6  /  TBili  0.5  /  DBili  x   /  AST  14  /  ALT  32  /  AlkPhos  64  08-27    PT/INR - ( 26 Aug 2018 11:15 )   PT: 10.7 sec;   INR: 0.96          PTT - ( 26 Aug 2018 11:15 )  PTT:26.3 sec      RADIOLOGY & ADDITIONAL TESTS: Reviewed.

## 2018-08-27 NOTE — PROGRESS NOTE ADULT - SUBJECTIVE AND OBJECTIVE BOX
Neurology Stroke Progress Note    INTERVAL HPI/OVERNIGHT EVENTS:  Patient seen and examined. Pt intubated    MEDICATIONS  (STANDING):  amLODIPine   Tablet 10 milliGRAM(s) Oral daily  aspirin Suppository 300 milliGRAM(s) Rectal daily  atorvastatin 80 milliGRAM(s) Oral at bedtime  BACItracin   Ointment 1 Application(s) Topical daily  chlorhexidine 2% Cloths 1 Application(s) Topical daily  cholecalciferol 1000 Unit(s) Oral daily  cloNIDine 0.1 milliGRAM(s) Oral every 12 hours  dextrose 5%. 1000 milliLiter(s) (50 mL/Hr) IV Continuous <Continuous>  dextrose 50% Injectable 12.5 Gram(s) IV Push once  dextrose 50% Injectable 25 Gram(s) IV Push once  dextrose 50% Injectable 25 Gram(s) IV Push once  folic acid 1 milliGRAM(s) Oral daily  heparin  flush 100 Units/mL Injectable 100 Unit(s) IV Push every other day  heparin  Injectable 5000 Unit(s) SubCutaneous every 8 hours  hydrALAZINE 100 milliGRAM(s) Oral every 8 hours  insulin glargine Injectable (LANTUS) 5 Unit(s) SubCutaneous at bedtime  insulin lispro (HumaLOG) corrective regimen sliding scale   SubCutaneous every 6 hours  isosorbide   dinitrate Tablet (ISORDIL) 30 milliGRAM(s) Oral three times a day  levETIRAcetam  IVPB 250 milliGRAM(s) IV Intermittent every 12 hours  methylPREDNISolone sodium succinate IVPB 250 milliGRAM(s) IV Intermittent two times a day  multivitamin 1 Tablet(s) Oral daily  niCARdipine Infusion 5 mG/Hr (25 mL/Hr) IV Continuous <Continuous>  pantoprazole  Injectable 40 milliGRAM(s) IV Push daily  propofol Infusion 13.774 MICROgram(s)/kG/Min (5 mL/Hr) IV Continuous <Continuous>  sertraline 75 milliGRAM(s) Oral daily  thiamine 100 milliGRAM(s) Oral daily    MEDICATIONS  (PRN):  acetaminophen    Suspension 650 milliGRAM(s) Oral every 6 hours PRN For Temp greater than 38 C (100.4 F)  acetaminophen    Suspension. 650 milliGRAM(s) Oral every 6 hours PRN Moderate Pain (4 - 6)  atropine Injectable 0.5 milliGRAM(s) IV Push every 5 minutes PRN Symptomatic Bradycardia  dextrose 40% Gel 15 Gram(s) Oral once PRN Blood Glucose LESS THAN 70 milliGRAM(s)/deciliter  diphenhydrAMINE   Capsule 25 milliGRAM(s) Oral every 4 hours PRN Rash and/or Itching  glucagon  Injectable 1 milliGRAM(s) IntraMuscular once PRN Glucose LESS THAN 70 milligrams/deciliter      Allergies    No Known Allergies      ROS: As per HPI, otherwise negative    Vital Signs Last 24 Hrs  T(C): 37.6 (27 Aug 2018 04:05), Max: 37.9 (26 Aug 2018 22:00)  T(F): 99.6 (27 Aug 2018 04:05), Max: 100.2 (26 Aug 2018 22:00)  HR: 84 (27 Aug 2018 09:00) (72 - 120)  BP: 147/71 (27 Aug 2018 01:00) (144/80 - 221/78)  BP(mean): 95 (27 Aug 2018 01:00) (95 - 139)  RR: 13 (27 Aug 2018 09:00) (12 - 26)  SpO2: 98% (27 Aug 2018 09:00) (84% - 100%)    Physical exam:  Gen: intubated  Mental status -   Cranial nerves - EOMI, PERRL+, +blink to threat bilaterally, no nystagmus, facial sensation intact, left facial droop, palatal elevation intact, tongue midline  Motor - strength 5/5 on right, 0/5 in left, normal bulk and tone  Sensory - Decreased response to light touch on left, no neglect on DSS  Cerebellar - finger-nose intact on left  Gait - deferred        LABS:                        10.8   19.7  )-----------( 236      ( 27 Aug 2018 05:58 )             31.6     08-27    138  |  100  |  25<H>  ----------------------------<  127<H>  3.9   |  25  |  1.37<H>    Ca    9.3      27 Aug 2018 05:58  Phos  3.4     08-27  Mg     2.2     08-27    TPro  6.5  /  Alb  3.6  /  TBili  0.5  /  DBili  x   /  AST  14  /  ALT  32  /  AlkPhos  64  08-27    PT/INR - ( 26 Aug 2018 11:15 )   PT: 10.7 sec;   INR: 0.96          PTT - ( 26 Aug 2018 11:15 )  PTT:26.3 sec      RADIOLOGY & ADDITIONAL TESTS:  < from: CT Brain Stroke Protocol (08.26.18 @ 11:40) >  There are bilateral lacunar infarcts involving the thalamus bilaterally.     There are multiple areas of hypodensity within the right corona radiata   and paramedian right frontal lobe which are better delineated on the   current CT study correlating with right PATRICIA and MCA territory infarcts   when compared with the CT from 8/18. Acute ischemia in the setting of   multiple acute/subacute infarcts would be better evaluated with MRI.        Assessment and Plan  68y yo Female    1)Secondary stroke prevention  -ASA  -Statin    2) Stroke risk factors  -    3) Further workup     DVT prophylaxis   -Heparin SQ TID and SCDs Neurology Stroke Progress Note    INTERVAL HPI/OVERNIGHT EVENTS:  Patient seen and examined. Pt intubated on propofol drip    MEDICATIONS  (STANDING):  amLODIPine   Tablet 10 milliGRAM(s) Oral daily  aspirin Suppository 300 milliGRAM(s) Rectal daily  atorvastatin 80 milliGRAM(s) Oral at bedtime  BACItracin   Ointment 1 Application(s) Topical daily  chlorhexidine 2% Cloths 1 Application(s) Topical daily  cholecalciferol 1000 Unit(s) Oral daily  cloNIDine 0.1 milliGRAM(s) Oral every 12 hours  dextrose 5%. 1000 milliLiter(s) (50 mL/Hr) IV Continuous <Continuous>  dextrose 50% Injectable 12.5 Gram(s) IV Push once  dextrose 50% Injectable 25 Gram(s) IV Push once  dextrose 50% Injectable 25 Gram(s) IV Push once  folic acid 1 milliGRAM(s) Oral daily  heparin  flush 100 Units/mL Injectable 100 Unit(s) IV Push every other day  heparin  Injectable 5000 Unit(s) SubCutaneous every 8 hours  hydrALAZINE 100 milliGRAM(s) Oral every 8 hours  insulin glargine Injectable (LANTUS) 5 Unit(s) SubCutaneous at bedtime  insulin lispro (HumaLOG) corrective regimen sliding scale   SubCutaneous every 6 hours  isosorbide   dinitrate Tablet (ISORDIL) 30 milliGRAM(s) Oral three times a day  levETIRAcetam  IVPB 250 milliGRAM(s) IV Intermittent every 12 hours  methylPREDNISolone sodium succinate IVPB 250 milliGRAM(s) IV Intermittent two times a day  multivitamin 1 Tablet(s) Oral daily  niCARdipine Infusion 5 mG/Hr (25 mL/Hr) IV Continuous <Continuous>  pantoprazole  Injectable 40 milliGRAM(s) IV Push daily  propofol Infusion 13.774 MICROgram(s)/kG/Min (5 mL/Hr) IV Continuous <Continuous>  sertraline 75 milliGRAM(s) Oral daily  thiamine 100 milliGRAM(s) Oral daily    MEDICATIONS  (PRN):  acetaminophen    Suspension 650 milliGRAM(s) Oral every 6 hours PRN For Temp greater than 38 C (100.4 F)  acetaminophen    Suspension. 650 milliGRAM(s) Oral every 6 hours PRN Moderate Pain (4 - 6)  atropine Injectable 0.5 milliGRAM(s) IV Push every 5 minutes PRN Symptomatic Bradycardia  dextrose 40% Gel 15 Gram(s) Oral once PRN Blood Glucose LESS THAN 70 milliGRAM(s)/deciliter  diphenhydrAMINE   Capsule 25 milliGRAM(s) Oral every 4 hours PRN Rash and/or Itching  glucagon  Injectable 1 milliGRAM(s) IntraMuscular once PRN Glucose LESS THAN 70 milligrams/deciliter      Allergies    No Known Allergies      ROS: As per HPI, otherwise negative    Vital Signs Last 24 Hrs  T(C): 37.6 (27 Aug 2018 04:05), Max: 37.9 (26 Aug 2018 22:00)  T(F): 99.6 (27 Aug 2018 04:05), Max: 100.2 (26 Aug 2018 22:00)  HR: 84 (27 Aug 2018 09:00) (72 - 120)  BP: 147/71 (27 Aug 2018 01:00) (144/80 - 221/78)  BP(mean): 95 (27 Aug 2018 01:00) (95 - 139)  RR: 13 (27 Aug 2018 09:00) (12 - 26)  SpO2: 98% (27 Aug 2018 09:00) (84% - 100%)    Physical exam:  Gen: intubated, on propofol drip  Mental status -   Cranial nerves - PERRL+ , left facial droop, right eye deviated to the right. unable to assess others 2/2 intubation  Motor - on propofol drip so unable to assess strength or sensation  Cerebellar - did not cooperate  Gait - deferred        LABS:                        10.8   19.7  )-----------( 236      ( 27 Aug 2018 05:58 )             31.6     08-27    138  |  100  |  25<H>  ----------------------------<  127<H>  3.9   |  25  |  1.37<H>    Ca    9.3      27 Aug 2018 05:58  Phos  3.4     08-27  Mg     2.2     08-27    TPro  6.5  /  Alb  3.6  /  TBili  0.5  /  DBili  x   /  AST  14  /  ALT  32  /  AlkPhos  64  08-27    PT/INR - ( 26 Aug 2018 11:15 )   PT: 10.7 sec;   INR: 0.96          PTT - ( 26 Aug 2018 11:15 )  PTT:26.3 sec      RADIOLOGY & ADDITIONAL TESTS:  < from: CT Brain Stroke Protocol (08.26.18 @ 11:40) >  There are bilateral lacunar infarcts involving the thalamus bilaterally.     There are multiple areas of hypodensity within the right corona radiata   and paramedian right frontal lobe which are better delineated on the   current CT study correlating with right PATRICIA and MCA territory infarcts   when compared with the CT from 8/18. Acute ischemia in the setting of   multiple acute/subacute infarcts would be better evaluated with MRI.        Assessment and Plan  68F PMHx MDD here with 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     imp  -   1. severe vasculitis with recurrent strokes - case discussed with rheum and critical care.  -increase solumedrol to 500 q12h x 72 h  -keep  -180  -cont aspirin     2. Resp failure - no evidence of infection but probably secondary to multiple strokes   wbc stable    -rest of care per MICU team    DVT prophylaxis   -Heparin SQ TID and SCDs Neurology Stroke Progress Note    INTERVAL HPI/OVERNIGHT EVENTS:  Patient seen and examined. Pt intubated on propofol drip    MEDICATIONS  (STANDING):  amLODIPine   Tablet 10 milliGRAM(s) Oral daily  aspirin Suppository 300 milliGRAM(s) Rectal daily  atorvastatin 80 milliGRAM(s) Oral at bedtime  BACItracin   Ointment 1 Application(s) Topical daily  chlorhexidine 2% Cloths 1 Application(s) Topical daily  cholecalciferol 1000 Unit(s) Oral daily  cloNIDine 0.1 milliGRAM(s) Oral every 12 hours  dextrose 5%. 1000 milliLiter(s) (50 mL/Hr) IV Continuous <Continuous>  dextrose 50% Injectable 12.5 Gram(s) IV Push once  dextrose 50% Injectable 25 Gram(s) IV Push once  dextrose 50% Injectable 25 Gram(s) IV Push once  folic acid 1 milliGRAM(s) Oral daily  heparin  flush 100 Units/mL Injectable 100 Unit(s) IV Push every other day  heparin  Injectable 5000 Unit(s) SubCutaneous every 8 hours  hydrALAZINE 100 milliGRAM(s) Oral every 8 hours  insulin glargine Injectable (LANTUS) 5 Unit(s) SubCutaneous at bedtime  insulin lispro (HumaLOG) corrective regimen sliding scale   SubCutaneous every 6 hours  isosorbide   dinitrate Tablet (ISORDIL) 30 milliGRAM(s) Oral three times a day  levETIRAcetam  IVPB 250 milliGRAM(s) IV Intermittent every 12 hours  methylPREDNISolone sodium succinate IVPB 250 milliGRAM(s) IV Intermittent two times a day  multivitamin 1 Tablet(s) Oral daily  niCARdipine Infusion 5 mG/Hr (25 mL/Hr) IV Continuous <Continuous>  pantoprazole  Injectable 40 milliGRAM(s) IV Push daily  propofol Infusion 13.774 MICROgram(s)/kG/Min (5 mL/Hr) IV Continuous <Continuous>  sertraline 75 milliGRAM(s) Oral daily  thiamine 100 milliGRAM(s) Oral daily    MEDICATIONS  (PRN):  acetaminophen    Suspension 650 milliGRAM(s) Oral every 6 hours PRN For Temp greater than 38 C (100.4 F)  acetaminophen    Suspension. 650 milliGRAM(s) Oral every 6 hours PRN Moderate Pain (4 - 6)  atropine Injectable 0.5 milliGRAM(s) IV Push every 5 minutes PRN Symptomatic Bradycardia  dextrose 40% Gel 15 Gram(s) Oral once PRN Blood Glucose LESS THAN 70 milliGRAM(s)/deciliter  diphenhydrAMINE   Capsule 25 milliGRAM(s) Oral every 4 hours PRN Rash and/or Itching  glucagon  Injectable 1 milliGRAM(s) IntraMuscular once PRN Glucose LESS THAN 70 milligrams/deciliter      Allergies    No Known Allergies      ROS: As per HPI, otherwise negative    Vital Signs Last 24 Hrs  T(C): 37.6 (27 Aug 2018 04:05), Max: 37.9 (26 Aug 2018 22:00)  T(F): 99.6 (27 Aug 2018 04:05), Max: 100.2 (26 Aug 2018 22:00)  HR: 84 (27 Aug 2018 09:00) (72 - 120)  BP: 147/71 (27 Aug 2018 01:00) (144/80 - 221/78)  BP(mean): 95 (27 Aug 2018 01:00) (95 - 139)  RR: 13 (27 Aug 2018 09:00) (12 - 26)  SpO2: 98% (27 Aug 2018 09:00) (84% - 100%)    Physical exam:  Gen: intubated, on propofol drip. Later propofol shut off though pt appeared in more distress, with some gagging  Mental status -   Cranial nerves - PERRL+ , left facial droop, right eye deviated to the right. Both eyes not passing midline. unable to assess others 2/2 intubation  Motor - Not cooperating with strength testing  Sensation - reflexive to pain of both lower extremities  Cerebellar - did not cooperate  Gait - deferred        LABS:                        10.8   19.7  )-----------( 236      ( 27 Aug 2018 05:58 )             31.6     08-27    138  |  100  |  25<H>  ----------------------------<  127<H>  3.9   |  25  |  1.37<H>    Ca    9.3      27 Aug 2018 05:58  Phos  3.4     08-27  Mg     2.2     08-27    TPro  6.5  /  Alb  3.6  /  TBili  0.5  /  DBili  x   /  AST  14  /  ALT  32  /  AlkPhos  64  08-27    PT/INR - ( 26 Aug 2018 11:15 )   PT: 10.7 sec;   INR: 0.96          PTT - ( 26 Aug 2018 11:15 )  PTT:26.3 sec      RADIOLOGY & ADDITIONAL TESTS:  < from: CT Brain Stroke Protocol (08.26.18 @ 11:40) >  There are bilateral lacunar infarcts involving the thalamus bilaterally.     There are multiple areas of hypodensity within the right corona radiata   and paramedian right frontal lobe which are better delineated on the   current CT study correlating with right PATRICIA and MCA territory infarcts   when compared with the CT from 8/18. Acute ischemia in the setting of   multiple acute/subacute infarcts would be better evaluated with MRI.        Assessment and Plan  68F PMHx MDD here with 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     imp  -   1. severe vasculitis with recurrent strokes - case discussed with rheum and critical care.  -increase solumedrol to 500 q12h x 72 h  -keep  -180  -cont aspirin     2. Resp failure - no evidence of infection but probably secondary to multiple strokes   wbc stable    -rest of care per MICU team    DVT prophylaxis   -Heparin SQ TID and SCDs

## 2018-08-27 NOTE — PROGRESS NOTE ADULT - ASSESSMENT
69yo Female, PMHx HTN and Major Depressive Disorder, presented to the ED with recent history of self harm and depressed mood - stroke code called for L sided weakness and decreased sensation along with significant hypertension. Patient found to have lacunar infarcts, indeterminate age - pursuing remainder of stroke workup. 69yo Female, PMHx HTN and Major Depressive Disorder, presented to the ED with recent history of self harm and depressed mood - stroke code called for L sided weakness and decreased sensation along with significant hypertension. Patient found to have lacunar infarcts, indeterminate age - pursuing remainder of stroke workup.     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 Patient with residual left sided deficits with new lethargy, CTH showing new infarcts.  - Solumedrol 250mg IV BID x4 doses, to reassess tomorrow  - c/w  rectally while NPO  - Atorvastatin 80 when able as NPO  - Cyclophosphamide per rheum - first dose yesterday   - f/u Neuro recs  - Q2H neuro checks  - c/w Keppra 250mg IV BID    Cardiac  #Hypertensive emergency/urgency - Episodes of vomiting noted from possible hypertensive encephelopathy. CTH with no ICH, edema, or midline shift, though showing evolving infarcts.    - Nicardipine drip with goal SBP between 160-180 per Neuro  - c/w Norvasc 10mg QD, Clonidine 0.1mg BID, Hydral 100mg TID, and Isordil 30mg TID when able to tolerate PO or if NG tube placed  - Q2H neuro exams    Pulmonary  #Concern for aspiration - Patient with vomiting and AMS in setting of likely hypertensive encephalopathy, CXR without infiltrate  - Currently protecting airway  - Breathing comfortably on 3L NC  - Continue to monitor airway, if concern for compromise low threshold for intubation    Renal  #MARYJANE vs CKD - Possibly 2/2 vasculitis vs CKD 2/2 hypertensive nephropathy - Patient presented with MARYJANE of 1.45; unknown baseline. Renal ultrasound showed slightly diminished left renal size with smooth renal cortical mantle thinning which may suggest underlying nephrosclerosis. Renal duplex performed 8/24, showing <60% stenosis of the right renal artery at its origin. Urine lytes revealing FeNa - 1.4%, indicating intrinsic renal disease. Currently downtrending.  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 Patient with residual left sided deficits with new lethargy, CTH showing new infarcts.  - Solumedrol 250mg IV BID x4 doses, to reassess tomorrow  - c/w  rectally while NPO  - Atorvastatin 80 when able as NPO  - Cyclophosphamide per rheum - first dose yesterday   - f/u Neuro recs  - Q2H neuro checks  - c/w Keppra 250mg IV BID    Cardiac  #Hypertensive emergency/urgency - Episodes of vomiting noted from possible hypertensive encephelopathy. CTH with no ICH, edema, or midline shift, though showing evolving infarcts.    - Nicardipine drip with goal SBP between 160-180 per Neuro  - c/w Norvasc 10mg QD, Clonidine 0.1mg BID, Hydral 100mg TID, and Isordil 30mg TID when able to tolerate PO or if NG tube placed  - Q2H neuro exams    Pulmonary  #Concern for aspiration - Patient with vomiting and AMS in setting of likely hypertensive encephalopathy, CXR without infiltrate  - Currently protecting airway  - Breathing comfortably on 3L NC  - Continue to monitor airway, if concern for compromise low threshold for intubation    Renal  #MARYJANE vs CKD - Possibly 2/2 vasculitis vs CKD 2/2 hypertensive nephropathy - Patient presented with MARYJANE of 1.45; unknown baseline. Renal ultrasound showed slightly diminished left renal size with smooth renal cortical mantle thinning which may suggest underlying nephrosclerosis. Renal duplex performed 8/24, showing <60% stenosis of the right renal artery at its origin. Urine lytes revealing FeNa - 1.4%, indicating intrinsic renal disease. Currently downtrending.  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 Patient with residual left sided deficits with new lethargy, CTH showing new infarcts.  - c/w  rectally while NPO. Atorvastatin 80 when able   - Cyclophosphamide per rheum - first dose yesterday   - c/w Keppra 250mg IV BID  - Per neuro will add solumedrol 500mg IV q12hrs for 72hrs.   - Q2H neuro checks      Cardiac  #Hypertensive emergency/urgency - Episodes of vomiting noted from possible hypertensive encephelopathy. CTH with no ICH, edema, or midline shift, though showing evolving infarcts.    - BP continues to be labile on current regimen. Will d/c all other antihypertensives and c/w nicardipine.   - Nicardipine drip with goal SBP between 160-180 per Neuro      Pulmonary  #Concern for aspiration   - Patient with vomiting and AMS in setting of likely hypertensive encephalopathy, CXR without infiltrate  - intubated, sedated. Saturating appropriately.     Renal  #MARYJANE vs CKD - Possibly 2/2 vasculitis vs CKD 2/2 hypertensive nephropathy - Patient presented with MARYJANE of 1.45; unknown baseline. Renal ultrasound showed slightly diminished left renal size with smooth renal cortical mantle thinning which may suggest underlying nephrosclerosis. Renal duplex performed 8/24, showing <60% stenosis of the right renal artery at its origin. Urine lytes revealing FeNa - 1.4%, indicating intrinsic renal disease. Currently downtrending.  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 Patient with residual left sided deficits with new lethargy, CTH showing new infarcts.  - c/w  rectally while NPO. Atorvastatin 80 when able   - Cyclophosphamide per rheum - first dose yesterday   - c/w Keppra 250mg IV BID  - Per neuro will add solumedrol 500mg IV q12hrs for 72hrs.   - Q2H neuro checks      Cardiac  #Hypertensive emergency/urgency - Episodes of vomiting noted from possible hypertensive encephelopathy. CTH with no ICH, edema, or midline shift, though showing evolving infarcts.    - BP continues to be labile on current regimen. Will d/c all other antihypertensives and c/w nicardipine.   - Nicardipine drip with goal SBP between 160-180 per Neuro      Pulmonary  #Concern for aspiration   - Patient with vomiting and AMS in setting of likely hypertensive encephalopathy, CXR without infiltrate  - intubated, sedated. Saturating appropriately.     Renal  #MARYJANE vs CKD - Possibly 2/2 vasculitis vs CKD 2/2 hypertensive nephropathy - Patient presented with MARYJANE of 1.45; unknown baseline. Renal ultrasound showed slightly diminished left renal size with smooth renal cortical mantle thinning which may suggest underlying nephrosclerosis. Renal duplex performed 8/24, showing <60% stenosis of the right renal artery at its origin. Urine lytes revealing FeNa - 1.4%, indicating intrinsic renal disease.   - Avoiding nephrotoxic agents, Trend Cr      GI  #Vomiting - patient with vomiting at onset of hypertension and alteration of mental status, possible hypertensive encephalopathy  - c/w HTN treatment as above  - Zofran 4mg IV PRN. 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 Patient with residual left sided deficits with new lethargy, CTH showing new infarcts.  - c/w  rectally while NPO. Atorvastatin 80 when able   - Cyclophosphamide per rheum - first dose yesterday   - c/w Keppra 250mg IV BID  - Per neuro will add solumedrol 500mg IV q12hrs for 72hrs.   - Q2H neuro checks      Cardiac  #Hypertensive emergency/urgency - Episodes of vomiting noted from possible hypertensive encephelopathy. CTH with no ICH, edema, or midline shift, though showing evolving infarcts.    - BP continues to be labile on current regimen. Will d/c all other antihypertensives and c/w nicardipine.   - Nicardipine drip with goal SBP between 160-180 per Neuro      Pulmonary  #Concern for aspiration   - Patient with vomiting and AMS in setting of likely hypertensive encephalopathy, CXR without infiltrate  - intubated, sedated. Saturating appropriately.     Renal  #MARYJANE vs CKD - Possibly 2/2 vasculitis vs CKD 2/2 hypertensive nephropathy - Patient presented with MARYJANE of 1.45; unknown baseline. Renal ultrasound showed slightly diminished left renal size with smooth renal cortical mantle thinning which may suggest underlying nephrosclerosis. Renal duplex performed 8/24, showing <60% stenosis of the right renal artery at its origin. Urine lytes revealing FeNa - 1.4%, indicating intrinsic renal disease.   - Avoiding nephrotoxic agents, Trend Cr      GI  #Vomiting - patient with vomiting at onset of hypertension and alteration of mental status, possible hypertensive encephalopathy  - c/w HTN treatment as above  - Zofran 4mg IV PRN. Keep NPO    PPx  HSQ, SCDs, PPI IV  MVT, Thiamine, Folic Acid    Dispo  Admit to MICU  Critical care time rendered 45 minutes

## 2018-08-27 NOTE — PROGRESS NOTE ADULT - PROBLEM SELECTOR PLAN 2
- SBP 220s-230s in the ED and on floor, patient entirely asymptomatic   - Elevated trop, since peaked, likely in the setting of demand ischemia   - Permissive HTN - SBP < 200mmHg in setting of stroke   - on current regimen BP continues to be labile.   - stop all antihypertensive medication and restart nicardipine.

## 2018-08-27 NOTE — PROGRESS NOTE BEHAVIORAL HEALTH - NSBHFUPINTERVALHXFT_PSY_A_CORE
Patient evaluated a Patient evaluated at bedside. Patient presented intubated, in the ICU. During the week, patient was found to have AMS, with high BP and possible new strokes. Prior to this event, psychiatry was consulted as the patient verbalized SI with plan to jump off the window. When assessed by psychiatry, patient reported that her mood is frustrated and scared about her current medical problems. She admitted to verbalize SI but stated that she is aware that she bedboud and cannot act on her thoughts. She engaged in the interview and reported feeling alone. She was willing to see a  and try music/pet therapy/

## 2018-08-27 NOTE — CHART NOTE - NSCHARTNOTEFT_GEN_A_CORE
Admitting Diagnosis:   Patient is a 68y old  Female who presents with a chief complaint of Depression, Stroke Code Called in Unit (11 Aug 2018 10:04)      PAST MEDICAL & SURGICAL HISTORY:  Hypertension  Depression  No significant past surgical history      Current Nutrition Order:  NPO    PO Intake: Good (%) [   ]  Fair (50-75%) [   ] Poor (<25%) [   ]- N/A NPO     GI Issues: Unable to assess at this time 2/2 vent    Pain: Unable to assess at this time 2/2 vent     Skin Integrity: Clavicle abrasion, intact pressure-wise    Labs:   08-27    138  |  100  |  25<H>  ----------------------------<  127<H>  3.9   |  25  |  1.37<H>    Ca    9.3      27 Aug 2018 05:58  Phos  3.4     08-27  Mg     2.2     08-27    TPro  6.5  /  Alb  3.6  /  TBili  0.5  /  DBili  x   /  AST  14  /  ALT  32  /  AlkPhos  64  08-27    CAPILLARY BLOOD GLUCOSE  152 (26 Aug 2018 12:13)      POCT Blood Glucose.: 128 mg/dL (27 Aug 2018 06:44)  POCT Blood Glucose.: 173 mg/dL (26 Aug 2018 23:06)  POCT Blood Glucose.: 156 mg/dL (26 Aug 2018 17:30)  POCT Blood Glucose.: 152 mg/dL (26 Aug 2018 11:51)      Medications:  MEDICATIONS  (STANDING):  amLODIPine   Tablet 10 milliGRAM(s) Oral daily  aspirin Suppository 300 milliGRAM(s) Rectal daily  atorvastatin 80 milliGRAM(s) Oral at bedtime  BACItracin   Ointment 1 Application(s) Topical daily  chlorhexidine 2% Cloths 1 Application(s) Topical daily  cholecalciferol 1000 Unit(s) Oral daily  cloNIDine 0.1 milliGRAM(s) Oral every 12 hours  dextrose 5%. 1000 milliLiter(s) (50 mL/Hr) IV Continuous <Continuous>  dextrose 50% Injectable 12.5 Gram(s) IV Push once  dextrose 50% Injectable 25 Gram(s) IV Push once  dextrose 50% Injectable 25 Gram(s) IV Push once  folic acid 1 milliGRAM(s) Oral daily  heparin  flush 100 Units/mL Injectable 100 Unit(s) IV Push every other day  heparin  Injectable 5000 Unit(s) SubCutaneous every 8 hours  hydrALAZINE 100 milliGRAM(s) Oral every 8 hours  insulin glargine Injectable (LANTUS) 5 Unit(s) SubCutaneous at bedtime  insulin lispro (HumaLOG) corrective regimen sliding scale   SubCutaneous every 6 hours  isosorbide   dinitrate Tablet (ISORDIL) 30 milliGRAM(s) Oral three times a day  levETIRAcetam  IVPB 250 milliGRAM(s) IV Intermittent every 12 hours  methylPREDNISolone sodium succinate IVPB 250 milliGRAM(s) IV Intermittent once  methylPREDNISolone sodium succinate IVPB 500 milliGRAM(s) IV Intermittent two times a day  multivitamin 1 Tablet(s) Oral daily  niCARdipine Infusion 5 mG/Hr (25 mL/Hr) IV Continuous <Continuous>  pantoprazole  Injectable 40 milliGRAM(s) IV Push daily  propofol Infusion 13.774 MICROgram(s)/kG/Min (5 mL/Hr) IV Continuous <Continuous>  sertraline 75 milliGRAM(s) Oral daily  thiamine 100 milliGRAM(s) Oral daily    MEDICATIONS  (PRN):  acetaminophen    Suspension 650 milliGRAM(s) Oral every 6 hours PRN For Temp greater than 38 C (100.4 F)  acetaminophen    Suspension. 650 milliGRAM(s) Oral every 6 hours PRN Moderate Pain (4 - 6)  atropine Injectable 0.5 milliGRAM(s) IV Push every 5 minutes PRN Symptomatic Bradycardia  dextrose 40% Gel 15 Gram(s) Oral once PRN Blood Glucose LESS THAN 70 milliGRAM(s)/deciliter  diphenhydrAMINE   Capsule 25 milliGRAM(s) Oral every 4 hours PRN Rash and/or Itching  glucagon  Injectable 1 milliGRAM(s) IntraMuscular once PRN Glucose LESS THAN 70 milligrams/deciliter      Weight: 60.5kg   Daily     Daily     Weight Change: No new weights recorded since admit     Estimated energy needs: Utilized IBW (45.5kg) to calculate needs, pt >120% of IBW. Adjusted for age/stroke/vent  Calories: 25-30 kcal/kg = 8845-9468 kcal/day  Protein: 1.4-1.6 g/kg = 64-73g protein/day  Fluids: 30-35 mL/kg = 3753-4506 mL/day or per team    Subjective: 69y/o F PMHx HTN and Major Depressive Disorder with hx of poor sleep, anorexia, alcohol use, and self-cutting, presented to the ED with recent history of self harm and depressed mood, admitted to inpatient psych ([id number]) for close monitoring. Stroke code called with imaging revealing acute infarct in L PATRICIA and subacute insular infarct. Pt stepped down to 7 [facility]. S/p angiogram on 8/17, showing B/L carotid stenosis. MRI showing new CVA. Course c/b AMS, hypertensive emergency, N/V- new stroke code called on 8/26. Pt intubated for airway protection. Pt seen in room and discussed during rounds. Pt intubated on CPAP, off of sedation at this time. , not on pressor support. NGT placed for decompression, noted with dark output. Lytes WNL, renal function improving.     Previous Nutrition Diagnosis:  Increased nutrient needs RT increased demand for protein intake AEB s/p CVA    Active [ X - and now vent ]  Resolved [   ]    If resolved, new PES:     Goal: Pt will meet >75% of EER per day with good tolerance     Recommendations:  1. If PO intake not feasible within 24hrs, recommend starting Jevity 1.2 Rosales @ 41mL/hr x 24hrs plus 1 ProStat (route per team) to provide: 984mL TV, 1281, 69g protein, 794mL free H2O, 98% RDI, 1.52g/kg IBW pro. Recommend starting at 20mL/hr and advancing by [id number] to goal as tolerated. Additional free H2O per team. Monitor for s/s intolerance; maintain aspiration precautions at all times.   2. Monitor lytes and replete prn.     Education: N/A - vent      Risk Level: High [ X  ] Moderate [   ] Low [   ].

## 2018-08-28 DIAGNOSIS — F32.2 MAJOR DEPRESSIVE DISORDER, SINGLE EPISODE, SEVERE WITHOUT PSYCHOTIC FEATURES: ICD-10-CM

## 2018-08-28 LAB
ANION GAP SERPL CALC-SCNC: 15 MMOL/L — SIGNIFICANT CHANGE UP (ref 5–17)
APPEARANCE UR: ABNORMAL
BILIRUB UR-MCNC: NEGATIVE — SIGNIFICANT CHANGE UP
BUN SERPL-MCNC: 40 MG/DL — HIGH (ref 7–23)
CALCIUM SERPL-MCNC: 9 MG/DL — SIGNIFICANT CHANGE UP (ref 8.4–10.5)
CHLORIDE SERPL-SCNC: 105 MMOL/L — SIGNIFICANT CHANGE UP (ref 96–108)
CO2 SERPL-SCNC: 24 MMOL/L — SIGNIFICANT CHANGE UP (ref 22–31)
COLOR SPEC: YELLOW — SIGNIFICANT CHANGE UP
CREAT ?TM UR-MCNC: 62 MG/DL — SIGNIFICANT CHANGE UP
CREAT SERPL-MCNC: 1.72 MG/DL — HIGH (ref 0.5–1.3)
DIFF PNL FLD: ABNORMAL
GLUCOSE BLDC GLUCOMTR-MCNC: 164 MG/DL — HIGH (ref 70–99)
GLUCOSE BLDC GLUCOMTR-MCNC: 168 MG/DL — HIGH (ref 70–99)
GLUCOSE SERPL-MCNC: 165 MG/DL — HIGH (ref 70–99)
GLUCOSE UR QL: NEGATIVE — SIGNIFICANT CHANGE UP
HCT VFR BLD CALC: 29.1 % — LOW (ref 34.5–45)
HGB BLD-MCNC: 9.7 G/DL — LOW (ref 11.5–15.5)
KETONES UR-MCNC: NEGATIVE — SIGNIFICANT CHANGE UP
LEUKOCYTE ESTERASE UR-ACNC: ABNORMAL
MAGNESIUM SERPL-MCNC: 2.4 MG/DL — SIGNIFICANT CHANGE UP (ref 1.6–2.6)
MCHC RBC-ENTMCNC: 29.4 PG — SIGNIFICANT CHANGE UP (ref 27–34)
MCHC RBC-ENTMCNC: 33.3 G/DL — SIGNIFICANT CHANGE UP (ref 32–36)
MCV RBC AUTO: 88.2 FL — SIGNIFICANT CHANGE UP (ref 80–100)
NITRITE UR-MCNC: POSITIVE
PH UR: 5.5 — SIGNIFICANT CHANGE UP (ref 5–8)
PHOSPHATE SERPL-MCNC: 3.7 MG/DL — SIGNIFICANT CHANGE UP (ref 2.5–4.5)
PLATELET # BLD AUTO: 209 K/UL — SIGNIFICANT CHANGE UP (ref 150–400)
POTASSIUM SERPL-MCNC: 3.7 MMOL/L — SIGNIFICANT CHANGE UP (ref 3.5–5.3)
POTASSIUM SERPL-SCNC: 3.7 MMOL/L — SIGNIFICANT CHANGE UP (ref 3.5–5.3)
PROT UR-MCNC: 100 MG/DL
RBC # BLD: 3.3 M/UL — LOW (ref 3.8–5.2)
RBC # FLD: 14.9 % — SIGNIFICANT CHANGE UP (ref 10.3–16.9)
SODIUM SERPL-SCNC: 144 MMOL/L — SIGNIFICANT CHANGE UP (ref 135–145)
SODIUM UR-SCNC: <20 MMOL/L — SIGNIFICANT CHANGE UP
SP GR SPEC: 1.01 — SIGNIFICANT CHANGE UP (ref 1–1.03)
UROBILINOGEN FLD QL: 0.2 E.U./DL — SIGNIFICANT CHANGE UP
WBC # BLD: 15.1 K/UL — HIGH (ref 3.8–10.5)
WBC # FLD AUTO: 15.1 K/UL — HIGH (ref 3.8–10.5)

## 2018-08-28 PROCEDURE — 99231 SBSQ HOSP IP/OBS SF/LOW 25: CPT

## 2018-08-28 PROCEDURE — 71045 X-RAY EXAM CHEST 1 VIEW: CPT | Mod: 26,76

## 2018-08-28 PROCEDURE — 99233 SBSQ HOSP IP/OBS HIGH 50: CPT

## 2018-08-28 PROCEDURE — 99291 CRITICAL CARE FIRST HOUR: CPT

## 2018-08-28 RX ORDER — PIPERACILLIN AND TAZOBACTAM 4; .5 G/20ML; G/20ML
2.25 INJECTION, POWDER, LYOPHILIZED, FOR SOLUTION INTRAVENOUS EVERY 6 HOURS
Qty: 0 | Refills: 0 | Status: DISCONTINUED | OUTPATIENT
Start: 2018-08-28 | End: 2018-08-31

## 2018-08-28 RX ORDER — ACETAMINOPHEN 500 MG
1000 TABLET ORAL ONCE
Qty: 0 | Refills: 0 | Status: COMPLETED | OUTPATIENT
Start: 2018-08-28 | End: 2018-08-28

## 2018-08-28 RX ORDER — POTASSIUM CHLORIDE 20 MEQ
10 PACKET (EA) ORAL
Qty: 0 | Refills: 0 | Status: COMPLETED | OUTPATIENT
Start: 2018-08-28 | End: 2018-08-28

## 2018-08-28 RX ORDER — HYDRALAZINE HCL 50 MG
50 TABLET ORAL EVERY 8 HOURS
Qty: 0 | Refills: 0 | Status: DISCONTINUED | OUTPATIENT
Start: 2018-08-28 | End: 2018-08-29

## 2018-08-28 RX ORDER — SERTRALINE 25 MG/1
75 TABLET, FILM COATED ORAL DAILY
Qty: 0 | Refills: 0 | Status: DISCONTINUED | OUTPATIENT
Start: 2018-08-28 | End: 2018-09-03

## 2018-08-28 RX ORDER — ACETAMINOPHEN 500 MG
650 TABLET ORAL EVERY 6 HOURS
Qty: 0 | Refills: 0 | Status: DISCONTINUED | OUTPATIENT
Start: 2018-08-28 | End: 2018-08-29

## 2018-08-28 RX ADMIN — Medication 400 MILLIGRAM(S): at 11:39

## 2018-08-28 RX ADMIN — ONDANSETRON 4 MILLIGRAM(S): 8 TABLET, FILM COATED ORAL at 12:09

## 2018-08-28 RX ADMIN — Medication 50 MILLIGRAM(S): at 18:13

## 2018-08-28 RX ADMIN — Medication 2: at 06:45

## 2018-08-28 RX ADMIN — NICARDIPINE HYDROCHLORIDE 25 MG/HR: 30 CAPSULE, EXTENDED RELEASE ORAL at 09:05

## 2018-08-28 RX ADMIN — ONDANSETRON 4 MILLIGRAM(S): 8 TABLET, FILM COATED ORAL at 18:14

## 2018-08-28 RX ADMIN — Medication 2: at 18:16

## 2018-08-28 RX ADMIN — PIPERACILLIN AND TAZOBACTAM 200 GRAM(S): 4; .5 INJECTION, POWDER, LYOPHILIZED, FOR SOLUTION INTRAVENOUS at 23:26

## 2018-08-28 RX ADMIN — PANTOPRAZOLE SODIUM 40 MILLIGRAM(S): 20 TABLET, DELAYED RELEASE ORAL at 12:08

## 2018-08-28 RX ADMIN — NICARDIPINE HYDROCHLORIDE 25 MG/HR: 30 CAPSULE, EXTENDED RELEASE ORAL at 22:09

## 2018-08-28 RX ADMIN — Medication 100 MILLIGRAM(S): at 06:45

## 2018-08-28 RX ADMIN — Medication 300 MILLIGRAM(S): at 12:03

## 2018-08-28 RX ADMIN — ONDANSETRON 4 MILLIGRAM(S): 8 TABLET, FILM COATED ORAL at 23:26

## 2018-08-28 RX ADMIN — SERTRALINE 75 MILLIGRAM(S): 25 TABLET, FILM COATED ORAL at 18:14

## 2018-08-28 RX ADMIN — Medication 650 MILLIGRAM(S): at 18:40

## 2018-08-28 RX ADMIN — NICARDIPINE HYDROCHLORIDE 25 MG/HR: 30 CAPSULE, EXTENDED RELEASE ORAL at 18:13

## 2018-08-28 RX ADMIN — CHLORHEXIDINE GLUCONATE 1 APPLICATION(S): 213 SOLUTION TOPICAL at 05:46

## 2018-08-28 RX ADMIN — Medication 2: at 12:29

## 2018-08-28 RX ADMIN — CHLORHEXIDINE GLUCONATE 1 APPLICATION(S): 213 SOLUTION TOPICAL at 05:45

## 2018-08-28 RX ADMIN — Medication 1 MILLIGRAM(S): at 12:09

## 2018-08-28 RX ADMIN — Medication 100 MILLIEQUIVALENT(S): at 07:09

## 2018-08-28 RX ADMIN — HEPARIN SODIUM 5000 UNIT(S): 5000 INJECTION INTRAVENOUS; SUBCUTANEOUS at 15:15

## 2018-08-28 RX ADMIN — NICARDIPINE HYDROCHLORIDE 25 MG/HR: 30 CAPSULE, EXTENDED RELEASE ORAL at 15:09

## 2018-08-28 RX ADMIN — CHLORHEXIDINE GLUCONATE 15 MILLILITER(S): 213 SOLUTION TOPICAL at 05:45

## 2018-08-28 RX ADMIN — HEPARIN SODIUM 5000 UNIT(S): 5000 INJECTION INTRAVENOUS; SUBCUTANEOUS at 22:09

## 2018-08-28 RX ADMIN — Medication 0.1 MILLIGRAM(S): at 11:03

## 2018-08-28 RX ADMIN — CHLORHEXIDINE GLUCONATE 15 MILLILITER(S): 213 SOLUTION TOPICAL at 18:12

## 2018-08-28 RX ADMIN — Medication 400 MILLIGRAM(S): at 05:00

## 2018-08-28 RX ADMIN — NICARDIPINE HYDROCHLORIDE 25 MG/HR: 30 CAPSULE, EXTENDED RELEASE ORAL at 12:00

## 2018-08-28 RX ADMIN — INSULIN GLARGINE 5 UNIT(S): 100 INJECTION, SOLUTION SUBCUTANEOUS at 22:41

## 2018-08-28 RX ADMIN — Medication 1 APPLICATION(S): at 12:09

## 2018-08-28 RX ADMIN — Medication 2: at 23:10

## 2018-08-28 RX ADMIN — PIPERACILLIN AND TAZOBACTAM 200 GRAM(S): 4; .5 INJECTION, POWDER, LYOPHILIZED, FOR SOLUTION INTRAVENOUS at 18:13

## 2018-08-28 RX ADMIN — LEVETIRACETAM 400 MILLIGRAM(S): 250 TABLET, FILM COATED ORAL at 18:50

## 2018-08-28 RX ADMIN — Medication 0.1 MILLIGRAM(S): at 22:11

## 2018-08-28 RX ADMIN — Medication 100 MILLIEQUIVALENT(S): at 10:04

## 2018-08-28 RX ADMIN — HEPARIN SODIUM 5000 UNIT(S): 5000 INJECTION INTRAVENOUS; SUBCUTANEOUS at 06:45

## 2018-08-28 RX ADMIN — ONDANSETRON 4 MILLIGRAM(S): 8 TABLET, FILM COATED ORAL at 06:45

## 2018-08-28 RX ADMIN — Medication 50 MILLIGRAM(S): at 22:10

## 2018-08-28 RX ADMIN — LEVETIRACETAM 400 MILLIGRAM(S): 250 TABLET, FILM COATED ORAL at 07:10

## 2018-08-28 RX ADMIN — Medication 100 MILLIEQUIVALENT(S): at 09:13

## 2018-08-28 RX ADMIN — Medication 100 MILLIGRAM(S): at 12:09

## 2018-08-28 RX ADMIN — Medication 100 MILLIGRAM(S): at 18:13

## 2018-08-28 RX ADMIN — NICARDIPINE HYDROCHLORIDE 25 MG/HR: 30 CAPSULE, EXTENDED RELEASE ORAL at 02:21

## 2018-08-28 NOTE — PROGRESS NOTE BEHAVIORAL HEALTH - NSBHCONSULTFOLLOW_PSY_A_CORE
Signing off - call with questions…
yes
Signing off - call with questions…
yes
yes
Signing off - call with questions…
yes

## 2018-08-28 NOTE — CONSULT NOTE ADULT - SUBJECTIVE AND OBJECTIVE BOX
GI CONSULT NOTE  HPI:  67yo Female, PMHx HTN and Major Depressive Disorder, cerebral vasculitis - intubated and sedated - on pulse dose steroids, GI consulted for PEG tube placement. History not obtainable from pt given being sedated. As per RN pt had sump on suction yesterday >700cc of dark brown thick material. Then tube feeds started, no melena or blood in stool noted. Last BM documented was on 8/18 in chart. Pt spiking fevers, with elevated WBC, intubated over the weekend given MS and inability to tolerate secretions, possibly have aspirated secretions unclear.    Unable to obtain ROS.    Physical Examination:   Vital Signs Last 24 Hrs  T(C): 38.6 (28 Aug 2018 10:46), Max: 38.6 (28 Aug 2018 10:46)  T(F): 101.5 (28 Aug 2018 10:46), Max: 101.5 (28 Aug 2018 10:46)  HR: 82 (28 Aug 2018 14:00) (68 - 92)  BP: 181/72 (28 Aug 2018 11:00) (148/71 - 186/74)  BP(mean): 116 (28 Aug 2018 11:00) (97 - 118)  RR: 14 (28 Aug 2018 14:00) (10 - 24)  SpO2: 96% (28 Aug 2018 14:00) (95% - 100%)  I&O's Detail    27 Aug 2018 07:01  -  28 Aug 2018 07:00  --------------------------------------------------------  IN:    IV PiggyBack: 350 mL    niCARdipine Infusion: 1152.5 mL    propofol Infusion: 133 mL  Total IN: 1635.5 mL    OUT:    Indwelling Catheter - Urethral: 1280 mL    Nasoenteral Tube: 150 mL  Total OUT: 1430 mL    Total NET: 205.5 mL      28 Aug 2018 07:01  -  28 Aug 2018 14:35  --------------------------------------------------------  IN:    Enteral Tube Flush: 60 mL    IV PiggyBack: 300 mL    niCARdipine Infusion: 300 mL    Osmolite 1.2: 40 mL  Total IN: 700 mL    OUT:    Indwelling Catheter - Urethral: 230 mL    Nasoenteral Tube: 100 mL  Total OUT: 330 mL    Total NET: 370 mL        CAPILLARY BLOOD GLUCOSE      POCT Blood Glucose.: 158 mg/dL (28 Aug 2018 12:17)  POCT Blood Glucose.: 164 mg/dL (28 Aug 2018 06:06)  POCT Blood Glucose.: 134 mg/dL (27 Aug 2018 23:05)  POCT Blood Glucose.: 191 mg/dL (27 Aug 2018 19:24)    PHYSICAL EXAM:  Constitutional: NAD  HEENT: PERRLA, EOMI, Normal Hearing, MMM  Neck: No LAD, No JVD  Back: Normal spine flexure, No CVA tenderness  Respiratory: CTAB  Cardiovascular: S1 and S2, RRR, no M/G/R  Gastrointestinal: BS+, soft, NT/ND, rectal-yellow brown stool  Extremities: No peripheral edema          Labs/Imaging:     ABG - ( 27 Aug 2018 06:54 )  pH, Arterial: 7.48  pH, Blood: x     /  pCO2: 35    /  pO2: 140   / HCO3: 26    / Base Excess: 2.6   /  SaO2: 99                    CBC Full  -  ( 28 Aug 2018 05:37 )  WBC Count : 15.1 K/uL  Hemoglobin : 9.7 g/dL  Hematocrit : 29.1 %  Platelet Count - Automated : 209 K/uL  Mean Cell Volume : 88.2 fL  Mean Cell Hemoglobin : 29.4 pg  Mean Cell Hemoglobin Concentration : 33.3 g/dL  Auto Neutrophil # : x  Auto Lymphocyte # : x  Auto Monocyte # : x  Auto Eosinophil # : x  Auto Basophil # : x  Auto Neutrophil % : 97.4 %  Auto Lymphocyte % : 1.8 %  Auto Monocyte % : 0.8 %  Auto Eosinophil % : 0.0 %  Auto Basophil % : 0.0 %    08-28    144  |  105  |  40<H>  ----------------------------<  165<H>  3.7   |  24  |  1.72<H>    Ca    9.0      28 Aug 2018 05:37  Phos  3.7     08-28  Mg     2.4     08-28    TPro  6.0  /  Alb  3.6  /  TBili  0.4  /  DBili  <0.2  /  AST  10  /  ALT  26  /  AlkPhos  52  08-28    LIVER FUNCTIONS - ( 28 Aug 2018 05:37 )  Alb: 3.6 g/dL / Pro: 6.0 g/dL / ALK PHOS: 52 U/L / ALT: 26 U/L / AST: 10 U/L / GGT: x             A/P  67yo Female, PMHx HTN and Major Depressive Disorder, cerebral vasculitis - intubated and sedated - on pulse dose steroids, GI consulted for PEG tube placement.     Need PEG  -will plan for PEG when pt deemed medically optimized by primary team GI CONSULT NOTE  HPI:  69yo Female, PMHx HTN and Major Depressive Disorder, cerebral vasculitis - intubated and sedated - on pulse dose steroids, GI consulted for PEG tube placement. History not obtainable from pt given being sedated. As per RN pt had sump on suction yesterday >700cc of dark brown thick material. Then tube feeds started, no melena or blood in stool noted. H/H stable. Pt spiking fevers, with elevated WBC, intubated over the weekend given MS and inability to tolerate secretions.    Unable to obtain ROS.    Physical Examination:   Vital Signs Last 24 Hrs  T(C): 38.6 (28 Aug 2018 10:46), Max: 38.6 (28 Aug 2018 10:46)  T(F): 101.5 (28 Aug 2018 10:46), Max: 101.5 (28 Aug 2018 10:46)  HR: 82 (28 Aug 2018 14:00) (68 - 92)  BP: 181/72 (28 Aug 2018 11:00) (148/71 - 186/74)  BP(mean): 116 (28 Aug 2018 11:00) (97 - 118)  RR: 14 (28 Aug 2018 14:00) (10 - 24)  SpO2: 96% (28 Aug 2018 14:00) (95% - 100%)  I&O's Detail    27 Aug 2018 07:01  -  28 Aug 2018 07:00  --------------------------------------------------------  IN:    IV PiggyBack: 350 mL    niCARdipine Infusion: 1152.5 mL    propofol Infusion: 133 mL  Total IN: 1635.5 mL    OUT:    Indwelling Catheter - Urethral: 1280 mL    Nasoenteral Tube: 150 mL  Total OUT: 1430 mL    Total NET: 205.5 mL      28 Aug 2018 07:01  -  28 Aug 2018 14:35  --------------------------------------------------------  IN:    Enteral Tube Flush: 60 mL    IV PiggyBack: 300 mL    niCARdipine Infusion: 300 mL    Osmolite 1.2: 40 mL  Total IN: 700 mL    OUT:    Indwelling Catheter - Urethral: 230 mL    Nasoenteral Tube: 100 mL  Total OUT: 330 mL    Total NET: 370 mL        CAPILLARY BLOOD GLUCOSE      POCT Blood Glucose.: 158 mg/dL (28 Aug 2018 12:17)  POCT Blood Glucose.: 164 mg/dL (28 Aug 2018 06:06)  POCT Blood Glucose.: 134 mg/dL (27 Aug 2018 23:05)  POCT Blood Glucose.: 191 mg/dL (27 Aug 2018 19:24)    PHYSICAL EXAM:  Constitutional: NAD  HEENT: PERRLA, EOMI, Normal Hearing, MMM  Neck: No LAD, No JVD  Back: Normal spine flexure, No CVA tenderness  Respiratory: CTAB  Cardiovascular: S1 and S2, RRR, no M/G/R  Gastrointestinal: BS+, soft, NT/ND, rectal-yellow brown stool  Extremities: No peripheral edema          Labs/Imaging:     ABG - ( 27 Aug 2018 06:54 )  pH, Arterial: 7.48  pH, Blood: x     /  pCO2: 35    /  pO2: 140   / HCO3: 26    / Base Excess: 2.6   /  SaO2: 99                    CBC Full  -  ( 28 Aug 2018 05:37 )  WBC Count : 15.1 K/uL  Hemoglobin : 9.7 g/dL  Hematocrit : 29.1 %  Platelet Count - Automated : 209 K/uL  Mean Cell Volume : 88.2 fL  Mean Cell Hemoglobin : 29.4 pg  Mean Cell Hemoglobin Concentration : 33.3 g/dL  Auto Neutrophil # : x  Auto Lymphocyte # : x  Auto Monocyte # : x  Auto Eosinophil # : x  Auto Basophil # : x  Auto Neutrophil % : 97.4 %  Auto Lymphocyte % : 1.8 %  Auto Monocyte % : 0.8 %  Auto Eosinophil % : 0.0 %  Auto Basophil % : 0.0 %    08-28    144  |  105  |  40<H>  ----------------------------<  165<H>  3.7   |  24  |  1.72<H>    Ca    9.0      28 Aug 2018 05:37  Phos  3.7     08-28  Mg     2.4     08-28    TPro  6.0  /  Alb  3.6  /  TBili  0.4  /  DBili  <0.2  /  AST  10  /  ALT  26  /  AlkPhos  52  08-28    LIVER FUNCTIONS - ( 28 Aug 2018 05:37 )  Alb: 3.6 g/dL / Pro: 6.0 g/dL / ALK PHOS: 52 U/L / ALT: 26 U/L / AST: 10 U/L / GGT: x             A/P  69yo Female, PMHx HTN and Major Depressive Disorder, cerebral vasculitis - intubated and sedated - on pulse dose steroids, GI consulted for PEG tube placement.     Need PEG  -PEG tomorrow  -NPO after MN  -please obtain coags GI CONSULT NOTE  HPI:  69yo Female, PMHx HTN and Major Depressive Disorder, cerebral vasculitis - intubated and sedated - on pulse dose steroids, GI consulted for PEG tube placement. History not obtainable from pt given being sedated. As per RN pt had sump on suction yesterday >700cc of dark brown thick material. Then tube feeds started, no melena or blood in stool noted. H/H stable. Pt spiking fevers, with elevated WBC, intubated over the weekend given MS and inability to tolerate secretions.    Unable to obtain ROS.    Physical Examination:   Vital Signs Last 24 Hrs  T(C): 38.6 (28 Aug 2018 10:46), Max: 38.6 (28 Aug 2018 10:46)  T(F): 101.5 (28 Aug 2018 10:46), Max: 101.5 (28 Aug 2018 10:46)  HR: 82 (28 Aug 2018 14:00) (68 - 92)  BP: 181/72 (28 Aug 2018 11:00) (148/71 - 186/74)  BP(mean): 116 (28 Aug 2018 11:00) (97 - 118)  RR: 14 (28 Aug 2018 14:00) (10 - 24)  SpO2: 96% (28 Aug 2018 14:00) (95% - 100%)  I&O's Detail    27 Aug 2018 07:01  -  28 Aug 2018 07:00  --------------------------------------------------------  IN:    IV PiggyBack: 350 mL    niCARdipine Infusion: 1152.5 mL    propofol Infusion: 133 mL  Total IN: 1635.5 mL    OUT:    Indwelling Catheter - Urethral: 1280 mL    Nasoenteral Tube: 150 mL  Total OUT: 1430 mL    Total NET: 205.5 mL      28 Aug 2018 07:01  -  28 Aug 2018 14:35  --------------------------------------------------------  IN:    Enteral Tube Flush: 60 mL    IV PiggyBack: 300 mL    niCARdipine Infusion: 300 mL    Osmolite 1.2: 40 mL  Total IN: 700 mL    OUT:    Indwelling Catheter - Urethral: 230 mL    Nasoenteral Tube: 100 mL  Total OUT: 330 mL    Total NET: 370 mL        CAPILLARY BLOOD GLUCOSE      POCT Blood Glucose.: 158 mg/dL (28 Aug 2018 12:17)  POCT Blood Glucose.: 164 mg/dL (28 Aug 2018 06:06)  POCT Blood Glucose.: 134 mg/dL (27 Aug 2018 23:05)  POCT Blood Glucose.: 191 mg/dL (27 Aug 2018 19:24)    PHYSICAL EXAM:  Constitutional: NAD, intubated  HEENT: PERRLA, EOMI, Normal Hearing, MMM  Neck: No LAD, No JVD  Back: Normal spine flexure, No CVA tenderness  Respiratory: CTAB  Cardiovascular: S1 and S2, RRR, no M/G/R  Gastrointestinal: BS+, soft, NT/ND, rectal-yellow brown stool  Extremities: No peripheral edema          Labs/Imaging:     ABG - ( 27 Aug 2018 06:54 )  pH, Arterial: 7.48  pH, Blood: x     /  pCO2: 35    /  pO2: 140   / HCO3: 26    / Base Excess: 2.6   /  SaO2: 99                    CBC Full  -  ( 28 Aug 2018 05:37 )  WBC Count : 15.1 K/uL  Hemoglobin : 9.7 g/dL  Hematocrit : 29.1 %  Platelet Count - Automated : 209 K/uL  Mean Cell Volume : 88.2 fL  Mean Cell Hemoglobin : 29.4 pg  Mean Cell Hemoglobin Concentration : 33.3 g/dL  Auto Neutrophil # : x  Auto Lymphocyte # : x  Auto Monocyte # : x  Auto Eosinophil # : x  Auto Basophil # : x  Auto Neutrophil % : 97.4 %  Auto Lymphocyte % : 1.8 %  Auto Monocyte % : 0.8 %  Auto Eosinophil % : 0.0 %  Auto Basophil % : 0.0 %    08-28    144  |  105  |  40<H>  ----------------------------<  165<H>  3.7   |  24  |  1.72<H>    Ca    9.0      28 Aug 2018 05:37  Phos  3.7     08-28  Mg     2.4     08-28    TPro  6.0  /  Alb  3.6  /  TBili  0.4  /  DBili  <0.2  /  AST  10  /  ALT  26  /  AlkPhos  52  08-28    LIVER FUNCTIONS - ( 28 Aug 2018 05:37 )  Alb: 3.6 g/dL / Pro: 6.0 g/dL / ALK PHOS: 52 U/L / ALT: 26 U/L / AST: 10 U/L / GGT: x

## 2018-08-28 NOTE — PROGRESS NOTE BEHAVIORAL HEALTH - PROBLEM SELECTOR PROBLEM 1
Depression, unspecified depression type
Depression, unspecified depression type
Current severe episode of major depressive disorder without psychotic features without prior episode
Depression, unspecified depression type
Depression, unspecified depression type

## 2018-08-28 NOTE — PROGRESS NOTE BEHAVIORAL HEALTH - NSBHCONSFOLLOWNEEDS_PSY_A_CORE
no psychiatric contraindications to discharge
Patient needs further psychiatric safety assessment prior to discharge
no psychiatric contraindications to discharge
Patient needs further psychiatric safety assessment prior to discharge
Patient needs further psychiatric safety assessment prior to discharge
no psychiatric contraindications to discharge
Patient needs further psychiatric safety assessment prior to discharge

## 2018-08-28 NOTE — PROGRESS NOTE ADULT - SUBJECTIVE AND OBJECTIVE BOX
Rheumatology Follow Up Evaluation     68 year old female with severe depression presents with multiple lacunar infarcts after being found down in her apartment.      Received [facility] 8/24/18 Sunday am noted to be hypertensive with SBP ~ 200, vomiting and AMS, stroke protocol, found to have new lesions   Transferred to ICU, remains intubated  Day 2/3 repeat pulse steroids   Non responsive, no sedation at the moment  Febrile today, potentially urinary source?    Afebrile. /70 on nicardipine gtt   Physical exam notable for the following pertinent positives/negatives:  Much more interactive. Eye contact. Speaking a bit louder. Answering questions mostly appropriately.   Comfortable  Non toxic   Hyperpigmentation over nasal bridge not sparing nasolabial folds  Slight periorbital erythema  No gottrens papules, malar rash or discoid lesions   Hair thinning  Dry mouth without pooling of saliva, no tongue furrowing   No parotid or lacrimal enlargement   CV bradycardic, reg rhythm no murmurs   Resp CTAB  Facial droop  GI soft non tender BS+ no HSM   No renal bruit appreciated   No livedo     Data reviewed, notable for:   CBC with worsening leukocytosis    BUN/Cr improving  Renin/rohan 135/10   AST/ALT WNL   ESR 45  LAC neg  Cardiolipin neg   RF neg   ANCA neg  KORINA neg   sm/RNP neg  ssa/ssb neg   IgG4 subclasses normal   Quantiferon indeterminate     KATHERINE no valvular disease or pericardial effusion  CTH/MRA with progressing strokes as described above   Angiogram CNS vasculitis   CTA with chronic L VA occlusion, R VA and basilar hypoplasia, carotid stenosis bilaterally   Renal US with asymmetric kidneys possibly representing nephrosclerosis   CXR nml

## 2018-08-28 NOTE — PROGRESS NOTE ADULT - SUBJECTIVE AND OBJECTIVE BOX
INTERVAL HPI/OVERNIGHT EVENTS: fever overnight 101.3. BCx pending    SUBJECTIVE: Patient seen and examined at bedside. Pt intubated and sedated. Not awake, ROS unable to be performed. Vent currently on CPAP PEEP 5, FiO2 40%    OBJECTIVE:    VITAL SIGNS:  ICU Vital Signs Last 24 Hrs  T(C): 38.6 (28 Aug 2018 10:46), Max: 38.6 (28 Aug 2018 10:46)  T(F): 101.5 (28 Aug 2018 10:46), Max: 101.5 (28 Aug 2018 10:46)  HR: 90 (28 Aug 2018 12:00) (68 - 92)  BP: 181/72 (28 Aug 2018 11:00) (148/71 - 186/74)  BP(mean): 116 (28 Aug 2018 11:00) (97 - 118)  ABP: 176/72 (28 Aug 2018 12:00) (152/58 - 188/76)  ABP(mean): 104 (28 Aug 2018 12:00) (86 - 112)  RR: 12 (28 Aug 2018 12:00) (10 - 24)  SpO2: 96% (28 Aug 2018 12:00) (95% - 100%)    Mode: CPAP with PS, FiO2: 40, PEEP: 5, PS: 8, MAP: 7, PIP: 14    08-27 @ 07:01 - 08-28 @ 07:00  --------------------------------------------------------  IN: 1635.5 mL / OUT: 1430 mL / NET: 205.5 mL    08-28 @ 07:01 - 08-28 @ 13:26  --------------------------------------------------------  IN: 700 mL / OUT: 330 mL / NET: 370 mL      CAPILLARY BLOOD GLUCOSE      POCT Blood Glucose.: 158 mg/dL (28 Aug 2018 12:17)      PHYSICAL EXAM:    General: intubated, sedated  HEENT: NC/AT; PERRL, clear conjunctiva  Neck: supple  Respiratory: b/l rhonchi  Cardiovascular: systolic murmur noted  Abdomen: tender to palpation RUQ  Extremities: WWP, 2+ peripheral pulses b/l; no LE edema  Skin: normal color and turgor; no rash  Neurological: PERRL, retracts all four limbs to pain, appropriate muscle tone    MEDICATIONS:  MEDICATIONS  (STANDING):  aspirin Suppository 300 milliGRAM(s) Rectal daily  BACItracin   Ointment 1 Application(s) Topical daily  chlorhexidine 0.12% Liquid 15 milliLiter(s) Swish and Spit two times a day  chlorhexidine 2% Cloths 1 Application(s) Topical daily  cloNIDine 0.1 milliGRAM(s) Oral two times a day  dextrose 5%. 1000 milliLiter(s) (50 mL/Hr) IV Continuous <Continuous>  dextrose 50% Injectable 12.5 Gram(s) IV Push once  dextrose 50% Injectable 25 Gram(s) IV Push once  dextrose 50% Injectable 25 Gram(s) IV Push once  folic acid Injectable 1 milliGRAM(s) IV Push daily  heparin  flush 100 Units/mL Injectable 100 Unit(s) IV Push every other day  heparin  Injectable 5000 Unit(s) SubCutaneous every 8 hours  insulin glargine Injectable (LANTUS) 5 Unit(s) SubCutaneous at bedtime  insulin lispro (HumaLOG) corrective regimen sliding scale   SubCutaneous every 6 hours  levETIRAcetam  IVPB 250 milliGRAM(s) IV Intermittent every 12 hours  methylPREDNISolone sodium succinate IVPB 500 milliGRAM(s) IV Intermittent every 12 hours  niCARdipine Infusion 5 mG/Hr (25 mL/Hr) IV Continuous <Continuous>  ondansetron Injectable 4 milliGRAM(s) IV Push every 6 hours  pantoprazole  Injectable 40 milliGRAM(s) IV Push daily  propofol Infusion 13.774 MICROgram(s)/kG/Min (5 mL/Hr) IV Continuous <Continuous>  thiamine Injectable 100 milliGRAM(s) IV Push daily    MEDICATIONS  (PRN):  acetaminophen    Suspension 650 milliGRAM(s) Oral every 6 hours PRN For Temp greater than 38 C (100.4 F)  dextrose 40% Gel 15 Gram(s) Oral once PRN Blood Glucose LESS THAN 70 milliGRAM(s)/deciliter  glucagon  Injectable 1 milliGRAM(s) IntraMuscular once PRN Glucose LESS THAN 70 milligrams/deciliter      ALLERGIES:  Allergies    No Known Allergies    Intolerances        LABS:                        9.7    15.1  )-----------( 209      ( 28 Aug 2018 05:37 )             29.1     08-28    144  |  105  |  40<H>  ----------------------------<  165<H>  3.7   |  24  |  1.72<H>    Ca    9.0      28 Aug 2018 05:37  Phos  3.7     08-28  Mg     2.4     08-28    TPro  6.0  /  Alb  3.6  /  TBili  0.4  /  DBili  <0.2  /  AST  10  /  ALT  26  /  AlkPhos  52  08-28          RADIOLOGY & ADDITIONAL TESTS: Reviewed.

## 2018-08-28 NOTE — PROGRESS NOTE BEHAVIORAL HEALTH - MUSCLE TONE / STRENGTH
Abnormal muscle tone/strength

## 2018-08-28 NOTE — PROGRESS NOTE ADULT - SUBJECTIVE AND OBJECTIVE BOX
Neurology Stroke Progress Note     INTERVAL HPI/OVERNIGHT EVENTS:  Patient seen and examined. No changes from yesterday    MEDICATIONS  (STANDING):  aspirin Suppository 300 milliGRAM(s) Rectal daily  BACItracin   Ointment 1 Application(s) Topical daily  chlorhexidine 0.12% Liquid 15 milliLiter(s) Swish and Spit two times a day  chlorhexidine 2% Cloths 1 Application(s) Topical daily  cloNIDine 0.1 milliGRAM(s) Oral two times a day  dextrose 5%. 1000 milliLiter(s) (50 mL/Hr) IV Continuous <Continuous>  dextrose 50% Injectable 12.5 Gram(s) IV Push once  dextrose 50% Injectable 25 Gram(s) IV Push once  dextrose 50% Injectable 25 Gram(s) IV Push once  folic acid Injectable 1 milliGRAM(s) IV Push daily  heparin  flush 100 Units/mL Injectable 100 Unit(s) IV Push every other day  heparin  Injectable 5000 Unit(s) SubCutaneous every 8 hours  insulin glargine Injectable (LANTUS) 5 Unit(s) SubCutaneous at bedtime  insulin lispro (HumaLOG) corrective regimen sliding scale   SubCutaneous every 6 hours  levETIRAcetam  IVPB 250 milliGRAM(s) IV Intermittent every 12 hours  methylPREDNISolone sodium succinate IVPB 500 milliGRAM(s) IV Intermittent every 12 hours  niCARdipine Infusion 5 mG/Hr (25 mL/Hr) IV Continuous <Continuous>  ondansetron Injectable 4 milliGRAM(s) IV Push every 6 hours  pantoprazole  Injectable 40 milliGRAM(s) IV Push daily  propofol Infusion 13.774 MICROgram(s)/kG/Min (5 mL/Hr) IV Continuous <Continuous>  thiamine Injectable 100 milliGRAM(s) IV Push daily    MEDICATIONS  (PRN):  acetaminophen    Suspension 650 milliGRAM(s) Oral every 6 hours PRN For Temp greater than 38 C (100.4 F)  dextrose 40% Gel 15 Gram(s) Oral once PRN Blood Glucose LESS THAN 70 milliGRAM(s)/deciliter  glucagon  Injectable 1 milliGRAM(s) IntraMuscular once PRN Glucose LESS THAN 70 milligrams/deciliter      Allergies    No Known Allergies    Intolerances        ROS: As per HPI, otherwise negative    Vital Signs Last 24 Hrs  T(C): 38.6 (28 Aug 2018 10:46), Max: 38.6 (28 Aug 2018 10:46)  T(F): 101.5 (28 Aug 2018 10:46), Max: 101.5 (28 Aug 2018 10:46)  HR: 90 (28 Aug 2018 12:00) (68 - 92)  BP: 181/72 (28 Aug 2018 11:00) (148/71 - 186/74)  BP(mean): 116 (28 Aug 2018 11:00) (97 - 118)  RR: 12 (28 Aug 2018 12:00) (10 - 24)  SpO2: 96% (28 Aug 2018 12:00) (95% - 100%)    Physical exam:  Gen: intubated, on propofol drip.  Mental status -   Cranial nerves - PERRL+ , left facial droop, right eye deviated to the right. Both eyes not passing midline. unable to assess others 2/2 intubation  Motor - Not cooperating with strength testing  Sensation - reflexive to pain of both lower extremities  Cerebellar - did not cooperate  Gait - deferred        LABS:                        9.7    15.1  )-----------( 209      ( 28 Aug 2018 05:37 )             29.1     08-28    144  |  105  |  40<H>  ----------------------------<  165<H>  3.7   |  24  |  1.72<H>    Ca    9.0      28 Aug 2018 05:37  Phos  3.7     08-28  Mg     2.4     08-28    TPro  6.0  /  Alb  3.6  /  TBili  0.4  /  DBili  <0.2  /  AST  10  /  ALT  26  /  AlkPhos  52  08-28          RADIOLOGY & ADDITIONAL TESTS:  no new cerebral imaging        Assessment and Plan  68F PMHx MDD here with 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     1. severe vasculitis with recurrent strokes - case discussed with rheum and critical care.  -Continue solumedrol to 500 q12h x 72 h (today is day 2)  -keep  -180  -cont aspirin     2. Resp failure - no evidence of infection but probably secondary to multiple strokes   wbc downtrending 19.7 --> 15.1    -rest of care per MICU team    DVT prophylaxis   -Heparin SQ TID and SCDs Neurology Stroke Progress Note     INTERVAL HPI/OVERNIGHT EVENTS:  Patient seen and examined. No changes from yesterday. Febrile    MEDICATIONS  (STANDING):  aspirin Suppository 300 milliGRAM(s) Rectal daily  BACItracin   Ointment 1 Application(s) Topical daily  chlorhexidine 0.12% Liquid 15 milliLiter(s) Swish and Spit two times a day  chlorhexidine 2% Cloths 1 Application(s) Topical daily  cloNIDine 0.1 milliGRAM(s) Oral two times a day  dextrose 5%. 1000 milliLiter(s) (50 mL/Hr) IV Continuous <Continuous>  dextrose 50% Injectable 12.5 Gram(s) IV Push once  dextrose 50% Injectable 25 Gram(s) IV Push once  dextrose 50% Injectable 25 Gram(s) IV Push once  folic acid Injectable 1 milliGRAM(s) IV Push daily  heparin  flush 100 Units/mL Injectable 100 Unit(s) IV Push every other day  heparin  Injectable 5000 Unit(s) SubCutaneous every 8 hours  insulin glargine Injectable (LANTUS) 5 Unit(s) SubCutaneous at bedtime  insulin lispro (HumaLOG) corrective regimen sliding scale   SubCutaneous every 6 hours  levETIRAcetam  IVPB 250 milliGRAM(s) IV Intermittent every 12 hours  methylPREDNISolone sodium succinate IVPB 500 milliGRAM(s) IV Intermittent every 12 hours  niCARdipine Infusion 5 mG/Hr (25 mL/Hr) IV Continuous <Continuous>  ondansetron Injectable 4 milliGRAM(s) IV Push every 6 hours  pantoprazole  Injectable 40 milliGRAM(s) IV Push daily  propofol Infusion 13.774 MICROgram(s)/kG/Min (5 mL/Hr) IV Continuous <Continuous>  thiamine Injectable 100 milliGRAM(s) IV Push daily    MEDICATIONS  (PRN):  acetaminophen    Suspension 650 milliGRAM(s) Oral every 6 hours PRN For Temp greater than 38 C (100.4 F)  dextrose 40% Gel 15 Gram(s) Oral once PRN Blood Glucose LESS THAN 70 milliGRAM(s)/deciliter  glucagon  Injectable 1 milliGRAM(s) IntraMuscular once PRN Glucose LESS THAN 70 milligrams/deciliter      Allergies    No Known Allergies    Intolerances        ROS: As per HPI, otherwise negative    Vital Signs Last 24 Hrs  T(C): 38.6 (28 Aug 2018 10:46), Max: 38.6 (28 Aug 2018 10:46)  T(F): 101.5 (28 Aug 2018 10:46), Max: 101.5 (28 Aug 2018 10:46)  HR: 90 (28 Aug 2018 12:00) (68 - 92)  BP: 181/72 (28 Aug 2018 11:00) (148/71 - 186/74)  BP(mean): 116 (28 Aug 2018 11:00) (97 - 118)  RR: 12 (28 Aug 2018 12:00) (10 - 24)  SpO2: 96% (28 Aug 2018 12:00) (95% - 100%)    Physical exam:  Gen: intubated, on propofol drip.  Mental status -   Cranial nerves - PERRL+ , left facial droop, right eye deviated to the right. Both eyes not passing midline. unable to assess others 2/2 intubation  Motor - Not cooperating with strength testing  Sensation - reflexive to pain of both lower extremities  Cerebellar - did not cooperate  Gait - deferred        LABS:                        9.7    15.1  )-----------( 209      ( 28 Aug 2018 05:37 )             29.1     08-28    144  |  105  |  40<H>  ----------------------------<  165<H>  3.7   |  24  |  1.72<H>    Ca    9.0      28 Aug 2018 05:37  Phos  3.7     08-28  Mg     2.4     08-28    TPro  6.0  /  Alb  3.6  /  TBili  0.4  /  DBili  <0.2  /  AST  10  /  ALT  26  /  AlkPhos  52  08-28          RADIOLOGY & ADDITIONAL TESTS:  no new cerebral imaging        Assessment and Plan  68F PMHx MDD here with 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 Febrile overnight and today    1. severe vasculitis with recurrent strokes - case discussed with rheum and critical care.  -Continue solumedrol to 500 q12h x 72 h (today is day 2)  -keep  -180  -cont aspirin     2. Resp failure - no evidence of infection but probably secondary to multiple strokes   -wbc downtrending 19.7 --> 15.1  -however pt febrile overnight and today, fever workup per MICU    -rest of care per MICU team    DVT prophylaxis   -Heparin SQ TID and SCDs

## 2018-08-28 NOTE — PROGRESS NOTE BEHAVIORAL HEALTH - NSBHFUPREASONCONS_PSY_A_CORE
depression
suicidality
suicidality/depression
suicidality
suicidality/depression

## 2018-08-28 NOTE — PROGRESS NOTE BEHAVIORAL HEALTH - NS ED [facility] MED ROS RESPIRATORY
No complaints
Unable to assess

## 2018-08-28 NOTE — PROGRESS NOTE ADULT - ASSESSMENT
68 year old female with severe depression presents with multiple lacunar infarcts after being found down in her apartment.  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 Patient now s/p pulse steroids and CYC, with new strokes on 8/26/18 requiring transfer to the ICU for mechanical ventilation.    Concern now for infection given new fevers, UA with +nitrites and leuk est. Immunosuppression at this time from steroids not CYC, anticipate wili at two week antonina. Would consult ID given high level immunosuppression, anticipated this will get worse.     Continue pulse steroids, currently day 2/3.     Feel free to call me with any questions

## 2018-08-28 NOTE — PROGRESS NOTE ADULT - ASSESSMENT
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 Patient with residual left sided deficits with new lethargy, CTH showing new infarcts.  - c/w  rectally while NPO. Atorvastatin 80 when able   - Cyclophosphamide per rheum - first dose yesterday   - c/w Keppra 250mg IV BID  - Per neuro will add solumedrol 500mg IV q12hrs for 72hrs. On day 2/3  - Q2H neuro checks      Cardiac  #Hypertensive emergency/urgency - Episodes of vomiting noted from possible hypertensive encephelopathy. CTH with no ICH, edema, or midline shift, though showing evolving infarcts.    - Nicardipine drip with goal SBP between 160-180 per Neuro  - BP still not within range today, added clonidine back on      Pulmonary  #Concern for aspiration   - Patient with vomiting and AMS in setting of likely hypertensive encephalopathy, CXR without infiltrate  - intubated, sedated. Saturating appropriately.     Renal  #MARYJANE vs CKD - Possibly 2/2 vasculitis vs CKD 2/2 hypertensive nephropathy - Patient presented with MARYJANE of 1.45; unknown baseline. Renal ultrasound showed slightly diminished left renal size with smooth renal cortical mantle thinning which may suggest underlying nephrosclerosis. Renal duplex performed 8/24, showing <60% stenosis of the right renal artery at its origin. Urine lytes revealing FeNa - 1.4%, indicating intrinsic renal disease.   - Avoiding nephrotoxic agents, Trend Cr      GI  #Vomiting - patient with vomiting at onset of hypertension and alteration of mental status, possible hypertensive encephalopathy  - c/w HTN treatment as above  - Zofran 4mg IV PRN. Keep NPO  - GI consult for PEG placement.     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 Patient with residual left sided deficits with new lethargy, CTH showing new infarcts.  - c/w  rectally while NPO. Atorvastatin 80 when able   - Cyclophosphamide per rheum  - c/w Keppra 250mg IV BID  - Per neuro will add solumedrol 500mg IV q12hrs for 72hrs. On day 2/3  - Q2H neuro checks      Cardiac  #Hypertensive emergency/urgency - Episodes of vomiting noted from possible hypertensive encephelopathy. CTH with no ICH, edema, or midline shift, though showing evolving infarcts.    - Nicardipine drip with goal SBP between 160-180 per Neuro  - BP still not within range today, added clonidine back on      Pulmonary  #Concern for aspiration   - Patient with vomiting and AMS in setting of likely hypertensive encephalopathy, CXR without infiltrate  - intubated, sedated. Saturating appropriately.     Renal  #MARYJANE vs CKD - Possibly 2/2 vasculitis vs CKD 2/2 hypertensive nephropathy - Patient presented with MARYJANE of 1.45; unknown baseline. Renal ultrasound showed slightly diminished left renal size with smooth renal cortical mantle thinning which may suggest underlying nephrosclerosis. Renal duplex performed 8/24, showing <60% stenosis of the right renal artery at its origin. Urine lytes revealing FeNa - 1.4%, indicating intrinsic renal disease.   - Avoiding nephrotoxic agents, Trend Cr      GI  #Vomiting - patient with vomiting at onset of hypertension and alteration of mental status, possible hypertensive encephalopathy  - c/w HTN treatment as above  - Zofran 4mg IV PRN. Keep NPO  - GI consult for PEG placement.     F: none   E: replete as needed  N: GI consult for PEG placement  HSQ, SCDs, PPI IV  MVT, Thiamine, Folic Acid  Admit to MICU

## 2018-08-28 NOTE — PROGRESS NOTE BEHAVIORAL HEALTH - NSBHADMITCOUNSEL_PSY_A_CORE
risks and benefits of treatment options/diagnostic results/impressions and/or recommended studies/risk factor reduction
diagnostic results/impressions and/or recommended studies/importance of adherence to chosen treatment/risks and benefits of treatment options/risk factor reduction
risk factor reduction/diagnostic results/impressions and/or recommended studies/risks and benefits of treatment options
risks and benefits of treatment options/diagnostic results/impressions and/or recommended studies/risk factor reduction
diagnostic results/impressions and/or recommended studies/risks and benefits of treatment options/risk factor reduction

## 2018-08-28 NOTE — PROGRESS NOTE BEHAVIORAL HEALTH - PRIMARY DX
Current severe episode of major depressive disorder without psychotic features without prior episode
Severe episode of recurrent major depressive disorder, without psychotic features

## 2018-08-28 NOTE — PROGRESS NOTE ADULT - ATTENDING COMMENTS
Continue off sedation. will need trach and PEG as discussed with Dr. Sapp. F/U Cx. No obvious source of sepsis. Steroids per Neuro. No wean.

## 2018-08-28 NOTE — PROGRESS NOTE BEHAVIORAL HEALTH - NSBHCONSULTOBSREASON_PSY_A_CORE FT
see plan
No SI, agrees to speak with team about emotions and if any SI develops
No SI, agrees to speak with team about emotions and if any SI develops
see plan
see plan
No SI, agrees to speak with team about emotions and if any SI develops
see plan

## 2018-08-28 NOTE — PROGRESS NOTE BEHAVIORAL HEALTH - NSBHPTASSESSDT_PSY_A_CORE
20-Aug-2018 10:55
21-Aug-2018 15:50
23-Aug-2018 12:38
25-Aug-2018
27-Aug-2018 10:44
26-Aug-2018 19:00
28-Aug-2018 14:40

## 2018-08-28 NOTE — PROGRESS NOTE BEHAVIORAL HEALTH - NSBHATTESTSEENBY_PSY_A_CORE
Trainee with telephonic supervision from Attending Psychiatrist
attending Psychiatrist without NP/Trainee
Trainee with telephonic supervision from Attending Psychiatrist
attending Psychiatrist without NP/Trainee
attending Psychiatrist without NP/Trainee

## 2018-08-28 NOTE — PROGRESS NOTE BEHAVIORAL HEALTH - NS ED [facility] MED ROS ENDOCRINE
No complaints
Unable to assess
No complaints
No complaints
Unable to assess
Unable to assess
No complaints

## 2018-08-28 NOTE — CONSULT NOTE ADULT - ASSESSMENT
A/P  67yo Female, PMHx HTN and Major Depressive Disorder, cerebral vasculitis - intubated and sedated - on pulse dose steroids, GI consulted for PEG tube placement.     1. Dysphagia:  -Plan for PEG tomorrow  -NPO after MN  -please obtain coags    Case discussed with Dr. Gonzales

## 2018-08-28 NOTE — PROGRESS NOTE BEHAVIORAL HEALTH - SUMMARY
Ms. Olivas is a 68 yr old woman, with a PMHx of HTN that presented to 8U for psych stabilization, subsequently had a multi-focal stroke with L sided facial and UE/LE deficits. Course c/b persistent HTN requiring nicardipine drip now transition to PO BP regimen. Primary etiology thought to be an embolic stroke however, initial neuro w/u (-) for a-fib or LV thrombus; Cerebral angiogram on 8/20 showing CNS vasculitis. Rheumatology consulted. Medicine initially consulted for clearance prior to angiogram. Psychiatry reconsulted due to patient expressing suicidal ideation with plan. Patient Saturday expressing active SI with plan, though intent was unclear given patient is bedbound with physical deficits. On interview Sunday patient was lethargic and unable to have meaningful conversation, and interview deferred to primary consult team on Monday.    Plan  -Patient currently with difficulty swallowing pills, however c/w Sertraline to 75 mg po daily for improved management of depressed mood. If patient continues to have difficulty with PO intake should consider adjusting medication regimen  -Would consult chaplaincy, pet therapy and music therapy if available, as patient appears lonely. Would also consult other integrative therapies if appropriate  -Not requiring 1:1 at this time as patient does not appear to have intent and has limited means given limited mobility  -Psych will continue to follow
69 yo female with h/o MDD with worsening depression since retiring from [employer] position several years ago.  Came to hospital for failure to take care of self in community.  Was on 8U and had CVA. She was found to also have old undiagnosed infarcts. Now on med/neuro floor for stabilization.  On interview still depressed but denies SI.  Agrees to treatment.  No behavioral issues per team. She presents detached and with blunted affect, incongruent to her reports which could be in context of her recent and old strokes.
69 yo female with h/o MDD with worsening depression since retiring from [employer] position several years ago.  Came to hospital for failure to take care of self in community.  Was on 8U and had CVA. She was found to also have old undiagnosed infarcts. Now on med/neuro floor for stabilization.  On interview still depressed but denies SI.  Agrees to treatment.  No behavioral issues per team. She presents detached and with blunted affect, incongruent to her reports which could be in context of her recent and old strokes.
Ms. Olivas is a 68 yr old woman, with a PMHx of HTN that presented to 8U for psych stabilization, subsequently had a multi-focal stroke with L sided facial and UE/LE deficits. Course c/b persistent HTN requiring nicardipine drip now transition to PO BP regimen. Primary etiology thought to be an embolic stroke however, initial neuro w/u (-) for a-fib or LV thrombus; Cerebral angiogram on 8/20 showing CNS vasculitis. Rheumatology consulted. Medicine initially consulted for clearance prior to angiogram. Psychiatry reconsulted due to patient expressing suicidal ideation with plan. Patient Saturday expressing active SI with plan, though intent was unclear given patient is bedbound with physical deficits. On interview Sunday patient was lethargic and unable to have meaningful conversation, and interview deferred to primary consult team on Monday. During the night patient had an episode of HTN a stroke code was called, new lesions were noticed. Patient was transferred to the ICU and is intubated. No episodes of agitation were noted.    Plan  -Patient currently with difficulty swallowing pills, however c/w Sertraline to 75 mg po daily for improved management of depressed mood. If patient continues to have difficulty with PO intake should consider adjusting medication regimen  -Would consult chaplaincy, pet therapy and music therapy if available, as patient appears lonely. Would also consult other integrative therapies if appropriate  -Not requiring 1:1 at this time as patient does not appear to have intent and has limited means given limited mobility  -Psych will continue to follow
67 yo female with h/o MDD with worsening depression since retiring from [employer] position several years ago.  Came to hospital for failure to take care of self in community.  Was on 8U and had CVA. She was found to also have old undiagnosed infarcts. Now on med/neuro floor for stabilization.  On interview still depressed but denies SI.  Agrees to treatment.  No behavioral issues per team.  Patient was started on steroids for possible rheumatological disorder that could contribute to her repeated strokes and mood symptoms.   She continue to present detached, but more alert and with brighter affect.
Ms. Olivas is a 68 yr old woman, with a PMHx of HTN that presented to 8U for psych stabilization, subsequently had a multi-focal stroke with L sided facial and UE/LE deficits. Course c/b persistent HTN requiring nicardipine drip now transition to PO BP regimen. Primary etiology thought to be an embolic stroke however, initial neuro w/u (-) for a-fib or LV thrombus; Cerebral angiogram on 8/20 showing CNS vasculitis. Rheumatology consulted. Medicine initially consulted for clearance prior to angiogram. Psychiatry reconsulted due to patient expressing suicidal ideation with plan. Patient expressing active SI with plan, though unclear intent given patient is bedbound. Patient's affect appropriately brightened at times, though overall appeared depressed.    Plan  -Increase Sertraline to 75 mg po daily for improved management of depressed mood, may increase further as tolerated  -Would consult chaplaincy, pet therapy and music therapy if available, as patient appears lonely. Would also consult other integrative therapies if appropriate  -Not requiring 1:1 at this time as patient does not appear to have intent and has limited means given limited mobility  -Psych will continue to follow
Ms. Olivas is a 68 yr old woman, with a PMHx of HTN that presented to 8U for psych stabilization, subsequently had a multi-focal stroke with L sided facial and UE/LE deficits. Course c/b persistent HTN requiring nicardipine drip now transition to PO BP regimen. Primary etiology thought to be an embolic stroke however, initial neuro w/u (-) for a-fib or LV thrombus; Cerebral angiogram on 8/20 showing CNS vasculitis. Rheumatology consulted. Medicine initially consulted for clearance prior to angiogram. Psychiatry reconsulted due to patient expressing suicidal ideation with plan. Patient Saturday expressing active SI with plan, though intent was unclear given patient is bedbound with physical deficits. On interview Sunday patient was lethargic and unable to have meaningful conversation. Patient presents today intubated and unable to participate in the interview

## 2018-08-29 DIAGNOSIS — Z51.5 ENCOUNTER FOR PALLIATIVE CARE: ICD-10-CM

## 2018-08-29 DIAGNOSIS — Z71.89 OTHER SPECIFIED COUNSELING: ICD-10-CM

## 2018-08-29 DIAGNOSIS — Z78.9 OTHER SPECIFIED HEALTH STATUS: ICD-10-CM

## 2018-08-29 DIAGNOSIS — J96.90 RESPIRATORY FAILURE, UNSPECIFIED, UNSPECIFIED WHETHER WITH HYPOXIA OR HYPERCAPNIA: ICD-10-CM

## 2018-08-29 LAB
ANION GAP SERPL CALC-SCNC: 15 MMOL/L — SIGNIFICANT CHANGE UP (ref 5–17)
APTT BLD: 26.3 SEC — LOW (ref 27.5–37.4)
BASOPHILS NFR BLD AUTO: 0.1 % — SIGNIFICANT CHANGE UP (ref 0–2)
BLD GP AB SCN SERPL QL: NEGATIVE — SIGNIFICANT CHANGE UP
BUN SERPL-MCNC: 47 MG/DL — HIGH (ref 7–23)
CALCIUM SERPL-MCNC: 8.6 MG/DL — SIGNIFICANT CHANGE UP (ref 8.4–10.5)
CHLORIDE SERPL-SCNC: 109 MMOL/L — HIGH (ref 96–108)
CO2 SERPL-SCNC: 22 MMOL/L — SIGNIFICANT CHANGE UP (ref 22–31)
CREAT SERPL-MCNC: 1.86 MG/DL — HIGH (ref 0.5–1.3)
GLUCOSE BLDC GLUCOMTR-MCNC: 152 MG/DL — HIGH (ref 70–99)
GLUCOSE BLDC GLUCOMTR-MCNC: 158 MG/DL — HIGH (ref 70–99)
GLUCOSE BLDC GLUCOMTR-MCNC: 161 MG/DL — HIGH (ref 70–99)
GLUCOSE BLDC GLUCOMTR-MCNC: 210 MG/DL — HIGH (ref 70–99)
GLUCOSE SERPL-MCNC: 167 MG/DL — HIGH (ref 70–99)
GRAM STN FLD: SIGNIFICANT CHANGE UP
HCT VFR BLD CALC: 27.1 % — LOW (ref 34.5–45)
HGB BLD-MCNC: 8.9 G/DL — LOW (ref 11.5–15.5)
INR BLD: 1.07 — SIGNIFICANT CHANGE UP (ref 0.88–1.16)
LYMPHOCYTES # BLD AUTO: 0.6 % — LOW (ref 13–44)
MAGNESIUM SERPL-MCNC: 2.4 MG/DL — SIGNIFICANT CHANGE UP (ref 1.6–2.6)
MCHC RBC-ENTMCNC: 29.4 PG — SIGNIFICANT CHANGE UP (ref 27–34)
MCHC RBC-ENTMCNC: 32.8 G/DL — SIGNIFICANT CHANGE UP (ref 32–36)
MCV RBC AUTO: 89.4 FL — SIGNIFICANT CHANGE UP (ref 80–100)
METANEPH UR-MCNC: SIGNIFICANT CHANGE UP
MONOCYTES NFR BLD AUTO: 2.8 % — SIGNIFICANT CHANGE UP (ref 2–14)
NEUTROPHILS NFR BLD AUTO: 96.5 % — HIGH (ref 43–77)
PHOSPHATE SERPL-MCNC: 3.1 MG/DL — SIGNIFICANT CHANGE UP (ref 2.5–4.5)
PLATELET # BLD AUTO: 171 K/UL — SIGNIFICANT CHANGE UP (ref 150–400)
POTASSIUM SERPL-MCNC: 3.7 MMOL/L — SIGNIFICANT CHANGE UP (ref 3.5–5.3)
POTASSIUM SERPL-SCNC: 3.7 MMOL/L — SIGNIFICANT CHANGE UP (ref 3.5–5.3)
PROTHROM AB SERPL-ACNC: 11.9 SEC — SIGNIFICANT CHANGE UP (ref 9.8–12.7)
RBC # BLD: 3.03 M/UL — LOW (ref 3.8–5.2)
RBC # FLD: 14.9 % — SIGNIFICANT CHANGE UP (ref 10.3–16.9)
RH IG SCN BLD-IMP: POSITIVE — SIGNIFICANT CHANGE UP
SODIUM SERPL-SCNC: 146 MMOL/L — HIGH (ref 135–145)
SPECIMEN SOURCE: SIGNIFICANT CHANGE UP
WBC # BLD: 16 K/UL — HIGH (ref 3.8–10.5)
WBC # FLD AUTO: 16 K/UL — HIGH (ref 3.8–10.5)

## 2018-08-29 PROCEDURE — 70450 CT HEAD/BRAIN W/O DYE: CPT | Mod: 26

## 2018-08-29 PROCEDURE — 99223 1ST HOSP IP/OBS HIGH 75: CPT

## 2018-08-29 PROCEDURE — 99291 CRITICAL CARE FIRST HOUR: CPT

## 2018-08-29 PROCEDURE — 71045 X-RAY EXAM CHEST 1 VIEW: CPT | Mod: 26

## 2018-08-29 PROCEDURE — 99232 SBSQ HOSP IP/OBS MODERATE 35: CPT

## 2018-08-29 RX ORDER — LEVETIRACETAM 250 MG/1
250 TABLET, FILM COATED ORAL ONCE
Qty: 0 | Refills: 0 | Status: COMPLETED | OUTPATIENT
Start: 2018-08-29 | End: 2018-08-29

## 2018-08-29 RX ORDER — SODIUM CHLORIDE 9 MG/ML
1000 INJECTION INTRAMUSCULAR; INTRAVENOUS; SUBCUTANEOUS
Qty: 0 | Refills: 0 | Status: DISCONTINUED | OUTPATIENT
Start: 2018-08-29 | End: 2018-08-30

## 2018-08-29 RX ORDER — LEVETIRACETAM 250 MG/1
500 TABLET, FILM COATED ORAL EVERY 12 HOURS
Qty: 0 | Refills: 0 | Status: DISCONTINUED | OUTPATIENT
Start: 2018-08-29 | End: 2018-09-05

## 2018-08-29 RX ORDER — POTASSIUM CHLORIDE 20 MEQ
30 PACKET (EA) ORAL ONCE
Qty: 0 | Refills: 0 | Status: DISCONTINUED | OUTPATIENT
Start: 2018-08-29 | End: 2018-08-29

## 2018-08-29 RX ORDER — ACETAMINOPHEN 500 MG
650 TABLET ORAL EVERY 6 HOURS
Qty: 0 | Refills: 0 | Status: DISCONTINUED | OUTPATIENT
Start: 2018-08-29 | End: 2018-09-05

## 2018-08-29 RX ORDER — HYDRALAZINE HCL 50 MG
75 TABLET ORAL EVERY 8 HOURS
Qty: 0 | Refills: 0 | Status: DISCONTINUED | OUTPATIENT
Start: 2018-08-29 | End: 2018-08-30

## 2018-08-29 RX ORDER — POTASSIUM CHLORIDE 20 MEQ
10 PACKET (EA) ORAL
Qty: 0 | Refills: 0 | Status: COMPLETED | OUTPATIENT
Start: 2018-08-29 | End: 2018-08-29

## 2018-08-29 RX ADMIN — Medication 1 APPLICATION(S): at 13:12

## 2018-08-29 RX ADMIN — PIPERACILLIN AND TAZOBACTAM 200 GRAM(S): 4; .5 INJECTION, POWDER, LYOPHILIZED, FOR SOLUTION INTRAVENOUS at 05:55

## 2018-08-29 RX ADMIN — Medication 100 MILLIEQUIVALENT(S): at 06:15

## 2018-08-29 RX ADMIN — ONDANSETRON 4 MILLIGRAM(S): 8 TABLET, FILM COATED ORAL at 18:55

## 2018-08-29 RX ADMIN — Medication 100 MILLIGRAM(S): at 18:55

## 2018-08-29 RX ADMIN — Medication 0.1 MILLIGRAM(S): at 10:09

## 2018-08-29 RX ADMIN — ONDANSETRON 4 MILLIGRAM(S): 8 TABLET, FILM COATED ORAL at 05:55

## 2018-08-29 RX ADMIN — CHLORHEXIDINE GLUCONATE 15 MILLILITER(S): 213 SOLUTION TOPICAL at 05:56

## 2018-08-29 RX ADMIN — Medication 650 MILLIGRAM(S): at 12:06

## 2018-08-29 RX ADMIN — Medication 50 MILLIGRAM(S): at 05:55

## 2018-08-29 RX ADMIN — Medication 2: at 06:03

## 2018-08-29 RX ADMIN — NICARDIPINE HYDROCHLORIDE 25 MG/HR: 30 CAPSULE, EXTENDED RELEASE ORAL at 14:18

## 2018-08-29 RX ADMIN — Medication 0.1 MILLIGRAM(S): at 22:33

## 2018-08-29 RX ADMIN — Medication 2: at 13:13

## 2018-08-29 RX ADMIN — PANTOPRAZOLE SODIUM 40 MILLIGRAM(S): 20 TABLET, DELAYED RELEASE ORAL at 13:10

## 2018-08-29 RX ADMIN — SODIUM CHLORIDE 80 MILLILITER(S): 9 INJECTION INTRAMUSCULAR; INTRAVENOUS; SUBCUTANEOUS at 07:05

## 2018-08-29 RX ADMIN — Medication 650 MILLIGRAM(S): at 23:28

## 2018-08-29 RX ADMIN — HEPARIN SODIUM 5000 UNIT(S): 5000 INJECTION INTRAVENOUS; SUBCUTANEOUS at 13:10

## 2018-08-29 RX ADMIN — PIPERACILLIN AND TAZOBACTAM 200 GRAM(S): 4; .5 INJECTION, POWDER, LYOPHILIZED, FOR SOLUTION INTRAVENOUS at 18:55

## 2018-08-29 RX ADMIN — Medication 100 MILLIEQUIVALENT(S): at 10:00

## 2018-08-29 RX ADMIN — NICARDIPINE HYDROCHLORIDE 25 MG/HR: 30 CAPSULE, EXTENDED RELEASE ORAL at 05:55

## 2018-08-29 RX ADMIN — LEVETIRACETAM 400 MILLIGRAM(S): 250 TABLET, FILM COATED ORAL at 06:03

## 2018-08-29 RX ADMIN — Medication 100 MILLIGRAM(S): at 13:12

## 2018-08-29 RX ADMIN — Medication 2: at 18:55

## 2018-08-29 RX ADMIN — LEVETIRACETAM 400 MILLIGRAM(S): 250 TABLET, FILM COATED ORAL at 22:33

## 2018-08-29 RX ADMIN — HEPARIN SODIUM 5000 UNIT(S): 5000 INJECTION INTRAVENOUS; SUBCUTANEOUS at 22:34

## 2018-08-29 RX ADMIN — Medication 100 MILLIEQUIVALENT(S): at 13:12

## 2018-08-29 RX ADMIN — INSULIN GLARGINE 5 UNIT(S): 100 INJECTION, SOLUTION SUBCUTANEOUS at 23:26

## 2018-08-29 RX ADMIN — CHLORHEXIDINE GLUCONATE 15 MILLILITER(S): 213 SOLUTION TOPICAL at 18:55

## 2018-08-29 RX ADMIN — Medication 300 MILLIGRAM(S): at 13:11

## 2018-08-29 RX ADMIN — Medication 100 UNIT(S): at 13:11

## 2018-08-29 RX ADMIN — LEVETIRACETAM 400 MILLIGRAM(S): 250 TABLET, FILM COATED ORAL at 12:07

## 2018-08-29 RX ADMIN — Medication 75 MILLIGRAM(S): at 22:33

## 2018-08-29 RX ADMIN — Medication 650 MILLIGRAM(S): at 00:06

## 2018-08-29 RX ADMIN — Medication 650 MILLIGRAM(S): at 06:14

## 2018-08-29 RX ADMIN — SERTRALINE 75 MILLIGRAM(S): 25 TABLET, FILM COATED ORAL at 13:12

## 2018-08-29 RX ADMIN — ONDANSETRON 4 MILLIGRAM(S): 8 TABLET, FILM COATED ORAL at 13:11

## 2018-08-29 RX ADMIN — NICARDIPINE HYDROCHLORIDE 25 MG/HR: 30 CAPSULE, EXTENDED RELEASE ORAL at 10:09

## 2018-08-29 RX ADMIN — Medication 100 MILLIGRAM(S): at 05:55

## 2018-08-29 RX ADMIN — Medication 30 MILLIEQUIVALENT(S): at 06:04

## 2018-08-29 RX ADMIN — Medication 50 MILLIGRAM(S): at 13:11

## 2018-08-29 RX ADMIN — Medication 650 MILLIGRAM(S): at 18:54

## 2018-08-29 RX ADMIN — NICARDIPINE HYDROCHLORIDE 25 MG/HR: 30 CAPSULE, EXTENDED RELEASE ORAL at 00:31

## 2018-08-29 RX ADMIN — HEPARIN SODIUM 5000 UNIT(S): 5000 INJECTION INTRAVENOUS; SUBCUTANEOUS at 06:07

## 2018-08-29 RX ADMIN — Medication 1 MILLIGRAM(S): at 13:11

## 2018-08-29 RX ADMIN — NICARDIPINE HYDROCHLORIDE 25 MG/HR: 30 CAPSULE, EXTENDED RELEASE ORAL at 02:38

## 2018-08-29 RX ADMIN — PIPERACILLIN AND TAZOBACTAM 200 GRAM(S): 4; .5 INJECTION, POWDER, LYOPHILIZED, FOR SOLUTION INTRAVENOUS at 13:10

## 2018-08-29 RX ADMIN — CHLORHEXIDINE GLUCONATE 1 APPLICATION(S): 213 SOLUTION TOPICAL at 05:59

## 2018-08-29 NOTE — PROGRESS NOTE ADULT - SUBJECTIVE AND OBJECTIVE BOX
INTERVAL HPI/OVERNIGHT EVENTS: fever overnight 101.9. BP still labile but improved compared to night before.     SUBJECTIVE: Patient seen and examined at bedside. On vent, nonresponsive.       OBJECTIVE:    VITAL SIGNS:  ICU Vital Signs Last 24 Hrs  T(C): 37.9 (29 Aug 2018 14:00), Max: 38.9 (29 Aug 2018 09:46)  T(F): 100.2 (29 Aug 2018 14:00), Max: 102 (29 Aug 2018 09:46)  HR: 70 (29 Aug 2018 14:00) (68 - 116)  BP: 178/68 (28 Aug 2018 21:08) (153/67 - 178/68)  BP(mean): 102 (28 Aug 2018 21:08) (102 - 106)  ABP: 162/66 (29 Aug 2018 14:00) (154/54 - 198/98)  ABP(mean): 96 (29 Aug 2018 14:00) (84 - 136)  RR: 52 (29 Aug 2018 14:) (9 - 52)  SpO2: 96% (29 Aug 2018 14:00) (94% - 99%)    Mode: AC/ CMV (Assist Control/ Continuous Mandatory Ventilation), RR (machine): 12, TV (machine): 360, FiO2: 40, PEEP: 5, ITime: 1, MAP: 7.5, PIP: 17     @ : @ 07:00  --------------------------------------------------------  IN: 2880 mL / OUT: 360 mL / NET: 2520 mL     @ : @ 15:32  --------------------------------------------------------  IN: 1285 mL / OUT: 0 mL / NET: 1285 mL      CAPILLARY BLOOD GLUCOSE      POCT Blood Glucose.: 161 mg/dL (29 Aug 2018 12:09)      PHYSICAL EXAM:    General: NAD  HEENT: NC/AT; PERRL, clear conjunctiva  Neck: supple  Respiratory: CTA b/l  Cardiovascular: +S1/S2; RRR  Abdomen: soft, NT/ND; +BS x4  Extremities: WWP, 2+ peripheral pulses b/l; no LE edema  Skin: normal color and turgor; no rash  Neurological:    MEDICATIONS:  MEDICATIONS  (STANDING):  acetaminophen    Suspension 650 milliGRAM(s) Oral every 6 hours  aspirin Suppository 300 milliGRAM(s) Rectal daily  BACItracin   Ointment 1 Application(s) Topical daily  chlorhexidine 0.12% Liquid 15 milliLiter(s) Swish and Spit two times a day  chlorhexidine 2% Cloths 1 Application(s) Topical daily  cloNIDine 0.1 milliGRAM(s) Oral two times a day  dextrose 5%. 1000 milliLiter(s) (50 mL/Hr) IV Continuous <Continuous>  dextrose 50% Injectable 12.5 Gram(s) IV Push once  dextrose 50% Injectable 25 Gram(s) IV Push once  dextrose 50% Injectable 25 Gram(s) IV Push once  folic acid Injectable 1 milliGRAM(s) IV Push daily  heparin  flush 100 Units/mL Injectable 100 Unit(s) IV Push every other day  heparin  Injectable 5000 Unit(s) SubCutaneous every 8 hours  hydrALAZINE 50 milliGRAM(s) Oral every 8 hours  insulin glargine Injectable (LANTUS) 5 Unit(s) SubCutaneous at bedtime  insulin lispro (HumaLOG) corrective regimen sliding scale   SubCutaneous every 6 hours  levETIRAcetam  IVPB 500 milliGRAM(s) IV Intermittent every 12 hours  methylPREDNISolone sodium succinate IVPB 500 milliGRAM(s) IV Intermittent every 12 hours  niCARdipine Infusion 5 mG/Hr (25 mL/Hr) IV Continuous <Continuous>  ondansetron Injectable 4 milliGRAM(s) IV Push every 6 hours  pantoprazole  Injectable 40 milliGRAM(s) IV Push daily  piperacillin/tazobactam IVPB. 2.25 Gram(s) IV Intermittent every 6 hours  propofol Infusion 13.774 MICROgram(s)/kG/Min (5 mL/Hr) IV Continuous <Continuous>  sertraline 75 milliGRAM(s) Oral daily  sodium chloride 0.9%. 1000 milliLiter(s) (80 mL/Hr) IV Continuous <Continuous>  thiamine Injectable 100 milliGRAM(s) IV Push daily    MEDICATIONS  (PRN):  dextrose 40% Gel 15 Gram(s) Oral once PRN Blood Glucose LESS THAN 70 milliGRAM(s)/deciliter  glucagon  Injectable 1 milliGRAM(s) IntraMuscular once PRN Glucose LESS THAN 70 milligrams/deciliter      ALLERGIES:  Allergies    No Known Allergies    Intolerances        LABS:                        8.9    16.0  )-----------( 171      ( 29 Aug 2018 03:20 )             27.1     -    146<H>  |  109<H>  |  47<H>  ----------------------------<  167<H>  3.7   |  22  |  1.86<H>    Ca    8.6      29 Aug 2018 03:20  Phos  3.1       Mg     2.4         TPro  6.0  /  Alb  3.6  /  TBili  0.4  /  DBili  <0.2  /  AST  10  /  ALT  26  /  AlkPhos  52      PT/INR - ( 29 Aug 2018 03:20 )   PT: 11.9 sec;   INR: 1.07          PTT - ( 29 Aug 2018 03:20 )  PTT:26.3 sec  Urinalysis Basic - ( 28 Aug 2018 15:13 )    Color: Yellow / Appearance: SL Cloudy / S.015 / pH: x  Gluc: x / Ketone: NEGATIVE  / Bili: Negative / Urobili: 0.2 E.U./dL   Blood: x / Protein: 100 mg/dL / Nitrite: POSITIVE   Leuk Esterase: Small / RBC: 5-10 /HPF / WBC > 10 /HPF   Sq Epi: x / Non Sq Epi: 5-10 /HPF / Bacteria: Present /HPF        RADIOLOGY & ADDITIONAL TESTS: Reviewed. INTERVAL HPI/OVERNIGHT EVENTS: fever overnight 101.9. BP still labile but improved compared to night before.     SUBJECTIVE: Patient seen and examined at bedside. On vent, nonresponsive. Pt has not improved from a neuro standpoint.       OBJECTIVE:    VITAL SIGNS:  ICU Vital Signs Last 24 Hrs  T(C): 37.9 (29 Aug 2018 14:00), Max: 38.9 (29 Aug 2018 09:46)  T(F): 100.2 (29 Aug 2018 14:00), Max: 102 (29 Aug 2018 09:46)  HR: 70 (29 Aug 2018 14:00) (68 - 116)  BP: 178/68 (28 Aug 2018 21:08) (153/67 - 178/68)  BP(mean): 102 (28 Aug 2018 21:08) (102 - 106)  ABP: 162/66 (29 Aug 2018 14:00) (154/54 - 198/98)  ABP(mean): 96 (29 Aug 2018 14:00) (84 - 136)  RR: 52 (29 Aug 2018 14:) (9 - 52)  SpO2: 96% (29 Aug 2018 14:) (94% - 99%)    Mode: AC/ CMV (Assist Control/ Continuous Mandatory Ventilation), RR (machine): 12, TV (machine): 360, FiO2: 40, PEEP: 5, ITime: 1, MAP: 7.5, PIP: 17     @ 07:  -   @ 07:00  --------------------------------------------------------  IN: 2880 mL / OUT: 360 mL / NET: 2520 mL     @ 07: @ 15:32  --------------------------------------------------------  IN: 1285 mL / OUT: 0 mL / NET: 1285 mL      CAPILLARY BLOOD GLUCOSE      POCT Blood Glucose.: 161 mg/dL (29 Aug 2018 12:09)      PHYSICAL EXAM:    General: intubated, nonresponsive.   HEENT: NC/AT; PERRL, clear conjunctiva.   Neck: supple  Respiratory: rhonchi b/l.   Cardiovascular: +S1/S2; RRR  Abdomen: soft, NT/ND; +BS x4  Extremities: WWP, 2+ peripheral pulses b/l; no LE edema  Skin: normal color and turgor; no rash  Neurological: A&Ox0. Nonresponsive. Rightward gaze preference with horizontal nystagmus. Decerebrate posturing noted with painful stimuli.    MEDICATIONS:  MEDICATIONS  (STANDING):  acetaminophen    Suspension 650 milliGRAM(s) Oral every 6 hours  aspirin Suppository 300 milliGRAM(s) Rectal daily  BACItracin   Ointment 1 Application(s) Topical daily  chlorhexidine 0.12% Liquid 15 milliLiter(s) Swish and Spit two times a day  chlorhexidine 2% Cloths 1 Application(s) Topical daily  cloNIDine 0.1 milliGRAM(s) Oral two times a day  dextrose 5%. 1000 milliLiter(s) (50 mL/Hr) IV Continuous <Continuous>  dextrose 50% Injectable 12.5 Gram(s) IV Push once  dextrose 50% Injectable 25 Gram(s) IV Push once  dextrose 50% Injectable 25 Gram(s) IV Push once  folic acid Injectable 1 milliGRAM(s) IV Push daily  heparin  flush 100 Units/mL Injectable 100 Unit(s) IV Push every other day  heparin  Injectable 5000 Unit(s) SubCutaneous every 8 hours  hydrALAZINE 50 milliGRAM(s) Oral every 8 hours  insulin glargine Injectable (LANTUS) 5 Unit(s) SubCutaneous at bedtime  insulin lispro (HumaLOG) corrective regimen sliding scale   SubCutaneous every 6 hours  levETIRAcetam  IVPB 500 milliGRAM(s) IV Intermittent every 12 hours  methylPREDNISolone sodium succinate IVPB 500 milliGRAM(s) IV Intermittent every 12 hours  niCARdipine Infusion 5 mG/Hr (25 mL/Hr) IV Continuous <Continuous>  ondansetron Injectable 4 milliGRAM(s) IV Push every 6 hours  pantoprazole  Injectable 40 milliGRAM(s) IV Push daily  piperacillin/tazobactam IVPB. 2.25 Gram(s) IV Intermittent every 6 hours  propofol Infusion 13.774 MICROgram(s)/kG/Min (5 mL/Hr) IV Continuous <Continuous>  sertraline 75 milliGRAM(s) Oral daily  sodium chloride 0.9%. 1000 milliLiter(s) (80 mL/Hr) IV Continuous <Continuous>  thiamine Injectable 100 milliGRAM(s) IV Push daily    MEDICATIONS  (PRN):  dextrose 40% Gel 15 Gram(s) Oral once PRN Blood Glucose LESS THAN 70 milliGRAM(s)/deciliter  glucagon  Injectable 1 milliGRAM(s) IntraMuscular once PRN Glucose LESS THAN 70 milligrams/deciliter      ALLERGIES:  Allergies    No Known Allergies    Intolerances        LABS:                        8.9    16.0  )-----------( 171      ( 29 Aug 2018 03:20 )             27.1     08-    146<H>  |  109<H>  |  47<H>  ----------------------------<  167<H>  3.7   |  22  |  1.86<H>    Ca    8.6      29 Aug 2018 03:20  Phos  3.1       Mg     2.4         TPro  6.0  /  Alb  3.6  /  TBili  0.4  /  DBili  <0.2  /  AST  10  /  ALT  26  /  AlkPhos  52  08-    PT/INR - ( 29 Aug 2018 03:20 )   PT: 11.9 sec;   INR: 1.07          PTT - ( 29 Aug 2018 03:20 )  PTT:26.3 sec  Urinalysis Basic - ( 28 Aug 2018 15:13 )    Color: Yellow / Appearance: SL Cloudy / S.015 / pH: x  Gluc: x / Ketone: NEGATIVE  / Bili: Negative / Urobili: 0.2 E.U./dL   Blood: x / Protein: 100 mg/dL / Nitrite: POSITIVE   Leuk Esterase: Small / RBC: 5-10 /HPF / WBC > 10 /HPF   Sq Epi: x / Non Sq Epi: 5-10 /HPF / Bacteria: Present /HPF        RADIOLOGY & ADDITIONAL TESTS: Reviewed.

## 2018-08-29 NOTE — PROGRESS NOTE ADULT - ASSESSMENT
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   Decorticate posturing.   Per neuro, hospice.     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   Decorticate posturing.   Per neuro, hospice.     OK to resume Solumedrol at 125mg BID IV as of tomorrow pending hospice eval.     Feel free to call me with any questions

## 2018-08-29 NOTE — CONSULT NOTE ADULT - CONSULT REASON
Bradycardia and hypertensive emergency
CNS Vasculitis
GOC
Hydrocephalus
NEURO STROKE CODE CONSULT
PEG tube
Pt with depression and reccurent strokes
Rule Out Stroke
Stroke Code
central venous access
pre-op clearance angiogram
Hypertension and vomiting

## 2018-08-29 NOTE — CONSULT NOTE ADULT - SUBJECTIVE AND OBJECTIVE BOX
ALCON COPE          MRN-[id number]            (1950)    HPI:  69yo Female, PMHx HTN and Major Depressive Disorder, presented to the ED with recent history of self harm and depressed mood. Primary complaint was of progressive functional decline at home w/severe anhedonia, anorexia, poor sleep, suicidality (would cut herself and claims to have started cutting herself several months ago), often drinking alcohol at home to consol herself. She states that her depression began after retiring from her job at the [employer] 1.5yrs ago. Patient was admitted to [facility] for depressive episode - however, 1-2 hours after arriving onto the floor, a stroke code was called for L sided weakness and hypertension. NIHSS was 6 on initial assessment. On CTH - noted to have lacunar infarcts (basal ganglia and R/L thalami). On second assessment, NIHSS 1. Patient was brought back to the ED and re-worked up. EKG revealed profound bradycardia - Cardiology was consulted for HR 30s - asymptomatic.      PAST MEDICAL & SURGICAL HISTORY:  Hypertension  Major Depression Disorder  No significant past surgical history    FAMILY HISTORY:  No pertinent family history in first degree relatives    SOCIAL HISTORY: Was living alone in 4th floor walk up apartment building. Etoh use. Nonreligious. Born in [facility], vocalt[place], worked as  at the [employer] until care home in . , no children. Nonsmoker.    ROS:    Unable to attain due to:  Medical condition                    Dyspnea (Castro 0-10): 0 Mechanically vented                    N/V (Y/N): Unable to assess                             Secretions (Y/N) : No               Agitation(Y/N): No   Pain (Y/N): RY pain scale: 2      -Provocation/Palliation: Unable to assess      -Quality/Quantity: Unable to assess      -Radiating: Unable to assess      -Severity: No pain  -Timing/Frequency: Unable to assess      -Impact on ADLs: Unable to assess        Allergies: No Known Allergies or Intolerances    Opiate Naive (Y/N): Yes  -iStop reviewed (Y/N): Yes. No Rx found on iStop review (Ref#:[id number])    Medications:      MEDICATIONS  (STANDING):  acetaminophen    Suspension 650 milliGRAM(s) Oral every 6 hours  aspirin Suppository 300 milliGRAM(s) Rectal daily  BACItracin   Ointment 1 Application(s) Topical daily  chlorhexidine 0.12% Liquid 15 milliLiter(s) Swish and Spit two times a day  chlorhexidine 2% Cloths 1 Application(s) Topical daily  cloNIDine 0.1 milliGRAM(s) Oral two times a day  folic acid Injectable 1 milliGRAM(s) IV Push daily  heparin  flush 100 Units/mL Injectable 100 Unit(s) IV Push every other day  heparin  Injectable 5000 Unit(s) SubCutaneous every 8 hours  hydrALAZINE 50 milliGRAM(s) Oral every 8 hours  insulin glargine Injectable (LANTUS) 5 Unit(s) SubCutaneous at bedtime  insulin lispro (HumaLOG) corrective regimen sliding scale   SubCutaneous every 6 hours  levETIRAcetam  IVPB 500 milliGRAM(s) IV Intermittent every 12 hours  methylPREDNISolone sodium succinate IVPB 500 milliGRAM(s) IV Intermittent every 12 hours  niCARdipine Infusion 5 mG/Hr (25 mL/Hr) IV Continuous <Continuous>  ondansetron Injectable 4 milliGRAM(s) IV Push every 6 hours  pantoprazole  Injectable 40 milliGRAM(s) IV Push daily  piperacillin/tazobactam IVPB. 2.25 Gram(s) IV Intermittent every 6 hours  propofol Infusion 13.774 MICROgram(s)/kG/Min (5 mL/Hr) IV Continuous <Continuous>  sertraline 75 milliGRAM(s) Oral daily  sodium chloride 0.9%. 1000 milliLiter(s) (80 mL/Hr) IV Continuous <Continuous>  thiamine Injectable 100 milliGRAM(s) IV Push daily    MEDICATIONS  (PRN):  dextrose 40% Gel 15 Gram(s) Oral once PRN Blood Glucose LESS THAN 70 milliGRAM(s)/deciliter  glucagon  Injectable 1 milliGRAM(s) IntraMuscular once PRN Glucose LESS THAN 70 milligrams/deciliter    Labs:    CBC:                               8.9    16.0  )-----------( 171      ( 29 Aug 2018 03:20 )                    27.1   CMP:    -  146<H>  |  109<H>  |  47<H>  ----------------------------<  167<H>  3.7   |  22  |  1.86<H>    Ca    8.6      29 Aug 2018 03:20  Phos  3.1       Mg     2.4         TPro  6.0  /  Alb  3.6  /  TBili  0.4  /  DBili  <0.2  /  AST  10  /  ALT  26  /  AlkPhos  52      PT/INR - ( 29 Aug 2018 03:20 )   PT: 11.9 sec;   INR: 1.07     PTT - ( 29 Aug 2018 03:20 )  PTT:26.3 sec    Urinalysis Basic - ( 28 Aug 2018 15:13 )  Color: Yellow / Appearance: SL Cloudy / S.015 / pH: x  Gluc: x / Ketone: NEGATIVE  / Bili: Negative / Urobili: 0.2 E.U./dL   Blood: x / Protein: 100 mg/dL / Nitrite: POSITIVE   Leuk Esterase: Small / RBC: 5-10 /HPF / WBC > 10 /HPF   Sq Epi: x / Non Sq Epi: 5-10 /HPF / Bacteria: Present /HPF    POCT Blood Glucose.: 161 mg/dL (18 @ 12:09)    Type + Screen (18 @ 05:30)    ABO Interpretation: A    Rh Interpretation: Positive    Antibody Screen: Negative    Culture - Urine (18 @ 22:10)    Specimen Source: Catheterized Catheterized    Culture Results:   No growth to date.    Culture - Sputum . (18 @ 17:48)    Gram Stain:   Numerous white blood cells  Moderate epithelial cells  Moderate Gram positive cocci in pairs  Moderate Gram Negative Rods  Moderate Gram Positive Rods    Specimen Source: .Sputum Sputum    Culture Results:   Moderate Gram Negative Rods  Identification and susceptibility to follow.    Sodium, Random Urine: <20 mmol/L (18 @ 15:14)  Creatinine, Random Urine: 62 mg/dL (18 @ 15:14)  Creatinine, Random Urine: 34 mg/dL (18 @ 23:18)  Urea Nitrogen,  Random Urine: 337 mg/dL (18 @ 23:18)  Osmolality, Random Urine: 375 mosmol/kg (18 @ 23:18)    Culture - Blood (18 @ 14:33)    Specimen Source: .Blood Blood-Venous    Culture Results:   No growth at 12 hours    Lactate, Blood: 0.9 mmoL/L (18 @ 11:08)    Troponin T, Serum: 0.33 ng/mL (18 @ 12:42)  Troponin T, Serum: 0.36 ng/mL (18 @ 08:31)  CKMB Units: 2.6 ng/mL (18 @ 12:42)  Creatine Kinase, Serum: 62 U/L (18 @ 12:42)  Creatine Kinase, Serum: 72 U/L (18 @ 09:10)    IgG Subsets (18 @ 21:11)    IgG 1: 317 mg/dL    IgG 2: 220 mg/dL    IgG 3: 31 mg/dL    IgG 4: 47: Performed At:  [facility] [address] [id number]  Bee NJ MD Ph:[phone]  Performed At:  [facility] [address] [id number]  Reyes Araceli B MD Ph:[phone] mg/dL    IgG Subset Total: 604 mg/dL    Immunoglobulins Panel (18 @ 15:28)    Quantitative Ig: Please Note: The Reference range has changed for this test due to an  upgrade in the testing methodology ( Turbidometry - Optilite Instrument).  Results are flagged as In Range  or Out of Range based upon the updated  reference range as of 2018. mg/dL    Quantitative IgA: 226: Please Note: The Reference range has changed for this test due to an  upgrade in the testing methodology ( Turbidometry - Optilite Instrument).  Results are flagged as In Range  or Out of Range based upon the updated  reference range as of 2018. mg/dL    Quantitative IgM: 271: Please Note: The Reference range has changed for this test due to an  upgrade in the testing methodology ( Turbidometry - Optilite Instrument).  Results are flagged as In Range  or Out of Range based upon the updated  reference range as of 2018. mg/dL    JAKE Kappa: 2.42 mg/dL    JAKE Lambda: 2.33 mg/dL    Kappa/Lambda Free Light Chain Ratio, Serum: 1.04 Ratio    Protein S Free Activity Assay: 84: Decreased levels of Protein S activity may be found in patients  with hereditary deficiency, warfarin therapy, vitamin k  deficiency, liver disease, DIC, or recent thrombosis as well as  after surgery. In addition, it may be physiologic in pregnancy.  An elevated Protein S activity is not clinically significant.  Only deficiencies are associated with an increased thrombotic  risk.   Test(s) performed at:      [facility]      Jeana Blanco M.D., Ph.D., [place],       [address] [place] [address]      CLIA #[id number] % normal (18 @ 21:19)    Acute Hepatitis Panel (18 @ 06:47)    Hepatitis C Virus Interpretation: Nonreact    Hepatitis C Virus S/CO Ratio: 0.04 S/CO    Hepatitis B Core IgM Antibody: Nonreact    Hepatitis B Surface Antigen: Nonreact    Hepatitis A IgM Antibody: NONREACT    Sjogren&#x27;s Syndrome Antibodies (18 @ 15:28)    Anti SS-A Antibody: <0.2 AI    Anti SS-B Antibody: <0.2: Fluorescent Bead Immunoassay   Reference Ranges for SS-A AND SS-B:   <1.0 AI (negative)   > or =1.0 AI (positive)   Reference values apply  to all ages. AI    Anti Nuclear Factor Titer: Negative (18 @ 15:28)    Imaging:  Reviewed    EXAM:  XR CHEST PORTABLE ROUTINE 1V                        PROCEDURE DATE:  2018    INTERPRETATION:  PORTABLE CHEST X-RAY   HISTORY: intubated  PRIOR STUDIES: 2018.  FINDINGS: The lungs are clear.  There are no pleural effusions.  The   cardiomediastinal silhouette, bones and soft tissues are unremarkable.   Endotracheal tube tip 2.9 cm proximal to alisson. NG tube noted with   distal aspect obscured in abdomen. Distal sidehole overlies the gastric   fundus. Right subclavian venous line with tip overlying the SVC. No   visualized pneumothorax.  IMPRESSION:  No change.    EXAM:  CT BRAIN STROKE PROTOCOL                        PROCEDURE DATE:  2018    INTERPRETATION:  IFabiana MD, have reviewed the images and the   report and agree with the findings, with the following modification:   Comparison is also made with the patient's MRI from 2018.  There are bilateral lacunar infarcts involving the thalamus bilaterally.   There are multiple areas of hypodensity within the right corona radiata   and paramedian right frontal lobe which are better delineated on the   current CT study correlating with right PATRICIA and MCA territory infarcts   when compared with the CT from . Acute ischemia in the setting of   multiple acute/subacute infarcts would be better evluated with MRI.  Theabove findings were discussed with Dr. Allen at 12:15 PM.  ******PRELIMINARY REPORT******   INDICATIONS: Altered mental status in the setting of vasculitis.   Somnolence. Clonus.  TECHNIQUE: Serial axial images were obtained from the skull baseto the   vertex without the use of intravenous contrast. Imaging is performed   using helical low-dose technique, and sagittal and coronal reformations   are provided.  COMPARISON EXAMINATION: CT head from 2018.  FINDINGS:    VENTRICLES AND SULCI: There is mild enlargement of the ventricles out of   proportion to sulcal enlargement, similar to prior exam, and can be   secondary to normal pressure hydrocephalus.  INTRA-AXIAL: There are multiple lacunar infarcts in the basal ganglia   bilaterally. There is a linear hypodensity in the right thalamus, which   appears more well defined on the current examination. There is mild   patchy hypodensity within the periventricular white matter, which is   nonspecific and may represent the sequela small vessel ischemic disease.   The gray-white differentiation is preserved without acute transcortical   infarction. No hemorrhage. No mass effect or midline shift.   EXTRA-AXIAL: No extra-axial fluid collection is present.   VISUALIZED SINUSES: No air-fluid levels are identified.   VISUALIZED MASTOIDS: Well developed and aerated bilaterally.  CALVARIUM: No calvarial fracture.  IMPRESSION:   1.  No acute intracranial hemorrhage, transcortical infarction or mass   effect.  2.  Multiple bilateral basal ganglia and right thalamus lacunar   infarctions, grossly similar to prior exam.  3.  Chronic mild small vessel ischemic disease.    EXAM:  CT ANGIO NECK (W)AW IC                        EXAM:  CT ANGIO BRAIN (W)AW IC                        PROCEDURE DATE:  2018    INTERPRETATION:  CT ANGIOGRAM OF THE HEAD AND NECK WITH CONTRAST  INDICATION:  stroke  TECHNIQUE: Axial contrast enhanced CT angiogram of the head and neck was   performed during timed bolus infusion of intravenous contrast. MIP   reformats were generated in the sagittal and coronal plane. 3-D surface   renderings were also generated.  COMPARISON:  2018  FINDINGS:  Comparison made to a CTA head and neck performed just yesterday at   2018 redemonstrates an occluded left vertebral artery from its   origin off the left subclavian artery throughout its course.   Redemonstrated ispatency of the right vertebral artery and basilar   artery though both hypoplastic in caliber, and unchanged in appearance.   Proximal segments of the bilateral PCA and SCA branches are patent.  Patent bilateral common carotid arteries. Redemonstration is combination   of calcified and noncalcified plaque at the bilateral carotid bifurcation   resulting in moderate stenosis as described on the earlier CTA head and   neck. Bilateral intracranial internal carotid arteries and middle   cerebral arteries are patent. Right A1 segment is normal in caliber. Left   A1 segment is hypoplastic, essentially unchanged in appearance from the   prior study.  IMPRESSION:  No significant interval change.    EXAM:  MR BRAIN                        PROCEDURE DATE:  2018    INTERPRETATION:  PROCEDURE: MRI Brain without contrast  INDICATION: Vasculitis, weakness, hypertension  TECHNIQUE: Sagittal T1, axial T1, T2-FLAIR, diffusion, GRE andcoronal   T2-FLAIR images of the brain were obtained.  COMPARISON: MRI brain from 2018  FINDINGS:   Diffusion-weighted imaging shows new sites of acute versus early subacute   infarction in the right posterior-inferior basal ganglia, posterior   internal capsule and right medial temporal deep periventricular white   matter. Also new is a punctate focus of left anteromedial frontal   subcortical infarction (series 8, image 57). There is corresponding new   hyperintense signal on T2-FLAIR at both these sites. There is again   scattered diffusion restriction within the right paramedian superior   frontal gyrus and cingulate gyrus in the anterior cerebral artery   territory. Subacute lacunar infarctions in the left subinsular white   matter, centrum semiovale and corona radiata are unchanged. Gradient echo   imaging again shows several old microhemorrhages within the wilfred,   pontomesencephalic junction and left subthalamic region. There are   several chronic tiny lacunar infarctions in the basal ganglia and thalami   bilaterally, superimposed on a background of patchy small vessel ischemic   change, probably sequela of long-standing small vessel ischemic change or   other vasculopathy. On T2 images, large vessel flow voids aregrossly   preserved. Ventricular caliber is unchanged. There is no extra-axial   fluid collection, mass effect or midline shift. Scattered mucosal   thickening is seen in the paranasal sinuses and mastoid air cells.  IMPRESSION:   Compared to 2018, there is new infarction in the right medial   temporal periventricular white matter and posterior-inferior basal   ganglia and internal capsule. Also new is a punctate left frontal   subcortical infarction.  Other sites of recent ischemic injury are again seen in the right PATRICIA   territory and speckled in left cerebral deep white matter. Background of   small vessel ischemic change within the cerebral white matter and in the   presence of multiple chronic basal ganglia lacunes.    EXAM:  US DPLX ABD PELV LTD                        PROCEDURE DATE:  2018    INTERPRETATION:  RENAL ARTERIAL DUPLEX DOPPLER dated 2018 1:09 PM  INDICATION: Refractory hypertension. Assess for renal artery stenosis.  PRIOR STUDIES: Retroperitoneal ultrasound from 2018  TECHNIQUE: Ultrasound evaluation of the kidneys was performed. Duplex   Doppler evaluation of the renal arteries was performed bilaterally   utilizing grayscale imaging, color flow Doppler, and spectral Doppler   waveform analysis.   RIGHT RENAL ARTERIAL DOPPLER:  Main renal artery maximum peak systolic velocity: 190 cm/sec at the origin  Segmental renal artery resistive indices: Normal (< 0.8)  Segmental renal artery acceleration times: Normal (< 70 msec)  Segmental renal artery acceleration indices: Normal (> 300 cm/sec2)  LEFT RENAL ARTERIAL DOPPLER:  Main renal artery maximum peak systolic velocity: 140 cm/sec  Segmental renal artery resistive indices: Normal (< 0.8)  Segmental renal artery acceleration times: Normal (< 70 msec)  Segmental renal artery acceleration indices: Normal (> 300 cm/sec2)  ABDOMINAL AORTA:  Maximum peak systolic velocity: 65 cm/sec.  IMPRESSION:  <60% stenosis of the right renal artery at its origin.     EXAM:  US RETROPERITONEAL COMPLETE                        PROCEDURE DATE:  2018    INTERPRETATION:  RENAL and BLADDER ULTRASOUND dated 2018 5:08 PM  Indication: Poor renal function.  Prior studies: None.  Findings:  RIGHTKIDNEY:  Length: 10 cm  Lesions: None  Hydronephrosis: Extrarenal pelvis. No eladia hydronephrosis.   Stones: None  Echogenicity: Normal  LEFT KIDNEY:  Length: 9 cm  Lesions: Includes renal cortical mantle thinning. Small simple 0.8 cm cyst  Hydronephrosis: None  Stones: None  Echogenicity: Normal  URINARY BLADDER:  Ureteral jets: Both visible.  Filling defects: None  Bladder volume: Pre-void: 253 ml; Post-void: Not obtained due to   patient's inability to void.  IMPRESSION:  No evidence of hydroureteronephrosis.  Extrarenal pelvis on the right kidney.  Slightly diminished left renal size with smooth renal cortical mantle   thinning which may suggest underlying nephrosclerosis.  No post void residual could be obtained due to patient's inability to   void. No bladder wall thickening or intraluminal lesions are demonstrated.    EXAM:  ESOPHAGEAL ECHO (CARDIOL)                        PROCEDURE DATE:  2018    INTERPRETATION:  Patient Height: 160.0 cm  Patient Weight: 60.0 kg  BSA: 1.6 m^2  Interpretation Summary  KATHERINE to evaluate for possible cardiac source of CVA. No clot seen in the   left  atrium or in the left atrial appendage. Left atrial appendage emptying   velocities are normal.  No evidence for interatrial shunt by color   Doppler assessment. Injection of agitated saline contrast documented no   interatrial shunt.  The aortic valve is trileaflet. There is mild aortic valve   thickening. No aortic regurgitation noted.  Structurally normal mitral valve. There   is trace to mild mitral regurgitation.  Structurally normal tricuspid valve.   There is trace tricuspid regurgitation.Structurally normal pulmonic   valve. No pulmonic regurgitation noted.The right ventricular systolic function is   normal.The left ventricular ejection fraction is estimated to be   55-60%Mild atherosclerotic plaque(s) in the aortic arch.Mild atherosclerotic   plaque(s) in the descending aorta.There is no pericardial effusion.    ECG  Systolic  mmHg  Diastolic BP 64 mmHg  Ventricular Rate 71 BPM  Atrial Rate 71 BPM  P-R Interval 121 ms  QRS Duration 96 ms  Q-T Interval 375 ms  QTC Calculation(Bezet) 407 ms  P Axis 74 degrees  R Axis 31 degrees  T Axis 170 degrees  Diagnosis Line Normal sinus rhythm  Left ventricular hypertrophy with ST-T abnormalities    PEx:  T(C): 38.9 (18 @ 09:46), Max: 38.9 (18 @ 09:46)  HR: 70 (18 @ 13:10) (68 - 116)  BP: 178/68 (18 @ 21:08) (153/67 - 178/68)  RR: 9 (18 @ 13:00) (9 - 21)  SpO2: 97% (18 @ 13:10) (94% - 99%)  Wt(kg): 60.5  I&O's Summary  29 Aug 2018 07:01  -  29 Aug 2018 14:06  --------------------------------------------------------  IN: 1245 mL / OUT: 0 mL / NET: 1245 mL    General: Intubated sedated in NAD  HEENT: NCAT ETT+ NGT+ Dry lips Facial droop Nystagmus  Neck: Supple. Right subclavian  CVS:  RR S1S2 No M/G/R  Resp: Mechanically vented. Unlabored. Good air entry B/L  GI: Globose soft ND BS+  : Arias+  Musc: Does not localize pain. No C/C/E   Neuro: Sedated. Not following commands.  Psych: Calm    Skin: Non jaundiced. Pale  Lymph: Normal  Preadmit Karnofsky:60  %           Current Karnofsky: 10%  Cachexia (Y/N): No  BMI: 26.1    Advanced Directives:     Full Code    Decision maker: The patient does not have capacity for complex medical decision making given medical condition.  Legal surrogate: Reportedly there is a HCP naming the patient's friend Leyla Lizama [phone] as the HCP agent.  Alternate surrogate: Friend Steven Foote Esq. [phone]    GOALS OF CARE DISCUSSION       Palliative care info/counseling provided	           Family meeting TBD       Documentation of GOC: None	          REFERRALS	        Palliative Med        Unit SW/Case Mgmt       Ethics

## 2018-08-29 NOTE — PROGRESS NOTE ADULT - ATTENDING COMMENTS
Patient worse today with extensor posturing and comatose off sedation. repeat Head Ct shows large left pontine infarct. wbc down to 16 k.   day 3 of solumedrol    Imp - severe CNS vasculitis with multiple strokes even in the setting of solumedrol 1 gm and cytoxan.   poor prognosis  palliative team consulted

## 2018-08-29 NOTE — CONSULT NOTE ADULT - PROBLEM SELECTOR RECOMMENDATION 5
Plan for friends/family meeting tomorrow Thursday 8/30/18. Time still to be determined. They will reach out to the palliative SW if/when time is agreed upon. Recommend primary team members be present for the meeting. This provider will not be available tomorrow from 2-4pm due to previously scheduled meeting.

## 2018-08-29 NOTE — CONSULT NOTE ADULT - PROBLEM SELECTOR RECOMMENDATION 9
Hypercoagulable w/u in progress
Currently intubated and sedated and no signs of distress.  Management as per ICU.

## 2018-08-29 NOTE — CONSULT NOTE ADULT - ASSESSMENT
69yo Female, PMHx HTN and Major Depressive Disorder, presented to the ED with recent history of self harm and depressed mood. Consult after patient had repeated episodes of strokes and respiratory failure currently on vent support. Consult for [facility]

## 2018-08-29 NOTE — CONSULT NOTE ADULT - PROBLEM SELECTOR PROBLEM 2
Current severe episode of major depressive disorder with psychotic features without prior episode
CVA (cerebral vascular accident)

## 2018-08-29 NOTE — CONSULT NOTE ADULT - PROBLEM SELECTOR RECOMMENDATION 4
Discussed with the patient's friend Steven Foote Esq. and he collaborates that the patients other friend Leyla Lizama is the patient's HCP. Leyla called back the palliative SW and informed her that she would bring the copy of the HCP.

## 2018-08-29 NOTE — PROGRESS NOTE ADULT - ASSESSMENT
A/P  67yo Female, PMHx HTN and Major Depressive Disorder, cerebral vasculitis - intubated and sedated - on pulse dose steroids, GI consulted for PEG tube placement.     1. Dysphagia: 2/2 cerebral vasculitis  -PEG cancelled today given primary team's concern for new stroke vs seizure.   Please notify GI when pt is medically optimized for PEG.     Case discussed with Dr. Gonzales

## 2018-08-29 NOTE — CONSULT NOTE ADULT - PROBLEM SELECTOR RECOMMENDATION 3
The patient self admitted herself to 8U after being found at home she could not care for herself due to recurrence of MDD.   Management as per ICU and psychiatry.

## 2018-08-29 NOTE — PROGRESS NOTE ADULT - ASSESSMENT
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 Patient with residual left sided deficits with new lethargy, CTH showing new infarcts.  - c/w  rectally while NPO. Atorvastatin 80 when able   - Cyclophosphamide per rheum  - c/w Keppra, increase to 500mg PO BID  - Per neuro will add solumedrol 500mg IV q12hrs for 72hrs. On day 2/3  - Q2H neuro checks      Cardiac  #Hypertensive emergency/urgency - Episodes of vomiting noted from possible hypertensive encephelopathy. CTH with no ICH, edema, or midline shift, though showing evolving infarcts.    - Nicardipine drip with goal SBP between 160-180 per Neuro  - BP still not within range today, added clonidine back on      Pulmonary  #Concern for aspiration   - Patient with vomiting and AMS in setting of likely hypertensive encephalopathy, CXR without infiltrate  - intubated, sedated. Saturating appropriately.     Renal  #MARYJANE vs CKD - Possibly 2/2 vasculitis vs CKD 2/2 hypertensive nephropathy - Patient presented with MARYJANE of 1.45; unknown baseline. Renal ultrasound showed slightly diminished left renal size with smooth renal cortical mantle thinning which may suggest underlying nephrosclerosis. Renal duplex performed 8/24, showing <60% stenosis of the right renal artery at its origin. Urine lytes revealing FeNa - 1.4%, indicating intrinsic renal disease.   - Avoiding nephrotoxic agents, Trend Cr      GI  #Vomiting - patient with vomiting at onset of hypertension and alteration of mental status, possible hypertensive encephalopathy  - c/w HTN treatment as above  - Zofran 4mg IV PRN. Keep NPO  - GI consult for PEG placement.     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 Patient with residual left sided deficits with new lethargy, CTH showing new infarcts.  - c/w  rectally while NPO. Atorvastatin 80 when able   - Cyclophosphamide per rheum  - c/w Keppra, increase to 500mg PO BID  - solumedrol 125mg IV daily per rheum  - Q2H neuro checks      Cardiac  #Hypertensive emergency/urgency - Episodes of vomiting noted from possible hypertensive encephelopathy. CTH with no ICH, edema, or midline shift, though showing evolving infarcts.    - Nicardipine drip with goal SBP between 160-180 per Neuro  - BP still not within range today, added clonidine back on      Pulmonary  #Concern for aspiration   - Patient with vomiting and AMS in setting of likely hypertensive encephalopathy, CXR without infiltrate  - intubated, sedated. Saturating appropriately.     Renal  #MARYJANE vs CKD - Possibly 2/2 vasculitis vs CKD 2/2 hypertensive nephropathy - Patient presented with MARYJANE of 1.45; unknown baseline. Renal ultrasound showed slightly diminished left renal size with smooth renal cortical mantle thinning which may suggest underlying nephrosclerosis. Renal duplex performed 8/24, showing <60% stenosis of the right renal artery at its origin. Urine lytes revealing FeNa - 1.4%, indicating intrinsic renal disease.   - Avoiding nephrotoxic agents, Trend Cr      GI  #Vomiting - patient with vomiting at onset of hypertension and alteration of mental status, possible hypertensive encephalopathy  - c/w HTN treatment as above  - Zofran 4mg IV PRN. Keep NPO  - GI consult for PEG placement.     F: none   E: replete as needed  N: GI consult for PEG placement  HSQ, SCDs, PPI IV  MVT, Thiamine, Folic Acid  Admit to MICU

## 2018-08-29 NOTE — CONSULT NOTE ADULT - CONSULT REQUESTED DATE/TIME
11-Aug-2018
11-Aug-2018 10:01
11-Aug-2018 12:42
12-Aug-2018 15:20
16-Aug-2018 12:48
16-Aug-2018 18:35
20-Aug-2018 16:41
24-Aug-2018 23:25
26-Aug-2018 11:07
28-Aug-2018 14:34
29-Aug-2018 14:06
26-Aug-2018 10:42

## 2018-08-29 NOTE — CONSULT NOTE ADULT - PROVIDER SPECIALTY LIST ADULT
Cardiology
Gastroenterology
Heme/Onc
Internal Medicine
Neurology
Neurosurgery
Palliative Care
Rheumatology
Surgery
MICU

## 2018-08-29 NOTE — CONSULT NOTE ADULT - PROBLEM SELECTOR RECOMMENDATION 6
Support provided to patient and family/friends. Patient to have access to supportive services during rest of hospital stay as the patient/family deemed necessary ie. Chaplaincy, Massage therapy, Music therapy, Patient and family supportive services, Palliative SW, etc.    PSSA Pending.

## 2018-08-29 NOTE — PROGRESS NOTE ADULT - SUBJECTIVE AND OBJECTIVE BOX
Pt seen and examined at bedside.    PERTINENT REVIEW OF SYSTEMS:  unable to assess    Allergies    No Known Allergies    Intolerances      MEDICATIONS:  MEDICATIONS  (STANDING):  aspirin Suppository 300 milliGRAM(s) Rectal daily  BACItracin   Ointment 1 Application(s) Topical daily  chlorhexidine 0.12% Liquid 15 milliLiter(s) Swish and Spit two times a day  chlorhexidine 2% Cloths 1 Application(s) Topical daily  cloNIDine 0.1 milliGRAM(s) Oral two times a day  dextrose 5%. 1000 milliLiter(s) (50 mL/Hr) IV Continuous <Continuous>  dextrose 50% Injectable 12.5 Gram(s) IV Push once  dextrose 50% Injectable 25 Gram(s) IV Push once  dextrose 50% Injectable 25 Gram(s) IV Push once  folic acid Injectable 1 milliGRAM(s) IV Push daily  heparin  flush 100 Units/mL Injectable 100 Unit(s) IV Push every other day  heparin  Injectable 5000 Unit(s) SubCutaneous every 8 hours  hydrALAZINE 50 milliGRAM(s) Oral every 8 hours  insulin glargine Injectable (LANTUS) 5 Unit(s) SubCutaneous at bedtime  insulin lispro (HumaLOG) corrective regimen sliding scale   SubCutaneous every 6 hours  levETIRAcetam  IVPB 500 milliGRAM(s) IV Intermittent every 12 hours  methylPREDNISolone sodium succinate IVPB 500 milliGRAM(s) IV Intermittent every 12 hours  niCARdipine Infusion 5 mG/Hr (25 mL/Hr) IV Continuous <Continuous>  ondansetron Injectable 4 milliGRAM(s) IV Push every 6 hours  pantoprazole  Injectable 40 milliGRAM(s) IV Push daily  piperacillin/tazobactam IVPB. 2.25 Gram(s) IV Intermittent every 6 hours  potassium chloride  10 mEq/100 mL IVPB 10 milliEquivalent(s) IV Intermittent every 1 hour  propofol Infusion 13.774 MICROgram(s)/kG/Min (5 mL/Hr) IV Continuous <Continuous>  sertraline 75 milliGRAM(s) Oral daily  sodium chloride 0.9%. 1000 milliLiter(s) (80 mL/Hr) IV Continuous <Continuous>  thiamine Injectable 100 milliGRAM(s) IV Push daily    MEDICATIONS  (PRN):  acetaminophen    Suspension 650 milliGRAM(s) Oral every 6 hours PRN For Temp greater than 38 C (100.4 F)  dextrose 40% Gel 15 Gram(s) Oral once PRN Blood Glucose LESS THAN 70 milliGRAM(s)/deciliter  glucagon  Injectable 1 milliGRAM(s) IntraMuscular once PRN Glucose LESS THAN 70 milligrams/deciliter    Vital Signs Last 24 Hrs  T(C): 38.9 (29 Aug 2018 09:46), Max: 39.2 (28 Aug 2018 14:00)  T(F): 102 (29 Aug 2018 09:46), Max: 102.6 (28 Aug 2018 14:00)  HR: 72 (29 Aug 2018 10:00) (70 - 116)  BP: 178/68 (28 Aug 2018 21:08) (153/67 - 178/68)  BP(mean): 102 (28 Aug 2018 21:08) (102 - 115)  RR: 10 (29 Aug 2018 10:00) (10 - 21)  SpO2: 95% (29 Aug 2018 10:00) (94% - 99%)     @ 07:  -   @ 07:00  --------------------------------------------------------  IN: 2880 mL / OUT: 360 mL / NET: 2520 mL     @ : @ 12:15  --------------------------------------------------------  IN: 400 mL / OUT: 0 mL / NET: 400 mL      PHYSICAL EXAM:    General: intubated, sedated, posturing  HEENT: MMM, conjunctiva and sclera clear  Gastrointestinal: Soft non-tender non-distended; Normal bowel sounds; No hepatosplenomegaly. No rebound or guarding  Skin: Warm and dry. No obvious rash    LABS:                        8.9    16.0  )-----------( 171      ( 29 Aug 2018 03:20 )             27.1     08    146<H>  |  109<H>  |  47<H>  ----------------------------<  167<H>  3.7   |  22  |  1.86<H>    Ca    8.6      29 Aug 2018 03:20  Phos  3.1       Mg     2.4         TPro  6.0  /  Alb  3.6  /  TBili  0.4  /  DBili  <0.2  /  AST  10  /  ALT  26  /  AlkPhos  52  08-    PT/INR - ( 29 Aug 2018 03:20 )   PT: 11.9 sec;   INR: 1.07          PTT - ( 29 Aug 2018 03:20 )  PTT:26.3 sec      Urinalysis Basic - ( 28 Aug 2018 15:13 )    Color: Yellow / Appearance: SL Cloudy / S.015 / pH: x  Gluc: x / Ketone: NEGATIVE  / Bili: Negative / Urobili: 0.2 E.U./dL   Blood: x / Protein: 100 mg/dL / Nitrite: POSITIVE   Leuk Esterase: Small / RBC: 5-10 /HPF / WBC > 10 /HPF   Sq Epi: x / Non Sq Epi: 5-10 /HPF / Bacteria: Present /HPF                Culture - Urine (collected 28 Aug 2018 22:10)  Source: Catheterized Catheterized  Preliminary Report (29 Aug 2018 09:51):    No growth to date.    Culture - Sputum (collected 28 Aug 2018 17:48)  Source: .Sputum Sputum  Gram Stain (29 Aug 2018 11:00):    Numerous white blood cells    Moderate epithelial cells    Moderate Gram positive cocci in pairs    Moderate Gram Negative Rods    Moderate Gram Positive Rods  Preliminary Report (29 Aug 2018 11:07):    Moderate Gram Negative Rods    Identification and susceptibility to follow.    Culture - Blood (collected 28 Aug 2018 14:33)  Source: .Blood Blood-Venous  Preliminary Report (29 Aug 2018 03:01):    No growth at 12 hours    Culture - Blood (collected 28 Aug 2018 14:33)  Source: .Blood Blood  Preliminary Report (29 Aug 2018 03:01):    No growth at 12 hours      RADIOLOGY & ADDITIONAL STUDIES:

## 2018-08-29 NOTE — PROGRESS NOTE ADULT - SUBJECTIVE AND OBJECTIVE BOX
Neurology Stroke Progress Note     INTERVAL HPI/OVERNIGHT EVENTS:  Patient seen and examined. Patient continuing to spike fevers overnight. Planned for PEG today    MEDICATIONS  (STANDING):  aspirin Suppository 300 milliGRAM(s) Rectal daily  BACItracin   Ointment 1 Application(s) Topical daily  chlorhexidine 0.12% Liquid 15 milliLiter(s) Swish and Spit two times a day  chlorhexidine 2% Cloths 1 Application(s) Topical daily  cloNIDine 0.1 milliGRAM(s) Oral two times a day  dextrose 5%. 1000 milliLiter(s) (50 mL/Hr) IV Continuous <Continuous>  dextrose 50% Injectable 12.5 Gram(s) IV Push once  dextrose 50% Injectable 25 Gram(s) IV Push once  dextrose 50% Injectable 25 Gram(s) IV Push once  folic acid Injectable 1 milliGRAM(s) IV Push daily  heparin  flush 100 Units/mL Injectable 100 Unit(s) IV Push every other day  heparin  Injectable 5000 Unit(s) SubCutaneous every 8 hours  hydrALAZINE 50 milliGRAM(s) Oral every 8 hours  insulin glargine Injectable (LANTUS) 5 Unit(s) SubCutaneous at bedtime  insulin lispro (HumaLOG) corrective regimen sliding scale   SubCutaneous every 6 hours  levETIRAcetam  IVPB 250 milliGRAM(s) IV Intermittent every 12 hours  methylPREDNISolone sodium succinate IVPB 500 milliGRAM(s) IV Intermittent every 12 hours  niCARdipine Infusion 5 mG/Hr (25 mL/Hr) IV Continuous <Continuous>  ondansetron Injectable 4 milliGRAM(s) IV Push every 6 hours  pantoprazole  Injectable 40 milliGRAM(s) IV Push daily  piperacillin/tazobactam IVPB. 2.25 Gram(s) IV Intermittent every 6 hours  potassium chloride  10 mEq/100 mL IVPB 10 milliEquivalent(s) IV Intermittent every 1 hour  propofol Infusion 13.774 MICROgram(s)/kG/Min (5 mL/Hr) IV Continuous <Continuous>  sertraline 75 milliGRAM(s) Oral daily  sodium chloride 0.9%. 1000 milliLiter(s) (80 mL/Hr) IV Continuous <Continuous>  thiamine Injectable 100 milliGRAM(s) IV Push daily    MEDICATIONS  (PRN):  acetaminophen    Suspension 650 milliGRAM(s) Oral every 6 hours PRN For Temp greater than 38 C (100.4 F)  dextrose 40% Gel 15 Gram(s) Oral once PRN Blood Glucose LESS THAN 70 milliGRAM(s)/deciliter  glucagon  Injectable 1 milliGRAM(s) IntraMuscular once PRN Glucose LESS THAN 70 milligrams/deciliter      Allergies    No Known Allergies        ROS: As per HPI, otherwise negative    Vital Signs Last 24 Hrs  T(C): 38.3 (29 Aug 2018 06:00), Max: 39.2 (28 Aug 2018 14:00)  T(F): 100.9 (29 Aug 2018 06:00), Max: 102.6 (28 Aug 2018 14:00)  HR: 80 (29 Aug 2018 08:00) (70 - 90)  BP: 178/68 (28 Aug 2018 21:08) (153/67 - 186/74)  BP(mean): 102 (28 Aug 2018 21:08) (102 - 117)  RR: 13 (29 Aug 2018 08:00) (10 - 17)  SpO2: 99% (29 Aug 2018 08:00) (95% - 99%)    Physical exam:  Gen: intubated, on propofol drip.  Mental status -   Cranial nerves - PERRL+ , left facial droop, right eye deviated to the right. Both eyes not passing midline. unable to assess others 2/2 intubation  Motor - Not cooperating with strength testing  Sensation - reflexive to pain of both lower extremities  Cerebellar - did not cooperate  Gait - deferred        LABS:                        8.9    16.0  )-----------( 171      ( 29 Aug 2018 03:20 )             27.1     08-29    146<H>  |  109<H>  |  47<H>  ----------------------------<  167<H>  3.7   |  22  |  1.86<H>    Ca    8.6      29 Aug 2018 03:20  Phos  3.1     08-29  Mg     2.4     08-29    TPro  6.0  /  Alb  3.6  /  TBili  0.4  /  DBili  <0.2  /  AST  10  /  ALT  26  /  AlkPhos  52  08-28    PT/INR - ( 29 Aug 2018 03:20 )   PT: 11.9 sec;   INR: 1.07          PTT - ( 29 Aug 2018 03:20 )  PTT:26.3 sec  Urinalysis Basic - ( 28 Aug 2018 15:13 )    Color: Yellow / Appearance: SL Cloudy / S.015 / pH: x  Gluc: x / Ketone: NEGATIVE  / Bili: Negative / Urobili: 0.2 E.U./dL   Blood: x / Protein: 100 mg/dL / Nitrite: POSITIVE   Leuk Esterase: Small / RBC: 5-10 /HPF / WBC > 10 /HPF   Sq Epi: x / Non Sq Epi: 5-10 /HPF / Bacteria: Present /HPF        RADIOLOGY & ADDITIONAL TESTS:  no new cerebral imaging      Assessment and Plan  68F PMHx MDD here with 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 Febrile overnight and today    1. severe vasculitis with recurrent strokes - case discussed with rheum and critical care.  -Continue solumedrol to 500 q12h x 72 h (today is day 3)  -keep  -180  -cont aspirin     2. Resp failure, fevers   -likely will need ID involvement as immunosuppressed    -rest of care per MICU team  -plan for PEG today    DVT prophylaxis   -Heparin SQ TID and SCDs Neurology Stroke Progress Note     INTERVAL HPI/OVERNIGHT EVENTS:  Patient seen and examined. Patient continuing to spike fevers overnight. Per GI note, planned for PEG today    MEDICATIONS  (STANDING):  aspirin Suppository 300 milliGRAM(s) Rectal daily  BACItracin   Ointment 1 Application(s) Topical daily  chlorhexidine 0.12% Liquid 15 milliLiter(s) Swish and Spit two times a day  chlorhexidine 2% Cloths 1 Application(s) Topical daily  cloNIDine 0.1 milliGRAM(s) Oral two times a day  dextrose 5%. 1000 milliLiter(s) (50 mL/Hr) IV Continuous <Continuous>  dextrose 50% Injectable 12.5 Gram(s) IV Push once  dextrose 50% Injectable 25 Gram(s) IV Push once  dextrose 50% Injectable 25 Gram(s) IV Push once  folic acid Injectable 1 milliGRAM(s) IV Push daily  heparin  flush 100 Units/mL Injectable 100 Unit(s) IV Push every other day  heparin  Injectable 5000 Unit(s) SubCutaneous every 8 hours  hydrALAZINE 50 milliGRAM(s) Oral every 8 hours  insulin glargine Injectable (LANTUS) 5 Unit(s) SubCutaneous at bedtime  insulin lispro (HumaLOG) corrective regimen sliding scale   SubCutaneous every 6 hours  levETIRAcetam  IVPB 250 milliGRAM(s) IV Intermittent every 12 hours  methylPREDNISolone sodium succinate IVPB 500 milliGRAM(s) IV Intermittent every 12 hours  niCARdipine Infusion 5 mG/Hr (25 mL/Hr) IV Continuous <Continuous>  ondansetron Injectable 4 milliGRAM(s) IV Push every 6 hours  pantoprazole  Injectable 40 milliGRAM(s) IV Push daily  piperacillin/tazobactam IVPB. 2.25 Gram(s) IV Intermittent every 6 hours  potassium chloride  10 mEq/100 mL IVPB 10 milliEquivalent(s) IV Intermittent every 1 hour  propofol Infusion 13.774 MICROgram(s)/kG/Min (5 mL/Hr) IV Continuous <Continuous>  sertraline 75 milliGRAM(s) Oral daily  sodium chloride 0.9%. 1000 milliLiter(s) (80 mL/Hr) IV Continuous <Continuous>  thiamine Injectable 100 milliGRAM(s) IV Push daily    MEDICATIONS  (PRN):  acetaminophen    Suspension 650 milliGRAM(s) Oral every 6 hours PRN For Temp greater than 38 C (100.4 F)  dextrose 40% Gel 15 Gram(s) Oral once PRN Blood Glucose LESS THAN 70 milliGRAM(s)/deciliter  glucagon  Injectable 1 milliGRAM(s) IntraMuscular once PRN Glucose LESS THAN 70 milligrams/deciliter      Allergies    No Known Allergies        ROS: As per HPI, otherwise negative    Vital Signs Last 24 Hrs  T(C): 38.3 (29 Aug 2018 06:00), Max: 39.2 (28 Aug 2018 14:00)  T(F): 100.9 (29 Aug 2018 06:00), Max: 102.6 (28 Aug 2018 14:00)  HR: 80 (29 Aug 2018 08:00) (70 - 90)  BP: 178/68 (28 Aug 2018 21:08) (153/67 - 186/74)  BP(mean): 102 (28 Aug 2018 21:08) (102 - 117)  RR: 13 (29 Aug 2018 08:00) (10 - 17)  SpO2: 99% (29 Aug 2018 08:00) (95% - 99%)    Physical exam:  Gen: intubated, on propofol drip.  Mental status -   Cranial nerves - PERRL+ , left facial droop, eyes midline though do not move to the left, only to the right with manual head movement. unable to assess others 2/2 intubation  Motor - Not cooperating with strength testing.   Sensation - reflexive to pain of both lower extremities  Cerebellar - did not cooperate  Gait - deferred        LABS:                        8.9    16.0  )-----------( 171      ( 29 Aug 2018 03:20 )             27.1     08-29    146<H>  |  109<H>  |  47<H>  ----------------------------<  167<H>  3.7   |  22  |  1.86<H>    Ca    8.6      29 Aug 2018 03:20  Phos  3.1     08-29  Mg     2.4     -    TPro  6.0  /  Alb  3.6  /  TBili  0.4  /  DBili  <0.2  /  AST  10  /  ALT  26  /  AlkPhos  52  08-28    PT/INR - ( 29 Aug 2018 03:20 )   PT: 11.9 sec;   INR: 1.07          PTT - ( 29 Aug 2018 03:20 )  PTT:26.3 sec  Urinalysis Basic - ( 28 Aug 2018 15:13 )    Color: Yellow / Appearance: SL Cloudy / S.015 / pH: x  Gluc: x / Ketone: NEGATIVE  / Bili: Negative / Urobili: 0.2 E.U./dL   Blood: x / Protein: 100 mg/dL / Nitrite: POSITIVE   Leuk Esterase: Small / RBC: 5-10 /HPF / WBC > 10 /HPF   Sq Epi: x / Non Sq Epi: 5-10 /HPF / Bacteria: Present /HPF        RADIOLOGY & ADDITIONAL TESTS:  no new cerebral imaging      Assessment and Plan  68F PMHx MDD here with 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 Febrile overnight and today    1. severe vasculitis with recurrent strokes - case discussed with rheum and critical care.  -Continue solumedrol to 500 q12h x 72 h (today is day 3)  -keep  -180  -cont aspirin     2. Resp failure, fevers   -likely will need ID involvement as immunosuppressed    -rest of care per MICU team  -plan for PEG today    DVT prophylaxis   -Heparin SQ TID and SCDs Neurology Stroke Progress Note     INTERVAL HPI/OVERNIGHT EVENTS:  Patient seen and examined. Patient continuing to spike fevers overnight. Pt with some bilateral posturing with sternal rub.     MEDICATIONS  (STANDING):  aspirin Suppository 300 milliGRAM(s) Rectal daily  BACItracin   Ointment 1 Application(s) Topical daily  chlorhexidine 0.12% Liquid 15 milliLiter(s) Swish and Spit two times a day  chlorhexidine 2% Cloths 1 Application(s) Topical daily  cloNIDine 0.1 milliGRAM(s) Oral two times a day  dextrose 5%. 1000 milliLiter(s) (50 mL/Hr) IV Continuous <Continuous>  dextrose 50% Injectable 12.5 Gram(s) IV Push once  dextrose 50% Injectable 25 Gram(s) IV Push once  dextrose 50% Injectable 25 Gram(s) IV Push once  folic acid Injectable 1 milliGRAM(s) IV Push daily  heparin  flush 100 Units/mL Injectable 100 Unit(s) IV Push every other day  heparin  Injectable 5000 Unit(s) SubCutaneous every 8 hours  hydrALAZINE 50 milliGRAM(s) Oral every 8 hours  insulin glargine Injectable (LANTUS) 5 Unit(s) SubCutaneous at bedtime  insulin lispro (HumaLOG) corrective regimen sliding scale   SubCutaneous every 6 hours  levETIRAcetam  IVPB 250 milliGRAM(s) IV Intermittent every 12 hours  methylPREDNISolone sodium succinate IVPB 500 milliGRAM(s) IV Intermittent every 12 hours  niCARdipine Infusion 5 mG/Hr (25 mL/Hr) IV Continuous <Continuous>  ondansetron Injectable 4 milliGRAM(s) IV Push every 6 hours  pantoprazole  Injectable 40 milliGRAM(s) IV Push daily  piperacillin/tazobactam IVPB. 2.25 Gram(s) IV Intermittent every 6 hours  potassium chloride  10 mEq/100 mL IVPB 10 milliEquivalent(s) IV Intermittent every 1 hour  propofol Infusion 13.774 MICROgram(s)/kG/Min (5 mL/Hr) IV Continuous <Continuous>  sertraline 75 milliGRAM(s) Oral daily  sodium chloride 0.9%. 1000 milliLiter(s) (80 mL/Hr) IV Continuous <Continuous>  thiamine Injectable 100 milliGRAM(s) IV Push daily    MEDICATIONS  (PRN):  acetaminophen    Suspension 650 milliGRAM(s) Oral every 6 hours PRN For Temp greater than 38 C (100.4 F)  dextrose 40% Gel 15 Gram(s) Oral once PRN Blood Glucose LESS THAN 70 milliGRAM(s)/deciliter  glucagon  Injectable 1 milliGRAM(s) IntraMuscular once PRN Glucose LESS THAN 70 milligrams/deciliter      Allergies    No Known Allergies        ROS: As per HPI, otherwise negative    Vital Signs Last 24 Hrs  T(C): 38.3 (29 Aug 2018 06:00), Max: 39.2 (28 Aug 2018 14:00)  T(F): 100.9 (29 Aug 2018 06:00), Max: 102.6 (28 Aug 2018 14:00)  HR: 80 (29 Aug 2018 08:00) (70 - 90)  BP: 178/68 (28 Aug 2018 21:08) (153/67 - 186/74)  BP(mean): 102 (28 Aug 2018 21:08) (102 - 117)  RR: 13 (29 Aug 2018 08:00) (10 - 17)  SpO2: 99% (29 Aug 2018 08:00) (95% - 99%)    Physical exam:  Gen: intubated, on propofol drip.  Mental status -   Cranial nerves - PERRL+ , left facial droop, Eyes driving to the right in pulsing movements. Does not cross midline  Motor - bilateral upper extremity decerebrate posturing with sternal rub  Sensation - reflexive to pain of both lower extremities  Cerebellar - did not cooperate  Gait - deferred        LABS:                        8.9    16.0  )-----------( 171      ( 29 Aug 2018 03:20 )             27.1     08-29    146<H>  |  109<H>  |  47<H>  ----------------------------<  167<H>  3.7   |  22  |  1.86<H>    Ca    8.6      29 Aug 2018 03:20  Phos  3.1     -  Mg     2.4     -    TPro  6.0  /  Alb  3.6  /  TBili  0.4  /  DBili  <0.2  /  AST  10  /  ALT  26  /  AlkPhos  52  08-28    PT/INR - ( 29 Aug 2018 03:20 )   PT: 11.9 sec;   INR: 1.07          PTT - ( 29 Aug 2018 03:20 )  PTT:26.3 sec  Urinalysis Basic - ( 28 Aug 2018 15:13 )    Color: Yellow / Appearance: SL Cloudy / S.015 / pH: x  Gluc: x / Ketone: NEGATIVE  / Bili: Negative / Urobili: 0.2 E.U./dL   Blood: x / Protein: 100 mg/dL / Nitrite: POSITIVE   Leuk Esterase: Small / RBC: 5-10 /HPF / WBC > 10 /HPF   Sq Epi: x / Non Sq Epi: 5-10 /HPF / Bacteria: Present /HPF        RADIOLOGY & ADDITIONAL TESTS:  no new cerebral imaging      Assessment and Plan  68F PMHx MDD here with 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 Febrile overnight and today. Worsening neuro exam, also with concern for seizure.     1. severe vasculitis with recurrent strokes, worsening neuro exam  -Continue solumedrol to 500 q12h x 72 h (today is day 3)  -keep  -180  -cont aspirin   -Obtain vEEG  -Repeat HCT    2. Resp failure, fevers   -likely will need ID involvement as immunosuppressed  -24 hour around the clock tylenol    -rest of care per MICU team    DVT prophylaxis   -Heparin SQ TID and SCDs

## 2018-08-29 NOTE — PROGRESS NOTE ADULT - SUBJECTIVE AND OBJECTIVE BOX
Rheumatology Follow Up Evaluation     68 year old female with severe refractory CNS vasculitis.   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   Decorticate posturing.       Data reviewed, notable for:   CBC with worsening leukocytosis    BUN/Cr improving  Renin/rohan 135/10   AST/ALT WNL   ESR 45  LAC neg  Cardiolipin neg   RF neg   ANCA neg  KORINA neg   sm/RNP neg  ssa/ssb neg   IgG4 subclasses normal   Quantiferon indeterminate     KATHERINE no valvular disease or pericardial effusion  CTH/MRA with progressing strokes as described above   Angiogram CNS vasculitis   CTA with chronic L VA occlusion, R VA and basilar hypoplasia, carotid stenosis bilaterally   Renal US with asymmetric kidneys possibly representing nephrosclerosis   CXR nml

## 2018-08-29 NOTE — CONSULT NOTE ADULT - CONSULT REQUESTED BY NAME
[id number]
Devon Saeed
Dr Paul
Dr Sapp
Dr Venegas
Dr. Carlos
Dr. Paul
Dr. Sapp
[place]ou
Dr. Sapp

## 2018-08-30 LAB
-  AMPICILLIN/SULBACTAM: SIGNIFICANT CHANGE UP
-  AMPICILLIN: SIGNIFICANT CHANGE UP
-  CEFAZOLIN: SIGNIFICANT CHANGE UP
-  CEFTRIAXONE: SIGNIFICANT CHANGE UP
-  CIPROFLOXACIN: SIGNIFICANT CHANGE UP
-  GENTAMICIN: SIGNIFICANT CHANGE UP
-  PIPERACILLIN/TAZOBACTAM: SIGNIFICANT CHANGE UP
-  TOBRAMYCIN: SIGNIFICANT CHANGE UP
-  TRIMETHOPRIM/SULFAMETHOXAZOLE: SIGNIFICANT CHANGE UP
ANION GAP SERPL CALC-SCNC: 15 MMOL/L — SIGNIFICANT CHANGE UP (ref 5–17)
BASOPHILS NFR BLD AUTO: 0 % — SIGNIFICANT CHANGE UP (ref 0–2)
BUN SERPL-MCNC: 53 MG/DL — HIGH (ref 7–23)
CALCIUM SERPL-MCNC: 8.5 MG/DL — SIGNIFICANT CHANGE UP (ref 8.4–10.5)
CHLORIDE SERPL-SCNC: 113 MMOL/L — HIGH (ref 96–108)
CO2 SERPL-SCNC: 20 MMOL/L — LOW (ref 22–31)
CREAT SERPL-MCNC: 1.85 MG/DL — HIGH (ref 0.5–1.3)
CULTURE RESULTS: SIGNIFICANT CHANGE UP
EOSINOPHIL NFR BLD AUTO: 0 % — SIGNIFICANT CHANGE UP (ref 0–6)
GLUCOSE BLDC GLUCOMTR-MCNC: 197 MG/DL — HIGH (ref 70–99)
GLUCOSE BLDC GLUCOMTR-MCNC: 200 MG/DL — HIGH (ref 70–99)
GLUCOSE BLDC GLUCOMTR-MCNC: 216 MG/DL — HIGH (ref 70–99)
GLUCOSE SERPL-MCNC: 205 MG/DL — HIGH (ref 70–99)
HCT VFR BLD CALC: 24.7 % — LOW (ref 34.5–45)
HGB BLD-MCNC: 8.1 G/DL — LOW (ref 11.5–15.5)
LYMPHOCYTES # BLD AUTO: 2.1 % — LOW (ref 13–44)
MAGNESIUM SERPL-MCNC: 2.5 MG/DL — SIGNIFICANT CHANGE UP (ref 1.6–2.6)
MCHC RBC-ENTMCNC: 29.5 PG — SIGNIFICANT CHANGE UP (ref 27–34)
MCHC RBC-ENTMCNC: 32.8 G/DL — SIGNIFICANT CHANGE UP (ref 32–36)
MCV RBC AUTO: 89.8 FL — SIGNIFICANT CHANGE UP (ref 80–100)
METHOD TYPE: SIGNIFICANT CHANGE UP
MONOCYTES NFR BLD AUTO: 0.7 % — LOW (ref 2–14)
NEUTROPHILS NFR BLD AUTO: 97.2 % — HIGH (ref 43–77)
ORGANISM # SPEC MICROSCOPIC CNT: SIGNIFICANT CHANGE UP
ORGANISM # SPEC MICROSCOPIC CNT: SIGNIFICANT CHANGE UP
PHOSPHATE SERPL-MCNC: 3.4 MG/DL — SIGNIFICANT CHANGE UP (ref 2.5–4.5)
PLATELET # BLD AUTO: 154 K/UL — SIGNIFICANT CHANGE UP (ref 150–400)
POTASSIUM SERPL-MCNC: 3.6 MMOL/L — SIGNIFICANT CHANGE UP (ref 3.5–5.3)
POTASSIUM SERPL-SCNC: 3.6 MMOL/L — SIGNIFICANT CHANGE UP (ref 3.5–5.3)
RBC # BLD: 2.75 M/UL — LOW (ref 3.8–5.2)
RBC # FLD: 15.7 % — SIGNIFICANT CHANGE UP (ref 10.3–16.9)
SODIUM SERPL-SCNC: 148 MMOL/L — HIGH (ref 135–145)
SPECIMEN SOURCE: SIGNIFICANT CHANGE UP
WBC # BLD: 12 K/UL — HIGH (ref 3.8–10.5)
WBC # FLD AUTO: 12 K/UL — HIGH (ref 3.8–10.5)

## 2018-08-30 PROCEDURE — 99233 SBSQ HOSP IP/OBS HIGH 50: CPT | Mod: GC

## 2018-08-30 PROCEDURE — 95951: CPT | Mod: 26

## 2018-08-30 PROCEDURE — 99291 CRITICAL CARE FIRST HOUR: CPT

## 2018-08-30 PROCEDURE — 71045 X-RAY EXAM CHEST 1 VIEW: CPT | Mod: 26

## 2018-08-30 RX ORDER — INSULIN GLARGINE 100 [IU]/ML
10 INJECTION, SOLUTION SUBCUTANEOUS AT BEDTIME
Qty: 0 | Refills: 0 | Status: DISCONTINUED | OUTPATIENT
Start: 2018-08-30 | End: 2018-08-31

## 2018-08-30 RX ORDER — POTASSIUM CHLORIDE 20 MEQ
40 PACKET (EA) ORAL ONCE
Qty: 0 | Refills: 0 | Status: COMPLETED | OUTPATIENT
Start: 2018-08-30 | End: 2018-08-30

## 2018-08-30 RX ORDER — NITROGLYCERIN 6.5 MG
0.5 CAPSULE, EXTENDED RELEASE ORAL
Qty: 0 | Refills: 0 | Status: DISCONTINUED | OUTPATIENT
Start: 2018-08-30 | End: 2018-08-31

## 2018-08-30 RX ORDER — HYDRALAZINE HCL 50 MG
100 TABLET ORAL EVERY 8 HOURS
Qty: 0 | Refills: 0 | Status: DISCONTINUED | OUTPATIENT
Start: 2018-08-30 | End: 2018-08-31

## 2018-08-30 RX ORDER — INSULIN HUMAN 100 [IU]/ML
INJECTION, SOLUTION SUBCUTANEOUS EVERY 6 HOURS
Qty: 0 | Refills: 0 | Status: DISCONTINUED | OUTPATIENT
Start: 2018-08-30 | End: 2018-09-05

## 2018-08-30 RX ORDER — INSULIN HUMAN 100 [IU]/ML
3 INJECTION, SOLUTION SUBCUTANEOUS EVERY 6 HOURS
Qty: 0 | Refills: 0 | Status: DISCONTINUED | OUTPATIENT
Start: 2018-08-30 | End: 2018-08-31

## 2018-08-30 RX ORDER — POTASSIUM CHLORIDE 20 MEQ
40 PACKET (EA) ORAL ONCE
Qty: 0 | Refills: 0 | Status: DISCONTINUED | OUTPATIENT
Start: 2018-08-30 | End: 2018-08-30

## 2018-08-30 RX ORDER — HUMAN INSULIN 100 [IU]/ML
5 INJECTION, SUSPENSION SUBCUTANEOUS ONCE
Qty: 0 | Refills: 0 | Status: COMPLETED | OUTPATIENT
Start: 2018-08-30 | End: 2018-08-30

## 2018-08-30 RX ORDER — AMLODIPINE BESYLATE 2.5 MG/1
10 TABLET ORAL ONCE
Qty: 0 | Refills: 0 | Status: COMPLETED | OUTPATIENT
Start: 2018-08-30 | End: 2018-08-30

## 2018-08-30 RX ADMIN — PIPERACILLIN AND TAZOBACTAM 200 GRAM(S): 4; .5 INJECTION, POWDER, LYOPHILIZED, FOR SOLUTION INTRAVENOUS at 00:15

## 2018-08-30 RX ADMIN — SERTRALINE 75 MILLIGRAM(S): 25 TABLET, FILM COATED ORAL at 11:29

## 2018-08-30 RX ADMIN — INSULIN GLARGINE 10 UNIT(S): 100 INJECTION, SOLUTION SUBCUTANEOUS at 22:36

## 2018-08-30 RX ADMIN — Medication 2: at 06:38

## 2018-08-30 RX ADMIN — Medication 100 MILLIGRAM(S): at 19:43

## 2018-08-30 RX ADMIN — CHLORHEXIDINE GLUCONATE 15 MILLILITER(S): 213 SOLUTION TOPICAL at 17:40

## 2018-08-30 RX ADMIN — NICARDIPINE HYDROCHLORIDE 25 MG/HR: 30 CAPSULE, EXTENDED RELEASE ORAL at 14:00

## 2018-08-30 RX ADMIN — Medication 4: at 00:24

## 2018-08-30 RX ADMIN — PIPERACILLIN AND TAZOBACTAM 200 GRAM(S): 4; .5 INJECTION, POWDER, LYOPHILIZED, FOR SOLUTION INTRAVENOUS at 23:51

## 2018-08-30 RX ADMIN — Medication 100 MILLIGRAM(S): at 11:29

## 2018-08-30 RX ADMIN — INSULIN HUMAN 4: 100 INJECTION, SOLUTION SUBCUTANEOUS at 23:37

## 2018-08-30 RX ADMIN — Medication 0.5 INCH(S): at 13:51

## 2018-08-30 RX ADMIN — HEPARIN SODIUM 5000 UNIT(S): 5000 INJECTION INTRAVENOUS; SUBCUTANEOUS at 22:33

## 2018-08-30 RX ADMIN — ONDANSETRON 4 MILLIGRAM(S): 8 TABLET, FILM COATED ORAL at 06:39

## 2018-08-30 RX ADMIN — INSULIN HUMAN 3 UNIT(S): 100 INJECTION, SOLUTION SUBCUTANEOUS at 11:12

## 2018-08-30 RX ADMIN — CHLORHEXIDINE GLUCONATE 15 MILLILITER(S): 213 SOLUTION TOPICAL at 06:39

## 2018-08-30 RX ADMIN — Medication 100 MILLIGRAM(S): at 06:39

## 2018-08-30 RX ADMIN — PANTOPRAZOLE SODIUM 40 MILLIGRAM(S): 20 TABLET, DELAYED RELEASE ORAL at 11:29

## 2018-08-30 RX ADMIN — Medication 650 MILLIGRAM(S): at 06:39

## 2018-08-30 RX ADMIN — CHLORHEXIDINE GLUCONATE 1 APPLICATION(S): 213 SOLUTION TOPICAL at 06:39

## 2018-08-30 RX ADMIN — ONDANSETRON 4 MILLIGRAM(S): 8 TABLET, FILM COATED ORAL at 11:33

## 2018-08-30 RX ADMIN — HEPARIN SODIUM 5000 UNIT(S): 5000 INJECTION INTRAVENOUS; SUBCUTANEOUS at 06:39

## 2018-08-30 RX ADMIN — HEPARIN SODIUM 5000 UNIT(S): 5000 INJECTION INTRAVENOUS; SUBCUTANEOUS at 13:32

## 2018-08-30 RX ADMIN — NICARDIPINE HYDROCHLORIDE 25 MG/HR: 30 CAPSULE, EXTENDED RELEASE ORAL at 06:42

## 2018-08-30 RX ADMIN — INSULIN HUMAN 2: 100 INJECTION, SOLUTION SUBCUTANEOUS at 17:41

## 2018-08-30 RX ADMIN — NICARDIPINE HYDROCHLORIDE 25 MG/HR: 30 CAPSULE, EXTENDED RELEASE ORAL at 19:44

## 2018-08-30 RX ADMIN — PIPERACILLIN AND TAZOBACTAM 200 GRAM(S): 4; .5 INJECTION, POWDER, LYOPHILIZED, FOR SOLUTION INTRAVENOUS at 17:41

## 2018-08-30 RX ADMIN — HUMAN INSULIN 5 UNIT(S): 100 INJECTION, SUSPENSION SUBCUTANEOUS at 15:38

## 2018-08-30 RX ADMIN — Medication 650 MILLIGRAM(S): at 23:51

## 2018-08-30 RX ADMIN — Medication 100 MILLIGRAM(S): at 22:33

## 2018-08-30 RX ADMIN — Medication 40 MILLIEQUIVALENT(S): at 07:28

## 2018-08-30 RX ADMIN — AMLODIPINE BESYLATE 10 MILLIGRAM(S): 2.5 TABLET ORAL at 23:31

## 2018-08-30 RX ADMIN — Medication 100 MILLIGRAM(S): at 13:33

## 2018-08-30 RX ADMIN — LEVETIRACETAM 400 MILLIGRAM(S): 250 TABLET, FILM COATED ORAL at 10:58

## 2018-08-30 RX ADMIN — PIPERACILLIN AND TAZOBACTAM 200 GRAM(S): 4; .5 INJECTION, POWDER, LYOPHILIZED, FOR SOLUTION INTRAVENOUS at 11:34

## 2018-08-30 RX ADMIN — Medication 650 MILLIGRAM(S): at 17:40

## 2018-08-30 RX ADMIN — INSULIN HUMAN 2: 100 INJECTION, SOLUTION SUBCUTANEOUS at 11:45

## 2018-08-30 RX ADMIN — ONDANSETRON 4 MILLIGRAM(S): 8 TABLET, FILM COATED ORAL at 23:51

## 2018-08-30 RX ADMIN — ONDANSETRON 4 MILLIGRAM(S): 8 TABLET, FILM COATED ORAL at 00:15

## 2018-08-30 RX ADMIN — Medication 1 MILLIGRAM(S): at 12:00

## 2018-08-30 RX ADMIN — ONDANSETRON 4 MILLIGRAM(S): 8 TABLET, FILM COATED ORAL at 17:40

## 2018-08-30 RX ADMIN — INSULIN HUMAN 3 UNIT(S): 100 INJECTION, SOLUTION SUBCUTANEOUS at 23:37

## 2018-08-30 RX ADMIN — INSULIN HUMAN 3 UNIT(S): 100 INJECTION, SOLUTION SUBCUTANEOUS at 17:40

## 2018-08-30 RX ADMIN — NICARDIPINE HYDROCHLORIDE 25 MG/HR: 30 CAPSULE, EXTENDED RELEASE ORAL at 11:09

## 2018-08-30 RX ADMIN — Medication 650 MILLIGRAM(S): at 11:33

## 2018-08-30 RX ADMIN — Medication 75 MILLIGRAM(S): at 06:39

## 2018-08-30 RX ADMIN — LEVETIRACETAM 400 MILLIGRAM(S): 250 TABLET, FILM COATED ORAL at 22:37

## 2018-08-30 RX ADMIN — Medication 1 APPLICATION(S): at 11:29

## 2018-08-30 RX ADMIN — Medication 0.1 MILLIGRAM(S): at 11:11

## 2018-08-30 RX ADMIN — SODIUM CHLORIDE 80 MILLILITER(S): 9 INJECTION INTRAMUSCULAR; INTRAVENOUS; SUBCUTANEOUS at 04:30

## 2018-08-30 RX ADMIN — Medication 0.1 MILLIGRAM(S): at 17:42

## 2018-08-30 RX ADMIN — PIPERACILLIN AND TAZOBACTAM 200 GRAM(S): 4; .5 INJECTION, POWDER, LYOPHILIZED, FOR SOLUTION INTRAVENOUS at 06:38

## 2018-08-30 RX ADMIN — Medication 300 MILLIGRAM(S): at 11:30

## 2018-08-30 NOTE — PROGRESS NOTE ADULT - SUBJECTIVE AND OBJECTIVE BOX
Neurology Stroke Progress Note     INTERVAL HPI/OVERNIGHT EVENTS:  Patient seen and examined. Pt continues to not respond, with slight decerebrate posturing with sternal rub. Palliative on board    MEDICATIONS  (STANDING):  acetaminophen    Suspension 650 milliGRAM(s) Oral every 6 hours  aspirin Suppository 300 milliGRAM(s) Rectal daily  BACItracin   Ointment 1 Application(s) Topical daily  chlorhexidine 0.12% Liquid 15 milliLiter(s) Swish and Spit two times a day  chlorhexidine 2% Cloths 1 Application(s) Topical daily  cloNIDine 0.1 milliGRAM(s) Oral every 8 hours  dextrose 5%. 1000 milliLiter(s) (50 mL/Hr) IV Continuous <Continuous>  dextrose 50% Injectable 12.5 Gram(s) IV Push once  dextrose 50% Injectable 25 Gram(s) IV Push once  dextrose 50% Injectable 25 Gram(s) IV Push once  folic acid Injectable 1 milliGRAM(s) IV Push daily  heparin  flush 100 Units/mL Injectable 100 Unit(s) IV Push every other day  heparin  Injectable 5000 Unit(s) SubCutaneous every 8 hours  hydrALAZINE 100 milliGRAM(s) Oral every 8 hours  insulin glargine Injectable (LANTUS) 10 Unit(s) SubCutaneous at bedtime  insulin NPH human recombinant 5 Unit(s) SubCutaneous once  insulin regular  human corrective regimen sliding scale   SubCutaneous every 6 hours  insulin regular  human recombinant 3 Unit(s) SubCutaneous every 6 hours  levETIRAcetam  IVPB 500 milliGRAM(s) IV Intermittent every 12 hours  methylPREDNISolone sodium succinate IVPB 500 milliGRAM(s) IV Intermittent every 12 hours  niCARdipine Infusion 5 mG/Hr (25 mL/Hr) IV Continuous <Continuous>  ondansetron Injectable 4 milliGRAM(s) IV Push every 6 hours  pantoprazole  Injectable 40 milliGRAM(s) IV Push daily  piperacillin/tazobactam IVPB. 2.25 Gram(s) IV Intermittent every 6 hours  sertraline 75 milliGRAM(s) Oral daily  thiamine Injectable 100 milliGRAM(s) IV Push daily    MEDICATIONS  (PRN):  dextrose 40% Gel 15 Gram(s) Oral once PRN Blood Glucose LESS THAN 70 milliGRAM(s)/deciliter  glucagon  Injectable 1 milliGRAM(s) IntraMuscular once PRN Glucose LESS THAN 70 milligrams/deciliter      Allergies    No Known Allergies        ROS: As per HPI, otherwise negative    Vital Signs Last 24 Hrs  T(C): 37.6 (30 Aug 2018 09:04), Max: 38.8 (29 Aug 2018 22:11)  T(F): 99.7 (30 Aug 2018 09:04), Max: 101.8 (29 Aug 2018 22:11)  HR: 64 (30 Aug 2018 09:00) (58 - 76)  BP: 155/69 (30 Aug 2018 09:00) (155/69 - 155/69)  BP(mean): 100 (30 Aug 2018 09:00) (100 - 100)  RR: 22 (30 Aug 2018 09:00) (7 - 52)  SpO2: 97% (30 Aug 2018 11:59) (94% - 100%)    Physical exam:  Gen: intubated, on propofol drip.  Mental status -   Cranial nerves - PERRL+ , left facial droop, Right gaze preference. Does not cross midline  Motor - bilateral upper extremity decerebrate posturing with sternal rub  Sensation - reflexive to pain of both lower extremities  Cerebellar - did not cooperate  Gait - deferred        LABS:                        8.1    12.0  )-----------( 154      ( 30 Aug 2018 06:06 )             24.7     08-30    148<H>  |  113<H>  |  53<H>  ----------------------------<  205<H>  3.6   |  20<L>  |  1.85<H>    Ca    8.5      30 Aug 2018 06:06  Phos  3.4     08-30  Mg     2.5     08-30      PT/INR - ( 29 Aug 2018 03:20 )   PT: 11.9 sec;   INR: 1.07          PTT - ( 29 Aug 2018 03:20 )  PTT:26.3 sec  Urinalysis Basic - ( 28 Aug 2018 15:13 )    Color: Yellow / Appearance: SL Cloudy / S.015 / pH: x  Gluc: x / Ketone: NEGATIVE  / Bili: Negative / Urobili: 0.2 E.U./dL   Blood: x / Protein: 100 mg/dL / Nitrite: POSITIVE   Leuk Esterase: Small / RBC: 5-10 /HPF / WBC > 10 /HPF   Sq Epi: x / Non Sq Epi: 5-10 /HPF / Bacteria: Present /HPF        RADIOLOGY & ADDITIONAL TESTS:  < from: CT Head No Cont (18 @ 11:39) >    IMPRESSION: New hypodensity within the wilfred which may represent new   ischemia or artifact. The previously identified multiple ill-defined   areas of hypodensity within the right corona radiata are not well seen on   the current study. No intracranial hemorrhage.  Acute on subacute/chronic   ischemia would be better evaluated with a MRI with diffusion-weighted   images.       Assessment and Plan  68F PMHx MDD here with 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 Low grade fever overnight. Not responsive with some decerebrate posturing. New brainstem stroke found on CT yesterday. Palliative on board.    1. severe vasculitis with recurrent strokes, worsening neuro exam  -Continue solumedrol for 5 total days, then decrease to 125 BID  -keep  -180  -cont aspirin   -EEG read pending  -Repeat HCT yesterday with new brainstem stroke    2. Resp failure, fevers   -likely will need ID involvement as immunosuppressed  -palliative on board    -rest of care per MICU team    DVT prophylaxis   -Heparin SQ TID and SCDs

## 2018-08-30 NOTE — CHART NOTE - NSCHARTNOTEFT_GEN_A_CORE
PGY-3 UPDATE    Spoke with Leyla 8/29 evening regarding poor patient prognosis and risk of recurrent strokes in the setting of giant cell arteritis. Leyla states that she knows the patient from working in the [employer]. She also states that she did not know the patient well in 2016 and as of October 2017, the patient was not showing up to work, increasingly more depressed and disheveled. Leyla states that at that time she began to help the patient out with finances, food, and cleaning as the patient was unable to do so herself. She says the apartment is rent controlled. Leyla says that the patient has family, but they are estranged- ?brother and cousin (who is after her money?) whom the patient does not speak to. The patient is also  and has no children. Per Leyla, on the day of admission to [facility], patient had told her not to tell her family she was going to the hospital. Leyla also disclosed that the patient had signed the HCP form in the ambulance.    During this time, Leyla was noted to be intermittently tearful hearing the poor prognosis. At the same time would be asking the patient (who is unresponsive to commands), if she "would want to live" and Leyla believed her eye movements indicated yes (despite patient being unable to open her eyes). She is also unsure of what the patient would have wanted in terms of goals of care. When informed that patient is prone to further strokes and may not be able to regain her previous functional status, she was hesitant. She states that she would like to go forward with trach and peg, but would also like to discuss with Dr. Sapp regarding further treatment options.    Of note- per discussion with Dr. Romero and Mr. Harris Foote at bedside on 8/30, Leyla and him are going on vacation together and will be away until Monday. . Steven Foote seems to know the patient through Leyla. He also mentioned that the patient lives in a "valuable" apartment, but has been filthy.    I reviewed HCP form which is dated 8/10 (the day before admission to [facility]) which is pre-typed (patient's address and phone number), and witnessed by Mr. Steven Foote.    As no living will or advanced directives, it is difficult to assess the ability of Leyla and Mr. Steven Foote to act in the best interest of the patient regarding goals of care as it does not seem that they know the patient well.

## 2018-08-30 NOTE — PROGRESS NOTE ADULT - PROBLEM SELECTOR PLAN 4
HCP, Leyla Lizama, is waiting to discuss case with Neurology team. HCP form naming Leyla Lizama as her agent completed the day before admission. Form missing a second witness.

## 2018-08-30 NOTE — CHART NOTE - NSCHARTNOTEFT_GEN_A_CORE
Admitting Diagnosis:   Patient is a 68y old  Female who presents with a chief complaint of Depression, Stroke Code Called in Unit (11 Aug 2018 10:04)      PAST MEDICAL & SURGICAL HISTORY:  Hypertension  Depression  No significant past surgical history      Current Nutrition Order:  Glucerna 1.2 Rosales @ 45mL/hr x 24hrs via NGT (1080mL TV, 1296kcal, 65g protein, 869mL free H2O, 86% RDI, 1.43g/kg IBW protein)      PO Intake: Good (%) [   ]  Fair (50-75%) [   ] Poor (<25%) [   ]- N/A NPO w/EN    GI Issues: Unable to assess at this time 2/2 vent; good tolerance of EN per RN    Pain: Unable to assess at this time 2/2 vent     Skin Integrity: Calos 9, intact pressure-wise     Labs:   08-30    148<H>  |  113<H>  |  53<H>  ----------------------------<  205<H>  3.6   |  20<L>  |  1.85<H>    Ca    8.5      30 Aug 2018 06:06  Phos  3.4     08-30  Mg     2.5     08-30      CAPILLARY BLOOD GLUCOSE      POCT Blood Glucose.: 197 mg/dL (30 Aug 2018 11:32)  POCT Blood Glucose.: 200 mg/dL (30 Aug 2018 06:00)  POCT Blood Glucose.: 216 mg/dL (30 Aug 2018 00:07)  POCT Blood Glucose.: 210 mg/dL (29 Aug 2018 22:26)  POCT Blood Glucose.: 152 mg/dL (29 Aug 2018 18:21)      Medications:  MEDICATIONS  (STANDING):  acetaminophen    Suspension 650 milliGRAM(s) Oral every 6 hours  aspirin Suppository 300 milliGRAM(s) Rectal daily  BACItracin   Ointment 1 Application(s) Topical daily  chlorhexidine 0.12% Liquid 15 milliLiter(s) Swish and Spit two times a day  chlorhexidine 2% Cloths 1 Application(s) Topical daily  cloNIDine 0.1 milliGRAM(s) Oral every 8 hours  dextrose 5%. 1000 milliLiter(s) (50 mL/Hr) IV Continuous <Continuous>  dextrose 50% Injectable 12.5 Gram(s) IV Push once  dextrose 50% Injectable 25 Gram(s) IV Push once  dextrose 50% Injectable 25 Gram(s) IV Push once  folic acid Injectable 1 milliGRAM(s) IV Push daily  heparin  flush 100 Units/mL Injectable 100 Unit(s) IV Push every other day  heparin  Injectable 5000 Unit(s) SubCutaneous every 8 hours  hydrALAZINE 100 milliGRAM(s) Oral every 8 hours  insulin glargine Injectable (LANTUS) 10 Unit(s) SubCutaneous at bedtime  insulin NPH human recombinant 5 Unit(s) SubCutaneous once  insulin regular  human corrective regimen sliding scale   SubCutaneous every 6 hours  insulin regular  human recombinant 3 Unit(s) SubCutaneous every 6 hours  levETIRAcetam  IVPB 500 milliGRAM(s) IV Intermittent every 12 hours  methylPREDNISolone sodium succinate IVPB 500 milliGRAM(s) IV Intermittent every 12 hours  niCARdipine Infusion 5 mG/Hr (25 mL/Hr) IV Continuous <Continuous>  nitroglycerin    2% Ointment 0.5 Inch(s) Transdermal <User Schedule>  ondansetron Injectable 4 milliGRAM(s) IV Push every 6 hours  pantoprazole  Injectable 40 milliGRAM(s) IV Push daily  piperacillin/tazobactam IVPB. 2.25 Gram(s) IV Intermittent every 6 hours  sertraline 75 milliGRAM(s) Oral daily  thiamine Injectable 100 milliGRAM(s) IV Push daily    MEDICATIONS  (PRN):  dextrose 40% Gel 15 Gram(s) Oral once PRN Blood Glucose LESS THAN 70 milliGRAM(s)/deciliter  glucagon  Injectable 1 milliGRAM(s) IntraMuscular once PRN Glucose LESS THAN 70 milligrams/deciliter      Weight: 60.5kg   Daily     Daily     Weight Change: No new weights recorded since admit     Estimated energy needs: Utilized IBW (45.5kg) to calculate needs, pt >120% of IBW. Adjusted for age/stroke/vent  Calories: 25-30 kcal/kg = 9863-9947 kcal/day  Protein: 1.4-1.6 g/kg = 64-73g protein/day  Fluids: 30-35 mL/kg = 8596-9921 mL/day or per team    Subjective: 67y/o F PMHx HTN and Major Depressive Disorder with hx of poor sleep, anorexia, alcohol use, and self-cutting, presented to the ED with recent history of self harm and depressed mood, admitted to inpatient psych ([facility]) for close monitoring. Stroke code called with imaging revealing acute infarct in L PATRICIA and subacute insular infarct. Pt stepped down to 7 Lachman. S/p angiogram on 8/17, showing B/L carotid stenosis. MRI showing new CVA. Course c/b AMS, hypertensive emergency, N/V- new stroke code called on 8/26. Pt intubated for airway protection. Pt was planned for PEG on 8/29, though cancelled 2/2 c/f new stroke. CTH showing large brainstem stroke. Palliative following closely at this time 2/2 deteriorating condition. Pt seen in room, intubated on CPAP. Off of sedation and pressor support. MAP 95. EEG in place for monitoring. Pt not responsive to verbal commands at this time. TF running at 30mL/hr with plan to advance to goal of 45mL/hr- good tolerance per RN. Will continue to keep nutrition aligned with goals of care.     Previous Nutrition Diagnosis:  Increased nutrient needs RT increased demand for protein intake AEB s/p CVA    Active [ X - and now vent ]  Resolved [   ]    If resolved, new PES:     Goal: Pt will meet >75% of EER per day with good tolerance     Recommendations:  1. Continue with current EN order; maintain aspiration precautions at all times, monitor for s/s intolerance  2. Monitor lytes and replete prn.  3. Keep nutrition aligned with GOC at all times     Education: N/A - vent      Risk Level: High [ X  ] Moderate [   ] Low [   ].

## 2018-08-30 NOTE — EEG REPORT - NS EEG TEXT BOX
[facility] Department of Neurology  Inpatient Continuous video-Electroencephalography Report    Patient Name:	ALCON COPE    :	1950  MRN:	[id number]    Study Start Date/Time:  2018, 6:08:57 PM  Study End Date/Time:	2018,  07:00.01 AM    Referred by: Arabella Sapp MD    Brief Clinical History:  LACON COPE is a 68 year old Female  with     events     suspicious   for     seizures.     Diagnosis Code:   R56.9 convulsions/seizure  CPT: 50203 vEEG 12-24 hours    Pertinent Medications on Initiation    n/a    Acquisition Details:  Electroencephalography was acquired using a minimum of 21 channels on an [employer] system v 8.5.1 with electrode placement according to the standard International 10-20 system following ACNS (American Clinical Neurophysiology Society) guidelines for Long-Term Video EEG monitoring.  Anterior temporal T1 and T2 electrodes were utilized whenever possible.   The [employer] automated spike & seizure detections were all reviewed in detail, in addition to extensive portions of raw EEG.    Day 1: 2018 @ 6:08:57 PM to next morning @ 07:00 am  Background:  continuous, with predominantly theta-delta frequencies.  Symmetry:  there    was   more   delta    slowing    on the   right  hemisphere  Posterior Dominant Rhythm: no  clear  PDR    was    present  Organization: normal anterior-posterior voltage-frequency gradient.  Voltage:  Normal (20+ uV)  Variability: Yes. 						  Reactivity: Yes.  N2 sleep: symmetric, synchronous sleep spindles/K-complexes.  Spontaneous Activity:  No epileptiform discharges.  Periodic/rhythmic activity:  None.  Events:  No electrographic seizures or significant clinical events.  Provocations:  Hyperventilation and Photic stimulation: was not performed.  Daily Summary:    1.	 Background    slowing,  R>L   hemisphere  Read by:  Jonelle Jean MD        FINAL Summary:  Abnormal continuous av-EEG study.    1.	 Background    slowing,  R>L   hemisphere    FINAL Clinical Correlation:  The  study  is  consistent  with diffuse cerebral dysfunction,  R>L  hemisphere.   There  was  no  clinical  or  electrographic seizures  ecorded.   Jonelle Jean MD  Attending Neurologist, Division of Epilepsy

## 2018-08-30 NOTE — PROGRESS NOTE ADULT - SUBJECTIVE AND OBJECTIVE BOX
On interval followup, the right subclavian venous catheter site is clean, dry and non-inflamed.  T 100 range. Notes and cultures are followed.

## 2018-08-30 NOTE — PROGRESS NOTE ADULT - SUBJECTIVE AND OBJECTIVE BOX
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 Hospital course has been complicated by worsening respiratory status 2/2 aspiration PNA s/p intubation, malignant hypertension, and decreased mental status. Cerebral vasculitis diagnosed via cerebral angiogram on 08/17 and has been on steroid therapy since.      PAST MEDICAL & SURGICAL HISTORY:  Hypertension  Major Depression Disorder  No significant past surgical history    FAMILY HISTORY:  No pertinent family history in first degree relatives    SOCIAL HISTORY: Was living alone in 4th floor walk up apartment building. Etoh use. Nonreligious. [place] [facility] [place], worked as  at the [employer] until jail in 2011. , no children. Nonsmoker.    ROS:    Unable to attain due to:  Medical condition                    Dyspnea (Castro 0-10): 0 Mechanically vented                    N/V (Y/N): Unable to assess                             Secretions (Y/N) : No               Agitation(Y/N): No   Pain (Y/N): RY pain scale: 2      -Provocation/Palliation: Unable to assess      -Quality/Quantity: Unable to assess      -Radiating: Unable to assess      -Severity: No pain  -Timing/Frequency: Unable to assess      -Impact on ADLs: Unable to assess          PEx:  T(C): 37.6 (08-30-18 @ 09:04), Max: 38.8 (08-29-18 @ 22:11)  HR: 64 (08-30-18 @ 09:00) (58 - 76)  BP: 155/69 (08-30-18 @ 09:00) (155/69 - 155/69)  RR: 22 (08-30-18 @ 09:00) (7 - 52)  SpO2: 97% (08-30-18 @ 11:59) (94% - 100%)  Wt(kg): --    GENERAL: Intubated, not sedated, on vEEG   HEENT: ET tube in place, dry lips and mucous membranes   NECK:   supple   CVS:    +S1, S2, RRR      	  RESP:  On CPAP, not using any accessory muscles. good air movement throughout   GI:    soft, NTND       : +maradiaga 	     	  NEURO: Opens eyes, does not follow commands. Moving LE spontaneously.     	  Preadmit Karnofsky:60  %           Current Karnofsky: 10%  Cachexia (Y/N): No    ALLERGIES: No Known Allergies      Allergies: No Known Allergies or Intolerances    Opiate Naive (Y/N): Yes  -iStop reviewed (Y/N): Yes. No Rx found on iStop review (Ref#:[id number])    MEDICATIONS: REVIEWED  MEDICATIONS  (STANDING):  acetaminophen    Suspension 650 milliGRAM(s) Oral every 6 hours  aspirin Suppository 300 milliGRAM(s) Rectal daily  BACItracin   Ointment 1 Application(s) Topical daily  chlorhexidine 0.12% Liquid 15 milliLiter(s) Swish and Spit two times a day  chlorhexidine 2% Cloths 1 Application(s) Topical daily  cloNIDine 0.1 milliGRAM(s) Oral every 8 hours  dextrose 5%. 1000 milliLiter(s) (50 mL/Hr) IV Continuous <Continuous>  dextrose 50% Injectable 12.5 Gram(s) IV Push once  dextrose 50% Injectable 25 Gram(s) IV Push once  dextrose 50% Injectable 25 Gram(s) IV Push once  folic acid Injectable 1 milliGRAM(s) IV Push daily  heparin  flush 100 Units/mL Injectable 100 Unit(s) IV Push every other day  heparin  Injectable 5000 Unit(s) SubCutaneous every 8 hours  hydrALAZINE 100 milliGRAM(s) Oral every 8 hours  insulin glargine Injectable (LANTUS) 10 Unit(s) SubCutaneous at bedtime  insulin NPH human recombinant 5 Unit(s) SubCutaneous once  insulin regular  human corrective regimen sliding scale   SubCutaneous every 6 hours  insulin regular  human recombinant 3 Unit(s) SubCutaneous every 6 hours  levETIRAcetam  IVPB 500 milliGRAM(s) IV Intermittent every 12 hours  methylPREDNISolone sodium succinate IVPB 500 milliGRAM(s) IV Intermittent every 12 hours  niCARdipine Infusion 5 mG/Hr (25 mL/Hr) IV Continuous <Continuous>  ondansetron Injectable 4 milliGRAM(s) IV Push every 6 hours  pantoprazole  Injectable 40 milliGRAM(s) IV Push daily  piperacillin/tazobactam IVPB. 2.25 Gram(s) IV Intermittent every 6 hours  sertraline 75 milliGRAM(s) Oral daily  thiamine Injectable 100 milliGRAM(s) IV Push daily    MEDICATIONS  (PRN):  dextrose 40% Gel 15 Gram(s) Oral once PRN Blood Glucose LESS THAN 70 milliGRAM(s)/deciliter  glucagon  Injectable 1 milliGRAM(s) IntraMuscular once PRN Glucose LESS THAN 70 milligrams/deciliter      LABS: REVIEWED          EXAM:  XR CHEST PORTABLE ROUTINE 1V                        PROCEDURE DATE:  08/28/2018    INTERPRETATION:  PORTABLE CHEST X-RAY   HISTORY: intubated  PRIOR STUDIES: 8/27/2018.  FINDINGS: The lungs are clear.  There are no pleural effusions.  The   cardiomediastinal silhouette, bones and soft tissues are unremarkable.   Endotracheal tube tip 2.9 cm proximal to alisson. NG tube noted with   distal aspect obscured in abdomen. Distal sidehole overlies the gastric   fundus. Right subclavian venous line with tip overlying the SVC. No   visualized pneumothorax.  IMPRESSION:  No change.    EXAM:  CT BRAIN STROKE PROTOCOL                        PROCEDURE DATE:  08/26/2018    INTERPRETATION:  Fabiana CHÁVEZ MD, have reviewed the images and the   report and agree with the findings, with the following modification:   Comparison is also made with the patient's MRI from 8/20/2018.  There are bilateral lacunar infarcts involving the thalamus bilaterally.   There are multiple areas of hypodensity within the right corona radiata   and paramedian right frontal lobe which are better delineated on the   current CT study correlating with right PATRICIA and MCA territory infarcts   when compared with the CT from 8/18. Acute ischemia in the setting of   multiple acute/subacute infarcts would be better evluated with MRI.  Theabove findings were discussed with Dr. Allen at 12:15 PM.  ******PRELIMINARY REPORT******   INDICATIONS: Altered mental status in the setting of vasculitis.   Somnolence. Clonus.  TECHNIQUE: Serial axial images were obtained from the skull baseto the   vertex without the use of intravenous contrast. Imaging is performed   using helical low-dose technique, and sagittal and coronal reformations   are provided.  COMPARISON EXAMINATION: CT head from 8/18/2018.  FINDINGS:    VENTRICLES AND SULCI: There is mild enlargement of the ventricles out of   proportion to sulcal enlargement, similar to prior exam, and can be   secondary to normal pressure hydrocephalus.  INTRA-AXIAL: There are multiple lacunar infarcts in the basal ganglia   bilaterally. There is a linear hypodensity in the right thalamus, which   appears more well defined on the current examination. There is mild   patchy hypodensity within the periventricular white matter, which is   nonspecific and may represent the sequela small vessel ischemic disease.   The gray-white differentiation is preserved without acute transcortical   infarction. No hemorrhage. No mass effect or midline shift.   EXTRA-AXIAL: No extra-axial fluid collection is present.   VISUALIZED SINUSES: No air-fluid levels are identified.   VISUALIZED MASTOIDS: Well developed and aerated bilaterally.  CALVARIUM: No calvarial fracture.  IMPRESSION:   1.  No acute intracranial hemorrhage, transcortical infarction or mass   effect.  2.  Multiple bilateral basal ganglia and right thalamus lacunar   infarctions, grossly similar to prior exam.  3.  Chronic mild small vessel ischemic disease.    EXAM:  CT ANGIO NECK (W)AW IC                        EXAM:  CT ANGIO BRAIN (W)AW IC                        PROCEDURE DATE:  08/12/2018    INTERPRETATION:  CT ANGIOGRAM OF THE HEAD AND NECK WITH CONTRAST  INDICATION:  stroke  TECHNIQUE: Axial contrast enhanced CT angiogram of the head and neck was   performed during timed bolus infusion of intravenous contrast. MIP   reformats were generated in the sagittal and coronal plane. 3-D surface   renderings were also generated.  COMPARISON:  8/11/2018  FINDINGS:  Comparison made to a CTA head and neck performed just yesterday at   8/11/2018 redemonstrates an occluded left vertebral artery from its   origin off the left subclavian artery throughout its course.   Redemonstrated ispatency of the right vertebral artery and basilar   artery though both hypoplastic in caliber, and unchanged in appearance.   Proximal segments of the bilateral PCA and SCA branches are patent.  Patent bilateral common carotid arteries. Redemonstration is combination   of calcified and noncalcified plaque at the bilateral carotid bifurcation   resulting in moderate stenosis as described on the earlier CTA head and   neck. Bilateral intracranial internal carotid arteries and middle   cerebral arteries are patent. Right A1 segment is normal in caliber. Left   A1 segment is hypoplastic, essentially unchanged in appearance from the   prior study.  IMPRESSION:  No significant interval change.    EXAM:  MR BRAIN                        PROCEDURE DATE:  08/20/2018    INTERPRETATION:  PROCEDURE: MRI Brain without contrast  INDICATION: Vasculitis, weakness, hypertension  TECHNIQUE: Sagittal T1, axial T1, T2-FLAIR, diffusion, GRE andcoronal   T2-FLAIR images of the brain were obtained.  COMPARISON: MRI brain from 08/12/2018  FINDINGS:   Diffusion-weighted imaging shows new sites of acute versus early subacute   infarction in the right posterior-inferior basal ganglia, posterior   internal capsule and right medial temporal deep periventricular white   matter. Also new is a punctate focus of left anteromedial frontal   subcortical infarction (series 8, image 57). There is corresponding new   hyperintense signal on T2-FLAIR at both these sites. There is again   scattered diffusion restriction within the right paramedian superior   frontal gyrus and cingulate gyrus in the anterior cerebral artery   territory. Subacute lacunar infarctions in the left subinsular white   matter, centrum semiovale and corona radiata are unchanged. Gradient echo   imaging again shows several old microhemorrhages within the wilfred,   pontomesencephalic junction and left subthalamic region. There are   several chronic tiny lacunar infarctions in the basal ganglia and thalami   bilaterally, superimposed on a background of patchy small vessel ischemic   change, probably sequela of long-standing small vessel ischemic change or   other vasculopathy. On T2 images, large vessel flow voids aregrossly   preserved. Ventricular caliber is unchanged. There is no extra-axial   fluid collection, mass effect or midline shift. Scattered mucosal   thickening is seen in the paranasal sinuses and mastoid air cells.  IMPRESSION:   Compared to 8/12/2018, there is new infarction in the right medial   temporal periventricular white matter and posterior-inferior basal   ganglia and internal capsule. Also new is a punctate left frontal   subcortical infarction.  Other sites of recent ischemic injury are again seen in the right PATRICIA   territory and speckled in left cerebral deep white matter. Background of   small vessel ischemic change within the cerebral white matter and in the   presence of multiple chronic basal ganglia lacunes.    EXAM:  US DPLX ABD PELV LTD                        PROCEDURE DATE:  08/24/2018    INTERPRETATION:  RENAL ARTERIAL DUPLEX DOPPLER dated 8/24/2018 1:09 PM  INDICATION: Refractory hypertension. Assess for renal artery stenosis.  PRIOR STUDIES: Retroperitoneal ultrasound from 8/20/2018  TECHNIQUE: Ultrasound evaluation of the kidneys was performed. Duplex   Doppler evaluation of the renal arteries was performed bilaterally   utilizing grayscale imaging, color flow Doppler, and spectral Doppler   waveform analysis.   RIGHT RENAL ARTERIAL DOPPLER:  Main renal artery maximum peak systolic velocity: 190 cm/sec at the origin  Segmental renal artery resistive indices: Normal (< 0.8)  Segmental renal artery acceleration times: Normal (< 70 msec)  Segmental renal artery acceleration indices: Normal (> 300 cm/sec2)  LEFT RENAL ARTERIAL DOPPLER:  Main renal artery maximum peak systolic velocity: 140 cm/sec  Segmental renal artery resistive indices: Normal (< 0.8)  Segmental renal artery acceleration times: Normal (< 70 msec)  Segmental renal artery acceleration indices: Normal (> 300 cm/sec2)  ABDOMINAL AORTA:  Maximum peak systolic velocity: 65 cm/sec.  IMPRESSION:  <60% stenosis of the right renal artery at its origin.     EXAM:  US RETROPERITONEAL COMPLETE                        PROCEDURE DATE:  08/20/2018    INTERPRETATION:  RENAL and BLADDER ULTRASOUND dated 8/20/2018 5:08 PM  Indication: Poor renal function.  Prior studies: None.  Findings:  RIGHTKIDNEY:  Length: 10 cm  Lesions: None  Hydronephrosis: Extrarenal pelvis. No eladia hydronephrosis.   Stones: None  Echogenicity: Normal  LEFT KIDNEY:  Length: 9 cm  Lesions: Includes renal cortical mantle thinning. Small simple 0.8 cm cyst  Hydronephrosis: None  Stones: None  Echogenicity: Normal  URINARY BLADDER:  Ureteral jets: Both visible.  Filling defects: None  Bladder volume: Pre-void: 253 ml; Post-void: Not obtained due to   patient's inability to void.  IMPRESSION:  No evidence of hydroureteronephrosis.  Extrarenal pelvis on the right kidney.  Slightly diminished left renal size with smooth renal cortical mantle   thinning which may suggest underlying nephrosclerosis.  No post void residual could be obtained due to patient's inability to   void. No bladder wall thickening or intraluminal lesions are demonstrated.    EXAM:  ESOPHAGEAL ECHO (CARDIOL)                        PROCEDURE DATE:  08/14/2018    INTERPRETATION:  Patient Height: 160.0 cm  Patient Weight: 60.0 kg  BSA: 1.6 m^2  Interpretation Summary  KATHERINE to evaluate for possible cardiac source of CVA. No clot seen in the   left  atrium or in the left atrial appendage. Left atrial appendage emptying   velocities are normal.  No evidence for interatrial shunt by color   Doppler assessment. Injection of agitated saline contrast documented no   interatrial shunt.  The aortic valve is trileaflet. There is mild aortic valve   thickening. No aortic regurgitation noted.  Structurally normal mitral valve. There   is trace to mild mitral regurgitation.  Structurally normal tricuspid valve.   There is trace tricuspid regurgitation.Structurally normal pulmonic   valve. No pulmonic regurgitation noted.The right ventricular systolic function is   normal.The left ventricular ejection fraction is estimated to be   55-60%Mild atherosclerotic plaque(s) in the aortic arch.Mild atherosclerotic   plaque(s) in the descending aorta.There is no pericardial effusion.        Advanced Directives:     Full Code    Decision maker: The patient does not have capacity for complex medical decision making given medical condition.  Legal surrogate: Reportedly there is a HCP naming the patient's friend Leyla Lizama [phone] as the HCP agent.  Alternate surrogate: Friend Steven Foote Esq. [phone]    GOALS OF CARE DISCUSSION       Palliative care info/counseling provided	           Family meeting TBD       Documentation of GOC: None	          REFERRALS	        Palliative Med        Unit SW/Case Mgmt       Ethics ALCON COPE             MRN-[id number]    CC: GOC/AD, support    HPI:  68 F w/ pmhx of HTN and Major Depressive Disorder, recent [facility] admission for SI, admitted initially [facility] for [facility] 2/2 cerebral vasculitis. Hospital course has been complicated by worsening respiratory status 2/2 aspiration PNA s/p intubation, malignant hypertension, and decreased mental status. Cerebral vasculitis diagnosed via cerebral angiogram on 08/17 and has been on steroid therapy since.      PAST MEDICAL & SURGICAL HISTORY:  Hypertension  Major Depression Disorder  No significant past surgical history    FAMILY HISTORY:  No pertinent family history in first degree relatives    SOCIAL HISTORY: Was living alone in 4th floor walk up apartment building. Etoh use. Nonreligious. Born in [facility] [place], worked as  at the [employer] until care home in 2011. , no children. Nonsmoker.  ROS:    Unable to attain due to:  Medical condition                    Dyspnea (Castro 0-10): 0 Mechanically vented                    N/V (Y/N): Unable to assess                             Secretions (Y/N) : No               Agitation(Y/N): No   Pain (Y/N): RY pain scale: 2      -Provocation/Palliation: Unable to assess      -Quality/Quantity: Unable to assess      -Radiating: Unable to assess      -Severity: No pain  -Timing/Frequency: Unable to assess      -Impact on ADLs: Unable to assess        PEx:  T(C): 37.6 (08-30-18 @ 09:04), Max: 38.8 (08-29-18 @ 22:11)  HR: 64 (08-30-18 @ 09:00) (58 - 76)  BP: 155/69 (08-30-18 @ 09:00) (155/69 - 155/69)  RR: 22 (08-30-18 @ 09:00) (7 - 52)  SpO2: 97% (08-30-18 @ 11:59) (94% - 100%)  Wt(kg): 60.5    GENERAL: Awake Intubated, not sedated, on vEEG   HEENT: ET tube in place, dry lips and mucous membranes Opening eyes and protects.   NECK: supple   CVS:  +S1, S2, RRR      	  RESP:  On CPAP, not using any accessory muscles. good air movement throughout   GI:    soft, NT ND       : +maradiaga 	     	  NEURO: Opens eyes, does not follow commands. Moving LE spontaneously.   SKIN: Ecchymotic  LYMPH: Normal    Allergies: No Known Allergies or Intolerances    Opiate Naive (Y/N): Yes  -iStop reviewed (Y/N): Yes. No Rx found on iStop review (Ref#:[id number])    MEDICATIONS: REVIEWED  MEDICATIONS  (STANDING):  acetaminophen    Suspension 650 milliGRAM(s) Oral every 6 hours  aspirin Suppository 300 milliGRAM(s) Rectal daily  BACItracin   Ointment 1 Application(s) Topical daily  chlorhexidine 0.12% Liquid 15 milliLiter(s) Swish and Spit two times a day  chlorhexidine 2% Cloths 1 Application(s) Topical daily  cloNIDine 0.1 milliGRAM(s) Oral every 8 hours  dextrose 5%. 1000 milliLiter(s) (50 mL/Hr) IV Continuous   folic acid Injectable 1 milliGRAM(s) IV Push daily  heparin  flush 100 Units/mL Injectable 100 Unit(s) IV Push every other day  heparin  Injectable 5000 Unit(s) SubCutaneous every 8 hours  hydrALAZINE 100 milliGRAM(s) Oral every 8 hours  insulin glargine Injectable (LANTUS) 10 Unit(s) SubCutaneous at bedtime  insulin NPH human recombinant 5 Unit(s) SubCutaneous once  insulin regular  human corrective regimen sliding scale   SubCutaneous every 6 hours  insulin regular  human recombinant 3 Unit(s) SubCutaneous every 6 hours  levETIRAcetam  IVPB 500 milliGRAM(s) IV Intermittent every 12 hours  methylPREDNISolone sodium succinate IVPB 500 milliGRAM(s) IV Intermittent every 12 hours  niCARdipine Infusion 5 mG/Hr (25 mL/Hr) IV Continuous <Continuous>  ondansetron Injectable 4 milliGRAM(s) IV Push every 6 hours  pantoprazole  Injectable 40 milliGRAM(s) IV Push daily  piperacillin/tazobactam IVPB. 2.25 Gram(s) IV Intermittent every 6 hours  sertraline 75 milliGRAM(s) Oral daily  thiamine Injectable 100 milliGRAM(s) IV Push daily    MEDICATIONS  (PRN):  dextrose 40% Gel 15 Gram(s) Oral once PRN Blood Glucose LESS THAN 70 milliGRAM(s)/deciliter  glucagon  Injectable 1 milliGRAM(s) IntraMuscular once PRN Glucose LESS THAN 70 milligrams/deciliter    LABS: REVIEWED                        8.1    12.0  )-----------( 154      ( 30 Aug 2018 06:06 )             24.7   08-30    148<H>  |  113<H>  |  53<H>  ----------------------------<  205<H>  3.6   |  20<L>  |  1.85<H>    Ca    8.5      30 Aug 2018 06:06  Phos  3.4     08-30  Mg     2.5     08-30    Imaging: Reviewed    EXAM:  XR CHEST PORTABLE ROUTINE 1V                        PROCEDURE DATE:  08/28/2018    INTERPRETATION:  PORTABLE CHEST X-RAY   HISTORY: intubated  PRIOR STUDIES: 8/27/2018.  FINDINGS: The lungs are clear.  There are no pleural effusions.  The   cardiomediastinal silhouette, bones and soft tissues are unremarkable.   Endotracheal tube tip 2.9 cm proximal to alisson. NG tube noted with   distal aspect obscured in abdomen. Distal sidehole overlies the gastric   fundus. Right subclavian venous line with tip overlying the SVC. No   visualized pneumothorax.  IMPRESSION:  No change.    EXAM:  CT BRAIN STROKE PROTOCOL                        PROCEDURE DATE:  08/26/2018    INTERPRETATION:  Fabiana CHÁVEZ MD, have reviewed the images and the   report and agree with the findings, with the following modification:   Comparison is also made with the patient's MRI from 8/20/2018.  There are bilateral lacunar infarcts involving the thalamus bilaterally.   There are multiple areas of hypodensity within the right corona radiata   and paramedian right frontal lobe which are better delineated on the   current CT study correlating with right PATRICIA and MCA territory infarcts   when compared with the CT from 8/18. Acute ischemia in the setting of   multiple acute/subacute infarcts would be better evluated with MRI.  Theabove findings were discussed with Dr. Allen at 12:15 PM.  ******PRELIMINARY REPORT******   INDICATIONS: Altered mental status in the setting of vasculitis.   Somnolence. Clonus.  TECHNIQUE: Serial axial images were obtained from the skull baseto the   vertex without the use of intravenous contrast. Imaging is performed   using helical low-dose technique, and sagittal and coronal reformations   are provided.  COMPARISON EXAMINATION: CT head from 8/18/2018.  FINDINGS:    VENTRICLES AND SULCI: There is mild enlargement of the ventricles out of   proportion to sulcal enlargement, similar to prior exam, and can be   secondary to normal pressure hydrocephalus.  INTRA-AXIAL: There are multiple lacunar infarcts in the basal ganglia   bilaterally. There is a linear hypodensity in the right thalamus, which   appears more well defined on the current examination. There is mild   patchy hypodensity within the periventricular white matter, which is   nonspecific and may represent the sequela small vessel ischemic disease.   The gray-white differentiation is preserved without acute transcortical   infarction. No hemorrhage. No mass effect or midline shift.   EXTRA-AXIAL: No extra-axial fluid collection is present.   VISUALIZED SINUSES: No air-fluid levels are identified.   VISUALIZED MASTOIDS: Well developed and aerated bilaterally.  CALVARIUM: No calvarial fracture.  IMPRESSION:   1.  No acute intracranial hemorrhage, transcortical infarction or mass   effect.  2.  Multiple bilateral basal ganglia and right thalamus lacunar   infarctions, grossly similar to prior exam.  3.  Chronic mild small vessel ischemic disease.    EXAM:  CT ANGIO NECK (W)AW IC                        EXAM:  CT ANGIO BRAIN (W)AW IC                        PROCEDURE DATE:  08/12/2018    INTERPRETATION:  CT ANGIOGRAM OF THE HEAD AND NECK WITH CONTRAST  INDICATION:  stroke  TECHNIQUE: Axial contrast enhanced CT angiogram of the head and neck was   performed during timed bolus infusion of intravenous contrast. MIP   reformats were generated in the sagittal and coronal plane. 3-D surface   renderings were also generated.  COMPARISON:  8/11/2018  FINDINGS:  Comparison made to a CTA head and neck performed just yesterday at   8/11/2018 redemonstrates an occluded left vertebral artery from its   origin off the left subclavian artery throughout its course.   Redemonstrated ispatency of the right vertebral artery and basilar   artery though both hypoplastic in caliber, and unchanged in appearance.   Proximal segments of the bilateral PCA and SCA branches are patent.  Patent bilateral common carotid arteries. Redemonstration is combination   of calcified and noncalcified plaque at the bilateral carotid bifurcation   resulting in moderate stenosis as described on the earlier CTA head and   neck. Bilateral intracranial internal carotid arteries and middle   cerebral arteries are patent. Right A1 segment is normal in caliber. Left   A1 segment is hypoplastic, essentially unchanged in appearance from the   prior study.  IMPRESSION:  No significant interval change.    EXAM:  MR BRAIN                        PROCEDURE DATE:  08/20/2018    INTERPRETATION:  PROCEDURE: MRI Brain without contrast  INDICATION: Vasculitis, weakness, hypertension  TECHNIQUE: Sagittal T1, axial T1, T2-FLAIR, diffusion, GRE andcoronal   T2-FLAIR images of the brain were obtained.  COMPARISON: MRI brain from 08/12/2018  FINDINGS:   Diffusion-weighted imaging shows new sites of acute versus early subacute   infarction in the right posterior-inferior basal ganglia, posterior   internal capsule and right medial temporal deep periventricular white   matter. Also new is a punctate focus of left anteromedial frontal   subcortical infarction (series 8, image 57). There is corresponding new   hyperintense signal on T2-FLAIR at both these sites. There is again   scattered diffusion restriction within the right paramedian superior   frontal gyrus and cingulate gyrus in the anterior cerebral artery   territory. Subacute lacunar infarctions in the left subinsular white   matter, centrum semiovale and corona radiata are unchanged. Gradient echo   imaging again shows several old microhemorrhages within the wilfred,   pontomesencephalic junction and left subthalamic region. There are   several chronic tiny lacunar infarctions in the basal ganglia and thalami   bilaterally, superimposed on a background of patchy small vessel ischemic   change, probably sequela of long-standing small vessel ischemic change or   other vasculopathy. On T2 images, large vessel flow voids aregrossly   preserved. Ventricular caliber is unchanged. There is no extra-axial   fluid collection, mass effect or midline shift. Scattered mucosal   thickening is seen in the paranasal sinuses and mastoid air cells.  IMPRESSION:   Compared to 8/12/2018, there is new infarction in the right medial   temporal periventricular white matter and posterior-inferior basal   ganglia and internal capsule. Also new is a punctate left frontal   subcortical infarction.  Other sites of recent ischemic injury are again seen in the right PATRICIA   territory and speckled in left cerebral deep white matter. Background of   small vessel ischemic change within the cerebral white matter and in the   presence of multiple chronic basal ganglia lacunes.    EXAM:  US DPLX ABD PELV LTD                        PROCEDURE DATE:  08/24/2018    INTERPRETATION:  RENAL ARTERIAL DUPLEX DOPPLER dated 8/24/2018 1:09 PM  INDICATION: Refractory hypertension. Assess for renal artery stenosis.  PRIOR STUDIES: Retroperitoneal ultrasound from 8/20/2018  TECHNIQUE: Ultrasound evaluation of the kidneys was performed. Duplex   Doppler evaluation of the renal arteries was performed bilaterally   utilizing grayscale imaging, color flow Doppler, and spectral Doppler   waveform analysis.   RIGHT RENAL ARTERIAL DOPPLER:  Main renal artery maximum peak systolic velocity: 190 cm/sec at the origin  Segmental renal artery resistive indices: Normal (< 0.8)  Segmental renal artery acceleration times: Normal (< 70 msec)  Segmental renal artery acceleration indices: Normal (> 300 cm/sec2)  LEFT RENAL ARTERIAL DOPPLER:  Main renal artery maximum peak systolic velocity: 140 cm/sec  Segmental renal artery resistive indices: Normal (< 0.8)  Segmental renal artery acceleration times: Normal (< 70 msec)  Segmental renal artery acceleration indices: Normal (> 300 cm/sec2)  ABDOMINAL AORTA:  Maximum peak systolic velocity: 65 cm/sec.  IMPRESSION:  <60% stenosis of the right renal artery at its origin.     EXAM:  US RETROPERITONEAL COMPLETE                        PROCEDURE DATE:  08/20/2018    INTERPRETATION:  RENAL and BLADDER ULTRASOUND dated 8/20/2018 5:08 PM  Indication: Poor renal function.  Prior studies: None.  Findings:  RIGHTKIDNEY:  Length: 10 cm  Lesions: None  Hydronephrosis: Extrarenal pelvis. No eladia hydronephrosis.   Stones: None  Echogenicity: Normal  LEFT KIDNEY:  Length: 9 cm  Lesions: Includes renal cortical mantle thinning. Small simple 0.8 cm cyst  Hydronephrosis: None  Stones: None  Echogenicity: Normal  URINARY BLADDER:  Ureteral jets: Both visible.  Filling defects: None  Bladder volume: Pre-void: 253 ml; Post-void: Not obtained due to   patient's inability to void.  IMPRESSION:  No evidence of hydroureteronephrosis.  Extrarenal pelvis on the right kidney.  Slightly diminished left renal size with smooth renal cortical mantle   thinning which may suggest underlying nephrosclerosis.  No post void residual could be obtained due to patient's inability to   void. No bladder wall thickening or intraluminal lesions are demonstrated.    EXAM:  ESOPHAGEAL ECHO (CARDIOL)                        PROCEDURE DATE:  08/14/2018    INTERPRETATION:  Patient Height: 160.0 cm  Patient Weight: 60.0 kg  BSA: 1.6 m^2  Interpretation Summary  KATHERINE to evaluate for possible cardiac source of CVA. No clot seen in the   left  atrium or in the left atrial appendage. Left atrial appendage emptying   velocities are normal.  No evidence for interatrial shunt by color   Doppler assessment. Injection of agitated saline contrast documented no   interatrial shunt.  The aortic valve is trileaflet. There is mild aortic valve   thickening. No aortic regurgitation noted.  Structurally normal mitral valve. There   is trace to mild mitral regurgitation.  Structurally normal tricuspid valve.   There is trace tricuspid regurgitation.Structurally normal pulmonic   valve. No pulmonic regurgitation noted.The right ventricular systolic function is   normal.The left ventricular ejection fraction is estimated to be   55-60%Mild atherosclerotic plaque(s) in the aortic arch.Mild atherosclerotic   plaque(s) in the descending aorta.There is no pericardial effusion.    Advanced Directives:     Full Code    Decision maker: The patient does not have capacity for complex medical decision making given medical condition.  Legal surrogate: HCP form naming the patient's friend Leyla Lizama [phone] as the HCP agent placed in the paper chart. [Form incomplete and done the day prior to admission]  Alternate surrogate: Friend Steven Foote Esq. [phone]    GOALS OF CARE DISCUSSION       Palliative care info/counseling provided	           Family meeting TBD       Documentation of GOC: None	          REFERRALS	        Palliative Med        Unit SW/Case Mgmt       Ethics

## 2018-08-30 NOTE — PROGRESS NOTE ADULT - SUBJECTIVE AND OBJECTIVE BOX
INTERVAL HPI/OVERNIGHT EVENTS: temp 101.8 recorded overnight.    SUBJECTIVE: Patient seen and examined at bedside. Intubated on CPAP. Nicardipine drip on 10.     Pt nonresponsive. No improvement from yesterday. ROS unable to be completed.      OBJECTIVE:    VITAL SIGNS:  ICU Vital Signs Last 24 Hrs  T(C): 38.2 (30 Aug 2018 15:02), Max: 38.8 (29 Aug 2018 22:11)  T(F): 100.8 (30 Aug 2018 15:02), Max: 101.8 (29 Aug 2018 22:11)  HR: 68 (30 Aug 2018 14:00) (58 - 76)  BP: 155/69 (30 Aug 2018 09:00) (155/69 - 155/69)  BP(mean): 100 (30 Aug 2018 09:00) (100 - 100)  ABP: 160/60 (30 Aug 2018 14:00) (158/54 - 180/72)  ABP(mean): 90 (30 Aug 2018 14:00) (84 - 106)  RR: 11 (30 Aug 2018 14:00) (7 - 22)  SpO2: 98% (30 Aug 2018 14:00) (94% - 100%)    Mode: CPAP with PS, FiO2: 40, PEEP: 5, PS: 8, MAP: 7, PIP: 14    08-29 @ 07:01  -  08-30 @ 07:00  --------------------------------------------------------  IN: 4052.5 mL / OUT: 0 mL / NET: 4052.5 mL    08-30 @ 07:01 - 08-30 @ 15:26  --------------------------------------------------------  IN: 880 mL / OUT: 0 mL / NET: 880 mL      CAPILLARY BLOOD GLUCOSE      POCT Blood Glucose.: 197 mg/dL (30 Aug 2018 11:32)      PHYSICAL EXAM:    General: intubated, nonresponsive.   HEENT: NC/AT; PERRL, clear conjunctiva  Neck: supple  Respiratory: Rhonchi noted b/l  Cardiovascular: +S1/S2; RRR  Abdomen: soft, NT/ND; +BS x4  Extremities: WWP, 2+ peripheral pulses b/l; no LE edema  Skin: normal color and turgor; no rash  Neurological: A&Ox0. Pupils equal and reactive to light. Rightward gaze with horizontal nystagmus noted. Pain stimulus evokes decerebrate posturing.     MEDICATIONS:  MEDICATIONS  (STANDING):  acetaminophen    Suspension 650 milliGRAM(s) Oral every 6 hours  aspirin Suppository 300 milliGRAM(s) Rectal daily  BACItracin   Ointment 1 Application(s) Topical daily  chlorhexidine 0.12% Liquid 15 milliLiter(s) Swish and Spit two times a day  chlorhexidine 2% Cloths 1 Application(s) Topical daily  cloNIDine 0.1 milliGRAM(s) Oral every 8 hours  dextrose 5%. 1000 milliLiter(s) (50 mL/Hr) IV Continuous <Continuous>  dextrose 50% Injectable 12.5 Gram(s) IV Push once  dextrose 50% Injectable 25 Gram(s) IV Push once  dextrose 50% Injectable 25 Gram(s) IV Push once  folic acid Injectable 1 milliGRAM(s) IV Push daily  heparin  flush 100 Units/mL Injectable 100 Unit(s) IV Push every other day  heparin  Injectable 5000 Unit(s) SubCutaneous every 8 hours  hydrALAZINE 100 milliGRAM(s) Oral every 8 hours  insulin glargine Injectable (LANTUS) 10 Unit(s) SubCutaneous at bedtime  insulin NPH human recombinant 5 Unit(s) SubCutaneous once  insulin regular  human corrective regimen sliding scale   SubCutaneous every 6 hours  insulin regular  human recombinant 3 Unit(s) SubCutaneous every 6 hours  levETIRAcetam  IVPB 500 milliGRAM(s) IV Intermittent every 12 hours  methylPREDNISolone sodium succinate IVPB 500 milliGRAM(s) IV Intermittent every 12 hours  niCARdipine Infusion 5 mG/Hr (25 mL/Hr) IV Continuous <Continuous>  nitroglycerin    2% Ointment 0.5 Inch(s) Transdermal <User Schedule>  ondansetron Injectable 4 milliGRAM(s) IV Push every 6 hours  pantoprazole  Injectable 40 milliGRAM(s) IV Push daily  piperacillin/tazobactam IVPB. 2.25 Gram(s) IV Intermittent every 6 hours  sertraline 75 milliGRAM(s) Oral daily  thiamine Injectable 100 milliGRAM(s) IV Push daily    MEDICATIONS  (PRN):  dextrose 40% Gel 15 Gram(s) Oral once PRN Blood Glucose LESS THAN 70 milliGRAM(s)/deciliter  glucagon  Injectable 1 milliGRAM(s) IntraMuscular once PRN Glucose LESS THAN 70 milligrams/deciliter      ALLERGIES:  Allergies    No Known Allergies    Intolerances        LABS:                        8.1    12.0  )-----------( 154      ( 30 Aug 2018 06:06 )             24.7     08-30    148<H>  |  113<H>  |  53<H>  ----------------------------<  205<H>  3.6   |  20<L>  |  1.85<H>    Ca    8.5      30 Aug 2018 06:06  Phos  3.4     08-30  Mg     2.5     08-30      PT/INR - ( 29 Aug 2018 03:20 )   PT: 11.9 sec;   INR: 1.07          PTT - ( 29 Aug 2018 03:20 )  PTT:26.3 sec      RADIOLOGY & ADDITIONAL TESTS: Reviewed.

## 2018-08-30 NOTE — PROGRESS NOTE ADULT - ASSESSMENT
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 Patient with residual left sided deficits with new lethargy, CTH showing new infarcts.  - c/w  rectally while NPO. Atorvastatin 80 when able   - Cyclophosphamide per rheum  - c/w Keppra, 500mg PO BID  - solumedrol 500mg BID until 8/31 then 125mg bid.  - Q2H neuro checks      Cardiac  #Hypertensive emergency/urgency - Episodes of vomiting noted from possible hypertensive encephelopathy. CTH with no ICH, edema, or midline shift, though showing evolving infarcts.    - Nicardipine drip with goal SBP between 140-160 per Neuro  - BP still not within range today. Increase clonidine to 0.1mg PO tid, hydralazine to 100mg PO tid, and add nitropaste 0.5''        Pulmonary  #Concern for aspiration   - Patient with vomiting and AMS in setting of likely hypertensive encephalopathy, CXR without infiltrate  - intubated, sedated. Saturating appropriately.     Renal  #MARYJANE vs CKD - Possibly 2/2 vasculitis vs CKD 2/2 hypertensive nephropathy - Patient presented with MARYJANE of 1.45; unknown baseline. Renal ultrasound showed slightly diminished left renal size with smooth renal cortical mantle thinning which may suggest underlying nephrosclerosis. Renal duplex performed 8/24, showing <60% stenosis of the right renal artery at its origin. Urine lytes revealing FeNa - 1.4%, indicating intrinsic renal disease.   - Avoiding nephrotoxic agents, Trend Cr      GI  #Vomiting - patient with vomiting at onset of hypertension and alteration of mental status, possible hypertensive encephalopathy  - c/w HTN treatment as above  - Zofran 4mg IV PRN. Keep NPO  - GI consult for PEG placement.     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 Patient with residual left sided deficits with new lethargy, CTH showing new infarcts.  - c/w  rectally while NPO. Atorvastatin 80 when able   - Cyclophosphamide per rheum  - c/w Keppra, 500mg PO BID  - solumedrol 500mg BID until 8/31 then 125mg bid.  - Q2H neuro checks  - scheduled tylenol      Cardiac  #Hypertensive emergency/urgency - Episodes of vomiting noted from possible hypertensive encephelopathy. CTH with no ICH, edema, or midline shift, though showing evolving infarcts.    - Nicardipine drip with goal SBP between 140-160 per Neuro  - BP still not within range today. Increase clonidine to 0.1mg PO tid, hydralazine to 100mg PO tid, and add nitropaste 0.5''      Pulmonary  #Concern for aspiration   - Patient with vomiting and AMS in setting of likely hypertensive encephalopathy, CXR without infiltrate  - intubated on CPAP. Saturating appropriately.     Renal  #MARYJANE vs CKD - Possibly 2/2 vasculitis vs CKD 2/2 hypertensive nephropathy - Patient presented with MARYJANE of 1.45; unknown baseline. Renal ultrasound showed slightly diminished left renal size with smooth renal cortical mantle thinning which may suggest underlying nephrosclerosis. Renal duplex performed 8/24, showing <60% stenosis of the right renal artery at its origin. Urine lytes revealing FeNa - 1.4%, indicating intrinsic renal disease.   - Avoiding nephrotoxic agents, Trend Cr      GI  #Vomiting - patient with vomiting at onset of hypertension and alteration of mental status, possible hypertensive encephalopathy  - c/w HTN treatment as above  - Zofran 4mg IV PRN. Keep NPO  - GI consult for PEG placement.     F: none   E: replete as needed  N: GI consult for PEG placement  HSQ, SCDs, PPI IV  MVT, Thiamine, Folic Acid  dispo: MICU

## 2018-08-30 NOTE — PROGRESS NOTE ADULT - PROBLEM SELECTOR PLAN 5
Family meeting still to be determined. Unsure if HCP will be coming in today. Reachable by phone. Family meeting cancelled by the HCP. States she needs to talk to the neurologist before making any decision. She won't be coming to [place] today and will be leaving on vacation soon.

## 2018-08-30 NOTE — PROGRESS NOTE ADULT - PROBLEM SELECTOR PLAN 6
Support provided to patient and family/friends. Patient to have access to supportive services during rest of hospital stay as the patient/family deemed necessary ie. Chaplaincy, Massage therapy, Music therapy, Patient and family supportive services, Palliative SW, etc.    PSSA Pending. Support provided to patient and family/friends. Patient to have access to supportive services during rest of hospital stay as the patient/family deemed necessary ie. Chaplaincy, Massage therapy, Music therapy, Patient and family supportive services, Palliative SW, etc.    As discussed during the palliative IDT meeting, the patients PSSA screening did not identify any current psychosocial need or spiritual support deficits.

## 2018-08-30 NOTE — PROGRESS NOTE ADULT - ASSESSMENT
69yo Female, PMHx HTN and Major Depressive Disorder, presented to the ED with recent history of self harm and depressed mood. Consult after patient had repeated episodes of strokes and respiratory failure currently on vent support. Consult for [facility]

## 2018-08-30 NOTE — PROGRESS NOTE ADULT - ATTENDING COMMENTS
Pt remains comatose. Will contact legal regarding level of care decisions. Pt supposedly completed HCP on way to hospital and unclear her capacity at that time. In addition the HCP agent listed, Leyla, does not feel comfortable making decisions. Continue steroids per Neuro and enteral feeds. Increase lantus and nutritional insulin. complete abx and f/u Cx.

## 2018-08-30 NOTE — PROGRESS NOTE ADULT - PROBLEM SELECTOR PLAN 2
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 Has had acute- subacute CVA.   Management per MICU and Neurology

## 2018-08-31 LAB
-  AZTREONAM: SIGNIFICANT CHANGE UP
-  CEFTAZIDIME: SIGNIFICANT CHANGE UP
-  CIPROFLOXACIN: SIGNIFICANT CHANGE UP
-  GENTAMICIN: SIGNIFICANT CHANGE UP
-  PIPERACILLIN/TAZOBACTAM: SIGNIFICANT CHANGE UP
-  TOBRAMYCIN: SIGNIFICANT CHANGE UP
ANION GAP SERPL CALC-SCNC: 14 MMOL/L — SIGNIFICANT CHANGE UP (ref 5–17)
ANION GAP SERPL CALC-SCNC: 15 MMOL/L — SIGNIFICANT CHANGE UP (ref 5–17)
BASE EXCESS BLDA CALC-SCNC: -5.2 MMOL/L — LOW (ref -2–3)
BUN SERPL-MCNC: 54 MG/DL — HIGH (ref 7–23)
BUN SERPL-MCNC: 54 MG/DL — HIGH (ref 7–23)
CALCIUM SERPL-MCNC: 8.6 MG/DL — SIGNIFICANT CHANGE UP (ref 8.4–10.5)
CALCIUM SERPL-MCNC: 8.7 MG/DL — SIGNIFICANT CHANGE UP (ref 8.4–10.5)
CHLORIDE SERPL-SCNC: 115 MMOL/L — HIGH (ref 96–108)
CHLORIDE SERPL-SCNC: 115 MMOL/L — HIGH (ref 96–108)
CO2 SERPL-SCNC: 19 MMOL/L — LOW (ref 22–31)
CO2 SERPL-SCNC: 20 MMOL/L — LOW (ref 22–31)
CREAT SERPL-MCNC: 1.66 MG/DL — HIGH (ref 0.5–1.3)
CREAT SERPL-MCNC: 1.69 MG/DL — HIGH (ref 0.5–1.3)
CULTURE RESULTS: SIGNIFICANT CHANGE UP
GAS PNL BLDA: SIGNIFICANT CHANGE UP
GLUCOSE SERPL-MCNC: 239 MG/DL — HIGH (ref 70–99)
GLUCOSE SERPL-MCNC: 245 MG/DL — HIGH (ref 70–99)
HCO3 BLDA-SCNC: 18 MMOL/L — LOW (ref 21–28)
HCT VFR BLD CALC: 25.2 % — LOW (ref 34.5–45)
HGB BLD-MCNC: 8.2 G/DL — LOW (ref 11.5–15.5)
MAGNESIUM SERPL-MCNC: 2.6 MG/DL — SIGNIFICANT CHANGE UP (ref 1.6–2.6)
MCHC RBC-ENTMCNC: 29.5 PG — SIGNIFICANT CHANGE UP (ref 27–34)
MCHC RBC-ENTMCNC: 32.5 G/DL — SIGNIFICANT CHANGE UP (ref 32–36)
MCV RBC AUTO: 90.6 FL — SIGNIFICANT CHANGE UP (ref 80–100)
METHOD TYPE: SIGNIFICANT CHANGE UP
ORGANISM # SPEC MICROSCOPIC CNT: SIGNIFICANT CHANGE UP
ORGANISM # SPEC MICROSCOPIC CNT: SIGNIFICANT CHANGE UP
PCO2 BLDA: 27 MMHG — LOW (ref 32–45)
PH BLDA: 7.44 — SIGNIFICANT CHANGE UP (ref 7.35–7.45)
PHOSPHATE SERPL-MCNC: 3.3 MG/DL — SIGNIFICANT CHANGE UP (ref 2.5–4.5)
PLATELET # BLD AUTO: 136 K/UL — LOW (ref 150–400)
PO2 BLDA: 103 MMHG — SIGNIFICANT CHANGE UP (ref 83–108)
POTASSIUM SERPL-MCNC: 3.2 MMOL/L — LOW (ref 3.5–5.3)
POTASSIUM SERPL-MCNC: 3.2 MMOL/L — LOW (ref 3.5–5.3)
POTASSIUM SERPL-SCNC: 3.2 MMOL/L — LOW (ref 3.5–5.3)
POTASSIUM SERPL-SCNC: 3.2 MMOL/L — LOW (ref 3.5–5.3)
RBC # BLD: 2.78 M/UL — LOW (ref 3.8–5.2)
RBC # FLD: 15.4 % — SIGNIFICANT CHANGE UP (ref 10.3–16.9)
SAO2 % BLDA: 98 % — SIGNIFICANT CHANGE UP (ref 95–100)
SODIUM SERPL-SCNC: 149 MMOL/L — HIGH (ref 135–145)
SODIUM SERPL-SCNC: 149 MMOL/L — HIGH (ref 135–145)
SPECIMEN SOURCE: SIGNIFICANT CHANGE UP
WBC # BLD: 13.6 K/UL — HIGH (ref 3.8–10.5)
WBC # FLD AUTO: 13.6 K/UL — HIGH (ref 3.8–10.5)

## 2018-08-31 PROCEDURE — 71045 X-RAY EXAM CHEST 1 VIEW: CPT | Mod: 26

## 2018-08-31 PROCEDURE — 93970 EXTREMITY STUDY: CPT | Mod: 26

## 2018-08-31 PROCEDURE — 99291 CRITICAL CARE FIRST HOUR: CPT

## 2018-08-31 RX ORDER — HYDRALAZINE HCL 50 MG
100 TABLET ORAL EVERY 8 HOURS
Qty: 0 | Refills: 0 | Status: DISCONTINUED | OUTPATIENT
Start: 2018-08-31 | End: 2018-09-05

## 2018-08-31 RX ORDER — ISOSORBIDE DINITRATE 5 MG/1
30 TABLET ORAL EVERY 8 HOURS
Qty: 0 | Refills: 0 | Status: DISCONTINUED | OUTPATIENT
Start: 2018-08-31 | End: 2018-09-05

## 2018-08-31 RX ORDER — INSULIN HUMAN 100 [IU]/ML
4 INJECTION, SOLUTION SUBCUTANEOUS EVERY 6 HOURS
Qty: 0 | Refills: 0 | Status: DISCONTINUED | OUTPATIENT
Start: 2018-08-31 | End: 2018-09-02

## 2018-08-31 RX ORDER — PIPERACILLIN AND TAZOBACTAM 4; .5 G/20ML; G/20ML
3.38 INJECTION, POWDER, LYOPHILIZED, FOR SOLUTION INTRAVENOUS EVERY 6 HOURS
Qty: 0 | Refills: 0 | Status: DISCONTINUED | OUTPATIENT
Start: 2018-08-31 | End: 2018-09-02

## 2018-08-31 RX ORDER — INSULIN GLARGINE 100 [IU]/ML
14 INJECTION, SOLUTION SUBCUTANEOUS AT BEDTIME
Qty: 0 | Refills: 0 | Status: DISCONTINUED | OUTPATIENT
Start: 2018-08-31 | End: 2018-09-02

## 2018-08-31 RX ORDER — AMLODIPINE BESYLATE 2.5 MG/1
10 TABLET ORAL DAILY
Qty: 0 | Refills: 0 | Status: DISCONTINUED | OUTPATIENT
Start: 2018-08-31 | End: 2018-09-05

## 2018-08-31 RX ORDER — POTASSIUM CHLORIDE 20 MEQ
40 PACKET (EA) ORAL ONCE
Qty: 0 | Refills: 0 | Status: COMPLETED | OUTPATIENT
Start: 2018-08-31 | End: 2018-08-31

## 2018-08-31 RX ORDER — POTASSIUM CHLORIDE 20 MEQ
10 PACKET (EA) ORAL
Qty: 0 | Refills: 0 | Status: COMPLETED | OUTPATIENT
Start: 2018-08-31 | End: 2018-08-31

## 2018-08-31 RX ADMIN — Medication 100 MILLIGRAM(S): at 16:16

## 2018-08-31 RX ADMIN — Medication 100 MILLIGRAM(S): at 18:00

## 2018-08-31 RX ADMIN — Medication 0.5 INCH(S): at 22:00

## 2018-08-31 RX ADMIN — Medication 100 MILLIGRAM(S): at 22:48

## 2018-08-31 RX ADMIN — Medication 650 MILLIGRAM(S): at 17:32

## 2018-08-31 RX ADMIN — HEPARIN SODIUM 5000 UNIT(S): 5000 INJECTION INTRAVENOUS; SUBCUTANEOUS at 06:47

## 2018-08-31 RX ADMIN — Medication 0.5 INCH(S): at 01:48

## 2018-08-31 RX ADMIN — INSULIN GLARGINE 14 UNIT(S): 100 INJECTION, SOLUTION SUBCUTANEOUS at 22:49

## 2018-08-31 RX ADMIN — Medication 650 MILLIGRAM(S): at 06:39

## 2018-08-31 RX ADMIN — Medication 100 MILLIEQUIVALENT(S): at 06:38

## 2018-08-31 RX ADMIN — ONDANSETRON 4 MILLIGRAM(S): 8 TABLET, FILM COATED ORAL at 06:43

## 2018-08-31 RX ADMIN — INSULIN HUMAN 2: 100 INJECTION, SOLUTION SUBCUTANEOUS at 07:55

## 2018-08-31 RX ADMIN — ONDANSETRON 4 MILLIGRAM(S): 8 TABLET, FILM COATED ORAL at 17:32

## 2018-08-31 RX ADMIN — INSULIN HUMAN 2: 100 INJECTION, SOLUTION SUBCUTANEOUS at 23:34

## 2018-08-31 RX ADMIN — CHLORHEXIDINE GLUCONATE 1 APPLICATION(S): 213 SOLUTION TOPICAL at 06:43

## 2018-08-31 RX ADMIN — ONDANSETRON 4 MILLIGRAM(S): 8 TABLET, FILM COATED ORAL at 11:32

## 2018-08-31 RX ADMIN — Medication 0.2 MILLIGRAM(S): at 06:44

## 2018-08-31 RX ADMIN — HEPARIN SODIUM 5000 UNIT(S): 5000 INJECTION INTRAVENOUS; SUBCUTANEOUS at 22:47

## 2018-08-31 RX ADMIN — NICARDIPINE HYDROCHLORIDE 25 MG/HR: 30 CAPSULE, EXTENDED RELEASE ORAL at 06:51

## 2018-08-31 RX ADMIN — CHLORHEXIDINE GLUCONATE 15 MILLILITER(S): 213 SOLUTION TOPICAL at 07:42

## 2018-08-31 RX ADMIN — Medication 1 APPLICATION(S): at 12:09

## 2018-08-31 RX ADMIN — PIPERACILLIN AND TAZOBACTAM 200 GRAM(S): 4; .5 INJECTION, POWDER, LYOPHILIZED, FOR SOLUTION INTRAVENOUS at 06:39

## 2018-08-31 RX ADMIN — Medication 100 MILLIEQUIVALENT(S): at 07:39

## 2018-08-31 RX ADMIN — AMLODIPINE BESYLATE 10 MILLIGRAM(S): 2.5 TABLET ORAL at 07:42

## 2018-08-31 RX ADMIN — ONDANSETRON 4 MILLIGRAM(S): 8 TABLET, FILM COATED ORAL at 23:48

## 2018-08-31 RX ADMIN — Medication 1 MILLIGRAM(S): at 12:08

## 2018-08-31 RX ADMIN — ISOSORBIDE DINITRATE 30 MILLIGRAM(S): 5 TABLET ORAL at 22:48

## 2018-08-31 RX ADMIN — Medication 650 MILLIGRAM(S): at 11:32

## 2018-08-31 RX ADMIN — Medication 100 MILLIGRAM(S): at 06:43

## 2018-08-31 RX ADMIN — CHLORHEXIDINE GLUCONATE 15 MILLILITER(S): 213 SOLUTION TOPICAL at 17:32

## 2018-08-31 RX ADMIN — HEPARIN SODIUM 5000 UNIT(S): 5000 INJECTION INTRAVENOUS; SUBCUTANEOUS at 16:16

## 2018-08-31 RX ADMIN — PIPERACILLIN AND TAZOBACTAM 200 GRAM(S): 4; .5 INJECTION, POWDER, LYOPHILIZED, FOR SOLUTION INTRAVENOUS at 12:11

## 2018-08-31 RX ADMIN — Medication 100 UNIT(S): at 11:33

## 2018-08-31 RX ADMIN — PIPERACILLIN AND TAZOBACTAM 200 GRAM(S): 4; .5 INJECTION, POWDER, LYOPHILIZED, FOR SOLUTION INTRAVENOUS at 23:46

## 2018-08-31 RX ADMIN — PIPERACILLIN AND TAZOBACTAM 200 GRAM(S): 4; .5 INJECTION, POWDER, LYOPHILIZED, FOR SOLUTION INTRAVENOUS at 17:32

## 2018-08-31 RX ADMIN — PANTOPRAZOLE SODIUM 40 MILLIGRAM(S): 20 TABLET, DELAYED RELEASE ORAL at 11:32

## 2018-08-31 RX ADMIN — SERTRALINE 75 MILLIGRAM(S): 25 TABLET, FILM COATED ORAL at 11:33

## 2018-08-31 RX ADMIN — Medication 300 MILLIGRAM(S): at 11:33

## 2018-08-31 RX ADMIN — Medication 650 MILLIGRAM(S): at 23:41

## 2018-08-31 RX ADMIN — INSULIN HUMAN 4 UNIT(S): 100 INJECTION, SOLUTION SUBCUTANEOUS at 12:19

## 2018-08-31 RX ADMIN — Medication 100 MILLIEQUIVALENT(S): at 08:50

## 2018-08-31 RX ADMIN — Medication 0.2 MILLIGRAM(S): at 16:17

## 2018-08-31 RX ADMIN — NICARDIPINE HYDROCHLORIDE 25 MG/HR: 30 CAPSULE, EXTENDED RELEASE ORAL at 12:20

## 2018-08-31 RX ADMIN — LEVETIRACETAM 400 MILLIGRAM(S): 250 TABLET, FILM COATED ORAL at 11:13

## 2018-08-31 RX ADMIN — INSULIN HUMAN 4 UNIT(S): 100 INJECTION, SOLUTION SUBCUTANEOUS at 17:32

## 2018-08-31 RX ADMIN — Medication 0.5 INCH(S): at 08:57

## 2018-08-31 RX ADMIN — ISOSORBIDE DINITRATE 30 MILLIGRAM(S): 5 TABLET ORAL at 16:16

## 2018-08-31 RX ADMIN — INSULIN HUMAN 4 UNIT(S): 100 INJECTION, SOLUTION SUBCUTANEOUS at 23:35

## 2018-08-31 RX ADMIN — Medication 100 MILLIGRAM(S): at 12:09

## 2018-08-31 RX ADMIN — LEVETIRACETAM 400 MILLIGRAM(S): 250 TABLET, FILM COATED ORAL at 22:48

## 2018-08-31 RX ADMIN — Medication 40 MILLIEQUIVALENT(S): at 06:50

## 2018-08-31 RX ADMIN — Medication 100 MILLIGRAM(S): at 06:39

## 2018-08-31 RX ADMIN — INSULIN HUMAN 3 UNIT(S): 100 INJECTION, SOLUTION SUBCUTANEOUS at 07:57

## 2018-08-31 NOTE — PROGRESS NOTE ADULT - SUBJECTIVE AND OBJECTIVE BOX
Neurology Stroke Progress Note     INTERVAL HPI/OVERNIGHT EVENTS:  Patient seen and examined. Opens eyes slightly to loud voice    MEDICATIONS  (STANDING):  acetaminophen    Suspension 650 milliGRAM(s) Oral every 6 hours  amLODIPine   Tablet 10 milliGRAM(s) Oral daily  aspirin Suppository 300 milliGRAM(s) Rectal daily  BACItracin   Ointment 1 Application(s) Topical daily  chlorhexidine 0.12% Liquid 15 milliLiter(s) Swish and Spit two times a day  chlorhexidine 2% Cloths 1 Application(s) Topical daily  cloNIDine 0.2 milliGRAM(s) Oral every 8 hours  dextrose 5%. 1000 milliLiter(s) (50 mL/Hr) IV Continuous <Continuous>  dextrose 50% Injectable 12.5 Gram(s) IV Push once  dextrose 50% Injectable 25 Gram(s) IV Push once  dextrose 50% Injectable 25 Gram(s) IV Push once  folic acid Injectable 1 milliGRAM(s) IV Push daily  heparin  flush 100 Units/mL Injectable 100 Unit(s) IV Push every other day  heparin  Injectable 5000 Unit(s) SubCutaneous every 8 hours  hydrALAZINE 100 milliGRAM(s) Oral every 8 hours  insulin glargine Injectable (LANTUS) 14 Unit(s) SubCutaneous at bedtime  insulin regular  human corrective regimen sliding scale   SubCutaneous every 6 hours  insulin regular  human recombinant 4 Unit(s) SubCutaneous every 6 hours  isosorbide   dinitrate Tablet (ISORDIL) 30 milliGRAM(s) Oral every 8 hours  levETIRAcetam  IVPB 500 milliGRAM(s) IV Intermittent every 12 hours  methylPREDNISolone sodium succinate IVPB 500 milliGRAM(s) IV Intermittent every 12 hours  niCARdipine Infusion 5 mG/Hr (25 mL/Hr) IV Continuous <Continuous>  ondansetron Injectable 4 milliGRAM(s) IV Push every 6 hours  pantoprazole  Injectable 40 milliGRAM(s) IV Push daily  piperacillin/tazobactam IVPB. 3.375 Gram(s) IV Intermittent every 6 hours  sertraline 75 milliGRAM(s) Oral daily  thiamine Injectable 100 milliGRAM(s) IV Push daily    MEDICATIONS  (PRN):  dextrose 40% Gel 15 Gram(s) Oral once PRN Blood Glucose LESS THAN 70 milliGRAM(s)/deciliter  glucagon  Injectable 1 milliGRAM(s) IntraMuscular once PRN Glucose LESS THAN 70 milligrams/deciliter      Allergies    No Known Allergies        ROS: As per HPI, otherwise negative    Vital Signs Last 24 Hrs  T(C): 37.1 (31 Aug 2018 06:00), Max: 38.2 (30 Aug 2018 15:02)  T(F): 98.7 (31 Aug 2018 06:00), Max: 100.8 (30 Aug 2018 15:02)  HR: 69 (31 Aug 2018 13:24) (58 - 122)  BP: --  BP(mean): --  RR: 19 (31 Aug 2018 13:24) (8 - 32)  SpO2: 95% (31 Aug 2018 13:24) (89% - 100%)    Physical exam:  Gen: intubated. Opens eyes slightly to voice.  Mental status -   Cranial nerves - PERRL+ , left facial droop, Right gaze preference. Does not cross midline  Motor - bilateral upper extremity decerebrate posturing with sternal rub  Sensation - reflexive to pain of both lower extremities  Cerebellar - did not cooperate  Gait - deferred        LABS:                        8.2    13.6  )-----------( 136      ( 31 Aug 2018 03:32 )             25.2     08-31    149<H>  |  115<H>  |  54<H>  ----------------------------<  245<H>  3.2<L>   |  20<L>  |  1.69<H>    Ca    8.7      31 Aug 2018 03:32  Phos  3.3     08-31  Mg     2.6     08-31            RADIOLOGY & ADDITIONAL TESTS:  no new cerebral imaging      Assessment and Plan  68F PMHx MDD here with 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 Febrile, worsening mental status with new brainstem stroke. Palliative on board, awaiting ethics and HCP with decision.    1. severe vasculitis with recurrent strokes, worsening neuro exam  -solumedrol for 5 total days, then decrease to 125 BID  -keep  -180  -cont aspirin   -EEG with background slowing  -Repeat HCT with new brainstem stroke    2. Resp failure, fevers, UTI  -ID per MICU team  -palliative on board, likely need ethics involvement.    -rest of care per MICU team    DVT prophylaxis   -Heparin SQ TID and SCDs

## 2018-08-31 NOTE — CHART NOTE - NSCHARTNOTEFT_GEN_A_CORE
Spoke with Neurology attending Dr. Sapp who indicated that Elizabeth Olivas had specifically named Leyla Lizama as the one she would desire making medical decisions on her behalf if she were unable to do so herself. Discussed with Leyla Lizama today at [phone] regarding her willingness to make decisions on Ms. Olivas's behalf knowing she will unlikely be able to do so herself. Ms. Loredo indicated that she does feel comfortable making health care decisions for Ms. Olivas as she is currently incapacitated, and knowing her prognosis and current respiratory status feels that Ms. Olivas would not wish to undergo a tracheostomy and live in a ventilator dependent state at a facility. Ms. Lizama stated she would like to give more time to the decision regarding withdrawal of care, and wished for Ms. Olivas to remain intubated and in the care of the ICU team at this time per her current level of care.

## 2018-08-31 NOTE — PROGRESS NOTE ADULT - ATTENDING COMMENTS
No change in neuro status. Remains comatose. Dr. Romero spoke with Leyla by phone today and she does feel comfortable making medical decisions for Ms. Olivas. Dr. Sapp confirms when the patient was awake and alert with capacity that she indicated she wanted Leyla to be making decisions for her. The HCP is not complete but does list Leyla as first in line and is consistent with what the patient verbalized to Dr. Sapp. She understands the neuro prognosis is poor as discussed with Dr. Sapp and she wishes to continue current care for possible improvement until next week and then we can all reassess her clinical condition and discuss level of care.

## 2018-08-31 NOTE — PROGRESS NOTE ADULT - ASSESSMENT
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 Patient with residual left sided deficits with new lethargy, CTH showing new infarcts.  - c/w  rectally while NPO. Atorvastatin 80 when able   - c/w Keppra, 500mg PO BID  - solumedrol 125mg bid.  - scheduled tylenol  - f/u CT head showed hypodensity in [place], possible new infarct vs ischemia     Cardiac  #Hypertensive emergency/urgency - Episodes of vomiting noted from possible hypertensive encephelopathy. CTH with no ICH, edema, or midline shift, though showing evolving infarcts.    - Nicardipine drip with goal SBP between 140-160 per Neuro  - BP still not within range today. Increase clonidine to 0.2mg PO TID. Start isordil 30mg.    Pulmonary  #Concern for aspiration   - Patient with vomiting and AMS in setting of likely hypertensive encephalopathy, CXR without infiltrate  - intubated on CPAP. Saturating appropriately.     Renal  #MARYJANE vs CKD - Possibly 2/2 vasculitis vs CKD 2/2 hypertensive nephropathy - Patient presented with MARYJANE of 1.45; unknown baseline. Renal ultrasound showed slightly diminished left renal size with smooth renal cortical mantle thinning which may suggest underlying nephrosclerosis. Renal duplex performed 8/24, showing <60% stenosis of the right renal artery at its origin. Urine lytes revealing FeNa - 1.4%, indicating intrinsic renal disease.   - Avoiding nephrotoxic agents, Trend Cr    GI  #Vomiting - patient with vomiting at onset of hypertension and alteration of mental status, possible hypertensive encephalopathy  - c/w HTN treatment as above  - Zofran 4mg IV PRN. Keep NPO  - GI consult for PEG placement.     Endo  # hyperglycemia  - likely 2/2 high dose steroids  - 14U daily lantus, 4U q6hr lispro    F: none   E: replete as needed  N: GI consult for PEG placement  HSQ, SCDs, PPI IV  MVT, Thiamine, Folic Acid  dispo: MICU

## 2018-08-31 NOTE — PROGRESS NOTE ADULT - SUBJECTIVE AND OBJECTIVE BOX
INTERVAL HPI/OVERNIGHT EVENTS: intubated on CPAP-5 40%. Overnight norvasc 10 added and clonidine changed to q6 dosing for additional BP control. Nicardipine drip at 8. SBP still around 160's.     SUBJECTIVE: Patient seen and examined at bedside. Pt is intubated, not sedated.       OBJECTIVE:    VITAL SIGNS:  ICU Vital Signs Last 24 Hrs  T(C): 37.1 (31 Aug 2018 06:00), Max: 38.2 (30 Aug 2018 15:02)  T(F): 98.7 (31 Aug 2018 06:00), Max: 100.8 (30 Aug 2018 15:02)  HR: 70 (31 Aug 2018 05:00) (58 - 122)  BP: 155/69 (30 Aug 2018 09:00) (155/69 - 155/69)  BP(mean): 100 (30 Aug 2018 09:00) (100 - 100)  ABP: 180/64 (31 Aug 2018 05:00) (154/58 - 184/64)  ABP(mean): 98 (31 Aug 2018 05:00) (84 - 102)  RR: 13 (31 Aug 2018 05:00) (8 - 32)  SpO2: 93% (31 Aug 2018 05:00) (89% - 99%)    Mode: CPAP with PS, FiO2: 40, PEEP: 5, PS: 8, MAP: 6.9, PIP: 14    08-30 @ 07:01  -  08-31 @ 07:00  --------------------------------------------------------  IN: 2628 mL / OUT: 2 mL / NET: 2626 mL      CAPILLARY BLOOD GLUCOSE      POCT Blood Glucose.: 242 mg/dL (30 Aug 2018 23:20)      PHYSICAL EXAM:    General: NAD  HEENT: NC/AT; PERRL, clear conjunctiva  Neck: supple  Respiratory: CTA b/l  Cardiovascular: +S1/S2; RRR  Abdomen: soft, NT/ND; +BS x4  Extremities: WWP, 2+ peripheral pulses b/l; no LE edema  Skin: normal color and turgor; no rash  Neurological:    MEDICATIONS:  MEDICATIONS  (STANDING):  acetaminophen    Suspension 650 milliGRAM(s) Oral every 6 hours  amLODIPine   Tablet 10 milliGRAM(s) Oral daily  aspirin Suppository 300 milliGRAM(s) Rectal daily  BACItracin   Ointment 1 Application(s) Topical daily  chlorhexidine 0.12% Liquid 15 milliLiter(s) Swish and Spit two times a day  chlorhexidine 2% Cloths 1 Application(s) Topical daily  cloNIDine 0.2 milliGRAM(s) Oral every 6 hours  dextrose 5%. 1000 milliLiter(s) (50 mL/Hr) IV Continuous <Continuous>  dextrose 50% Injectable 12.5 Gram(s) IV Push once  dextrose 50% Injectable 25 Gram(s) IV Push once  dextrose 50% Injectable 25 Gram(s) IV Push once  folic acid Injectable 1 milliGRAM(s) IV Push daily  heparin  flush 100 Units/mL Injectable 100 Unit(s) IV Push every other day  heparin  Injectable 5000 Unit(s) SubCutaneous every 8 hours  hydrALAZINE 100 milliGRAM(s) Oral every 8 hours  insulin glargine Injectable (LANTUS) 10 Unit(s) SubCutaneous at bedtime  insulin regular  human corrective regimen sliding scale   SubCutaneous every 6 hours  insulin regular  human recombinant 3 Unit(s) SubCutaneous every 6 hours  levETIRAcetam  IVPB 500 milliGRAM(s) IV Intermittent every 12 hours  methylPREDNISolone sodium succinate IVPB 500 milliGRAM(s) IV Intermittent every 12 hours  niCARdipine Infusion 5 mG/Hr (25 mL/Hr) IV Continuous <Continuous>  nitroglycerin    2% Ointment 0.5 Inch(s) Transdermal <User Schedule>  ondansetron Injectable 4 milliGRAM(s) IV Push every 6 hours  pantoprazole  Injectable 40 milliGRAM(s) IV Push daily  piperacillin/tazobactam IVPB. 2.25 Gram(s) IV Intermittent every 6 hours  potassium chloride  10 mEq/100 mL IVPB 10 milliEquivalent(s) IV Intermittent every 1 hour  sertraline 75 milliGRAM(s) Oral daily  thiamine Injectable 100 milliGRAM(s) IV Push daily    MEDICATIONS  (PRN):  dextrose 40% Gel 15 Gram(s) Oral once PRN Blood Glucose LESS THAN 70 milliGRAM(s)/deciliter  glucagon  Injectable 1 milliGRAM(s) IntraMuscular once PRN Glucose LESS THAN 70 milligrams/deciliter      ALLERGIES:  Allergies    No Known Allergies    Intolerances        LABS:                        8.2    13.6  )-----------( 136      ( 31 Aug 2018 03:32 )             25.2     08-31    149<H>  |  115<H>  |  54<H>  ----------------------------<  245<H>  3.2<L>   |  20<L>  |  1.69<H>    Ca    8.7      31 Aug 2018 03:32  Phos  3.3     08-31  Mg     2.6     08-31            RADIOLOGY & ADDITIONAL TESTS: Reviewed. INTERVAL HPI/OVERNIGHT EVENTS: intubated on CPAP-5 40%. Overnight norvasc 10 added and clonidine changed to q6 dosing for additional BP control. Nicardipine drip at 8. SBP still around 160's.     SUBJECTIVE: Patient seen and examined at bedside. Pt is intubated, not sedated. ROS unable to be completed d/t pt's mentation status.       OBJECTIVE:    VITAL SIGNS:  ICU Vital Signs Last 24 Hrs  T(C): 37.1 (31 Aug 2018 06:00), Max: 38.2 (30 Aug 2018 15:02)  T(F): 98.7 (31 Aug 2018 06:00), Max: 100.8 (30 Aug 2018 15:02)  HR: 70 (31 Aug 2018 05:00) (58 - 122)  BP: 155/69 (30 Aug 2018 09:00) (155/69 - 155/69)  BP(mean): 100 (30 Aug 2018 09:00) (100 - 100)  ABP: 180/64 (31 Aug 2018 05:00) (154/58 - 184/64)  ABP(mean): 98 (31 Aug 2018 05:00) (84 - 102)  RR: 13 (31 Aug 2018 05:00) (8 - 32)  SpO2: 93% (31 Aug 2018 05:00) (89% - 99%)    Mode: CPAP with PS, FiO2: 40, PEEP: 5, PS: 8, MAP: 6.9, PIP: 14    08-30 @ 07:01  -  08-31 @ 07:00  --------------------------------------------------------  IN: 2628 mL / OUT: 2 mL / NET: 2626 mL      CAPILLARY BLOOD GLUCOSE      POCT Blood Glucose.: 242 mg/dL (30 Aug 2018 23:20)      PHYSICAL EXAM:    General: intubated, nonresponsive  HEENT: NC/AT; PERRL, clear conjunctiva  Neck: supple  Respiratory: ronchi b/l  Cardiovascular: systolic murmur  Abdomen: soft, NT/ND; +BS x4  Extremities: WWP, 2+ peripheral pulses b/l; no LE edema  Skin: normal color and turgor; no rash  Neurological: A&Ox0, rightward eye gaze, horizontal nystagmus, RLE spontaneous movement.    MEDICATIONS:  MEDICATIONS  (STANDING):  acetaminophen    Suspension 650 milliGRAM(s) Oral every 6 hours  amLODIPine   Tablet 10 milliGRAM(s) Oral daily  aspirin Suppository 300 milliGRAM(s) Rectal daily  BACItracin   Ointment 1 Application(s) Topical daily  chlorhexidine 0.12% Liquid 15 milliLiter(s) Swish and Spit two times a day  chlorhexidine 2% Cloths 1 Application(s) Topical daily  cloNIDine 0.2 milliGRAM(s) Oral every 6 hours  dextrose 5%. 1000 milliLiter(s) (50 mL/Hr) IV Continuous <Continuous>  dextrose 50% Injectable 12.5 Gram(s) IV Push once  dextrose 50% Injectable 25 Gram(s) IV Push once  dextrose 50% Injectable 25 Gram(s) IV Push once  folic acid Injectable 1 milliGRAM(s) IV Push daily  heparin  flush 100 Units/mL Injectable 100 Unit(s) IV Push every other day  heparin  Injectable 5000 Unit(s) SubCutaneous every 8 hours  hydrALAZINE 100 milliGRAM(s) Oral every 8 hours  insulin glargine Injectable (LANTUS) 10 Unit(s) SubCutaneous at bedtime  insulin regular  human corrective regimen sliding scale   SubCutaneous every 6 hours  insulin regular  human recombinant 3 Unit(s) SubCutaneous every 6 hours  levETIRAcetam  IVPB 500 milliGRAM(s) IV Intermittent every 12 hours  methylPREDNISolone sodium succinate IVPB 500 milliGRAM(s) IV Intermittent every 12 hours  niCARdipine Infusion 5 mG/Hr (25 mL/Hr) IV Continuous <Continuous>  nitroglycerin    2% Ointment 0.5 Inch(s) Transdermal <User Schedule>  ondansetron Injectable 4 milliGRAM(s) IV Push every 6 hours  pantoprazole  Injectable 40 milliGRAM(s) IV Push daily  piperacillin/tazobactam IVPB. 2.25 Gram(s) IV Intermittent every 6 hours  potassium chloride  10 mEq/100 mL IVPB 10 milliEquivalent(s) IV Intermittent every 1 hour  sertraline 75 milliGRAM(s) Oral daily  thiamine Injectable 100 milliGRAM(s) IV Push daily    MEDICATIONS  (PRN):  dextrose 40% Gel 15 Gram(s) Oral once PRN Blood Glucose LESS THAN 70 milliGRAM(s)/deciliter  glucagon  Injectable 1 milliGRAM(s) IntraMuscular once PRN Glucose LESS THAN 70 milligrams/deciliter      ALLERGIES:  Allergies    No Known Allergies    Intolerances        LABS:                        8.2    13.6  )-----------( 136      ( 31 Aug 2018 03:32 )             25.2     08-31    149<H>  |  115<H>  |  54<H>  ----------------------------<  245<H>  3.2<L>   |  20<L>  |  1.69<H>    Ca    8.7      31 Aug 2018 03:32  Phos  3.3     08-31  Mg     2.6     08-31            RADIOLOGY & ADDITIONAL TESTS: Reviewed.

## 2018-09-01 LAB
ANION GAP SERPL CALC-SCNC: 13 MMOL/L — SIGNIFICANT CHANGE UP (ref 5–17)
BUN SERPL-MCNC: 54 MG/DL — HIGH (ref 7–23)
CALCIUM SERPL-MCNC: 8.6 MG/DL — SIGNIFICANT CHANGE UP (ref 8.4–10.5)
CHLORIDE SERPL-SCNC: 114 MMOL/L — HIGH (ref 96–108)
CO2 SERPL-SCNC: 21 MMOL/L — LOW (ref 22–31)
CREAT SERPL-MCNC: 1.7 MG/DL — HIGH (ref 0.5–1.3)
GLUCOSE SERPL-MCNC: 134 MG/DL — HIGH (ref 70–99)
HCT VFR BLD CALC: 22.9 % — LOW (ref 34.5–45)
HGB BLD-MCNC: 7.5 G/DL — LOW (ref 11.5–15.5)
MAGNESIUM SERPL-MCNC: 2.6 MG/DL — SIGNIFICANT CHANGE UP (ref 1.6–2.6)
MCHC RBC-ENTMCNC: 29.5 PG — SIGNIFICANT CHANGE UP (ref 27–34)
MCHC RBC-ENTMCNC: 32.8 G/DL — SIGNIFICANT CHANGE UP (ref 32–36)
MCV RBC AUTO: 90.2 FL — SIGNIFICANT CHANGE UP (ref 80–100)
PHOSPHATE SERPL-MCNC: 3.8 MG/DL — SIGNIFICANT CHANGE UP (ref 2.5–4.5)
PLATELET # BLD AUTO: 116 K/UL — LOW (ref 150–400)
POTASSIUM SERPL-MCNC: 3.6 MMOL/L — SIGNIFICANT CHANGE UP (ref 3.5–5.3)
POTASSIUM SERPL-SCNC: 3.6 MMOL/L — SIGNIFICANT CHANGE UP (ref 3.5–5.3)
RBC # BLD: 2.54 M/UL — LOW (ref 3.8–5.2)
RBC # FLD: 15.2 % — SIGNIFICANT CHANGE UP (ref 10.3–16.9)
SODIUM SERPL-SCNC: 148 MMOL/L — HIGH (ref 135–145)
WBC # BLD: 13.4 K/UL — HIGH (ref 3.8–10.5)
WBC # FLD AUTO: 13.4 K/UL — HIGH (ref 3.8–10.5)

## 2018-09-01 PROCEDURE — 71045 X-RAY EXAM CHEST 1 VIEW: CPT | Mod: 26

## 2018-09-01 PROCEDURE — 99291 CRITICAL CARE FIRST HOUR: CPT

## 2018-09-01 RX ORDER — FOLIC ACID 0.8 MG
1 TABLET ORAL DAILY
Qty: 0 | Refills: 0 | Status: DISCONTINUED | OUTPATIENT
Start: 2018-09-01 | End: 2018-09-05

## 2018-09-01 RX ORDER — ASPIRIN/CALCIUM CARB/MAGNESIUM 324 MG
325 TABLET ORAL DAILY
Qty: 0 | Refills: 0 | Status: DISCONTINUED | OUTPATIENT
Start: 2018-09-01 | End: 2018-09-05

## 2018-09-01 RX ORDER — THIAMINE MONONITRATE (VIT B1) 100 MG
100 TABLET ORAL DAILY
Qty: 0 | Refills: 0 | Status: DISCONTINUED | OUTPATIENT
Start: 2018-09-01 | End: 2018-09-05

## 2018-09-01 RX ORDER — POTASSIUM CHLORIDE 20 MEQ
40 PACKET (EA) ORAL ONCE
Qty: 0 | Refills: 0 | Status: COMPLETED | OUTPATIENT
Start: 2018-09-01 | End: 2018-09-01

## 2018-09-01 RX ADMIN — INSULIN HUMAN 4 UNIT(S): 100 INJECTION, SOLUTION SUBCUTANEOUS at 18:04

## 2018-09-01 RX ADMIN — HEPARIN SODIUM 5000 UNIT(S): 5000 INJECTION INTRAVENOUS; SUBCUTANEOUS at 06:26

## 2018-09-01 RX ADMIN — Medication 100 MILLIGRAM(S): at 13:20

## 2018-09-01 RX ADMIN — SERTRALINE 75 MILLIGRAM(S): 25 TABLET, FILM COATED ORAL at 13:21

## 2018-09-01 RX ADMIN — Medication 325 MILLIGRAM(S): at 18:13

## 2018-09-01 RX ADMIN — LEVETIRACETAM 400 MILLIGRAM(S): 250 TABLET, FILM COATED ORAL at 23:57

## 2018-09-01 RX ADMIN — Medication 650 MILLIGRAM(S): at 18:03

## 2018-09-01 RX ADMIN — Medication 650 MILLIGRAM(S): at 13:19

## 2018-09-01 RX ADMIN — Medication 650 MILLIGRAM(S): at 23:57

## 2018-09-01 RX ADMIN — ISOSORBIDE DINITRATE 30 MILLIGRAM(S): 5 TABLET ORAL at 13:24

## 2018-09-01 RX ADMIN — ISOSORBIDE DINITRATE 30 MILLIGRAM(S): 5 TABLET ORAL at 06:25

## 2018-09-01 RX ADMIN — PIPERACILLIN AND TAZOBACTAM 200 GRAM(S): 4; .5 INJECTION, POWDER, LYOPHILIZED, FOR SOLUTION INTRAVENOUS at 23:58

## 2018-09-01 RX ADMIN — Medication 100 MILLIGRAM(S): at 22:14

## 2018-09-01 RX ADMIN — AMLODIPINE BESYLATE 10 MILLIGRAM(S): 2.5 TABLET ORAL at 06:25

## 2018-09-01 RX ADMIN — Medication 650 MILLIGRAM(S): at 06:26

## 2018-09-01 RX ADMIN — PANTOPRAZOLE SODIUM 40 MILLIGRAM(S): 20 TABLET, DELAYED RELEASE ORAL at 13:20

## 2018-09-01 RX ADMIN — Medication 100 MILLIGRAM(S): at 13:22

## 2018-09-01 RX ADMIN — Medication 125 MILLIGRAM(S): at 06:00

## 2018-09-01 RX ADMIN — Medication 1 APPLICATION(S): at 13:21

## 2018-09-01 RX ADMIN — INSULIN HUMAN 4 UNIT(S): 100 INJECTION, SOLUTION SUBCUTANEOUS at 07:12

## 2018-09-01 RX ADMIN — HEPARIN SODIUM 5000 UNIT(S): 5000 INJECTION INTRAVENOUS; SUBCUTANEOUS at 13:19

## 2018-09-01 RX ADMIN — CHLORHEXIDINE GLUCONATE 15 MILLILITER(S): 213 SOLUTION TOPICAL at 06:27

## 2018-09-01 RX ADMIN — Medication 40 MILLIEQUIVALENT(S): at 06:25

## 2018-09-01 RX ADMIN — INSULIN HUMAN 4 UNIT(S): 100 INJECTION, SOLUTION SUBCUTANEOUS at 13:22

## 2018-09-01 RX ADMIN — ISOSORBIDE DINITRATE 30 MILLIGRAM(S): 5 TABLET ORAL at 22:14

## 2018-09-01 RX ADMIN — LEVETIRACETAM 400 MILLIGRAM(S): 250 TABLET, FILM COATED ORAL at 13:21

## 2018-09-01 RX ADMIN — Medication 125 MILLIGRAM(S): at 18:03

## 2018-09-01 RX ADMIN — INSULIN GLARGINE 14 UNIT(S): 100 INJECTION, SOLUTION SUBCUTANEOUS at 22:14

## 2018-09-01 RX ADMIN — HEPARIN SODIUM 5000 UNIT(S): 5000 INJECTION INTRAVENOUS; SUBCUTANEOUS at 22:13

## 2018-09-01 RX ADMIN — Medication 0.4 MILLIGRAM(S): at 22:14

## 2018-09-01 RX ADMIN — PIPERACILLIN AND TAZOBACTAM 200 GRAM(S): 4; .5 INJECTION, POWDER, LYOPHILIZED, FOR SOLUTION INTRAVENOUS at 06:26

## 2018-09-01 RX ADMIN — Medication 1 MILLIGRAM(S): at 13:20

## 2018-09-01 RX ADMIN — Medication 100 MILLIGRAM(S): at 06:26

## 2018-09-01 RX ADMIN — Medication 0.4 MILLIGRAM(S): at 13:24

## 2018-09-01 RX ADMIN — PIPERACILLIN AND TAZOBACTAM 200 GRAM(S): 4; .5 INJECTION, POWDER, LYOPHILIZED, FOR SOLUTION INTRAVENOUS at 18:03

## 2018-09-01 RX ADMIN — ONDANSETRON 4 MILLIGRAM(S): 8 TABLET, FILM COATED ORAL at 23:57

## 2018-09-01 RX ADMIN — CHLORHEXIDINE GLUCONATE 15 MILLILITER(S): 213 SOLUTION TOPICAL at 18:03

## 2018-09-01 RX ADMIN — CHLORHEXIDINE GLUCONATE 1 APPLICATION(S): 213 SOLUTION TOPICAL at 07:10

## 2018-09-01 RX ADMIN — Medication 2.5 MILLIGRAM(S): at 03:08

## 2018-09-01 RX ADMIN — ONDANSETRON 4 MILLIGRAM(S): 8 TABLET, FILM COATED ORAL at 18:04

## 2018-09-01 RX ADMIN — PIPERACILLIN AND TAZOBACTAM 200 GRAM(S): 4; .5 INJECTION, POWDER, LYOPHILIZED, FOR SOLUTION INTRAVENOUS at 13:20

## 2018-09-01 RX ADMIN — ONDANSETRON 4 MILLIGRAM(S): 8 TABLET, FILM COATED ORAL at 06:26

## 2018-09-01 RX ADMIN — ONDANSETRON 4 MILLIGRAM(S): 8 TABLET, FILM COATED ORAL at 13:19

## 2018-09-01 RX ADMIN — Medication 0.4 MILLIGRAM(S): at 06:27

## 2018-09-01 NOTE — PROGRESS NOTE ADULT - SUBJECTIVE AND OBJECTIVE BOX
Patient is a 68y old  Female who presents with a chief complaint of Depression, Stroke Code Called in Unit (11 Aug 2018 10:04)      INTERVAL HPI/OVERNIGHT EVENTS:  ICU Vital Signs Last 24 Hrs  T(C): 37.4 (01 Sep 2018 05:10), Max: 38 (31 Aug 2018 22:08)  T(F): 99.3 (01 Sep 2018 05:10), Max: 100.4 (31 Aug 2018 22:08)  HR: 54 (01 Sep 2018 05:24) (52 - 80)  BP: --  BP(mean): --  ABP: 164/66 (01 Sep 2018 05:00) (150/58 - 208/78)  ABP(mean): 92 (01 Sep 2018 05:00) (86 - 116)  RR: 18 (01 Sep 2018 05:24) (3 - 28)  SpO2: 98% (01 Sep 2018 05:24) (92% - 100%)    I&O's Summary    30 Aug 2018 07:01  -  31 Aug 2018 07:00  --------------------------------------------------------  IN: 2968 mL / OUT: 2 mL / NET: 2966 mL    31 Aug 2018 07:01  -  01 Sep 2018 06:48  --------------------------------------------------------  IN: 2610 mL / OUT: 0 mL / NET: 2610 mL      Mode: CPAP with PS  FiO2: 40  PEEP: 5  PS: 8  MAP: 6  PIP: 13      LABS:                        7.5    13.4  )-----------( 116      ( 01 Sep 2018 03:48 )             22.9     09-01    148<H>  |  114<H>  |  54<H>  ----------------------------<  134<H>  3.6   |  21<L>  |  1.70<H>    Ca    8.6      01 Sep 2018 03:48  Phos  3.8     09-01  Mg     2.6     09-01          CAPILLARY BLOOD GLUCOSE      POCT Blood Glucose.: 178 mg/dL (31 Aug 2018 23:08)  POCT Blood Glucose.: 136 mg/dL (31 Aug 2018 17:14)  POCT Blood Glucose.: 128 mg/dL (31 Aug 2018 11:52)  POCT Blood Glucose.: 177 mg/dL (31 Aug 2018 07:48)    ABG - ( 31 Aug 2018 13:13 )  pH, Arterial: 7.44  pH, Blood: x     /  pCO2: 27    /  pO2: 103   / HCO3: 18    / Base Excess: -5.2  /  SaO2: 98                  RADIOLOGY & ADDITIONAL TESTS:    Consultant(s) Notes Reviewed:  [x ] YES  [ ] NO    MEDICATIONS  (STANDING):  acetaminophen    Suspension 650 milliGRAM(s) Oral every 6 hours  amLODIPine   Tablet 10 milliGRAM(s) Oral daily  aspirin Suppository 300 milliGRAM(s) Rectal daily  BACItracin   Ointment 1 Application(s) Topical daily  chlorhexidine 0.12% Liquid 15 milliLiter(s) Swish and Spit two times a day  chlorhexidine 2% Cloths 1 Application(s) Topical daily  cloNIDine 0.4 milliGRAM(s) Oral every 8 hours  dextrose 5%. 1000 milliLiter(s) (50 mL/Hr) IV Continuous <Continuous>  dextrose 50% Injectable 12.5 Gram(s) IV Push once  dextrose 50% Injectable 25 Gram(s) IV Push once  dextrose 50% Injectable 25 Gram(s) IV Push once  enalaprilat Injectable 2.5 milliGRAM(s) IV Push every 6 hours  folic acid Injectable 1 milliGRAM(s) IV Push daily  heparin  flush 100 Units/mL Injectable 100 Unit(s) IV Push every other day  heparin  Injectable 5000 Unit(s) SubCutaneous every 8 hours  hydrALAZINE 100 milliGRAM(s) Oral every 8 hours  insulin glargine Injectable (LANTUS) 14 Unit(s) SubCutaneous at bedtime  insulin regular  human corrective regimen sliding scale   SubCutaneous every 6 hours  insulin regular  human recombinant 4 Unit(s) SubCutaneous every 6 hours  isosorbide   dinitrate Tablet (ISORDIL) 30 milliGRAM(s) Oral every 8 hours  levETIRAcetam  IVPB 500 milliGRAM(s) IV Intermittent every 12 hours  methylPREDNISolone sodium succinate Injectable 125 milliGRAM(s) IV Push two times a day  niCARdipine Infusion 5 mG/Hr (25 mL/Hr) IV Continuous <Continuous>  ondansetron Injectable 4 milliGRAM(s) IV Push every 6 hours  pantoprazole  Injectable 40 milliGRAM(s) IV Push daily  piperacillin/tazobactam IVPB. 3.375 Gram(s) IV Intermittent every 6 hours  sertraline 75 milliGRAM(s) Oral daily  thiamine Injectable 100 milliGRAM(s) IV Push daily    MEDICATIONS  (PRN):  dextrose 40% Gel 15 Gram(s) Oral once PRN Blood Glucose LESS THAN 70 milliGRAM(s)/deciliter  glucagon  Injectable 1 milliGRAM(s) IntraMuscular once PRN Glucose LESS THAN 70 milligrams/deciliter      PHYSICAL EXAM:  GENERAL: well built, well nourished  HEAD:  Atraumatic, Normocephalic  EYES: EOMI, PERRLA, conjunctiva and sclera clear  ENT: No tonsillar erythema, exudates, or enlargement; Moist mucous membranes, Good dentition, No lesions  NECK: Supple, No JVD, Normal thyroid, no enlarged nodes  NERVOUS SYSTEM:  Alert & Oriented X3, Good concentration; Motor Strength 5/5 B/L upper and lower extremities; DTRs 2+ intact and symmetric, sensory intact  CHEST/LUNG: B/L good air entry; No rales, rhonchi, or wheezing  HEART: S1S2 normal, no S3, Regular rate and rhythm; No murmurs, rubs, or gallops  ABDOMEN: Soft, Nontender, Nondistended; Bowel sounds present  EXTREMITIES:  2+ Peripheral Pulses, No clubbing, cyanosis, or edema  LYMPH: No lymphadenopathy noted  SKIN: No rashes or lesions    Care Discussed with Consultants/Other Providers [ x] YES  [ ] NO OVERNIGHT EVENTS: Increased clonodine to 0.4mg q8 for BP control.     INTERVAL HPI: Pt is examined at bedside. Pt is intubated. She is unresponsive to verbal commands. Unchanged neurologically.     ICU Vital Signs Last 24 Hrs  T(C): 37.4 (01 Sep 2018 05:10), Max: 38 (31 Aug 2018 22:08)  T(F): 99.3 (01 Sep 2018 05:10), Max: 100.4 (31 Aug 2018 22:08)  HR: 54 (01 Sep 2018 05:24) (52 - 80)  BP: --  BP(mean): --  ABP: 164/66 (01 Sep 2018 05:00) (150/58 - 208/78)  ABP(mean): 92 (01 Sep 2018 05:00) (86 - 116)  RR: 18 (01 Sep 2018 05:24) (3 - 28)  SpO2: 98% (01 Sep 2018 05:24) (92% - 100%)    I&O's Summary    30 Aug 2018 07:01  -  31 Aug 2018 07:00  --------------------------------------------------------  IN: 2968 mL / OUT: 2 mL / NET: 2966 mL    31 Aug 2018 07:01  -  01 Sep 2018 06:48  --------------------------------------------------------  IN: 2610 mL / OUT: 0 mL / NET: 2610 mL      Mode: CPAP with PS  FiO2: 40  PEEP: 5  PS: 8  MAP: 6  PIP: 13      LABS:                        7.5    13.4  )-----------( 116      ( 01 Sep 2018 03:48 )             22.9     09-01    148<H>  |  114<H>  |  54<H>  ----------------------------<  134<H>  3.6   |  21<L>  |  1.70<H>    Ca    8.6      01 Sep 2018 03:48  Phos  3.8     09-01  Mg     2.6     09-01          CAPILLARY BLOOD GLUCOSE      POCT Blood Glucose.: 178 mg/dL (31 Aug 2018 23:08)  POCT Blood Glucose.: 136 mg/dL (31 Aug 2018 17:14)  POCT Blood Glucose.: 128 mg/dL (31 Aug 2018 11:52)  POCT Blood Glucose.: 177 mg/dL (31 Aug 2018 07:48)    ABG - ( 31 Aug 2018 13:13 )  pH, Arterial: 7.44  pH, Blood: x     /  pCO2: 27    /  pO2: 103   / HCO3: 18    / Base Excess: -5.2  /  SaO2: 98                  RADIOLOGY & ADDITIONAL TESTS:    Consultant(s) Notes Reviewed:  [x ] YES  [ ] NO    MEDICATIONS  (STANDING):  acetaminophen    Suspension 650 milliGRAM(s) Oral every 6 hours  amLODIPine   Tablet 10 milliGRAM(s) Oral daily  aspirin Suppository 300 milliGRAM(s) Rectal daily  BACItracin   Ointment 1 Application(s) Topical daily  chlorhexidine 0.12% Liquid 15 milliLiter(s) Swish and Spit two times a day  chlorhexidine 2% Cloths 1 Application(s) Topical daily  cloNIDine 0.4 milliGRAM(s) Oral every 8 hours  dextrose 5%. 1000 milliLiter(s) (50 mL/Hr) IV Continuous <Continuous>  dextrose 50% Injectable 12.5 Gram(s) IV Push once  dextrose 50% Injectable 25 Gram(s) IV Push once  dextrose 50% Injectable 25 Gram(s) IV Push once  enalaprilat Injectable 2.5 milliGRAM(s) IV Push every 6 hours  folic acid Injectable 1 milliGRAM(s) IV Push daily  heparin  flush 100 Units/mL Injectable 100 Unit(s) IV Push every other day  heparin  Injectable 5000 Unit(s) SubCutaneous every 8 hours  hydrALAZINE 100 milliGRAM(s) Oral every 8 hours  insulin glargine Injectable (LANTUS) 14 Unit(s) SubCutaneous at bedtime  insulin regular  human corrective regimen sliding scale   SubCutaneous every 6 hours  insulin regular  human recombinant 4 Unit(s) SubCutaneous every 6 hours  isosorbide   dinitrate Tablet (ISORDIL) 30 milliGRAM(s) Oral every 8 hours  levETIRAcetam  IVPB 500 milliGRAM(s) IV Intermittent every 12 hours  methylPREDNISolone sodium succinate Injectable 125 milliGRAM(s) IV Push two times a day  niCARdipine Infusion 5 mG/Hr (25 mL/Hr) IV Continuous <Continuous>  ondansetron Injectable 4 milliGRAM(s) IV Push every 6 hours  pantoprazole  Injectable 40 milliGRAM(s) IV Push daily  piperacillin/tazobactam IVPB. 3.375 Gram(s) IV Intermittent every 6 hours  sertraline 75 milliGRAM(s) Oral daily  thiamine Injectable 100 milliGRAM(s) IV Push daily    MEDICATIONS  (PRN):  dextrose 40% Gel 15 Gram(s) Oral once PRN Blood Glucose LESS THAN 70 milliGRAM(s)/deciliter  glucagon  Injectable 1 milliGRAM(s) IntraMuscular once PRN Glucose LESS THAN 70 milligrams/deciliter    PHYSICAL EXAM:  General: intubated, nonresponsive.   HEENT: NC/AT; PERRL, clear conjunctiva  Neck: supple  Respiratory: Rhonchi noted b/l  Cardiovascular: +S1/S2; RRR  Abdomen: soft, NT/ND; +BS x4  Extremities: WWP, 2+ peripheral pulses b/l; no LE edema  Skin: normal color and turgor; no rash  Neurological: A&Ox0. Pupils equal and reactive to light. Rightward gaze with horizontal nystagmus noted. Pain stimulus evokes decerebrate posturing.     Care Discussed with Consultants/Other Providers [ x] YES  [ ] NO

## 2018-09-01 NOTE — PROGRESS NOTE ADULT - ASSESSMENT
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 Patient with residual left sided deficits with new lethargy, CTH showing new infarcts.  - c/w  rectally while NPO. Atorvastatin 80 when able   - c/w Keppra, 500mg PO BID  - solumedrol 125mg bid.  - scheduled tylenol  - f/u CT head showed hypodensity in [place], possible new infarct vs ischemia     Cardiac  #Hypertensive emergency/urgency - Episodes of vomiting noted from possible hypertensive encephelopathy. CTH with no ICH, edema, or midline shift, though showing evolving infarcts.    - d/c Nicardipine drip   - SBP between 140-160 per Neuro  - will d/c [place] due to inacurate SBP read  - d/c ACEI     Pulmonary  #Concern for aspiration   - Patient with vomiting and AMS in setting of likely hypertensive encephalopathy, CXR without infiltrate  - intubated on CPAP. Saturating appropriately.   - c/w zosyn     Renal  #MARYJANE vs CKD - Possibly 2/2 vasculitis vs CKD 2/2 hypertensive nephropathy - Patient presented with MARYJANE of 1.45; unknown baseline. Renal ultrasound showed slightly diminished left renal size with smooth renal cortical mantle thinning which may suggest underlying nephrosclerosis. Renal duplex performed 8/24, showing <60% stenosis of the right renal artery at its origin. Urine lytes revealing FeNa - 1.4%, indicating intrinsic renal disease.   - Avoiding nephrotoxic agents, Trend Cr    GI  #Vomiting - patient with vomiting at onset of hypertension and alteration of mental status, possible hypertensive encephalopathy  - c/w HTN treatment as above  - Zofran 4mg IV PRN. Keep NPO  - GI consult for PEG placement.     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 Patient with residual left sided deficits with new lethargy, CTH showing new infarcts.  - c/w  rectally while NPO. Atorvastatin 80 when able   - c/w Keppra, 500mg PO BID  - solumedrol 125mg bid.  - scheduled tylenol  - f/u CT head showed hypodensity in [place], possible new infarct vs ischemia     Cardiac  #Hypertensive emergency/urgency - Episodes of vomiting noted from possible hypertensive encephelopathy. CTH with no ICH, edema, or midline shift, though showing evolving infarcts.    - d/c Nicardipine drip   - SBP between 140-160 per Neuro  - will d/c [place] due to inacurate SBP read  - d/c ACEI   - pt has been paige to the high 40's overnight and intermittently throughout the day. Likely 2/2 clonidine. Will hold clonidine for HR<55    Pulmonary  #Concern for aspiration   - Patient with vomiting and AMS in setting of likely hypertensive encephalopathy, CXR without infiltrate  - intubated on CPAP. Saturating appropriately.   - change zosyn to levoquin per sensitivities until 9/4/18 to complete 7 days of Tx for HAP    Renal  #MARYJANE vs CKD - Possibly 2/2 vasculitis vs CKD 2/2 hypertensive nephropathy - Patient presented with MARYJANE of 1.45; unknown baseline. Renal ultrasound showed slightly diminished left renal size with smooth renal cortical mantle thinning which may suggest underlying nephrosclerosis. Renal duplex performed 8/24, showing <60% stenosis of the right renal artery at its origin. Urine lytes revealing FeNa - 1.4%, indicating intrinsic renal disease.   - Avoiding nephrotoxic agents, Trend Cr  - Cr improved today at 1.26    GI  #Vomiting - patient with vomiting at onset of hypertension and alteration of mental status, possible hypertensive encephalopathy  - c/w HTN treatment as above  - Zofran 4mg IV PRN. Keep NPO  - GI consult for PEG placement.     Endo  # hyperglycemia  - likely 2/2 high dose steroids  - Was at 14U daily lantus, 4U q6hr lispro. However blood sugars are coming down, will d/c standing insulin and keep pt on RISS. If needed can restart lantus at 10U/day    F: none   E: replete as needed  N: GI consult for PEG placement  HSQ, SCDs, PPI IV  MVT, Thiamine, Folic Acid  dispo: MICU

## 2018-09-02 LAB
ALBUMIN SERPL ELPH-MCNC: 2.5 G/DL — LOW (ref 3.3–5)
ALP SERPL-CCNC: 88 U/L — SIGNIFICANT CHANGE UP (ref 40–120)
ALT FLD-CCNC: 222 U/L — HIGH (ref 10–45)
ANION GAP SERPL CALC-SCNC: 11 MMOL/L — SIGNIFICANT CHANGE UP (ref 5–17)
ANION GAP SERPL CALC-SCNC: 12 MMOL/L — SIGNIFICANT CHANGE UP (ref 5–17)
APTT BLD: 39.8 SEC — HIGH (ref 27.5–37.4)
AST SERPL-CCNC: 138 U/L — HIGH (ref 10–40)
BASE EXCESS BLDA CALC-SCNC: -5.2 MMOL/L — LOW (ref -2–3)
BILIRUB SERPL-MCNC: 0.3 MG/DL — SIGNIFICANT CHANGE UP (ref 0.2–1.2)
BLD GP AB SCN SERPL QL: NEGATIVE — SIGNIFICANT CHANGE UP
BUN SERPL-MCNC: 47 MG/DL — HIGH (ref 7–23)
BUN SERPL-MCNC: 54 MG/DL — HIGH (ref 7–23)
CALCIUM SERPL-MCNC: 7.4 MG/DL — LOW (ref 8.4–10.5)
CALCIUM SERPL-MCNC: 7.6 MG/DL — LOW (ref 8.4–10.5)
CHLORIDE SERPL-SCNC: 116 MMOL/L — HIGH (ref 96–108)
CHLORIDE SERPL-SCNC: 121 MMOL/L — HIGH (ref 96–108)
CK MB CFR SERPL CALC: 2.2 NG/ML — SIGNIFICANT CHANGE UP (ref 0–6.7)
CK SERPL-CCNC: 93 U/L — SIGNIFICANT CHANGE UP (ref 25–170)
CO2 SERPL-SCNC: 17 MMOL/L — LOW (ref 22–31)
CO2 SERPL-SCNC: 18 MMOL/L — LOW (ref 22–31)
CREAT SERPL-MCNC: 1.26 MG/DL — SIGNIFICANT CHANGE UP (ref 0.5–1.3)
CREAT SERPL-MCNC: 1.41 MG/DL — HIGH (ref 0.5–1.3)
CULTURE RESULTS: SIGNIFICANT CHANGE UP
CULTURE RESULTS: SIGNIFICANT CHANGE UP
GAS PNL BLDA: SIGNIFICANT CHANGE UP
GLUCOSE SERPL-MCNC: 147 MG/DL — HIGH (ref 70–99)
GLUCOSE SERPL-MCNC: 80 MG/DL — SIGNIFICANT CHANGE UP (ref 70–99)
HCO3 BLDA-SCNC: 19 MMOL/L — LOW (ref 21–28)
HCT VFR BLD CALC: 20.4 % — CRITICAL LOW (ref 34.5–45)
HCT VFR BLD CALC: 23.4 % — LOW (ref 34.5–45)
HGB BLD-MCNC: 6.7 G/DL — CRITICAL LOW (ref 11.5–15.5)
HGB BLD-MCNC: 7.6 G/DL — LOW (ref 11.5–15.5)
INR BLD: 1.07 — SIGNIFICANT CHANGE UP (ref 0.88–1.16)
LACTATE SERPL-SCNC: 1.4 MMOL/L — SIGNIFICANT CHANGE UP (ref 0.5–2)
MAGNESIUM SERPL-MCNC: 2.2 MG/DL — SIGNIFICANT CHANGE UP (ref 1.6–2.6)
MAGNESIUM SERPL-MCNC: 2.7 MG/DL — HIGH (ref 1.6–2.6)
MCHC RBC-ENTMCNC: 29.1 PG — SIGNIFICANT CHANGE UP (ref 27–34)
MCHC RBC-ENTMCNC: 29.6 PG — SIGNIFICANT CHANGE UP (ref 27–34)
MCHC RBC-ENTMCNC: 32.5 G/DL — SIGNIFICANT CHANGE UP (ref 32–36)
MCHC RBC-ENTMCNC: 32.8 G/DL — SIGNIFICANT CHANGE UP (ref 32–36)
MCV RBC AUTO: 89.7 FL — SIGNIFICANT CHANGE UP (ref 80–100)
MCV RBC AUTO: 90.3 FL — SIGNIFICANT CHANGE UP (ref 80–100)
PCO2 BLDA: 31 MMHG — LOW (ref 32–45)
PH BLDA: 7.41 — SIGNIFICANT CHANGE UP (ref 7.35–7.45)
PHOSPHATE SERPL-MCNC: 3.4 MG/DL — SIGNIFICANT CHANGE UP (ref 2.5–4.5)
PHOSPHATE SERPL-MCNC: 4 MG/DL — SIGNIFICANT CHANGE UP (ref 2.5–4.5)
PLATELET # BLD AUTO: 109 K/UL — LOW (ref 150–400)
PLATELET # BLD AUTO: 96 K/UL — LOW (ref 150–400)
PO2 BLDA: 292 MMHG — HIGH (ref 83–108)
POTASSIUM SERPL-MCNC: 3.6 MMOL/L — SIGNIFICANT CHANGE UP (ref 3.5–5.3)
POTASSIUM SERPL-MCNC: 3.6 MMOL/L — SIGNIFICANT CHANGE UP (ref 3.5–5.3)
POTASSIUM SERPL-SCNC: 3.6 MMOL/L — SIGNIFICANT CHANGE UP (ref 3.5–5.3)
POTASSIUM SERPL-SCNC: 3.6 MMOL/L — SIGNIFICANT CHANGE UP (ref 3.5–5.3)
PROT SERPL-MCNC: 4 G/DL — LOW (ref 6–8.3)
PROTHROM AB SERPL-ACNC: 11.9 SEC — SIGNIFICANT CHANGE UP (ref 9.8–12.7)
RBC # BLD: 2.26 M/UL — LOW (ref 3.8–5.2)
RBC # BLD: 2.61 M/UL — LOW (ref 3.8–5.2)
RBC # FLD: 15.1 % — SIGNIFICANT CHANGE UP (ref 10.3–16.9)
RBC # FLD: 15.1 % — SIGNIFICANT CHANGE UP (ref 10.3–16.9)
RH IG SCN BLD-IMP: POSITIVE — SIGNIFICANT CHANGE UP
SAO2 % BLDA: 100 % — SIGNIFICANT CHANGE UP (ref 95–100)
SODIUM SERPL-SCNC: 145 MMOL/L — SIGNIFICANT CHANGE UP (ref 135–145)
SODIUM SERPL-SCNC: 150 MMOL/L — HIGH (ref 135–145)
SPECIMEN SOURCE: SIGNIFICANT CHANGE UP
SPECIMEN SOURCE: SIGNIFICANT CHANGE UP
TROPONIN T SERPL-MCNC: 0.04 NG/ML — CRITICAL HIGH (ref 0–0.01)
WBC # BLD: 10.2 K/UL — SIGNIFICANT CHANGE UP (ref 3.8–10.5)
WBC # BLD: 11.6 K/UL — HIGH (ref 3.8–10.5)
WBC # FLD AUTO: 10.2 K/UL — SIGNIFICANT CHANGE UP (ref 3.8–10.5)
WBC # FLD AUTO: 11.6 K/UL — HIGH (ref 3.8–10.5)

## 2018-09-02 PROCEDURE — 99291 CRITICAL CARE FIRST HOUR: CPT

## 2018-09-02 PROCEDURE — 93010 ELECTROCARDIOGRAM REPORT: CPT

## 2018-09-02 PROCEDURE — 71045 X-RAY EXAM CHEST 1 VIEW: CPT | Mod: 26,77

## 2018-09-02 PROCEDURE — 71045 X-RAY EXAM CHEST 1 VIEW: CPT | Mod: 26

## 2018-09-02 RX ORDER — METOPROLOL TARTRATE 50 MG
5 TABLET ORAL ONCE
Qty: 0 | Refills: 0 | Status: COMPLETED | OUTPATIENT
Start: 2018-09-02 | End: 2018-09-02

## 2018-09-02 RX ORDER — POTASSIUM CHLORIDE 20 MEQ
40 PACKET (EA) ORAL ONCE
Qty: 0 | Refills: 0 | Status: COMPLETED | OUTPATIENT
Start: 2018-09-02 | End: 2018-09-02

## 2018-09-02 RX ORDER — PIPERACILLIN AND TAZOBACTAM 4; .5 G/20ML; G/20ML
3.38 INJECTION, POWDER, LYOPHILIZED, FOR SOLUTION INTRAVENOUS EVERY 6 HOURS
Qty: 0 | Refills: 0 | Status: DISCONTINUED | OUTPATIENT
Start: 2018-09-02 | End: 2018-09-02

## 2018-09-02 RX ORDER — AMIODARONE HYDROCHLORIDE 400 MG/1
1 TABLET ORAL
Qty: 900 | Refills: 0 | Status: DISCONTINUED | OUTPATIENT
Start: 2018-09-02 | End: 2018-09-03

## 2018-09-02 RX ORDER — POTASSIUM CHLORIDE 20 MEQ
40 PACKET (EA) ORAL ONCE
Qty: 0 | Refills: 0 | Status: DISCONTINUED | OUTPATIENT
Start: 2018-09-02 | End: 2018-09-02

## 2018-09-02 RX ORDER — INSULIN GLARGINE 100 [IU]/ML
10 INJECTION, SOLUTION SUBCUTANEOUS AT BEDTIME
Qty: 0 | Refills: 0 | Status: DISCONTINUED | OUTPATIENT
Start: 2018-09-02 | End: 2018-09-05

## 2018-09-02 RX ADMIN — Medication 5 MILLIGRAM(S): at 23:30

## 2018-09-02 RX ADMIN — LEVETIRACETAM 400 MILLIGRAM(S): 250 TABLET, FILM COATED ORAL at 23:12

## 2018-09-02 RX ADMIN — Medication 325 MILLIGRAM(S): at 14:51

## 2018-09-02 RX ADMIN — Medication 0.4 MILLIGRAM(S): at 23:12

## 2018-09-02 RX ADMIN — Medication 1 MILLIGRAM(S): at 14:52

## 2018-09-02 RX ADMIN — Medication 1 APPLICATION(S): at 14:52

## 2018-09-02 RX ADMIN — HEPARIN SODIUM 5000 UNIT(S): 5000 INJECTION INTRAVENOUS; SUBCUTANEOUS at 14:48

## 2018-09-02 RX ADMIN — INSULIN HUMAN 4 UNIT(S): 100 INJECTION, SOLUTION SUBCUTANEOUS at 00:00

## 2018-09-02 RX ADMIN — ISOSORBIDE DINITRATE 30 MILLIGRAM(S): 5 TABLET ORAL at 23:13

## 2018-09-02 RX ADMIN — AMIODARONE HYDROCHLORIDE 33.33 MG/MIN: 400 TABLET ORAL at 21:53

## 2018-09-02 RX ADMIN — LEVETIRACETAM 400 MILLIGRAM(S): 250 TABLET, FILM COATED ORAL at 14:47

## 2018-09-02 RX ADMIN — Medication 650 MILLIGRAM(S): at 23:13

## 2018-09-02 RX ADMIN — Medication 0.4 MILLIGRAM(S): at 07:04

## 2018-09-02 RX ADMIN — CHLORHEXIDINE GLUCONATE 1 APPLICATION(S): 213 SOLUTION TOPICAL at 06:52

## 2018-09-02 RX ADMIN — HEPARIN SODIUM 5000 UNIT(S): 5000 INJECTION INTRAVENOUS; SUBCUTANEOUS at 21:53

## 2018-09-02 RX ADMIN — Medication 40 MILLIEQUIVALENT(S): at 15:13

## 2018-09-02 RX ADMIN — SERTRALINE 75 MILLIGRAM(S): 25 TABLET, FILM COATED ORAL at 14:50

## 2018-09-02 RX ADMIN — Medication 5 MILLIGRAM(S): at 22:13

## 2018-09-02 RX ADMIN — ISOSORBIDE DINITRATE 30 MILLIGRAM(S): 5 TABLET ORAL at 06:51

## 2018-09-02 RX ADMIN — ONDANSETRON 4 MILLIGRAM(S): 8 TABLET, FILM COATED ORAL at 07:04

## 2018-09-02 RX ADMIN — PANTOPRAZOLE SODIUM 40 MILLIGRAM(S): 20 TABLET, DELAYED RELEASE ORAL at 14:48

## 2018-09-02 RX ADMIN — AMIODARONE HYDROCHLORIDE 33.33 MG/MIN: 400 TABLET ORAL at 21:54

## 2018-09-02 RX ADMIN — PIPERACILLIN AND TAZOBACTAM 200 GRAM(S): 4; .5 INJECTION, POWDER, LYOPHILIZED, FOR SOLUTION INTRAVENOUS at 06:00

## 2018-09-02 RX ADMIN — Medication 650 MILLIGRAM(S): at 06:51

## 2018-09-02 RX ADMIN — ONDANSETRON 4 MILLIGRAM(S): 8 TABLET, FILM COATED ORAL at 14:49

## 2018-09-02 RX ADMIN — INSULIN GLARGINE 10 UNIT(S): 100 INJECTION, SOLUTION SUBCUTANEOUS at 21:53

## 2018-09-02 RX ADMIN — Medication 100 MILLIGRAM(S): at 14:52

## 2018-09-02 RX ADMIN — HEPARIN SODIUM 5000 UNIT(S): 5000 INJECTION INTRAVENOUS; SUBCUTANEOUS at 06:51

## 2018-09-02 RX ADMIN — Medication 125 MILLIGRAM(S): at 21:54

## 2018-09-02 RX ADMIN — Medication 125 MILLIGRAM(S): at 06:00

## 2018-09-02 RX ADMIN — INSULIN HUMAN 2: 100 INJECTION, SOLUTION SUBCUTANEOUS at 23:34

## 2018-09-02 RX ADMIN — ISOSORBIDE DINITRATE 30 MILLIGRAM(S): 5 TABLET ORAL at 14:51

## 2018-09-02 RX ADMIN — ONDANSETRON 4 MILLIGRAM(S): 8 TABLET, FILM COATED ORAL at 23:10

## 2018-09-02 RX ADMIN — CHLORHEXIDINE GLUCONATE 15 MILLILITER(S): 213 SOLUTION TOPICAL at 06:51

## 2018-09-02 RX ADMIN — Medication 100 MILLIGRAM(S): at 06:51

## 2018-09-02 RX ADMIN — Medication 100 MILLIGRAM(S): at 14:50

## 2018-09-02 RX ADMIN — Medication 650 MILLIGRAM(S): at 14:49

## 2018-09-02 RX ADMIN — Medication 100 MILLIGRAM(S): at 23:12

## 2018-09-02 RX ADMIN — AMLODIPINE BESYLATE 10 MILLIGRAM(S): 2.5 TABLET ORAL at 06:51

## 2018-09-02 RX ADMIN — INSULIN HUMAN 2: 100 INJECTION, SOLUTION SUBCUTANEOUS at 00:00

## 2018-09-02 NOTE — PROCEDURE NOTE - ADDITIONAL PROCEDURE DETAILS
During a post code setting arterial lines were attempted in both femoral arteries, successful cannulation was achieved but difficulty with passing the wire. Thus the femoral attempts were aborted and pressure was held, no hematomas observed.
cvp is normal; equal breath sounds; biopatch is applied.

## 2018-09-02 NOTE — PROCEDURE NOTE - NSINDICATIONS_GEN_A_CORE
venous access/critical illness
monitoring purposes/critical patient
monitoring purposes/critical patient

## 2018-09-02 NOTE — CHART NOTE - NSCHARTNOTEFT_GEN_A_CORE
code blue called at 7:30pm for cardiac arrest  pt received two rounds of epi, 1g Mg, 100mg amio, 2L NS boluses. Achieved ROSC. Pt now on amiodarone drip   HCP Leyla called and notified of marli. Leyla said she would like the pt to continue to be full code    orlando rea, PGY1

## 2018-09-02 NOTE — PROGRESS NOTE ADULT - ASSESSMENT
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 Patient with residual left sided deficits with new lethargy, CTH showing new infarcts.  - c/w  rectally while NPO. Atorvastatin 80 when able   - c/w Keppra, 500mg PO BID  - solumedrol 125mg bid.  - scheduled tylenol  - f/u CT head showed hypodensity in [place], possible new infarct vs ischemia     Cardiac  #Hypertensive emergency/urgency - Episodes of vomiting noted from possible hypertensive encephelopathy. CTH with no ICH, edema, or midline shift, though showing evolving infarcts.    - d/c Nicardipine drip   - SBP between 140-160 per Neuro  - will d/c Triplett due to inacurate SBP read    Pulmonary  #Concern for aspiration   - Patient with vomiting and AMS in setting of likely hypertensive encephalopathy, CXR without infiltrate  - intubated on CPAP. Saturating appropriately.   - will downgrade abx to levoquin from zosyn d/t sensitivities.     Renal  #MARYJANE vs CKD - Possibly 2/2 vasculitis vs CKD 2/2 hypertensive nephropathy - Patient presented with MARYJANE of 1.45; unknown baseline. Renal ultrasound showed slightly diminished left renal size with smooth renal cortical mantle thinning which may suggest underlying nephrosclerosis. Renal duplex performed 8/24, showing <60% stenosis of the right renal artery at its origin. Urine lytes revealing FeNa - 1.4%, indicating intrinsic renal disease.   - Avoiding nephrotoxic agents, Trend Cr. Cr today improved at 1.26  - Na 150, Cl 121. Na goals 140-150 to decrease cerebral edema. Will increase free water feeds to 250cc q4hr to increase intravascular volume    GI  #Vomiting - patient with vomiting at onset of hypertension and alteration of mental status, possible hypertensive encephalopathy  - c/w HTN treatment as above  - Zofran 4mg IV PRN. Keep NPO  - GI consult for PEG placement.     Endo  # hyperglycemia  - likely 2/2 high dose steroids  - sugars trending into normal and hypoglycemic ranges. Will hold scheduled insulin for now and keep RISS. can add back lantus 10U daily if needed.    F: none   E: replete as needed  N: GI consult for PEG placement  HSQ, SCDs, PPI IV  MVT, Thiamine, Folic Acid  dispo: MICU

## 2018-09-02 NOTE — PROCEDURE NOTE - NSINFORMCONSENT_GEN_A_CORE
Benefits, risks, and possible complications of procedure explained to patient/caregiver who verbalized understanding and gave written consent.
This was an emergent procedure.
This was an emergent procedure./2PC obtained

## 2018-09-02 NOTE — PROCEDURE NOTE - NSSITEPREP_SKIN_A_CORE
chlorhexidine/Adherence to aseptic technique: hand hygiene prior to donning barriers (gown, gloves), don cap and mask, sterile drape over patient

## 2018-09-02 NOTE — PROGRESS NOTE ADULT - SUBJECTIVE AND OBJECTIVE BOX
On followup, the right subclavian venous catheter was removed. The site is non-indurated, non-inflamed/ non-tender. Notes, temp pattern and cultures are followed.

## 2018-09-02 NOTE — PROGRESS NOTE ADULT - SUBJECTIVE AND OBJECTIVE BOX
INTERVAL HPI/OVERNIGHT EVENTS: asymptomatic bradycardia into high 40's noted on monitor. Pt asymptomatic during event    SUBJECTIVE: Patient seen and examined at bedside. Intubated, sedated. ROS unable to be completed. Neuro status unchanged      OBJECTIVE:    VITAL SIGNS:  ICU Vital Signs Last 24 Hrs  T(C): 36.4 (02 Sep 2018 14:11), Max: 37.6 (02 Sep 2018 01:03)  T(F): 97.6 (02 Sep 2018 14:11), Max: 99.7 (02 Sep 2018 01:03)  HR: 52 (02 Sep 2018 15:00) (46 - 60)  BP: 170/72 (02 Sep 2018 15:00) (138/64 - 191/78)  BP(mean): 92 (02 Sep 2018 15:00) (80 - 139)  ABP: --  ABP(mean): --  RR: 22 (02 Sep 2018 15:00) (10 - 27)  SpO2: 99% (02 Sep 2018 15:00) (93% - 100%)    Mode: Spontaneous/ CPAP (Continuous Positive Airway Pressure), FiO2: 40, PEEP: 5, PS: 8, PIP: 15    09-01 @ 07:01  -  09-02 @ 07:00  --------------------------------------------------------  IN: 2455 mL / OUT: 0 mL / NET: 2455 mL    09-02 @ 07:01 - 09-02 @ 15:11  --------------------------------------------------------  IN: 520 mL / OUT: 0 mL / NET: 520 mL      CAPILLARY BLOOD GLUCOSE      POCT Blood Glucose.: 114 mg/dL (02 Sep 2018 14:36)      PHYSICAL EXAM:    General: intubated, sedated  HEENT: NC/AT; PERRL, clear conjunctiva.  Neck: supple  Respiratory: rhonchi b/l  Cardiovascular: systolic murmur  Abdomen: soft, NT/ND; +BS x4  Extremities: WWP, 2+ peripheral pulses b/l; no LE edema  Skin: normal color and turgor; no rash  Neurological: A&Ox0, rightward gaze preference, unresponsive to commands, moves LE spontaneously not but UE    MEDICATIONS:  MEDICATIONS  (STANDING):  acetaminophen    Suspension 650 milliGRAM(s) Oral every 6 hours  amLODIPine   Tablet 10 milliGRAM(s) Oral daily  aspirin 325 milliGRAM(s) Oral daily  BACItracin   Ointment 1 Application(s) Topical daily  chlorhexidine 0.12% Liquid 15 milliLiter(s) Swish and Spit two times a day  chlorhexidine 2% Cloths 1 Application(s) Topical daily  cloNIDine 0.4 milliGRAM(s) Oral every 8 hours  dextrose 5%. 1000 milliLiter(s) (50 mL/Hr) IV Continuous <Continuous>  dextrose 50% Injectable 12.5 Gram(s) IV Push once  dextrose 50% Injectable 25 Gram(s) IV Push once  dextrose 50% Injectable 25 Gram(s) IV Push once  folic acid 1 milliGRAM(s) Oral daily  heparin  Injectable 5000 Unit(s) SubCutaneous every 8 hours  hydrALAZINE 100 milliGRAM(s) Oral every 8 hours  insulin glargine Injectable (LANTUS) 10 Unit(s) SubCutaneous at bedtime  insulin regular  human corrective regimen sliding scale   SubCutaneous every 6 hours  isosorbide   dinitrate Tablet (ISORDIL) 30 milliGRAM(s) Oral every 8 hours  levETIRAcetam  IVPB 500 milliGRAM(s) IV Intermittent every 12 hours  levoFLOXacin IVPB 750 milliGRAM(s) IV Intermittent every 24 hours  methylPREDNISolone sodium succinate Injectable 125 milliGRAM(s) IV Push two times a day  ondansetron Injectable 4 milliGRAM(s) IV Push every 6 hours  pantoprazole  Injectable 40 milliGRAM(s) IV Push daily  potassium chloride   Powder 40 milliEquivalent(s) Oral once  sertraline 75 milliGRAM(s) Oral daily  thiamine 100 milliGRAM(s) Oral daily    MEDICATIONS  (PRN):  dextrose 40% Gel 15 Gram(s) Oral once PRN Blood Glucose LESS THAN 70 milliGRAM(s)/deciliter  glucagon  Injectable 1 milliGRAM(s) IntraMuscular once PRN Glucose LESS THAN 70 milligrams/deciliter      ALLERGIES:  Allergies    No Known Allergies    Intolerances        LABS:                        7.6    11.6  )-----------( 109      ( 02 Sep 2018 06:45 )             23.4     09-02    150<H>  |  121<H>  |  47<H>  ----------------------------<  80  3.6   |  18<L>  |  1.26    Ca    7.4<L>      02 Sep 2018 06:45  Phos  3.4     09-02  Mg     2.2     09-02            RADIOLOGY & ADDITIONAL TESTS: Reviewed.

## 2018-09-03 DIAGNOSIS — D50.0 IRON DEFICIENCY ANEMIA SECONDARY TO BLOOD LOSS (CHRONIC): ICD-10-CM

## 2018-09-03 LAB
ANION GAP SERPL CALC-SCNC: 12 MMOL/L — SIGNIFICANT CHANGE UP (ref 5–17)
ANION GAP SERPL CALC-SCNC: 13 MMOL/L — SIGNIFICANT CHANGE UP (ref 5–17)
ANION GAP SERPL CALC-SCNC: 13 MMOL/L — SIGNIFICANT CHANGE UP (ref 5–17)
BASE EXCESS BLDA CALC-SCNC: -5.3 MMOL/L — LOW (ref -2–3)
BASE EXCESS BLDA CALC-SCNC: -6.5 MMOL/L — LOW (ref -2–3)
BUN SERPL-MCNC: 48 MG/DL — HIGH (ref 7–23)
BUN SERPL-MCNC: 51 MG/DL — HIGH (ref 7–23)
BUN SERPL-MCNC: 52 MG/DL — HIGH (ref 7–23)
CALCIUM SERPL-MCNC: 7.6 MG/DL — LOW (ref 8.4–10.5)
CALCIUM SERPL-MCNC: 8 MG/DL — LOW (ref 8.4–10.5)
CALCIUM SERPL-MCNC: 8.1 MG/DL — LOW (ref 8.4–10.5)
CHLORIDE SERPL-SCNC: 114 MMOL/L — HIGH (ref 96–108)
CHLORIDE SERPL-SCNC: 114 MMOL/L — HIGH (ref 96–108)
CHLORIDE SERPL-SCNC: 116 MMOL/L — HIGH (ref 96–108)
CO2 SERPL-SCNC: 16 MMOL/L — LOW (ref 22–31)
CO2 SERPL-SCNC: 17 MMOL/L — LOW (ref 22–31)
CO2 SERPL-SCNC: 18 MMOL/L — LOW (ref 22–31)
CREAT SERPL-MCNC: 1.25 MG/DL — SIGNIFICANT CHANGE UP (ref 0.5–1.3)
CREAT SERPL-MCNC: 1.32 MG/DL — HIGH (ref 0.5–1.3)
CREAT SERPL-MCNC: 1.35 MG/DL — HIGH (ref 0.5–1.3)
GAS PNL BLDA: SIGNIFICANT CHANGE UP
GAS PNL BLDA: SIGNIFICANT CHANGE UP
GLUCOSE SERPL-MCNC: 134 MG/DL — HIGH (ref 70–99)
GLUCOSE SERPL-MCNC: 157 MG/DL — HIGH (ref 70–99)
GLUCOSE SERPL-MCNC: 164 MG/DL — HIGH (ref 70–99)
HCO3 BLDA-SCNC: 17 MMOL/L — LOW (ref 21–28)
HCO3 BLDA-SCNC: 20 MMOL/L — LOW (ref 21–28)
HCT VFR BLD CALC: 20.3 % — CRITICAL LOW (ref 34.5–45)
HCT VFR BLD CALC: 21.2 % — LOW (ref 34.5–45)
HCT VFR BLD CALC: 21.7 % — LOW (ref 34.5–45)
HGB BLD-MCNC: 6.6 G/DL — CRITICAL LOW (ref 11.5–15.5)
HGB BLD-MCNC: 6.9 G/DL — CRITICAL LOW (ref 11.5–15.5)
HGB BLD-MCNC: 7.2 G/DL — LOW (ref 11.5–15.5)
MAGNESIUM SERPL-MCNC: 2.4 MG/DL — SIGNIFICANT CHANGE UP (ref 1.6–2.6)
MAGNESIUM SERPL-MCNC: 2.6 MG/DL — SIGNIFICANT CHANGE UP (ref 1.6–2.6)
MAGNESIUM SERPL-MCNC: 3.2 MG/DL — HIGH (ref 1.6–2.6)
MCHC RBC-ENTMCNC: 29.2 PG — SIGNIFICANT CHANGE UP (ref 27–34)
MCHC RBC-ENTMCNC: 29.3 PG — SIGNIFICANT CHANGE UP (ref 27–34)
MCHC RBC-ENTMCNC: 29.6 PG — SIGNIFICANT CHANGE UP (ref 27–34)
MCHC RBC-ENTMCNC: 32.5 G/DL — SIGNIFICANT CHANGE UP (ref 32–36)
MCHC RBC-ENTMCNC: 32.5 G/DL — SIGNIFICANT CHANGE UP (ref 32–36)
MCHC RBC-ENTMCNC: 33.2 G/DL — SIGNIFICANT CHANGE UP (ref 32–36)
MCV RBC AUTO: 89.3 FL — SIGNIFICANT CHANGE UP (ref 80–100)
MCV RBC AUTO: 89.8 FL — SIGNIFICANT CHANGE UP (ref 80–100)
MCV RBC AUTO: 90.2 FL — SIGNIFICANT CHANGE UP (ref 80–100)
PCO2 BLDA: 28 MMHG — LOW (ref 32–45)
PCO2 BLDA: 34 MMHG — SIGNIFICANT CHANGE UP (ref 32–45)
PH BLDA: 7.37 — SIGNIFICANT CHANGE UP (ref 7.35–7.45)
PH BLDA: 7.41 — SIGNIFICANT CHANGE UP (ref 7.35–7.45)
PHOSPHATE SERPL-MCNC: 3.7 MG/DL — SIGNIFICANT CHANGE UP (ref 2.5–4.5)
PHOSPHATE SERPL-MCNC: 4.5 MG/DL — SIGNIFICANT CHANGE UP (ref 2.5–4.5)
PHOSPHATE SERPL-MCNC: 4.7 MG/DL — HIGH (ref 2.5–4.5)
PLATELET # BLD AUTO: 103 K/UL — LOW (ref 150–400)
PLATELET # BLD AUTO: 91 K/UL — LOW (ref 150–400)
PLATELET # BLD AUTO: 96 K/UL — LOW (ref 150–400)
PO2 BLDA: 150 MMHG — HIGH (ref 83–108)
PO2 BLDA: 90 MMHG — SIGNIFICANT CHANGE UP (ref 83–108)
POTASSIUM SERPL-MCNC: 4.4 MMOL/L — SIGNIFICANT CHANGE UP (ref 3.5–5.3)
POTASSIUM SERPL-MCNC: 4.7 MMOL/L — SIGNIFICANT CHANGE UP (ref 3.5–5.3)
POTASSIUM SERPL-MCNC: 4.8 MMOL/L — SIGNIFICANT CHANGE UP (ref 3.5–5.3)
POTASSIUM SERPL-SCNC: 4.4 MMOL/L — SIGNIFICANT CHANGE UP (ref 3.5–5.3)
POTASSIUM SERPL-SCNC: 4.7 MMOL/L — SIGNIFICANT CHANGE UP (ref 3.5–5.3)
POTASSIUM SERPL-SCNC: 4.8 MMOL/L — SIGNIFICANT CHANGE UP (ref 3.5–5.3)
RBC # BLD: 2.25 M/UL — LOW (ref 3.8–5.2)
RBC # BLD: 2.36 M/UL — LOW (ref 3.8–5.2)
RBC # BLD: 2.43 M/UL — LOW (ref 3.8–5.2)
RBC # FLD: 15.1 % — SIGNIFICANT CHANGE UP (ref 10.3–16.9)
RBC # FLD: 15.1 % — SIGNIFICANT CHANGE UP (ref 10.3–16.9)
RBC # FLD: 15.2 % — SIGNIFICANT CHANGE UP (ref 10.3–16.9)
SAO2 % BLDA: 97 % — SIGNIFICANT CHANGE UP (ref 95–100)
SAO2 % BLDA: 99 % — SIGNIFICANT CHANGE UP (ref 95–100)
SODIUM SERPL-SCNC: 143 MMOL/L — SIGNIFICANT CHANGE UP (ref 135–145)
SODIUM SERPL-SCNC: 144 MMOL/L — SIGNIFICANT CHANGE UP (ref 135–145)
SODIUM SERPL-SCNC: 146 MMOL/L — HIGH (ref 135–145)
TROPONIN T SERPL-MCNC: 0.17 NG/ML — CRITICAL HIGH (ref 0–0.01)
TROPONIN T SERPL-MCNC: 0.23 NG/ML — CRITICAL HIGH (ref 0–0.01)
WBC # BLD: 11.6 K/UL — HIGH (ref 3.8–10.5)
WBC # BLD: 15 K/UL — HIGH (ref 3.8–10.5)
WBC # BLD: 16.3 K/UL — HIGH (ref 3.8–10.5)
WBC # FLD AUTO: 11.6 K/UL — HIGH (ref 3.8–10.5)
WBC # FLD AUTO: 15 K/UL — HIGH (ref 3.8–10.5)
WBC # FLD AUTO: 16.3 K/UL — HIGH (ref 3.8–10.5)

## 2018-09-03 PROCEDURE — 93010 ELECTROCARDIOGRAM REPORT: CPT

## 2018-09-03 PROCEDURE — 99291 CRITICAL CARE FIRST HOUR: CPT

## 2018-09-03 PROCEDURE — 99233 SBSQ HOSP IP/OBS HIGH 50: CPT | Mod: 24

## 2018-09-03 PROCEDURE — 99232 SBSQ HOSP IP/OBS MODERATE 35: CPT

## 2018-09-03 PROCEDURE — 71045 X-RAY EXAM CHEST 1 VIEW: CPT | Mod: 26

## 2018-09-03 RX ORDER — FENTANYL CITRATE 50 UG/ML
25 INJECTION INTRAVENOUS ONCE
Qty: 0 | Refills: 0 | Status: DISCONTINUED | OUTPATIENT
Start: 2018-09-03 | End: 2018-09-03

## 2018-09-03 RX ORDER — AMIODARONE HYDROCHLORIDE 400 MG/1
150 TABLET ORAL ONCE
Qty: 0 | Refills: 0 | Status: COMPLETED | OUTPATIENT
Start: 2018-09-03 | End: 2018-09-03

## 2018-09-03 RX ORDER — METOPROLOL TARTRATE 50 MG
5 TABLET ORAL EVERY 6 HOURS
Qty: 0 | Refills: 0 | Status: DISCONTINUED | OUTPATIENT
Start: 2018-09-03 | End: 2018-09-05

## 2018-09-03 RX ORDER — AMIODARONE HYDROCHLORIDE 400 MG/1
0.5 TABLET ORAL
Qty: 900 | Refills: 0 | Status: DISCONTINUED | OUTPATIENT
Start: 2018-09-03 | End: 2018-09-03

## 2018-09-03 RX ORDER — METOPROLOL TARTRATE 50 MG
5 TABLET ORAL ONCE
Qty: 0 | Refills: 0 | Status: COMPLETED | OUTPATIENT
Start: 2018-09-03 | End: 2018-09-02

## 2018-09-03 RX ORDER — MAGNESIUM SULFATE 500 MG/ML
2 VIAL (ML) INJECTION ONCE
Qty: 0 | Refills: 0 | Status: COMPLETED | OUTPATIENT
Start: 2018-09-03 | End: 2018-09-03

## 2018-09-03 RX ORDER — POTASSIUM CHLORIDE 20 MEQ
40 PACKET (EA) ORAL ONCE
Qty: 0 | Refills: 0 | Status: COMPLETED | OUTPATIENT
Start: 2018-09-03 | End: 2018-09-03

## 2018-09-03 RX ORDER — MIDAZOLAM HYDROCHLORIDE 1 MG/ML
2 INJECTION, SOLUTION INTRAMUSCULAR; INTRAVENOUS ONCE
Qty: 0 | Refills: 0 | Status: DISCONTINUED | OUTPATIENT
Start: 2018-09-03 | End: 2018-09-03

## 2018-09-03 RX ORDER — ATORVASTATIN CALCIUM 80 MG/1
20 TABLET, FILM COATED ORAL AT BEDTIME
Qty: 0 | Refills: 0 | Status: DISCONTINUED | OUTPATIENT
Start: 2018-09-03 | End: 2018-09-04

## 2018-09-03 RX ORDER — AMIODARONE HYDROCHLORIDE 400 MG/1
1 TABLET ORAL
Qty: 900 | Refills: 0 | Status: DISCONTINUED | OUTPATIENT
Start: 2018-09-03 | End: 2018-09-03

## 2018-09-03 RX ORDER — PIPERACILLIN AND TAZOBACTAM 4; .5 G/20ML; G/20ML
3.38 INJECTION, POWDER, LYOPHILIZED, FOR SOLUTION INTRAVENOUS EVERY 6 HOURS
Qty: 0 | Refills: 0 | Status: DISCONTINUED | OUTPATIENT
Start: 2018-09-03 | End: 2018-09-04

## 2018-09-03 RX ORDER — FENTANYL CITRATE 50 UG/ML
50 INJECTION INTRAVENOUS ONCE
Qty: 0 | Refills: 0 | Status: DISCONTINUED | OUTPATIENT
Start: 2018-09-03 | End: 2018-09-03

## 2018-09-03 RX ORDER — AMIODARONE HYDROCHLORIDE 400 MG/1
400 TABLET ORAL EVERY 8 HOURS
Qty: 0 | Refills: 0 | Status: DISCONTINUED | OUTPATIENT
Start: 2018-09-03 | End: 2018-09-05

## 2018-09-03 RX ORDER — HYDROMORPHONE HYDROCHLORIDE 2 MG/ML
0.5 INJECTION INTRAMUSCULAR; INTRAVENOUS; SUBCUTANEOUS EVERY 4 HOURS
Qty: 0 | Refills: 0 | Status: DISCONTINUED | OUTPATIENT
Start: 2018-09-03 | End: 2018-09-05

## 2018-09-03 RX ORDER — IRON SUCROSE 20 MG/ML
200 INJECTION, SOLUTION INTRAVENOUS EVERY 24 HOURS
Qty: 0 | Refills: 0 | Status: DISCONTINUED | OUTPATIENT
Start: 2018-09-03 | End: 2018-09-05

## 2018-09-03 RX ORDER — IPRATROPIUM/ALBUTEROL SULFATE 18-103MCG
3 AEROSOL WITH ADAPTER (GRAM) INHALATION EVERY 6 HOURS
Qty: 0 | Refills: 0 | Status: DISCONTINUED | OUTPATIENT
Start: 2018-09-03 | End: 2018-09-05

## 2018-09-03 RX ORDER — FENTANYL CITRATE 50 UG/ML
12 INJECTION INTRAVENOUS ONCE
Qty: 0 | Refills: 0 | Status: DISCONTINUED | OUTPATIENT
Start: 2018-09-03 | End: 2018-09-03

## 2018-09-03 RX ADMIN — INSULIN GLARGINE 10 UNIT(S): 100 INJECTION, SOLUTION SUBCUTANEOUS at 22:22

## 2018-09-03 RX ADMIN — HEPARIN SODIUM 5000 UNIT(S): 5000 INJECTION INTRAVENOUS; SUBCUTANEOUS at 22:23

## 2018-09-03 RX ADMIN — Medication 2.5 MILLIGRAM(S): at 03:40

## 2018-09-03 RX ADMIN — PANTOPRAZOLE SODIUM 40 MILLIGRAM(S): 20 TABLET, DELAYED RELEASE ORAL at 13:05

## 2018-09-03 RX ADMIN — Medication 40 MILLIEQUIVALENT(S): at 00:46

## 2018-09-03 RX ADMIN — ISOSORBIDE DINITRATE 30 MILLIGRAM(S): 5 TABLET ORAL at 22:22

## 2018-09-03 RX ADMIN — Medication 650 MILLIGRAM(S): at 06:35

## 2018-09-03 RX ADMIN — PIPERACILLIN AND TAZOBACTAM 200 GRAM(S): 4; .5 INJECTION, POWDER, LYOPHILIZED, FOR SOLUTION INTRAVENOUS at 13:23

## 2018-09-03 RX ADMIN — HEPARIN SODIUM 5000 UNIT(S): 5000 INJECTION INTRAVENOUS; SUBCUTANEOUS at 13:05

## 2018-09-03 RX ADMIN — AMIODARONE HYDROCHLORIDE 400 MILLIGRAM(S): 400 TABLET ORAL at 22:11

## 2018-09-03 RX ADMIN — ISOSORBIDE DINITRATE 30 MILLIGRAM(S): 5 TABLET ORAL at 06:36

## 2018-09-03 RX ADMIN — HEPARIN SODIUM 5000 UNIT(S): 5000 INJECTION INTRAVENOUS; SUBCUTANEOUS at 06:37

## 2018-09-03 RX ADMIN — Medication 3 MILLILITER(S): at 12:00

## 2018-09-03 RX ADMIN — FENTANYL CITRATE 25 MICROGRAM(S): 50 INJECTION INTRAVENOUS at 04:30

## 2018-09-03 RX ADMIN — MIDAZOLAM HYDROCHLORIDE 2 MILLIGRAM(S): 1 INJECTION, SOLUTION INTRAMUSCULAR; INTRAVENOUS at 06:50

## 2018-09-03 RX ADMIN — Medication 100 MILLIGRAM(S): at 06:36

## 2018-09-03 RX ADMIN — CHLORHEXIDINE GLUCONATE 1 APPLICATION(S): 213 SOLUTION TOPICAL at 06:43

## 2018-09-03 RX ADMIN — FENTANYL CITRATE 25 MICROGRAM(S): 50 INJECTION INTRAVENOUS at 07:25

## 2018-09-03 RX ADMIN — PIPERACILLIN AND TAZOBACTAM 200 GRAM(S): 4; .5 INJECTION, POWDER, LYOPHILIZED, FOR SOLUTION INTRAVENOUS at 22:26

## 2018-09-03 RX ADMIN — Medication 3 MILLILITER(S): at 18:00

## 2018-09-03 RX ADMIN — Medication 125 MILLIGRAM(S): at 11:09

## 2018-09-03 RX ADMIN — Medication 650 MILLIGRAM(S): at 13:05

## 2018-09-03 RX ADMIN — Medication 1 APPLICATION(S): at 13:07

## 2018-09-03 RX ADMIN — Medication 100 MILLIGRAM(S): at 22:11

## 2018-09-03 RX ADMIN — CHLORHEXIDINE GLUCONATE 15 MILLILITER(S): 213 SOLUTION TOPICAL at 18:19

## 2018-09-03 RX ADMIN — IRON SUCROSE 110 MILLIGRAM(S): 20 INJECTION, SOLUTION INTRAVENOUS at 18:19

## 2018-09-03 RX ADMIN — CHLORHEXIDINE GLUCONATE 15 MILLILITER(S): 213 SOLUTION TOPICAL at 06:36

## 2018-09-03 RX ADMIN — Medication 650 MILLIGRAM(S): at 18:19

## 2018-09-03 RX ADMIN — FENTANYL CITRATE 25 MICROGRAM(S): 50 INJECTION INTRAVENOUS at 05:16

## 2018-09-03 RX ADMIN — Medication 0.4 MILLIGRAM(S): at 06:36

## 2018-09-03 RX ADMIN — FENTANYL CITRATE 25 MICROGRAM(S): 50 INJECTION INTRAVENOUS at 09:21

## 2018-09-03 RX ADMIN — FENTANYL CITRATE 25 MICROGRAM(S): 50 INJECTION INTRAVENOUS at 06:36

## 2018-09-03 RX ADMIN — AMIODARONE HYDROCHLORIDE 600 MILLIGRAM(S): 400 TABLET ORAL at 18:45

## 2018-09-03 RX ADMIN — HYDROMORPHONE HYDROCHLORIDE 0.5 MILLIGRAM(S): 2 INJECTION INTRAMUSCULAR; INTRAVENOUS; SUBCUTANEOUS at 19:12

## 2018-09-03 RX ADMIN — ISOSORBIDE DINITRATE 30 MILLIGRAM(S): 5 TABLET ORAL at 13:07

## 2018-09-03 RX ADMIN — FENTANYL CITRATE 50 MICROGRAM(S): 50 INJECTION INTRAVENOUS at 07:28

## 2018-09-03 RX ADMIN — FENTANYL CITRATE 50 MICROGRAM(S): 50 INJECTION INTRAVENOUS at 08:00

## 2018-09-03 RX ADMIN — HYDROMORPHONE HYDROCHLORIDE 0.5 MILLIGRAM(S): 2 INJECTION INTRAMUSCULAR; INTRAVENOUS; SUBCUTANEOUS at 22:12

## 2018-09-03 RX ADMIN — ATORVASTATIN CALCIUM 20 MILLIGRAM(S): 80 TABLET, FILM COATED ORAL at 22:11

## 2018-09-03 RX ADMIN — AMIODARONE HYDROCHLORIDE 400 MILLIGRAM(S): 400 TABLET ORAL at 13:52

## 2018-09-03 RX ADMIN — Medication 1 MILLIGRAM(S): at 13:06

## 2018-09-03 RX ADMIN — Medication 100 MILLIGRAM(S): at 13:06

## 2018-09-03 RX ADMIN — Medication 50 GRAM(S): at 18:47

## 2018-09-03 RX ADMIN — Medication 0.4 MILLIGRAM(S): at 22:22

## 2018-09-03 RX ADMIN — HYDROMORPHONE HYDROCHLORIDE 0.5 MILLIGRAM(S): 2 INJECTION INTRAMUSCULAR; INTRAVENOUS; SUBCUTANEOUS at 13:23

## 2018-09-03 RX ADMIN — HYDROMORPHONE HYDROCHLORIDE 0.5 MILLIGRAM(S): 2 INJECTION INTRAMUSCULAR; INTRAVENOUS; SUBCUTANEOUS at 18:22

## 2018-09-03 RX ADMIN — HYDROMORPHONE HYDROCHLORIDE 0.5 MILLIGRAM(S): 2 INJECTION INTRAMUSCULAR; INTRAVENOUS; SUBCUTANEOUS at 13:52

## 2018-09-03 RX ADMIN — LEVETIRACETAM 400 MILLIGRAM(S): 250 TABLET, FILM COATED ORAL at 13:06

## 2018-09-03 RX ADMIN — AMLODIPINE BESYLATE 10 MILLIGRAM(S): 2.5 TABLET ORAL at 06:36

## 2018-09-03 RX ADMIN — Medication 325 MILLIGRAM(S): at 13:06

## 2018-09-03 RX ADMIN — FENTANYL CITRATE 25 MICROGRAM(S): 50 INJECTION INTRAVENOUS at 09:40

## 2018-09-03 NOTE — PROGRESS NOTE ADULT - ATTENDING COMMENTS
Events noted. s/p Vtach arrest with ROSC within 2 min according to team. Unclear etiology and Echo ordered and Trops sent. No improvement in neuro status. HCP informed of event and wishes to continue full support until Wednesday when she returns/can reach family. She understands grave prognosis. Continue care per Neuro.

## 2018-09-03 NOTE — PROGRESS NOTE ADULT - SUBJECTIVE AND OBJECTIVE BOX
HEALTH ISSUES - PROBLEM Dx:  Palliative care by specialist: Palliative care by specialist  Goals of care, counseling/discussion: Goals of care, counseling/discussion  Patient has healthcare proxy: Patient has healthcare proxy  Respiratory failure requiring intubation: Respiratory failure requiring intubation  Current severe episode of major depressive disorder without psychotic features without prior episode  Current severe episode of major depressive disorder without psychotic features without prior episode: Current severe episode of major depressive disorder without psychotic features without prior episode  Vasculitis: Vasculitis  Depression, unspecified depression type: Depression, unspecified depression type  MARYJANE (acute kidney injury): MARYJANE (acute kidney injury)  Current severe episode of major depressive disorder with psychotic features without prior episode: Current severe episode of major depressive disorder with psychotic features without prior episode  Transition of care performed with sharing of clinical summary: Transition of care performed with sharing of clinical summary  Prophylactic measure: Prophylactic measure  Depression: Depression  Hypertension: Hypertension  Abrasion of chest wall: Abrasion of chest wall  UTI (urinary tract infection): UTI (urinary tract infection)  CVA (cerebral vascular accident): CVA (cerebral vascular accident)  Nutrition, metabolism, and development symptoms: Nutrition, metabolism, and development symptoms  Hypertensive emergency: Hypertensive emergency  Hydrocephalus: Hydrocephalus  Severe episode of recurrent major depressive disorder, without psychotic features: Severe episode of recurrent major depressive disorder, without psychotic features  Bradycardia, sinus: Bradycardia, sinus  Hypertensive urgency: Hypertensive urgency  Lacunar infarction: Lacunar infarction          CHEMOTHERAPY REGIMEN:        Day:                          Diet:  Protocol:                                    IVF:      MEDICATIONS  (STANDING):  acetaminophen    Suspension 650 milliGRAM(s) Oral every 6 hours  ALBUTerol/ipratropium for Nebulization 3 milliLiter(s) Nebulizer every 6 hours  amiodarone    Tablet 400 milliGRAM(s) Oral every 8 hours  amLODIPine   Tablet 10 milliGRAM(s) Oral daily  aspirin 325 milliGRAM(s) Oral daily  atorvastatin 20 milliGRAM(s) Oral at bedtime  BACItracin   Ointment 1 Application(s) Topical daily  chlorhexidine 0.12% Liquid 15 milliLiter(s) Swish and Spit two times a day  chlorhexidine 2% Cloths 1 Application(s) Topical daily  cloNIDine 0.4 milliGRAM(s) Oral every 8 hours  dextrose 5%. 1000 milliLiter(s) (50 mL/Hr) IV Continuous <Continuous>  dextrose 50% Injectable 12.5 Gram(s) IV Push once  dextrose 50% Injectable 25 Gram(s) IV Push once  dextrose 50% Injectable 25 Gram(s) IV Push once  folic acid 1 milliGRAM(s) Oral daily  heparin  Injectable 5000 Unit(s) SubCutaneous every 8 hours  hydrALAZINE 100 milliGRAM(s) Oral every 8 hours  HYDROmorphone  Injectable 0.5 milliGRAM(s) IV Push every 4 hours  insulin glargine Injectable (LANTUS) 10 Unit(s) SubCutaneous at bedtime  insulin regular  human corrective regimen sliding scale   SubCutaneous every 6 hours  isosorbide   dinitrate Tablet (ISORDIL) 30 milliGRAM(s) Oral every 8 hours  levETIRAcetam  IVPB 500 milliGRAM(s) IV Intermittent every 12 hours  methylPREDNISolone sodium succinate Injectable 125 milliGRAM(s) IV Push two times a day  metoprolol tartrate Injectable 5 milliGRAM(s) IV Push every 6 hours  pantoprazole  Injectable 40 milliGRAM(s) IV Push daily  piperacillin/tazobactam IVPB. 3.375 Gram(s) IV Intermittent every 6 hours  thiamine 100 milliGRAM(s) Oral daily    MEDICATIONS  (PRN):  dextrose 40% Gel 15 Gram(s) Oral once PRN Blood Glucose LESS THAN 70 milliGRAM(s)/deciliter  glucagon  Injectable 1 milliGRAM(s) IntraMuscular once PRN Glucose LESS THAN 70 milligrams/deciliter      Allergies    No Known Allergies    Intolerances        DVT Prophylaxis: [ ] YES [ ] NO      Antibiotics: [ ] YES [ ] NO    Pain Scale (1-10):       Location:    Vital Signs Last 24 Hrs  T(C): 37.2 (03 Sep 2018 14:32), Max: 37.7 (02 Sep 2018 19:00)  T(F): 98.9 (03 Sep 2018 14:32), Max: 99.8 (02 Sep 2018 19:00)  HR: 50 (03 Sep 2018 14:00) (48 - 184)  BP: 125/70 (03 Sep 2018 04:00) (106/72 - 192/98)  BP(mean): 88 (03 Sep 2018 04:00) (76 - 146)  RR: 25 (03 Sep 2018 14:00) (13 - 38)  SpO2: 97% (03 Sep 2018 14:00) (94% - 100%)    Drug Dosing Weight  Height (cm): 152.4 (11 Aug 2018 10:20)  Weight (kg): 60.5 (11 Aug 2018 10:20)  BMI (kg/m2): 26 (11 Aug 2018 10:20)  BSA (m2): 1.57 (11 Aug 2018 10:20)     Physical Exam:  · Constitutional	detailed exam  · Constitutional Details	well-developed; well-groomed  · Eyes	EOMI; PERRL; no drainage or redness  · ENMT Comments	dry mucous membranes  · Respiratory	detailed exam  · Respiratory Comments	normal breath sounds at the lung bases bilaterally  · Cardiovascular	Regular rate & rhythm, normal S1, S2; no murmurs, gallops or rubs; no S3, S4  · Abd-Soft non tender  ·Ext-no edema, clubbing or cyanosis    URINARY CATHETER: [ ] YES [ ] NO     LABS:  CBC Full  -  ( 03 Sep 2018 06:14 )  WBC Count : 15.0 K/uL  Hemoglobin : 6.6 g/dL  Hematocrit : 20.3 %  Platelet Count - Automated : 96 K/uL  Mean Cell Volume : 90.2 fL  Mean Cell Hemoglobin : 29.3 pg  Mean Cell Hemoglobin Concentration : 32.5 g/dL  Auto Neutrophil # : x  Auto Lymphocyte # : x  Auto Monocyte # : x  Auto Eosinophil # : x  Auto Basophil # : x  Auto Neutrophil % : x  Auto Lymphocyte % : x  Auto Monocyte % : x  Auto Eosinophil % : x  Auto Basophil % : x    09-03    144  |  114<H>  |  52<H>  ----------------------------<  134<H>  4.8   |  18<L>  |  1.32<H>    Ca    8.0<L>      03 Sep 2018 06:14  Phos  4.5     09-03  Mg     2.6     09-03    TPro  4.0<L>  /  Alb  2.5<L>  /  TBili  0.3  /  DBili  x   /  AST  138<H>  /  ALT  222<H>  /  AlkPhos  88  09-02    PT/INR - ( 02 Sep 2018 21:20 )   PT: 11.9 sec;   INR: 1.07          PTT - ( 02 Sep 2018 21:20 )  PTT:39.8 sec      CULTURES:    RADIOLOGY & ADDITIONAL STUDIES:

## 2018-09-03 NOTE — CHART NOTE - NSCHARTNOTEFT_GEN_A_CORE
Patient's friend and [facility] Leyla Lizama as well as friend Steven Foote returned to the hospital aware of the events of the weekend including the episode of cardiac arrest with ROSC and episode of stable VTach today with resolution after Amiodarone. Both Ms. Lizama and Mr. Foote aware of overall poor prognosis and reiterate that they do not believe Ms. Olivas would want to undergo a tracheostomy or live in a ventilator dependent state. When discussing goals of care as further cardiac events are probable, Ms. Lizama ask that Ms. Olivas currently remain "full code" as her family is en route to see her and is expected to arrive in town on Wednesday. Both Ms. Lizama and Mr. Foote understand and acknowledge that successful resuscitation after another cardiac event may not be attainable, however, ask that all efforts be made until Wednesday, after which point they will readdress her code status and treatment goals.

## 2018-09-03 NOTE — PROGRESS NOTE ADULT - SUBJECTIVE AND OBJECTIVE BOX
INTERVAL HPI/OVERNIGHT EVENTS: Yesterday pt went into cardiac arrest last night. vfib noted on monitor.  Code blue called. Two rounds of epi, 1mg Mag, 2x1L NS boluses, 100mg amio given. Pt now on amio gtt. Over the night pt had recorded a-fib with RVR into 130's. Given lopressor 5mg IVPx2 and converted to NSR. Later noted to be hypertensive 220's/100's and given enalapril. Hgb noted to be 6.7. Consent for blood obtained, pt now receiving one unit.     SUBJECTIVE: Patient seen and examined at bedside. Intubated and sedated. ROS unable to be obtained.     OBJECTIVE:    VITAL SIGNS:  ICU Vital Signs Last 24 Hrs  T(C): 36.2 (02 Sep 2018 21:00), Max: 37.7 (02 Sep 2018 19:00)  T(F): 97.1 (02 Sep 2018 21:00), Max: 99.8 (02 Sep 2018 19:00)  HR: 54 (03 Sep 2018 09:00) (46 - 184)  BP: 125/70 (03 Sep 2018 04:00) (106/72 - 192/98)  BP(mean): 88 (03 Sep 2018 04:00) (76 - 146)  ABP: 162/76 (03 Sep 2018 09:00) (112/102 - 216/88)  ABP(mean): 100 (03 Sep 2018 09:00) (88 - 126)  RR: 18 (03 Sep 2018 09:00) (0 - 38)  SpO2: 94% (03 Sep 2018 09:00) (93% - 100%)    Mode: AC/ CMV (Assist Control/ Continuous Mandatory Ventilation), RR (machine): 12, TV (machine): 360, FiO2: 50, PEEP: 5, ITime: 0.1, MAP: 5, PIP: 8    09-02 @ 07:01  -  09-03 @ 07:00  --------------------------------------------------------  IN: 1046.7 mL / OUT: 2 mL / NET: 1044.7 mL    09-03 @ 07:01  -  09-03 @ 09:16  --------------------------------------------------------  IN: 33.4 mL / OUT: 0 mL / NET: 33.4 mL      CAPILLARY BLOOD GLUCOSE      POCT Blood Glucose.: 137 mg/dL (03 Sep 2018 06:34)      PHYSICAL EXAM:    General: NAD  HEENT: NC/AT; PERRL, clear conjunctiva  Neck: supple  Respiratory: CTA b/l  Cardiovascular: +S1/S2; RRR  Abdomen: soft, NT/ND; +BS x4  Extremities: WWP, 2+ peripheral pulses b/l; no LE edema  Skin: normal color and turgor; no rash  Neurological:    MEDICATIONS:  MEDICATIONS  (STANDING):  acetaminophen    Suspension 650 milliGRAM(s) Oral every 6 hours  ALBUTerol/ipratropium for Nebulization 3 milliLiter(s) Nebulizer every 6 hours  amiodarone Infusion 0.5 mG/Min (16.667 mL/Hr) IV Continuous <Continuous>  amLODIPine   Tablet 10 milliGRAM(s) Oral daily  aspirin 325 milliGRAM(s) Oral daily  BACItracin   Ointment 1 Application(s) Topical daily  chlorhexidine 0.12% Liquid 15 milliLiter(s) Swish and Spit two times a day  chlorhexidine 2% Cloths 1 Application(s) Topical daily  cloNIDine 0.4 milliGRAM(s) Oral every 8 hours  dextrose 5%. 1000 milliLiter(s) (50 mL/Hr) IV Continuous <Continuous>  dextrose 50% Injectable 12.5 Gram(s) IV Push once  dextrose 50% Injectable 25 Gram(s) IV Push once  dextrose 50% Injectable 25 Gram(s) IV Push once  fentaNYL    Injectable 25 MICROGram(s) IV Push once  folic acid 1 milliGRAM(s) Oral daily  heparin  Injectable 5000 Unit(s) SubCutaneous every 8 hours  hydrALAZINE 100 milliGRAM(s) Oral every 8 hours  insulin glargine Injectable (LANTUS) 10 Unit(s) SubCutaneous at bedtime  insulin regular  human corrective regimen sliding scale   SubCutaneous every 6 hours  isosorbide   dinitrate Tablet (ISORDIL) 30 milliGRAM(s) Oral every 8 hours  levETIRAcetam  IVPB 500 milliGRAM(s) IV Intermittent every 12 hours  levoFLOXacin IVPB 750 milliGRAM(s) IV Intermittent every 24 hours  methylPREDNISolone sodium succinate Injectable 125 milliGRAM(s) IV Push two times a day  metoprolol tartrate Injectable 5 milliGRAM(s) IV Push every 6 hours  pantoprazole  Injectable 40 milliGRAM(s) IV Push daily  thiamine 100 milliGRAM(s) Oral daily    MEDICATIONS  (PRN):  dextrose 40% Gel 15 Gram(s) Oral once PRN Blood Glucose LESS THAN 70 milliGRAM(s)/deciliter  glucagon  Injectable 1 milliGRAM(s) IntraMuscular once PRN Glucose LESS THAN 70 milligrams/deciliter      ALLERGIES:  Allergies    No Known Allergies    Intolerances        LABS:                        6.6    15.0  )-----------( 96       ( 03 Sep 2018 06:14 )             20.3     09-03    144  |  114<H>  |  52<H>  ----------------------------<  134<H>  4.8   |  18<L>  |  1.32<H>    Ca    8.0<L>      03 Sep 2018 06:14  Phos  4.5     09-03  Mg     2.6     09-03    TPro  4.0<L>  /  Alb  2.5<L>  /  TBili  0.3  /  DBili  x   /  AST  138<H>  /  ALT  222<H>  /  AlkPhos  88  09-02    PT/INR - ( 02 Sep 2018 21:20 )   PT: 11.9 sec;   INR: 1.07          PTT - ( 02 Sep 2018 21:20 )  PTT:39.8 sec      RADIOLOGY & ADDITIONAL TESTS: Reviewed. INTERVAL HPI/OVERNIGHT EVENTS: Yesterday pt went into cardiac arrest last night. vfib noted on monitor.  Code blue called. Two rounds of epi, 1mg Mag, 2x1L NS boluses, 100mg amio given. Pt now on amio gtt. Over the night pt had recorded a-fib with RVR into 130's. Given lopressor 5mg IVPx2 and converted to NSR. Later noted to be hypertensive 220's/100's and given enalapril. Hgb noted to be 6.7. Consent for blood obtained, pt now receiving one unit.     SUBJECTIVE: Patient seen and examined at bedside. Intubated and sedated. ROS unable to be obtained.     OBJECTIVE:    VITAL SIGNS:  ICU Vital Signs Last 24 Hrs  T(C): 36.2 (02 Sep 2018 21:00), Max: 37.7 (02 Sep 2018 19:00)  T(F): 97.1 (02 Sep 2018 21:00), Max: 99.8 (02 Sep 2018 19:00)  HR: 54 (03 Sep 2018 09:00) (46 - 184)  BP: 125/70 (03 Sep 2018 04:00) (106/72 - 192/98)  BP(mean): 88 (03 Sep 2018 04:00) (76 - 146)  ABP: 162/76 (03 Sep 2018 09:00) (112/102 - 216/88)  ABP(mean): 100 (03 Sep 2018 09:00) (88 - 126)  RR: 18 (03 Sep 2018 09:00) (0 - 38)  SpO2: 94% (03 Sep 2018 09:00) (93% - 100%)    Mode: AC/ CMV (Assist Control/ Continuous Mandatory Ventilation), RR (machine): 12, TV (machine): 360, FiO2: 50, PEEP: 5, ITime: 0.1, MAP: 5, PIP: 8    09-02 @ 07:01  -  09-03 @ 07:00  --------------------------------------------------------  IN: 1046.7 mL / OUT: 2 mL / NET: 1044.7 mL    09-03 @ 07:01  -  09-03 @ 09:16  --------------------------------------------------------  IN: 33.4 mL / OUT: 0 mL / NET: 33.4 mL      CAPILLARY BLOOD GLUCOSE      POCT Blood Glucose.: 137 mg/dL (03 Sep 2018 06:34)      PHYSICAL EXAM:    General: intubated, sedated  HEENT: NC/AT, PERRL, clear conjunctiva  Neck: supple  Respiratory: rhonchi b/l  Cardiovascular: +S1/S2; RRR  Abdomen: soft, NT/ND; +BS x4  Extremities: WWP, 2+ peripheral pulses b/l; no LE edema  Skin: normal color and turgor; no rash  Neurological: A&Ox0, rightward gaze, PERRL, moves LE's spontaneously    MEDICATIONS:  MEDICATIONS  (STANDING):  acetaminophen    Suspension 650 milliGRAM(s) Oral every 6 hours  ALBUTerol/ipratropium for Nebulization 3 milliLiter(s) Nebulizer every 6 hours  amiodarone Infusion 0.5 mG/Min (16.667 mL/Hr) IV Continuous <Continuous>  amLODIPine   Tablet 10 milliGRAM(s) Oral daily  aspirin 325 milliGRAM(s) Oral daily  BACItracin   Ointment 1 Application(s) Topical daily  chlorhexidine 0.12% Liquid 15 milliLiter(s) Swish and Spit two times a day  chlorhexidine 2% Cloths 1 Application(s) Topical daily  cloNIDine 0.4 milliGRAM(s) Oral every 8 hours  dextrose 5%. 1000 milliLiter(s) (50 mL/Hr) IV Continuous <Continuous>  dextrose 50% Injectable 12.5 Gram(s) IV Push once  dextrose 50% Injectable 25 Gram(s) IV Push once  dextrose 50% Injectable 25 Gram(s) IV Push once  fentaNYL    Injectable 25 MICROGram(s) IV Push once  folic acid 1 milliGRAM(s) Oral daily  heparin  Injectable 5000 Unit(s) SubCutaneous every 8 hours  hydrALAZINE 100 milliGRAM(s) Oral every 8 hours  insulin glargine Injectable (LANTUS) 10 Unit(s) SubCutaneous at bedtime  insulin regular  human corrective regimen sliding scale   SubCutaneous every 6 hours  isosorbide   dinitrate Tablet (ISORDIL) 30 milliGRAM(s) Oral every 8 hours  levETIRAcetam  IVPB 500 milliGRAM(s) IV Intermittent every 12 hours  levoFLOXacin IVPB 750 milliGRAM(s) IV Intermittent every 24 hours  methylPREDNISolone sodium succinate Injectable 125 milliGRAM(s) IV Push two times a day  metoprolol tartrate Injectable 5 milliGRAM(s) IV Push every 6 hours  pantoprazole  Injectable 40 milliGRAM(s) IV Push daily  thiamine 100 milliGRAM(s) Oral daily    MEDICATIONS  (PRN):  dextrose 40% Gel 15 Gram(s) Oral once PRN Blood Glucose LESS THAN 70 milliGRAM(s)/deciliter  glucagon  Injectable 1 milliGRAM(s) IntraMuscular once PRN Glucose LESS THAN 70 milligrams/deciliter      ALLERGIES:  Allergies    No Known Allergies    Intolerances        LABS:                        6.6    15.0  )-----------( 96       ( 03 Sep 2018 06:14 )             20.3     09-03    144  |  114<H>  |  52<H>  ----------------------------<  134<H>  4.8   |  18<L>  |  1.32<H>    Ca    8.0<L>      03 Sep 2018 06:14  Phos  4.5     09-03  Mg     2.6     09-03    TPro  4.0<L>  /  Alb  2.5<L>  /  TBili  0.3  /  DBili  x   /  AST  138<H>  /  ALT  222<H>  /  AlkPhos  88  09-02    PT/INR - ( 02 Sep 2018 21:20 )   PT: 11.9 sec;   INR: 1.07          PTT - ( 02 Sep 2018 21:20 )  PTT:39.8 sec      RADIOLOGY & ADDITIONAL TESTS: Reviewed. INTERVAL HPI/OVERNIGHT EVENTS: Yesterday pt went into cardiac arrest last night. vfib noted on monitor.  Code blue called. Two rounds of epi, 1g Mg, 2x1L NS boluses, 100mg amio given. Pt now on amio gtt. Over the night pt had recorded a-fib with RVR into 130's. Given lopressor 5mg IVPx2 and converted to NSR. Later noted to be hypertensive 220's/100's and given enalapril. Hgb noted to be 6.7. Consent for blood obtained, pt now receiving one unit.     SUBJECTIVE: Patient seen and examined at bedside. Intubated and sedated. ROS unable to be obtained.     OBJECTIVE:    VITAL SIGNS:  ICU Vital Signs Last 24 Hrs  T(C): 36.2 (02 Sep 2018 21:00), Max: 37.7 (02 Sep 2018 19:00)  T(F): 97.1 (02 Sep 2018 21:00), Max: 99.8 (02 Sep 2018 19:00)  HR: 54 (03 Sep 2018 09:00) (46 - 184)  BP: 125/70 (03 Sep 2018 04:00) (106/72 - 192/98)  BP(mean): 88 (03 Sep 2018 04:00) (76 - 146)  ABP: 162/76 (03 Sep 2018 09:00) (112/102 - 216/88)  ABP(mean): 100 (03 Sep 2018 09:00) (88 - 126)  RR: 18 (03 Sep 2018 09:00) (0 - 38)  SpO2: 94% (03 Sep 2018 09:00) (93% - 100%)    Mode: AC/ CMV (Assist Control/ Continuous Mandatory Ventilation), RR (machine): 12, TV (machine): 360, FiO2: 50, PEEP: 5, ITime: 0.1, MAP: 5, PIP: 8    09-02 @ 07:01  -  09-03 @ 07:00  --------------------------------------------------------  IN: 1046.7 mL / OUT: 2 mL / NET: 1044.7 mL    09-03 @ 07:01  -  09-03 @ 09:16  --------------------------------------------------------  IN: 33.4 mL / OUT: 0 mL / NET: 33.4 mL      CAPILLARY BLOOD GLUCOSE      POCT Blood Glucose.: 137 mg/dL (03 Sep 2018 06:34)      PHYSICAL EXAM:    General: intubated, sedated  HEENT: NC/AT, PERRL, clear conjunctiva  Neck: supple  Respiratory: rhonchi b/l  Cardiovascular: +S1/S2; RRR  Abdomen: soft, NT/ND; +BS x4  Extremities: WWP, 2+ peripheral pulses b/l; no LE edema  Skin: normal color and turgor; no rash  Neurological: A&Ox0, rightward gaze, PERRL, moves LE's spontaneously    MEDICATIONS:  MEDICATIONS  (STANDING):  acetaminophen    Suspension 650 milliGRAM(s) Oral every 6 hours  ALBUTerol/ipratropium for Nebulization 3 milliLiter(s) Nebulizer every 6 hours  amiodarone Infusion 0.5 mG/Min (16.667 mL/Hr) IV Continuous <Continuous>  amLODIPine   Tablet 10 milliGRAM(s) Oral daily  aspirin 325 milliGRAM(s) Oral daily  BACItracin   Ointment 1 Application(s) Topical daily  chlorhexidine 0.12% Liquid 15 milliLiter(s) Swish and Spit two times a day  chlorhexidine 2% Cloths 1 Application(s) Topical daily  cloNIDine 0.4 milliGRAM(s) Oral every 8 hours  dextrose 5%. 1000 milliLiter(s) (50 mL/Hr) IV Continuous <Continuous>  dextrose 50% Injectable 12.5 Gram(s) IV Push once  dextrose 50% Injectable 25 Gram(s) IV Push once  dextrose 50% Injectable 25 Gram(s) IV Push once  fentaNYL    Injectable 25 MICROGram(s) IV Push once  folic acid 1 milliGRAM(s) Oral daily  heparin  Injectable 5000 Unit(s) SubCutaneous every 8 hours  hydrALAZINE 100 milliGRAM(s) Oral every 8 hours  insulin glargine Injectable (LANTUS) 10 Unit(s) SubCutaneous at bedtime  insulin regular  human corrective regimen sliding scale   SubCutaneous every 6 hours  isosorbide   dinitrate Tablet (ISORDIL) 30 milliGRAM(s) Oral every 8 hours  levETIRAcetam  IVPB 500 milliGRAM(s) IV Intermittent every 12 hours  levoFLOXacin IVPB 750 milliGRAM(s) IV Intermittent every 24 hours  methylPREDNISolone sodium succinate Injectable 125 milliGRAM(s) IV Push two times a day  metoprolol tartrate Injectable 5 milliGRAM(s) IV Push every 6 hours  pantoprazole  Injectable 40 milliGRAM(s) IV Push daily  thiamine 100 milliGRAM(s) Oral daily    MEDICATIONS  (PRN):  dextrose 40% Gel 15 Gram(s) Oral once PRN Blood Glucose LESS THAN 70 milliGRAM(s)/deciliter  glucagon  Injectable 1 milliGRAM(s) IntraMuscular once PRN Glucose LESS THAN 70 milligrams/deciliter      ALLERGIES:  Allergies    No Known Allergies    Intolerances        LABS:                        6.6    15.0  )-----------( 96       ( 03 Sep 2018 06:14 )             20.3     09-03    144  |  114<H>  |  52<H>  ----------------------------<  134<H>  4.8   |  18<L>  |  1.32<H>    Ca    8.0<L>      03 Sep 2018 06:14  Phos  4.5     09-03  Mg     2.6     09-03    TPro  4.0<L>  /  Alb  2.5<L>  /  TBili  0.3  /  DBili  x   /  AST  138<H>  /  ALT  222<H>  /  AlkPhos  88  09-02    PT/INR - ( 02 Sep 2018 21:20 )   PT: 11.9 sec;   INR: 1.07          PTT - ( 02 Sep 2018 21:20 )  PTT:39.8 sec      RADIOLOGY & ADDITIONAL TESTS: Reviewed.

## 2018-09-03 NOTE — PROGRESS NOTE ADULT - SUBJECTIVE AND OBJECTIVE BOX
=====================   STROKE ATTENDING NOTE  =====================     ALCON COPE   [id number]  Summary:  68y/F with depression, found down in apartment in her stool, admitted initially to [facility] under psychiatry fro major depression.  On day of admission, noted to have L facial droop and L weakness, NIHSS 6, SBP 170s  a.m. glu WNL.  CT CTA showed multiple lacunar infarcts of indeterminate age, hydrocephalus.  Transferred to [facility] for further stroke work-up.  Hypertensive to 200s; stroke code called for new facial droop, weakness L UE/LE; uncertain time of onset.  Patient transferred to ICU for hemodynamic augmentation.  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    Overnight Events:  Code blue .    PMH:  Hypertension Depression  Allergies:  No Known Allergies  Home meds:     PHYSICAL EXAMINATION  T(C): 37.2 ( @ 14:32), Max: 37.7 ( @ 19:00) HR: 52 ( @ 15:00) (48 - 184) BP: 125/70 ( @ 04:00) (106/72 - 192/98) RR: 10 ( @ 15:00) (10 - 38) SpO2: 97% ( @ 15:00) (94% - 100%)  NEUROLOGIC EXAMINATION:  Patient examined this afternoon - noted to have L facial droop, L UE 2/5, L LE 1/5, awake alert, fully oriented, following commands, pupils equal and reactive.  GENERAL:  not intubated, not in cardiorespiratory distress  EENT: anicteric  CARDIOVASC:  (+) S1 S2, bradycardic rate and regular rhythm  PULMONARY:  clear to auscultation bilaterally  ABDOMEN:  soft, nontender, with normoactive bowel sounds  EXTREMITIES:  no edema  SKIN:  no rash    LABS:  CAPILLARY BLOOD GLUCOSE 126 137 151 134 107               6.6    15.0  )-----------( 96       ( 03 Sep 2018 06:14 )             20.3     144  |  114<H>  |  52<H>  ----------------------------<  134<H>  4.8   |  18<L>  |  1.32<H>    Ca    8.0<L>      03 Sep 2018 06:14  Phos  4.5       Mg     2.6         TPro  4.0<L>  /  Alb  2.5<L>  /  TBili  0.3  /  DBili  x   /  AST  138<H>  /  ALT  222<H>  /  AlkPhos  88   @ 07:01  -   @ 07:00  IN: 1046.7 mL / OUT: 2 mL / NET: 1044.7 mL    HbA1C = 5.2 ()  LDL = 132 () 134 ()   HDL = 37 () 34 ()  TG = 149 () 156 ()  TSH = 1.865 ()    Bacteriology:  CSF studies:  EEG:  Neuroimagin/29 CT head:  new hypodensity wiflred   CT head:  bilateral lacunar infarcts thalamus; multiple R corona radata hypodensity, R frontal lobe, R PATRICIA MCA territory infarcts   MRI:  acute R PATRICIA infarction, scattered smaller areas of acute infarction L centrum semiovale, L subcortical insular, medial margin of R temporal horn, ?embolic, no hemorrhagic transformation; extensive SVID, numerous chronic small lacunar infarctions B basal ganglia, bilateral corona radiata, bilateral thalami, wilfred   CTP: nondiagnostic   CTA: mod calcified atherosclerotic plaque bilateral carotid bifurcation, 50-70% stenosis at origin, L VA likely chronically occluded; R VA and basilar artery hypoplastic, ?vertebrobasilar insufficiency   CT: multiple age-indeterminate lacunar infarctions, basal ganglia, thalamic; mild HCP  Other imagin/03 CXR:  retrocardiac opacity L   LE Doppler: NEG   TTE:  severe LVH EF 55% LA severely dilated,     MEDICATIONS: amlodipine 10mg daily asa 81mg daily atorvastatin 80mg HS hydralazine 10mg PO q8h   MEDICATIONS: duonebvs q6h amiodarone 400mg q8h amlodipine 10mg daily asa 325 daily atorvastatin 20mg HS peridex clonidine 0.4mg q8h folic acid SQH q8h dilaudid hydralazine 100mg q8h lantus 10mg HS   acetaminophen    Suspension 650 every 6 hours  ALBUTerol/ipratropium for Nebulization 3 every 6 hours  amiodarone    Tablet 400 every 8 hours  amLODIPine   Tablet 10 daily  aspirin 325 daily  atorvastatin 20 at bedtime  BACItracin   Ointment 1 daily  chlorhexidine 0.12% Liquid 15 two times a day  chlorhexidine 2% Cloths 1 daily  cloNIDine 0.4 every 8 hours  dextrose 40% Gel 15 once PRN  dextrose 5%. 1000 <Continuous>  dextrose 50% Injectable 12.5 once  dextrose 50% Injectable 25 once  dextrose 50% Injectable 25 once  folic acid 1 daily  glucagon  Injectable 1 once PRN  heparin  Injectable 5000 every 8 hours  hydrALAZINE 100 every 8 hours  HYDROmorphone  Injectable 0.5 every 4 hours  insulin glargine Injectable (LANTUS) 10 at bedtime  insulin regular  human corrective regimen sliding scale  every 6 hours  iron sucrose IVPB 200 every 24 hours  isosorbide   dinitrate Tablet (ISORDIL) 30 every 8 hours  levETIRAcetam  IVPB 500 every 12 hours  methylPREDNISolone sodium succinate Injectable 125 two times a day  metoprolol tartrate Injectable 5 every 6 hours  pantoprazole  Injectable 40 daily  piperacillin/tazobactam IVPB. 3.375 every 6 hours  thiamine 100 daily      IV FLUIDS:  DRIPS:  DIET: regular diet  Lines:    CODE STATUS:  full code                       GOALS OF CARE:  aggressive                      DISPOSITION:  7La  NIHSS 6 --> 1, no tPA given due to resolution of symptoms =====================   STROKE ATTENDING NOTE  =====================     ALCON COPE   [id number]  Summary:  68y/F with depression, found down in apartment in her stool, admitted initially to [facility] under psychiatry fro major depression.  On day of admission, noted to have L facial droop and L weakness, NIHSS 6, SBP 170s  a.m. glu WNL.  CT CTA showed multiple lacunar infarcts of indeterminate age, hydrocephalus.  Transferred to [facility] for further stroke work-up.  Hypertensive to 200s; stroke code called for new facial droop, weakness L UE/LE; uncertain time of onset.  Patient transferred to ICU for hemodynamic augmentation.  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    Overnight Events:  Code blue .    PMH:  Hypertension Depression  Allergies:  No Known Allergies  Home meds:     PHYSICAL EXAMINATION  T(C): 37.2 ( @ 14:32), Max: 37.7 ( @ 19:00) HR: 52 ( @ 15:00) (48 - 184) BP: 125/70 ( @ 04:00) (106/72 - 192/98) RR: 10 ( @ 15:00) (10 - 38) SpO2: 97% ( @ 15:00) (94% - 100%)  NEUROLOGIC EXAMINATION:  Patient   GENERAL:  intubated   EENT: anicteric  CARDIOVASC:  (+) S1 S2, bradycardic rate and regular rhythm  PULMONARY:  clear to auscultation bilaterally  ABDOMEN:  soft, nontender, with normoactive bowel sounds  EXTREMITIES:  no edema  SKIN:  no rash    LABS:  CAPILLARY BLOOD GLUCOSE 126 137 151 134 107               6.6   (6.9)  15.0  )-----------( 96       ( 03 Sep 2018 06:14 )             20.3     144  |  114<H>  |  52<H>  ----------------------------<  134<H>  4.8   |  18<L>  |  1.32<H> 1.25    Ca    8.0<L>      03 Sep 2018 06:14  Phos  4.5       Mg     2.6         TPro  4.0<L>  /  Alb  2.5<L>  /  TBili  0.3  /  DBili  x   /  AST  138<H>  /  ALT  222<H>  /  AlkPhos  88   @ 07:01  -   @ 07:00  IN: 1046.7 mL / OUT: 2 mL / NET: 1044.7 mL    HbA1C = 5.2 ()  LDL = 132 () 134 ()   HDL = 37 () 34 ()  TG = 149 () 156 ()  TSH = 1.865 ()    Bacteriology:  CSF studies:  EEG:  Neuroimagin/29 CT head:  new hypodensity wilfred   CT head:  bilateral lacunar infarcts thalamus; multiple R corona radata hypodensity, R frontal lobe, R PATRICIA MCA territory infarcts   MRI:  acute R PATRICIA infarction, scattered smaller areas of acute infarction L centrum semiovale, L subcortical insular, medial margin of R temporal horn, ?embolic, no hemorrhagic transformation; extensive SVID, numerous chronic small lacunar infarctions B basal ganglia, bilateral corona radiata, bilateral thalami, wilfred   CTP: nondiagnostic   CTA: mod calcified atherosclerotic plaque bilateral carotid bifurcation, 50-70% stenosis at origin, L VA likely chronically occluded; R VA and basilar artery hypoplastic, ?vertebrobasilar insufficiency   CT: multiple age-indeterminate lacunar infarctions, basal ganglia, thalamic; mild HCP  Other imagin/03 CXR:  retrocardiac opacity L   LE Doppler: NEG   TTE:  severe LVH EF 55% LA severely dilated,     MEDICATIONS: duonebs q6h amiodarone 400mg q8h amlodipine 10mg daily asa 325 daily atorvastatin 20mg HS peridex clonidine 0.4mg q8h folic acid SQH q8h dilaudid hydralazine 100mg q8h lantus 10 units HS mod ISS iron sucorse 200mg IV q24h ISDN 30mg q8h levetiracetam 500 q12h methylprednisolone 125 BID metoprolol 5mg IV q6h pantorpazole 40 IV daily zosyn 3.375 q6h thiamine 100mg daily     IV FLUIDS:  DRIPS:  DIET:    Lines:    CODE STATUS:  full code                       GOALS OF CARE:  aggressive                      DISPOSITION:  7La  NIHSS 6 --> 1, no tPA given due to resolution of symptoms =====================   STROKE ATTENDING NOTE  =====================     ALCON COPE   MRN-[id number]  Summary:  68y/F with depression, found down in apartment in her stool, [facility]ry fro major depression.  On day of admission, noted to have L facial droop and L weakness, NIHSS 6, SBP 170s  a.m. glu WNL.  CT CTA showed multiple lacunar infarcts of indeterminate age, hydrocephalus.  Transferred to [facility] for further stroke work-up.  Hypertensive to 200s; stroke code called for new facial droop, weakness L UE/LE; uncertain time of onset.  Patient transferred to ICU for hemodynamic augmentation.  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    Overnight Events:  Code blue .    PMH:  Hypertension Depression  Allergies:  No Known Allergies  Home meds:     PHYSICAL EXAMINATION  T(C): 37.2 ( @ 14:32), Max: 37.7 ( @ 19:00) HR: 52 ( @ 15:00) (48 - 184) BP: 125/70 ( @ 04:00) (106/72 - 192/98) RR: 10 ( @ 15:00) (10 - 38) SpO2: 97% ( @ 15:00) (94% - 100%)  NEUROLOGIC EXAMINATION:  Patient opens eyes to voice, does not regard, does not follow commands, R gaze preference not fixed; (+) gag (+) cough pupils 2mm reactive to light, extensor posturing B UE to noxious stimuli, TF B LE, (+) Babinski / clonus bilaterally  GENERAL:  intubated   EENT: anicteric  CARDIOVASC:  (+) S1 S2, bradycardic rate and regular rhythm  PULMONARY:  clear to auscultation bilaterally  ABDOMEN:  soft, nontender, with normoactive bowel sounds  EXTREMITIES:  no edema  SKIN:  no rash    LABS:  CAPILLARY BLOOD GLUCOSE 126 137 151 134 107               6.6   (6.9)  15.0  )-----------( 96       ( 03 Sep 2018 06:14 )             20.3     144  |  114<H>  |  52<H>  ----------------------------<  134<H>  4.8   |  18<L>  |  1.32<H> 1.25    Ca    8.0<L>      03 Sep 2018 06:14  Phos  4.5       Mg     2.6         TPro  4.0<L>  /  Alb  2.5<L>  /  TBili  0.3  /  DBili  x   /  AST  138<H>  /  ALT  222<H>  /  AlkPhos  88   @ 07:01  -   @ 07:00  IN: 1046.7 mL / OUT: 2 mL / NET: 1044.7 mL    HbA1C = 5.2 ()  LDL = 132 () 134 ()   HDL = 37 () 34 ()  TG = 149 () 156 ()  TSH = 1.865 ()    Bacteriology:  CSF studies:  EEG:  Neuroimagin/29 CT head:  new hypodensity wilfred   CT head:  bilateral lacunar infarcts thalamus; multiple R corona radata hypodensity, R frontal lobe, R PATRICIA MCA territory infarcts   MRI:  acute R PATRICIA infarction, scattered smaller areas of acute infarction L centrum semiovale, L subcortical insular, medial margin of R temporal horn, ?embolic, no hemorrhagic transformation; extensive SVID, numerous chronic small lacunar infarctions B basal ganglia, bilateral corona radiata, bilateral thalami, wilfred   CTP: nondiagnostic   CTA: mod calcified atherosclerotic plaque bilateral carotid bifurcation, 50-70% stenosis at origin, L VA likely chronically occluded; R VA and basilar artery hypoplastic, ?vertebrobasilar insufficiency   CT: multiple age-indeterminate lacunar infarctions, basal ganglia, thalamic; mild HCP  Other imagin/03 CXR:  retrocardiac opacity L   LE Doppler: NEG   TTE:  severe LVH EF 55% LA severely dilated,     MEDICATIONS: duonebs q6h amiodarone 400mg q8h amlodipine 10mg daily asa 325 daily atorvastatin 20mg HS peridex clonidine 0.4mg q8h folic acid SQH q8h dilaudid hydralazine 100mg q8h lantus 10 units HS mod ISS iron sucorse 200mg IV q24h ISDN 30mg q8h levetiracetam 500 q12h methylprednisolone 125 BID metoprolol 5mg IV q6h pantorpazole 40 IV daily zosyn 3.375 q6h thiamine 100mg daily     IV FLUIDS:  DRIPS:  DIET:    Lines:    CODE STATUS:  full code                       GOALS OF CARE:  aggressive                      DISPOSITION:  7La  NIHSS 6 --> 1, no tPA given due to resolution of symptoms

## 2018-09-03 NOTE — CHART NOTE - NSCHARTNOTEFT_GEN_A_CORE
PGY-3 EVENT NOTE:    ~6:30pm, nurse was cleaning patient, she went into [facility]. Patient had a pulse and BPs were ~100s/60s. Was given amiodarone 150mg IV x1 and magnesium 2mg IV x1 and rhythm converted into sinus bradycardia. 12-lead EKG obtained which did not show any significant ST changes.    Electrolytes to be sent and repleted. PGY-3 EVENT NOTE:    ~6:30pm, nurse was cleaning patient, she went into [facility]. Patient had a pulse and BPs were ~100s/60s. Pads in place from yesterday's event, patient was hooked up to monitor. Given amiodarone 150mg IV x1 and magnesium 2mg IV x1 and rhythm converted into sinus bradycardia. 12-lead EKG obtained which did not show any significant ST changes. Attempted to contact HCP Leyla without answer, left voicemail.    Electrolytes to be sent and repleted.    Trops downtrended from earlier today.

## 2018-09-03 NOTE — PROGRESS NOTE ADULT - ASSESSMENT
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 Patient with residual left sided deficits with new lethargy, CTH showing new infarcts.  - c/w  rectally while NPO. Atorvastatin 80 when able   - c/w Keppra, 500mg PO BID  - solumedrol 125mg bid.  - scheduled tylenol  - f/u CT head showed hypodensity in [place], possible new infarct vs ischemia     Cardiac  # post-cardiac arrest  - pulseless afib 9/3/18, code called  - s/p two rounds of epi, 1g Mg, 100mg Amio. S/p amio gtt. ROSC achieved  - cont amio PO 400mg q8hrs. Avoid QT prolonging agents  - dilaudid 0.5mg q4hrs for pain. Possible broken ribs 2/2 compressions.  - f/u trop  - start statin      #Hypertensive emergency/urgency - Episodes of vomiting noted from possible hypertensive encephelopathy. CTH with no ICH, edema, or midline shift, though showing evolving infarcts.    - d/c Nicardipine drip   - SBP between 140-160 per Neuro  - will d/c Mirlande due to inacurate SBP read    Pulmonary  #Concern for aspiration   - Patient with vomiting and AMS in setting of likely hypertensive encephalopathy, CXR without infiltrate  - intubated on CPAP. Saturating appropriately.   - will downgrade abx to levoquin from zosyn d/t sensitivities.     Renal  #MARYJANE vs CKD - Possibly 2/2 vasculitis vs CKD 2/2 hypertensive nephropathy - Patient presented with MARYJANE of 1.45; unknown baseline. Renal ultrasound showed slightly diminished left renal size with smooth renal cortical mantle thinning which may suggest underlying nephrosclerosis. Renal duplex performed 8/24, showing <60% stenosis of the right renal artery at its origin. Urine lytes revealing FeNa - 1.4%, indicating intrinsic renal disease.   - Avoiding nephrotoxic agents, Trend Cr. Cr today improved at 1.26  - Na 150, Cl 121. Na goals 140-150 to decrease cerebral edema. Will increase free water feeds to 250cc q4hr to increase intravascular volume    GI  #Vomiting - patient with vomiting at onset of hypertension and alteration of mental status, possible hypertensive encephalopathy  - c/w HTN treatment as above  - Zofran 4mg IV PRN. Keep NPO  - GI consult for PEG placement.     Endo  # hyperglycemia  - likely 2/2 high dose steroids  - sugars trending into normal and hypoglycemic ranges. Will hold scheduled insulin for now and keep RISS. can add back lantus 10U daily if needed.    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 Patient with residual left sided deficits with new lethargy, CTH showing new infarcts.  - c/w  rectally while NPO. Atorvastatin 80 when able   - c/w Keppra, 500mg PO BID  - solumedrol 125mg bid.  - scheduled tylenol  - f/u CT head showed hypodensity in [place]anca, possible new infarct vs ischemia     Cardiac  # Post-cardiac arrest  - pulseless v-fib 9/3/18, code called  - s/p two rounds of epi, 1g Mg, 100mg Amio. S/p amio gtt. ROSC achieved  - cont amio PO 400mg q8hrs. Avoid QT prolonging agents  - dilaudid 0.5mg q4hrs for pain. Possible broken ribs 2/2 compressions.  - f/u trop  - start statin      #Hypertensive emergency/urgency - Episodes of vomiting noted from possible hypertensive encephelopathy. CTH with no ICH, edema, or midline shift, though showing evolving infarcts.    - d/c Nicardipine drip   - SBP between 140-160 per Neuro  - will d/c Mirlande due to inacurate SBP read    Pulmonary  #Concern for aspiration   - Patient with vomiting and AMS in setting of likely hypertensive encephalopathy, CXR without infiltrate  - intubated on CPAP. Saturating appropriately.   - will downgrade abx to levoquin from zosyn d/t sensitivities.     Renal  #MARYJANE vs CKD - Possibly 2/2 vasculitis vs CKD 2/2 hypertensive nephropathy - Patient presented with MARYJANE of 1.45; unknown baseline. Renal ultrasound showed slightly diminished left renal size with smooth renal cortical mantle thinning which may suggest underlying nephrosclerosis. Renal duplex performed 8/24, showing <60% stenosis of the right renal artery at its origin. Urine lytes revealing FeNa - 1.4%, indicating intrinsic renal disease.   - Avoiding nephrotoxic agents, Trend Cr. Cr today improved at 1.26  - Na 150, Cl 121. Na goals 140-150 to decrease cerebral edema. Will increase free water feeds to 250cc q4hr to increase intravascular volume    GI  #Vomiting - patient with vomiting at onset of hypertension and alteration of mental status, possible hypertensive encephalopathy  - c/w HTN treatment as above  - Zofran 4mg IV PRN. Keep NPO  - GI consult for PEG placement.     Endo  # hyperglycemia  - likely 2/2 high dose steroids  - sugars trending into normal and hypoglycemic ranges. Will hold scheduled insulin for now and keep RISS. can add back lantus 10U daily if needed.    F: none   E: replete as needed  N: GI consult for PEG placement  HSQ, SCDs, PPI IV  MVT, Thiamine, Folic Acid  dispo: MICU

## 2018-09-03 NOTE — PROGRESS NOTE ADULT - ASSESSMENT
events in my absence noted...pt's Hb is 6gr/dl now..was transfused this am...will give IV Iron as well

## 2018-09-04 LAB
ANION GAP SERPL CALC-SCNC: 14 MMOL/L — SIGNIFICANT CHANGE UP (ref 5–17)
BUN SERPL-MCNC: 50 MG/DL — HIGH (ref 7–23)
CALCIUM SERPL-MCNC: 8 MG/DL — LOW (ref 8.4–10.5)
CHLORIDE SERPL-SCNC: 114 MMOL/L — HIGH (ref 96–108)
CO2 SERPL-SCNC: 18 MMOL/L — LOW (ref 22–31)
CREAT SERPL-MCNC: 1.62 MG/DL — HIGH (ref 0.5–1.3)
GLUCOSE SERPL-MCNC: 148 MG/DL — HIGH (ref 70–99)
HCT VFR BLD CALC: 20.8 % — CRITICAL LOW (ref 34.5–45)
HCT VFR BLD CALC: 24.9 % — LOW (ref 34.5–45)
HGB BLD-MCNC: 6.7 G/DL — CRITICAL LOW (ref 11.5–15.5)
HGB BLD-MCNC: 8.2 G/DL — LOW (ref 11.5–15.5)
MAGNESIUM SERPL-MCNC: 3.1 MG/DL — HIGH (ref 1.6–2.6)
MCHC RBC-ENTMCNC: 28.9 PG — SIGNIFICANT CHANGE UP (ref 27–34)
MCHC RBC-ENTMCNC: 29 PG — SIGNIFICANT CHANGE UP (ref 27–34)
MCHC RBC-ENTMCNC: 32.2 G/DL — SIGNIFICANT CHANGE UP (ref 32–36)
MCHC RBC-ENTMCNC: 32.9 G/DL — SIGNIFICANT CHANGE UP (ref 32–36)
MCV RBC AUTO: 88 FL — SIGNIFICANT CHANGE UP (ref 80–100)
MCV RBC AUTO: 89.7 FL — SIGNIFICANT CHANGE UP (ref 80–100)
PHOSPHATE SERPL-MCNC: 4.9 MG/DL — HIGH (ref 2.5–4.5)
PLATELET # BLD AUTO: 78 K/UL — LOW (ref 150–400)
PLATELET # BLD AUTO: 82 K/UL — LOW (ref 150–400)
POTASSIUM SERPL-MCNC: 4.7 MMOL/L — SIGNIFICANT CHANGE UP (ref 3.5–5.3)
POTASSIUM SERPL-SCNC: 4.7 MMOL/L — SIGNIFICANT CHANGE UP (ref 3.5–5.3)
RBC # BLD: 2.32 M/UL — LOW (ref 3.8–5.2)
RBC # BLD: 2.83 M/UL — LOW (ref 3.8–5.2)
RBC # FLD: 15.3 % — SIGNIFICANT CHANGE UP (ref 10.3–16.9)
RBC # FLD: 15.4 % — SIGNIFICANT CHANGE UP (ref 10.3–16.9)
SODIUM SERPL-SCNC: 146 MMOL/L — HIGH (ref 135–145)
WBC # BLD: 10.4 K/UL — SIGNIFICANT CHANGE UP (ref 3.8–10.5)
WBC # BLD: 10.4 K/UL — SIGNIFICANT CHANGE UP (ref 3.8–10.5)
WBC # FLD AUTO: 10.4 K/UL — SIGNIFICANT CHANGE UP (ref 3.8–10.5)
WBC # FLD AUTO: 10.4 K/UL — SIGNIFICANT CHANGE UP (ref 3.8–10.5)

## 2018-09-04 PROCEDURE — 93306 TTE W/DOPPLER COMPLETE: CPT | Mod: 26

## 2018-09-04 PROCEDURE — 71045 X-RAY EXAM CHEST 1 VIEW: CPT | Mod: 26

## 2018-09-04 RX ORDER — DEXTROSE 50 % IN WATER 50 %
25 SYRINGE (ML) INTRAVENOUS ONCE
Qty: 0 | Refills: 0 | Status: COMPLETED | OUTPATIENT
Start: 2018-09-04 | End: 2018-09-04

## 2018-09-04 RX ORDER — DEXTROSE 50 % IN WATER 50 %
50 SYRINGE (ML) INTRAVENOUS ONCE
Qty: 0 | Refills: 0 | Status: DISCONTINUED | OUTPATIENT
Start: 2018-09-04 | End: 2018-09-04

## 2018-09-04 RX ORDER — ATORVASTATIN CALCIUM 80 MG/1
20 TABLET, FILM COATED ORAL AT BEDTIME
Qty: 0 | Refills: 0 | Status: DISCONTINUED | OUTPATIENT
Start: 2018-09-04 | End: 2018-09-05

## 2018-09-04 RX ADMIN — HYDROMORPHONE HYDROCHLORIDE 0.5 MILLIGRAM(S): 2 INJECTION INTRAMUSCULAR; INTRAVENOUS; SUBCUTANEOUS at 18:14

## 2018-09-04 RX ADMIN — CHLORHEXIDINE GLUCONATE 15 MILLILITER(S): 213 SOLUTION TOPICAL at 18:15

## 2018-09-04 RX ADMIN — HYDROMORPHONE HYDROCHLORIDE 0.5 MILLIGRAM(S): 2 INJECTION INTRAMUSCULAR; INTRAVENOUS; SUBCUTANEOUS at 11:06

## 2018-09-04 RX ADMIN — Medication 125 MILLIGRAM(S): at 10:51

## 2018-09-04 RX ADMIN — Medication 0.4 MILLIGRAM(S): at 06:51

## 2018-09-04 RX ADMIN — Medication 3 MILLILITER(S): at 05:46

## 2018-09-04 RX ADMIN — IRON SUCROSE 110 MILLIGRAM(S): 20 INJECTION, SOLUTION INTRAVENOUS at 19:17

## 2018-09-04 RX ADMIN — HYDROMORPHONE HYDROCHLORIDE 0.5 MILLIGRAM(S): 2 INJECTION INTRAMUSCULAR; INTRAVENOUS; SUBCUTANEOUS at 08:35

## 2018-09-04 RX ADMIN — PIPERACILLIN AND TAZOBACTAM 200 GRAM(S): 4; .5 INJECTION, POWDER, LYOPHILIZED, FOR SOLUTION INTRAVENOUS at 14:56

## 2018-09-04 RX ADMIN — HEPARIN SODIUM 5000 UNIT(S): 5000 INJECTION INTRAVENOUS; SUBCUTANEOUS at 14:56

## 2018-09-04 RX ADMIN — AMLODIPINE BESYLATE 10 MILLIGRAM(S): 2.5 TABLET ORAL at 06:53

## 2018-09-04 RX ADMIN — HYDROMORPHONE HYDROCHLORIDE 0.5 MILLIGRAM(S): 2 INJECTION INTRAMUSCULAR; INTRAVENOUS; SUBCUTANEOUS at 10:51

## 2018-09-04 RX ADMIN — CHLORHEXIDINE GLUCONATE 1 APPLICATION(S): 213 SOLUTION TOPICAL at 06:53

## 2018-09-04 RX ADMIN — AMIODARONE HYDROCHLORIDE 400 MILLIGRAM(S): 400 TABLET ORAL at 14:57

## 2018-09-04 RX ADMIN — Medication 3 MILLILITER(S): at 12:42

## 2018-09-04 RX ADMIN — Medication 100 MILLIGRAM(S): at 22:26

## 2018-09-04 RX ADMIN — Medication 100 MILLIGRAM(S): at 12:35

## 2018-09-04 RX ADMIN — ISOSORBIDE DINITRATE 30 MILLIGRAM(S): 5 TABLET ORAL at 06:52

## 2018-09-04 RX ADMIN — HEPARIN SODIUM 5000 UNIT(S): 5000 INJECTION INTRAVENOUS; SUBCUTANEOUS at 06:51

## 2018-09-04 RX ADMIN — ISOSORBIDE DINITRATE 30 MILLIGRAM(S): 5 TABLET ORAL at 14:57

## 2018-09-04 RX ADMIN — Medication 100 MILLIGRAM(S): at 06:52

## 2018-09-04 RX ADMIN — Medication 650 MILLIGRAM(S): at 12:35

## 2018-09-04 RX ADMIN — PANTOPRAZOLE SODIUM 40 MILLIGRAM(S): 20 TABLET, DELAYED RELEASE ORAL at 12:35

## 2018-09-04 RX ADMIN — Medication 1 APPLICATION(S): at 12:36

## 2018-09-04 RX ADMIN — Medication 0.4 MILLIGRAM(S): at 22:26

## 2018-09-04 RX ADMIN — Medication 100 MILLIGRAM(S): at 14:57

## 2018-09-04 RX ADMIN — Medication 5 MILLIGRAM(S): at 06:51

## 2018-09-04 RX ADMIN — HYDROMORPHONE HYDROCHLORIDE 0.5 MILLIGRAM(S): 2 INJECTION INTRAMUSCULAR; INTRAVENOUS; SUBCUTANEOUS at 08:06

## 2018-09-04 RX ADMIN — LEVETIRACETAM 400 MILLIGRAM(S): 250 TABLET, FILM COATED ORAL at 12:35

## 2018-09-04 RX ADMIN — Medication 650 MILLIGRAM(S): at 18:14

## 2018-09-04 RX ADMIN — HEPARIN SODIUM 5000 UNIT(S): 5000 INJECTION INTRAVENOUS; SUBCUTANEOUS at 22:25

## 2018-09-04 RX ADMIN — HYDROMORPHONE HYDROCHLORIDE 0.5 MILLIGRAM(S): 2 INJECTION INTRAMUSCULAR; INTRAVENOUS; SUBCUTANEOUS at 00:47

## 2018-09-04 RX ADMIN — Medication 3 MILLILITER(S): at 18:00

## 2018-09-04 RX ADMIN — Medication 650 MILLIGRAM(S): at 06:00

## 2018-09-04 RX ADMIN — HYDROMORPHONE HYDROCHLORIDE 0.5 MILLIGRAM(S): 2 INJECTION INTRAMUSCULAR; INTRAVENOUS; SUBCUTANEOUS at 14:57

## 2018-09-04 RX ADMIN — Medication 3 MILLILITER(S): at 00:04

## 2018-09-04 RX ADMIN — ATORVASTATIN CALCIUM 20 MILLIGRAM(S): 80 TABLET, FILM COATED ORAL at 22:39

## 2018-09-04 RX ADMIN — AMIODARONE HYDROCHLORIDE 400 MILLIGRAM(S): 400 TABLET ORAL at 06:52

## 2018-09-04 RX ADMIN — INSULIN GLARGINE 10 UNIT(S): 100 INJECTION, SOLUTION SUBCUTANEOUS at 22:26

## 2018-09-04 RX ADMIN — Medication 5 MILLIGRAM(S): at 00:01

## 2018-09-04 RX ADMIN — Medication 125 MILLIGRAM(S): at 00:01

## 2018-09-04 RX ADMIN — CHLORHEXIDINE GLUCONATE 15 MILLILITER(S): 213 SOLUTION TOPICAL at 06:52

## 2018-09-04 RX ADMIN — Medication 650 MILLIGRAM(S): at 00:01

## 2018-09-04 RX ADMIN — HYDROMORPHONE HYDROCHLORIDE 0.5 MILLIGRAM(S): 2 INJECTION INTRAMUSCULAR; INTRAVENOUS; SUBCUTANEOUS at 22:39

## 2018-09-04 RX ADMIN — Medication 325 MILLIGRAM(S): at 12:35

## 2018-09-04 RX ADMIN — HYDROMORPHONE HYDROCHLORIDE 0.5 MILLIGRAM(S): 2 INJECTION INTRAMUSCULAR; INTRAVENOUS; SUBCUTANEOUS at 01:24

## 2018-09-04 RX ADMIN — INSULIN HUMAN 2: 100 INJECTION, SOLUTION SUBCUTANEOUS at 01:21

## 2018-09-04 RX ADMIN — LEVETIRACETAM 400 MILLIGRAM(S): 250 TABLET, FILM COATED ORAL at 00:02

## 2018-09-04 RX ADMIN — AMIODARONE HYDROCHLORIDE 400 MILLIGRAM(S): 400 TABLET ORAL at 22:26

## 2018-09-04 RX ADMIN — Medication 125 MILLIGRAM(S): at 22:25

## 2018-09-04 RX ADMIN — HYDROMORPHONE HYDROCHLORIDE 0.5 MILLIGRAM(S): 2 INJECTION INTRAMUSCULAR; INTRAVENOUS; SUBCUTANEOUS at 15:08

## 2018-09-04 RX ADMIN — HYDROMORPHONE HYDROCHLORIDE 0.5 MILLIGRAM(S): 2 INJECTION INTRAMUSCULAR; INTRAVENOUS; SUBCUTANEOUS at 18:30

## 2018-09-04 RX ADMIN — ISOSORBIDE DINITRATE 30 MILLIGRAM(S): 5 TABLET ORAL at 22:25

## 2018-09-04 RX ADMIN — HYDROMORPHONE HYDROCHLORIDE 0.5 MILLIGRAM(S): 2 INJECTION INTRAMUSCULAR; INTRAVENOUS; SUBCUTANEOUS at 02:24

## 2018-09-04 RX ADMIN — PIPERACILLIN AND TAZOBACTAM 200 GRAM(S): 4; .5 INJECTION, POWDER, LYOPHILIZED, FOR SOLUTION INTRAVENOUS at 03:00

## 2018-09-04 RX ADMIN — Medication 1 MILLIGRAM(S): at 12:35

## 2018-09-04 RX ADMIN — PIPERACILLIN AND TAZOBACTAM 200 GRAM(S): 4; .5 INJECTION, POWDER, LYOPHILIZED, FOR SOLUTION INTRAVENOUS at 08:07

## 2018-09-04 NOTE — PROGRESS NOTE ADULT - SUBJECTIVE AND OBJECTIVE BOX
INTERVAL HPI/OVERNIGHT EVENTS: pt was noted to have stable v-tach on tele that resolved with a bolus of amio and 2mg Mg.     SUBJECTIVE: Patient seen and examined at bedside. Intubated without sedation. Pt is nonresponsive, neurological status unchanged.     OBJECTIVE:    VITAL SIGNS:  ICU Vital Signs Last 24 Hrs  T(C): 36.3 (04 Sep 2018 14:47), Max: 37.7 (03 Sep 2018 18:00)  T(F): 97.3 (04 Sep 2018 14:47), Max: 99.9 (03 Sep 2018 18:00)  HR: 54 (04 Sep 2018 15:00) (48 - 150)  BP: 162/80 (04 Sep 2018 15:00) (102/53 - 171/81)  BP(mean): 125 (04 Sep 2018 15:00) (75 - 125)  ABP: 138/86 (04 Sep 2018 15:00) (98/60 - 184/180)  ABP(mean): 108 (04 Sep 2018 15:00) (74 - 182)  RR: 13 (04 Sep 2018 15:00) (7 - 24)  SpO2: 100% (04 Sep 2018 15:00) (98% - 100%)    Mode: AC/ CMV (Assist Control/ Continuous Mandatory Ventilation), RR (machine): 12, TV (machine): 360, FiO2: 50, PEEP: 5, ITime: 1, MAP: 7, PIP: 18    09-03 @ 07:01 - 09-04 @ 07:00  --------------------------------------------------------  IN: 1353.5 mL / OUT: 0 mL / NET: 1353.5 mL    09-04 @ 07:01  -  09-04 @ 16:25  --------------------------------------------------------  IN: 360 mL / OUT: 0 mL / NET: 360 mL      CAPILLARY BLOOD GLUCOSE      POCT Blood Glucose.: 134 mg/dL (04 Sep 2018 11:18)      PHYSICAL EXAM:    General: intubated, nonresponsive  HEENT: NC/AT; PERRL, clear conjunctiva  Neck: supple  Respiratory: rhonchi b/l  Cardiovascular: systolic murmur noted, RRR  Abdomen: soft, BSx4, mildly distended.  Extremities: WWP, 2+ peripheral pulses b/l; no LE edema  Skin: normal color and turgor; no rash  Neurological: A&Ox0 pt unresponsive to noxious stimuli. rightward gaze preference    MEDICATIONS:  MEDICATIONS  (STANDING):  acetaminophen    Suspension 650 milliGRAM(s) Oral every 6 hours  ALBUTerol/ipratropium for Nebulization 3 milliLiter(s) Nebulizer every 6 hours  amiodarone    Tablet 400 milliGRAM(s) Oral every 8 hours  amLODIPine   Tablet 10 milliGRAM(s) Oral daily  aspirin 325 milliGRAM(s) Oral daily  atorvastatin 20 milliGRAM(s) Oral at bedtime  BACItracin   Ointment 1 Application(s) Topical daily  chlorhexidine 0.12% Liquid 15 milliLiter(s) Swish and Spit two times a day  chlorhexidine 2% Cloths 1 Application(s) Topical daily  cloNIDine 0.4 milliGRAM(s) Oral every 8 hours  dextrose 5%. 1000 milliLiter(s) (50 mL/Hr) IV Continuous <Continuous>  dextrose 50% Injectable 12.5 Gram(s) IV Push once  dextrose 50% Injectable 25 Gram(s) IV Push once  dextrose 50% Injectable 25 Gram(s) IV Push once  dextrose 50% Injectable 50 milliLiter(s) IV Push once  folic acid 1 milliGRAM(s) Oral daily  heparin  Injectable 5000 Unit(s) SubCutaneous every 8 hours  hydrALAZINE 100 milliGRAM(s) Oral every 8 hours  HYDROmorphone  Injectable 0.5 milliGRAM(s) IV Push every 4 hours  insulin glargine Injectable (LANTUS) 10 Unit(s) SubCutaneous at bedtime  insulin regular  human corrective regimen sliding scale   SubCutaneous every 6 hours  iron sucrose IVPB 200 milliGRAM(s) IV Intermittent every 24 hours  isosorbide   dinitrate Tablet (ISORDIL) 30 milliGRAM(s) Oral every 8 hours  levETIRAcetam  IVPB 500 milliGRAM(s) IV Intermittent every 12 hours  methylPREDNISolone sodium succinate Injectable 125 milliGRAM(s) IV Push two times a day  metoprolol tartrate Injectable 5 milliGRAM(s) IV Push every 6 hours  pantoprazole  Injectable 40 milliGRAM(s) IV Push daily  piperacillin/tazobactam IVPB. 3.375 Gram(s) IV Intermittent every 6 hours  thiamine 100 milliGRAM(s) Oral daily    MEDICATIONS  (PRN):  dextrose 40% Gel 15 Gram(s) Oral once PRN Blood Glucose LESS THAN 70 milliGRAM(s)/deciliter  glucagon  Injectable 1 milliGRAM(s) IntraMuscular once PRN Glucose LESS THAN 70 milligrams/deciliter      ALLERGIES:  Allergies    No Known Allergies    Intolerances        LABS:                        8.2    10.4  )-----------( 78       ( 04 Sep 2018 06:25 )             24.9     09-04    146<H>  |  114<H>  |  50<H>  ----------------------------<  148<H>  4.7   |  18<L>  |  1.62<H>    Ca    8.0<L>      04 Sep 2018 06:25  Phos  4.9     09-04  Mg     3.1     09-04    TPro  4.0<L>  /  Alb  2.5<L>  /  TBili  0.3  /  DBili  x   /  AST  138<H>  /  ALT  222<H>  /  AlkPhos  88  09-02    PT/INR - ( 02 Sep 2018 21:20 )   PT: 11.9 sec;   INR: 1.07          PTT - ( 02 Sep 2018 21:20 )  PTT:39.8 sec      RADIOLOGY & ADDITIONAL TESTS: Reviewed.

## 2018-09-04 NOTE — CHART NOTE - NSCHARTNOTEFT_GEN_A_CORE
Admitting Diagnosis:   Patient is a 68y old  Female who presents with a chief complaint of Depression, Stroke Code Called in Unit (11 Aug 2018 10:04)      PAST MEDICAL & SURGICAL HISTORY:  Hypertension  Depression  No significant past surgical history    Current Nutrition Order:  Glucerna 1.2 Rosales @ 45mL/hr x 24hrs via NGT (1080mL TV, 1296kcal, 65g protein, 869mL free H2O, 86% RDI, 1.43g/kg IBW protein)  Feeds currently on hold 2/2 Vtach arrest    PO Intake: Good (%) [   ]  Fair (50-75%) [   ] Poor (<25%) [   ]- N/A NPO w/EN    GI Issues: Unable to assess at this time 2/2 vent; feeds held x 2days    Pain: Unable to assess at this time 2/2 vent     Skin Integrity: Calos 9, intact pressure-wise     Labs:   09-04    146<H>  |  114<H>  |  50<H>  ----------------------------<  148<H>  4.7   |  18<L>  |  1.62<H>    Ca    8.0<L>      04 Sep 2018 06:25  Phos  4.9     09-04  Mg     3.1     09-04    TPro  4.0<L>  /  Alb  2.5<L>  /  TBili  0.3  /  DBili  x   /  AST  138<H>  /  ALT  222<H>  /  AlkPhos  88  09-02    CAPILLARY BLOOD GLUCOSE      POCT Blood Glucose.: 134 mg/dL (04 Sep 2018 11:18)  POCT Blood Glucose.: 141 mg/dL (04 Sep 2018 06:17)  POCT Blood Glucose.: 158 mg/dL (04 Sep 2018 00:34)  POCT Blood Glucose.: 147 mg/dL (03 Sep 2018 23:58)  POCT Blood Glucose.: 169 mg/dL (03 Sep 2018 18:42)  POCT Blood Glucose.: 126 mg/dL (03 Sep 2018 13:21)      Medications:  MEDICATIONS  (STANDING):  acetaminophen    Suspension 650 milliGRAM(s) Oral every 6 hours  ALBUTerol/ipratropium for Nebulization 3 milliLiter(s) Nebulizer every 6 hours  amiodarone    Tablet 400 milliGRAM(s) Oral every 8 hours  amLODIPine   Tablet 10 milliGRAM(s) Oral daily  aspirin 325 milliGRAM(s) Oral daily  atorvastatin 20 milliGRAM(s) Oral at bedtime  BACItracin   Ointment 1 Application(s) Topical daily  chlorhexidine 0.12% Liquid 15 milliLiter(s) Swish and Spit two times a day  chlorhexidine 2% Cloths 1 Application(s) Topical daily  cloNIDine 0.4 milliGRAM(s) Oral every 8 hours  dextrose 5%. 1000 milliLiter(s) (50 mL/Hr) IV Continuous <Continuous>  dextrose 50% Injectable 12.5 Gram(s) IV Push once  dextrose 50% Injectable 25 Gram(s) IV Push once  dextrose 50% Injectable 25 Gram(s) IV Push once  folic acid 1 milliGRAM(s) Oral daily  heparin  Injectable 5000 Unit(s) SubCutaneous every 8 hours  hydrALAZINE 100 milliGRAM(s) Oral every 8 hours  HYDROmorphone  Injectable 0.5 milliGRAM(s) IV Push every 4 hours  insulin glargine Injectable (LANTUS) 10 Unit(s) SubCutaneous at bedtime  insulin regular  human corrective regimen sliding scale   SubCutaneous every 6 hours  iron sucrose IVPB 200 milliGRAM(s) IV Intermittent every 24 hours  isosorbide   dinitrate Tablet (ISORDIL) 30 milliGRAM(s) Oral every 8 hours  levETIRAcetam  IVPB 500 milliGRAM(s) IV Intermittent every 12 hours  methylPREDNISolone sodium succinate Injectable 125 milliGRAM(s) IV Push two times a day  metoprolol tartrate Injectable 5 milliGRAM(s) IV Push every 6 hours  pantoprazole  Injectable 40 milliGRAM(s) IV Push daily  piperacillin/tazobactam IVPB. 3.375 Gram(s) IV Intermittent every 6 hours  thiamine 100 milliGRAM(s) Oral daily    MEDICATIONS  (PRN):  dextrose 40% Gel 15 Gram(s) Oral once PRN Blood Glucose LESS THAN 70 milliGRAM(s)/deciliter  glucagon  Injectable 1 milliGRAM(s) IntraMuscular once PRN Glucose LESS THAN 70 milligrams/deciliter      Weight: 60.5kg   Daily     Daily     Weight Change: No new weights recorded since admit     Estimated energy needs: Utilized IBW (45.5kg) to calculate needs, pt >120% of IBW. Adjusted for age/stroke/vent  Calories: 25-30 kcal/kg = 4012-9085 kcal/day  Protein: 1.4-1.6 g/kg = 64-73g protein/day  Fluids: 30-35 mL/kg = 8980-1315 mL/day or per team    Subjective: 69y/o F PMHx HTN and Major Depressive Disorder with hx of poor sleep, anorexia, alcohol use, and self-cutting, presented to the ED with recent history of self harm and depressed mood, admitted to inpatient psych ([id number]) for close monitoring. Stroke code called with imaging revealing acute infarct in L PATRICIA and subacute insular infarct. Pt stepped down to 7 [facility]. S/p angiogram on 8/17, showing B/L carotid stenosis. MRI showing new CVA. Course c/b AMS, hypertensive emergency, N/V- new stroke code called on 8/26. Pt intubated for airway protection. Pt was planned for PEG on 8/29, though cancelled 2/2 c/f new stroke. CTH showing large brainstem stroke. Palliative following closely at this time 2/2 deteriorating condition. Course again c/b Vtach arrest with ROSC on 9/2. Pt seen in room and discussed during rounds. Intubated on VC/AC mode, MAP 85. Not on pressor support or sedation at this time. TF on hold currently 2/2 code. Na 146 (H), K 4.7, Mg 3.1 (H), Phos 4.9 (H). Pt currently full code, pending goals of care discussion with HCP and family tentatively Wednesday per team.     Previous Nutrition Diagnosis:  Increased nutrient needs RT increased demand for protein intake AEB s/p CVA    Active [ X - and now vent ]  Resolved [   ]    If resolved, new PES:     Goal: Pt will meet >75% of EER per day with good tolerance     Recommendations:  1. If nutrition support remains within the goals of care, recommend changing feeds to Nepro @ 28mL/hr x 24hrs plus 1 ProStat route per team (672mL TV, 1310kcal, 69g pro, 489mL free H2O, 72% RDI); maintain aspiration precautions at all times, monitor for s/s intolerance  2. Monitor lytes and replete prn.  3. *Keep nutrition aligned with GOC at all times*    Education: N/A - vent      Risk Level: High [ X  ] Moderate [   ] Low [   ].

## 2018-09-04 NOTE — PROGRESS NOTE ADULT - ATTENDING COMMENTS
Patient seen and examined with house-staff during bedside rounds.  Resident note read, including vitals, physical findings, laboratory data, and radiological reports.   Revisions included below.  Direct personal management at bed side and extensive interpretation of the data.  Plan was outlined and discussed in details with the housestaff.  Decision making of high complexity  Action taken for acute disease activity to reflect the level of care provided:  - medication reconciliation  - review laboratory data  Patient was seen unexamined. I rounded on the patient twice. I discussed the case in details with neurology. Patient had a cardiac arrest seconded to ventricular arrhythmia. Patient is dependent on the ventilator. Await family decision regarding the goals of care. Continue on amiodarone and beta blocker. No change in the neurological status. Prognosis is poor Attending Attestation (For Attendings USE Only)...

## 2018-09-04 NOTE — PROGRESS NOTE ADULT - SUBJECTIVE AND OBJECTIVE BOX
Neurology Stroke Progress Note     INTERVAL HPI/OVERNIGHT EVENTS:  Patient seen and examined. Opens eyes to voice. Flaccid quadriplegia      Of note, per previous conversation with patient, patient stated that her friend Leyla knew her best, and she did not want her sister to make decisions for her       MEDICATIONS  (STANDING):  acetaminophen    Suspension 650 milliGRAM(s) Oral every 6 hours  ALBUTerol/ipratropium for Nebulization 3 milliLiter(s) Nebulizer every 6 hours  amiodarone    Tablet 400 milliGRAM(s) Oral every 8 hours  amLODIPine   Tablet 10 milliGRAM(s) Oral daily  aspirin 325 milliGRAM(s) Oral daily  atorvastatin 20 milliGRAM(s) Oral at bedtime  BACItracin   Ointment 1 Application(s) Topical daily  chlorhexidine 0.12% Liquid 15 milliLiter(s) Swish and Spit two times a day  chlorhexidine 2% Cloths 1 Application(s) Topical daily  cloNIDine 0.4 milliGRAM(s) Oral every 8 hours  dextrose 5%. 1000 milliLiter(s) (50 mL/Hr) IV Continuous <Continuous>  dextrose 50% Injectable 12.5 Gram(s) IV Push once  dextrose 50% Injectable 25 Gram(s) IV Push once  dextrose 50% Injectable 25 Gram(s) IV Push once  folic acid 1 milliGRAM(s) Oral daily  heparin  Injectable 5000 Unit(s) SubCutaneous every 8 hours  hydrALAZINE 100 milliGRAM(s) Oral every 8 hours  HYDROmorphone  Injectable 0.5 milliGRAM(s) IV Push every 4 hours  insulin glargine Injectable (LANTUS) 10 Unit(s) SubCutaneous at bedtime  insulin regular  human corrective regimen sliding scale   SubCutaneous every 6 hours  iron sucrose IVPB 200 milliGRAM(s) IV Intermittent every 24 hours  isosorbide   dinitrate Tablet (ISORDIL) 30 milliGRAM(s) Oral every 8 hours  levETIRAcetam  IVPB 500 milliGRAM(s) IV Intermittent every 12 hours  methylPREDNISolone sodium succinate Injectable 125 milliGRAM(s) IV Push two times a day  metoprolol tartrate Injectable 5 milliGRAM(s) IV Push every 6 hours  pantoprazole  Injectable 40 milliGRAM(s) IV Push daily  piperacillin/tazobactam IVPB. 3.375 Gram(s) IV Intermittent every 6 hours  thiamine 100 milliGRAM(s) Oral daily    MEDICATIONS  (PRN):  dextrose 40% Gel 15 Gram(s) Oral once PRN Blood Glucose LESS THAN 70 milliGRAM(s)/deciliter  glucagon  Injectable 1 milliGRAM(s) IntraMuscular once PRN Glucose LESS THAN 70 milligrams/deciliter      Allergies    No Known Allergies        ROS: As per HPI, otherwise negative    Vital Signs Last 24 Hrs  T(C): 36.6 (04 Sep 2018 10:00), Max: 37.7 (03 Sep 2018 18:00)  T(F): 97.9 (04 Sep 2018 10:00), Max: 99.9 (03 Sep 2018 18:00)  HR: 54 (04 Sep 2018 13:00) (48 - 150)  BP: 171/81 (04 Sep 2018 13:00) (102/53 - 171/81)  BP(mean): 102 (04 Sep 2018 13:00) (75 - 118)  RR: 12 (04 Sep 2018 13:00) (7 - 25)  SpO2: 100% (04 Sep 2018 13:00) (97% - 100%)    Physical exam:  Gen: intubated. Opens eyes slightly to voice.  Mental status -   Cranial nerves - PERRL+ , left facial droop, Right gaze preference. Does not cross midline  Motor - flaccid quadriplegia.   Sensation - no movement to pain of both lower extremities  Cerebellar - did not cooperate  Gait - deferred        LABS:                        8.2    10.4  )-----------( 78       ( 04 Sep 2018 06:25 )             24.9     09-04    146<H>  |  114<H>  |  50<H>  ----------------------------<  148<H>  4.7   |  18<L>  |  1.62<H>    Ca    8.0<L>      04 Sep 2018 06:25  Phos  4.9     09-04  Mg     3.1     09-04    TPro  4.0<L>  /  Alb  2.5<L>  /  TBili  0.3  /  DBili  x   /  AST  138<H>  /  ALT  222<H>  /  AlkPhos  88  09-02    PT/INR - ( 02 Sep 2018 21:20 )   PT: 11.9 sec;   INR: 1.07          PTT - ( 02 Sep 2018 21:20 )  PTT:39.8 sec      RADIOLOGY & ADDITIONAL TESTS:  no new cerebral imaging      Assessment and Plan  68F PMHx MDD here with 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 Febrile, worsening mental status with new brainstem stroke. Palliative on board, awaiting ethics and HCP with decision.    1. severe vasculitis with recurrent strokes, worsening neuro exam  -s/p solumedrol for 5 total days, now on 125 BID  -keep  -180  -cont aspirin   -EEG with background slowing  -Repeat HCT on 8/29 with new brainstem stroke    2. Resp failure, fevers, UTI  -ID per MICU team  -palliative on board, likely need ethics involvement.  -Family and HCP Leyla to come in today for discussion of hospice vs trach/PEG. Per previous conversation with patient, patient stated that her friend Leyla knew her best, and she did not want her sister to make decisions for her     -rest of care per MICU team    DVT prophylaxis   -Heparin SQ TID and SCDs

## 2018-09-04 NOTE — PROGRESS NOTE ADULT - ASSESSMENT
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 Patient with residual left sided deficits with new lethargy, CTH showing new infarcts.  - c/w  rectally while NPO. Atorvastatin 80 when able   - c/w Keppra, 500mg PO BID  - solumedrol 125mg bid.  - scheduled tylenol  - repeat CT head showed hypodensity in [place], possible new infarct vs ischemia   - f/u palliative recs.   - Pt is full code for now, HCP planning to come in tomorrow with pt's family.     Cardiac  # Post-cardiac arrest  - pulseless v-fib 9/3/18, code called  - s/p two rounds of epi, 1g Mg, 100mg Amio. S/p amio gtt. ROSC achieved  - cont amio PO 400mg q8hrs. Avoid QT prolonging agents  - dilaudid 0.5mg q4hrs for pain. Possible broken ribs 2/2 compressions.  - elevated troponin likely 2/2 demand ischemia vs ACS  - c/w statin    #Hypertensive emergency/urgency - Episodes of vomiting noted from possible hypertensive encephelopathy. CTH with no ICH, edema, or midline shift, though showing evolving infarcts.    - d/c Nicardipine drip   - SBP between 140-160 per Neuro  - will d/c [place] due to inacurate SBP read    Pulmonary  #Concern for aspiration   - Patient with vomiting and AMS in setting of likely hypertensive encephalopathy, CXR without infiltrate  - intubated on CPAP. Saturating appropriately.   - will downgrade abx to levoquin from zosyn d/t sensitivities.     Renal  #MARYJANE vs CKD - Possibly 2/2 vasculitis vs CKD 2/2 hypertensive nephropathy - Patient presented with MARYJANE of 1.45; unknown baseline. Renal ultrasound showed slightly diminished left renal size with smooth renal cortical mantle thinning which may suggest underlying nephrosclerosis. Renal duplex performed 8/24, showing <60% stenosis of the right renal artery at its origin. Urine lytes revealing FeNa - 1.4%, indicating intrinsic renal disease.   - Avoiding nephrotoxic agents, Trend Cr. Cr today improved at 1.26  - Na 150, Cl 121. Na goals 140-150 to decrease cerebral edema. Will increase free water feeds to 250cc q4hr to increase intravascular volume    GI  #Vomiting - patient with vomiting at onset of hypertension and alteration of mental status, possible hypertensive encephalopathy  - c/w HTN treatment as above  - Zofran 4mg IV PRN. Keep NPO  - GI consult for PEG placement.     Endo  # hyperglycemia  - likely 2/2 high dose steroids  - sugars trending into normal and hypoglycemic ranges. Will hold scheduled insulin for now and keep RISS. can add back lantus 10U daily if needed.    F: none   E: replete as needed  N: GI consult for PEG placement  HSQ, SCDs, PPI IV  MVT, Thiamine, Folic Acid  dispo: MICU

## 2018-09-05 LAB
ANION GAP SERPL CALC-SCNC: 14 MMOL/L — SIGNIFICANT CHANGE UP (ref 5–17)
BUN SERPL-MCNC: 47 MG/DL — HIGH (ref 7–23)
CALCIUM SERPL-MCNC: 8.3 MG/DL — LOW (ref 8.4–10.5)
CHLORIDE SERPL-SCNC: 115 MMOL/L — HIGH (ref 96–108)
CO2 SERPL-SCNC: 17 MMOL/L — LOW (ref 22–31)
CREAT SERPL-MCNC: 1.46 MG/DL — HIGH (ref 0.5–1.3)
GLUCOSE SERPL-MCNC: 162 MG/DL — HIGH (ref 70–99)
HCT VFR BLD CALC: 23.6 % — LOW (ref 34.5–45)
HGB BLD-MCNC: 7.9 G/DL — LOW (ref 11.5–15.5)
MAGNESIUM SERPL-MCNC: 2.7 MG/DL — HIGH (ref 1.6–2.6)
MCHC RBC-ENTMCNC: 29.7 PG — SIGNIFICANT CHANGE UP (ref 27–34)
MCHC RBC-ENTMCNC: 33.5 G/DL — SIGNIFICANT CHANGE UP (ref 32–36)
MCV RBC AUTO: 88.7 FL — SIGNIFICANT CHANGE UP (ref 80–100)
PHOSPHATE SERPL-MCNC: 4.1 MG/DL — SIGNIFICANT CHANGE UP (ref 2.5–4.5)
PLATELET # BLD AUTO: 109 K/UL — LOW (ref 150–400)
POTASSIUM SERPL-MCNC: 4.2 MMOL/L — SIGNIFICANT CHANGE UP (ref 3.5–5.3)
POTASSIUM SERPL-SCNC: 4.2 MMOL/L — SIGNIFICANT CHANGE UP (ref 3.5–5.3)
RBC # BLD: 2.66 M/UL — LOW (ref 3.8–5.2)
RBC # FLD: 15.5 % — SIGNIFICANT CHANGE UP (ref 10.3–16.9)
SODIUM SERPL-SCNC: 146 MMOL/L — HIGH (ref 135–145)
WBC # BLD: 10 K/UL — SIGNIFICANT CHANGE UP (ref 3.8–10.5)
WBC # FLD AUTO: 10 K/UL — SIGNIFICANT CHANGE UP (ref 3.8–10.5)

## 2018-09-05 PROCEDURE — 99232 SBSQ HOSP IP/OBS MODERATE 35: CPT

## 2018-09-05 PROCEDURE — 71045 X-RAY EXAM CHEST 1 VIEW: CPT | Mod: 26

## 2018-09-05 PROCEDURE — 99356: CPT

## 2018-09-05 PROCEDURE — 99233 SBSQ HOSP IP/OBS HIGH 50: CPT

## 2018-09-05 PROCEDURE — 99497 ADVNCD CARE PLAN 30 MIN: CPT

## 2018-09-05 RX ORDER — ATROPINE SULFATE 1 %
2 DROPS OPHTHALMIC (EYE) EVERY 4 HOURS
Qty: 0 | Refills: 0 | Status: DISCONTINUED | OUTPATIENT
Start: 2018-09-05 | End: 2018-09-05

## 2018-09-05 RX ORDER — ROBINUL 0.2 MG/ML
0.2 INJECTION INTRAMUSCULAR; INTRAVENOUS ONCE
Qty: 0 | Refills: 0 | Status: COMPLETED | OUTPATIENT
Start: 2018-09-05 | End: 2018-09-05

## 2018-09-05 RX ORDER — HYDROMORPHONE HYDROCHLORIDE 2 MG/ML
0.5 INJECTION INTRAMUSCULAR; INTRAVENOUS; SUBCUTANEOUS EVERY 4 HOURS
Qty: 0 | Refills: 0 | Status: DISCONTINUED | OUTPATIENT
Start: 2018-09-05 | End: 2018-09-05

## 2018-09-05 RX ORDER — HYDROMORPHONE HYDROCHLORIDE 2 MG/ML
1 INJECTION INTRAMUSCULAR; INTRAVENOUS; SUBCUTANEOUS
Qty: 0 | Refills: 0 | Status: DISCONTINUED | OUTPATIENT
Start: 2018-09-05 | End: 2018-09-05

## 2018-09-05 RX ORDER — HYDROMORPHONE HYDROCHLORIDE 2 MG/ML
0.5 INJECTION INTRAMUSCULAR; INTRAVENOUS; SUBCUTANEOUS
Qty: 0 | Refills: 0 | Status: DISCONTINUED | OUTPATIENT
Start: 2018-09-05 | End: 2018-09-05

## 2018-09-05 RX ORDER — SCOPALAMINE 1 MG/3D
1 PATCH, EXTENDED RELEASE TRANSDERMAL ONCE
Qty: 0 | Refills: 0 | Status: COMPLETED | OUTPATIENT
Start: 2018-09-05 | End: 2018-09-05

## 2018-09-05 RX ORDER — HYDROMORPHONE HYDROCHLORIDE 2 MG/ML
0.5 INJECTION INTRAMUSCULAR; INTRAVENOUS; SUBCUTANEOUS ONCE
Qty: 0 | Refills: 0 | Status: DISCONTINUED | OUTPATIENT
Start: 2018-09-05 | End: 2018-09-05

## 2018-09-05 RX ORDER — HALOPERIDOL DECANOATE 100 MG/ML
1 INJECTION INTRAMUSCULAR
Qty: 0 | Refills: 0 | Status: DISCONTINUED | OUTPATIENT
Start: 2018-09-05 | End: 2018-09-05

## 2018-09-05 RX ADMIN — Medication 650 MILLIGRAM(S): at 06:20

## 2018-09-05 RX ADMIN — Medication 125 MILLIGRAM(S): at 10:23

## 2018-09-05 RX ADMIN — HYDROMORPHONE HYDROCHLORIDE 0.5 MILLIGRAM(S): 2 INJECTION INTRAMUSCULAR; INTRAVENOUS; SUBCUTANEOUS at 13:58

## 2018-09-05 RX ADMIN — Medication 3 MILLILITER(S): at 00:46

## 2018-09-05 RX ADMIN — HYDROMORPHONE HYDROCHLORIDE 0.5 MILLIGRAM(S): 2 INJECTION INTRAMUSCULAR; INTRAVENOUS; SUBCUTANEOUS at 19:05

## 2018-09-05 RX ADMIN — Medication 3 MILLILITER(S): at 18:25

## 2018-09-05 RX ADMIN — HYDROMORPHONE HYDROCHLORIDE 0.5 MILLIGRAM(S): 2 INJECTION INTRAMUSCULAR; INTRAVENOUS; SUBCUTANEOUS at 10:23

## 2018-09-05 RX ADMIN — AMIODARONE HYDROCHLORIDE 400 MILLIGRAM(S): 400 TABLET ORAL at 13:40

## 2018-09-05 RX ADMIN — HYDROMORPHONE HYDROCHLORIDE 0.5 MILLIGRAM(S): 2 INJECTION INTRAMUSCULAR; INTRAVENOUS; SUBCUTANEOUS at 06:20

## 2018-09-05 RX ADMIN — CHLORHEXIDINE GLUCONATE 15 MILLILITER(S): 213 SOLUTION TOPICAL at 18:10

## 2018-09-05 RX ADMIN — HYDROMORPHONE HYDROCHLORIDE 1 MILLIGRAM(S): 2 INJECTION INTRAMUSCULAR; INTRAVENOUS; SUBCUTANEOUS at 19:21

## 2018-09-05 RX ADMIN — CHLORHEXIDINE GLUCONATE 15 MILLILITER(S): 213 SOLUTION TOPICAL at 06:33

## 2018-09-05 RX ADMIN — Medication 5 MILLIGRAM(S): at 06:21

## 2018-09-05 RX ADMIN — Medication 325 MILLIGRAM(S): at 12:21

## 2018-09-05 RX ADMIN — HYDROMORPHONE HYDROCHLORIDE 0.5 MILLIGRAM(S): 2 INJECTION INTRAMUSCULAR; INTRAVENOUS; SUBCUTANEOUS at 10:40

## 2018-09-05 RX ADMIN — SCOPALAMINE 1 PATCH: 1 PATCH, EXTENDED RELEASE TRANSDERMAL at 19:04

## 2018-09-05 RX ADMIN — LEVETIRACETAM 400 MILLIGRAM(S): 250 TABLET, FILM COATED ORAL at 00:00

## 2018-09-05 RX ADMIN — Medication 650 MILLIGRAM(S): at 06:33

## 2018-09-05 RX ADMIN — Medication 3 MILLILITER(S): at 07:05

## 2018-09-05 RX ADMIN — HEPARIN SODIUM 5000 UNIT(S): 5000 INJECTION INTRAVENOUS; SUBCUTANEOUS at 13:39

## 2018-09-05 RX ADMIN — PANTOPRAZOLE SODIUM 40 MILLIGRAM(S): 20 TABLET, DELAYED RELEASE ORAL at 12:22

## 2018-09-05 RX ADMIN — AMIODARONE HYDROCHLORIDE 400 MILLIGRAM(S): 400 TABLET ORAL at 06:33

## 2018-09-05 RX ADMIN — Medication 1 MILLIGRAM(S): at 12:21

## 2018-09-05 RX ADMIN — AMLODIPINE BESYLATE 10 MILLIGRAM(S): 2.5 TABLET ORAL at 06:22

## 2018-09-05 RX ADMIN — HYDROMORPHONE HYDROCHLORIDE 0.5 MILLIGRAM(S): 2 INJECTION INTRAMUSCULAR; INTRAVENOUS; SUBCUTANEOUS at 13:40

## 2018-09-05 RX ADMIN — ISOSORBIDE DINITRATE 30 MILLIGRAM(S): 5 TABLET ORAL at 13:39

## 2018-09-05 RX ADMIN — ROBINUL 0.2 MILLIGRAM(S): 0.2 INJECTION INTRAMUSCULAR; INTRAVENOUS at 19:04

## 2018-09-05 RX ADMIN — Medication 0.4 MILLIGRAM(S): at 06:22

## 2018-09-05 RX ADMIN — HYDROMORPHONE HYDROCHLORIDE 0.5 MILLIGRAM(S): 2 INJECTION INTRAMUSCULAR; INTRAVENOUS; SUBCUTANEOUS at 08:20

## 2018-09-05 RX ADMIN — HYDROMORPHONE HYDROCHLORIDE 1 MILLIGRAM(S): 2 INJECTION INTRAMUSCULAR; INTRAVENOUS; SUBCUTANEOUS at 19:27

## 2018-09-05 RX ADMIN — CHLORHEXIDINE GLUCONATE 1 APPLICATION(S): 213 SOLUTION TOPICAL at 06:23

## 2018-09-05 RX ADMIN — Medication 100 MILLIGRAM(S): at 06:22

## 2018-09-05 RX ADMIN — Medication 5 MILLIGRAM(S): at 06:48

## 2018-09-05 RX ADMIN — LEVETIRACETAM 400 MILLIGRAM(S): 250 TABLET, FILM COATED ORAL at 12:21

## 2018-09-05 RX ADMIN — Medication 1 APPLICATION(S): at 12:22

## 2018-09-05 RX ADMIN — Medication 100 MILLIGRAM(S): at 12:21

## 2018-09-05 RX ADMIN — HEPARIN SODIUM 5000 UNIT(S): 5000 INJECTION INTRAVENOUS; SUBCUTANEOUS at 06:21

## 2018-09-05 RX ADMIN — Medication 650 MILLIGRAM(S): at 12:21

## 2018-09-05 RX ADMIN — Medication 100 MILLIGRAM(S): at 13:40

## 2018-09-05 RX ADMIN — INSULIN HUMAN 2: 100 INJECTION, SOLUTION SUBCUTANEOUS at 06:33

## 2018-09-05 RX ADMIN — Medication 3 MILLILITER(S): at 11:07

## 2018-09-05 RX ADMIN — IRON SUCROSE 110 MILLIGRAM(S): 20 INJECTION, SOLUTION INTRAVENOUS at 18:10

## 2018-09-05 RX ADMIN — Medication 650 MILLIGRAM(S): at 18:10

## 2018-09-05 RX ADMIN — ISOSORBIDE DINITRATE 30 MILLIGRAM(S): 5 TABLET ORAL at 06:22

## 2018-09-05 NOTE — PROGRESS NOTE ADULT - PROBLEM SELECTOR PROBLEM 1
Anemia due to blood loss
CVA (cerebral vascular accident)
Lacunar infarction
Lacunar infarction
Respiratory failure requiring intubation
Respiratory failure requiring intubation
Vasculitis
Lacunar infarction
Lacunar infarction
CVA (cerebral vascular accident)

## 2018-09-05 NOTE — DISCHARGE NOTE FOR THE EXPIRED PATIENT - HOSPITAL COURSE
68 year old female with a past medical history of major depression and hypertension was transferred to MICU from 8U after having AMS and left sided weakness 2/2 CVA. Imaging was positive for right PATRICIA infarction, left centrum semiovale, left subcortical insular, medial right temporal horn. Imaging performed at that time also showed evidence of cerebral vasculitis, which likely contributed to her strokes and refractory hypertension. Patient was intubated for airway protect. Neurology the pt was unresponsive, only minimally responsive to pain in the lower extremities. Notably her BP was very difficult to control, SBP in  200s despite getting all medication likely 2/2 to both her vascultis and high dose steroids. On  a repeat CT head showed evidence of ischemia or infarction to the wilfred. Her neuro status failed to improve, eventually displaying decerebrate posturing. On  after a family meeting, her health care proxy decided to withdraw life sustaining treatment. On 7:31pm the patient  2/2 cardiopulmonary arrest 2/2 CVA. 68 year old female with a past medical history of major depression and hypertension was transferred to MICU from 8U after having AMS and left sided weakness 2/2 CVA. Imaging was positive for right PATRICIA infarction, left centrum semiovale, left subcortical insular, medial right temporal horn. Imaging performed at that time also showed evidence of cerebral vasculitis, which likely contributed to her strokes and refractory hypertension. Patient was intubated for airway protect. Neurology the pt was unresponsive, only minimally responsive to pain in the lower extremities. Notably her BP was very difficult to control, SBP in  200s despite getting all medication likely 2/2 to both her vascultis and high dose steroids. On  a repeat CT head showed evidence of ischemia or infarction to the wilfred. Her neuro status failed to improve, eventually displaying decerebrate posturing. On  after a family meeting, her health care proxy decided to withdraw life sustaining treatment. On 7:31pm the patient  2/2 cardiopulmonary arrest 2/2 CVA.  agree

## 2018-09-05 NOTE — PROGRESS NOTE ADULT - SUBJECTIVE AND OBJECTIVE BOX
ALCON OLIVAS             MRN-[id number]    CC: GOC/AD, Support     HPI:  69yo Female, PMHx HTN and Major Depressive Disorder, presented to the ED with recent history of self harm and depressed mood. Primary complaint was of progressive functional decline at home w/severe anhedonia, anorexia, poor sleep, suicidality (would cut herself and claims to have started cutting herself several months ago), often drinking alcohol at home to consol herself. She states that her depression began after retiring from her job at the [employer] 1.5yrs ago. Patient was admitted to [facility] for depressive episode - however, 1-2 hours after arriving onto the floor, a stroke code was called for L sided weakness and hypertension. NIHSS was 6 on initial assessment. On CTH - noted to have lacunar infarcts (basal ganglia and R/L thalami). On second assessment, NIHSS 1. Patient was brought back to the ED and re-worked up. EKG revealed profound bradycardia - Cardiology was consulted for HR 30s - asymptomatic. Neurosurgery also consulted for what appeared to be mild hydrocephalus on CT Scan.     SUBJECTIVE: Saw and evaluated Mrs Olivas at bedside. She remains intubated off sedation. Events over the last week noted. Plan for family meeting later today with the participation of the patient's family (cousin Nelida and niece Fanny) as well as the patient's HCP/friend Leyla. The patient continues to require total care for all ADLs and continues to require ventilatory support. Meeting to determine if plan of care will be changed to a more comfort guided approach or continue ventilatory support and eventual disposition to a facility.     ROS:  DYSPNEA: Castro 2, Mechanically vented  NAUS/VOM: Unable to assess	  SECRETIONS: Yes	  AGITATION: No  Pain (Y/N): RY pain scale: 4     -Provocation/Palliation: Unable to assess	  -Quality/Quantity: Unable to assess	  -Radiating: Unable to assess	  -Severity: Mild  -Timing/Frequency:  Unable to assess	  -Impact on ADLs: Unable to assess	    OTHER REVIEW OF SYSTEMS: UNABLE TO OBTAIN  due to medical condition    PEx:  T(C): 36.4 (09-05-18 @ 10:00), Max: 37.4 (09-04-18 @ 19:00)  HR: 62 (09-05-18 @ 10:00) (48 - 76)  BP: 181/86 (09-05-18 @ 10:00) (128/69 - 214/98)  RR: 17 (09-05-18 @ 10:00) (9 - 18)  SpO2: 99% (09-05-18 @ 10:00) (87% - 100%)  Wt(kg): 60.5    GENERAL: Awake, but not following commands and in NAD. Intubated not sedated.  HEENT: PERRL MOM Lip swelling w/ desquamation/dry blood  Facial droop ETT+ NGT+     	  NECK: Supple No masses  CVS: Bradycardic S1S2 No M/G/R          	  RESP: Mechanically vented Good air entry B/L anteriorly scattered rhonchi       	  GI: Soft ND BS decreased             	  : Incontinent No maradiaga           	  MUSC: Flaccid B/L UE. Withdraws from pain of lower extremities. No C/C/E       	  NEURO: Not following commands. B/L UE motor deficits   	  PSYCH: Calm         SKIN: Non jaundiced.         	   LYMPH: Normal    	     ALLERGIES: No Known Allergies    OPIATE NAÏVE (Y/N): Yes    MEDICATIONS: REVIEWED  MEDICATIONS  (STANDING):  acetaminophen    Suspension 650 milliGRAM(s) Oral every 6 hours  ALBUTerol/ipratropium for Nebulization 3 milliLiter(s) Nebulizer every 6 hours  amiodarone    Tablet 400 milliGRAM(s) Oral every 8 hours  amLODIPine   Tablet 10 milliGRAM(s) Oral daily  aspirin 325 milliGRAM(s) Oral daily  atorvastatin 20 milliGRAM(s) Oral at bedtime  BACItracin   Ointment 1 Application(s) Topical daily  chlorhexidine 0.12% Liquid 15 milliLiter(s) Swish and Spit two times a day  chlorhexidine 2% Cloths 1 Application(s) Topical daily  cloNIDine 0.4 milliGRAM(s) Oral every 8 hours  folic acid 1 milliGRAM(s) Oral daily  heparin  Injectable 5000 Unit(s) SubCutaneous every 8 hours  hydrALAZINE 100 milliGRAM(s) Oral every 8 hours  HYDROmorphone  Injectable 0.5 milliGRAM(s) IV Push every 4 hours  insulin glargine Injectable (LANTUS) 10 Unit(s) SubCutaneous at bedtime  insulin regular  human corrective regimen sliding scale   SubCutaneous every 6 hours  iron sucrose IVPB 200 milliGRAM(s) IV Intermittent every 24 hours  isosorbide   dinitrate Tablet (ISORDIL) 30 milliGRAM(s) Oral every 8 hours  levETIRAcetam  IVPB 500 milliGRAM(s) IV Intermittent every 12 hours  methylPREDNISolone sodium succinate Injectable 125 milliGRAM(s) IV Push two times a day  metoprolol tartrate Injectable 5 milliGRAM(s) IV Push every 6 hours  pantoprazole  Injectable 40 milliGRAM(s) IV Push daily  thiamine 100 milliGRAM(s) Oral daily    MEDICATIONS  (PRN):  dextrose 40% Gel 15 Gram(s) Oral once PRN Blood Glucose LESS THAN 70 milliGRAM(s)/deciliter  glucagon  Injectable 1 milliGRAM(s) IntraMuscular once PRN Glucose LESS THAN 70 milligrams/deciliter    LABS: REVIEWED                             7.9    10.0  )-----------( 109      ( 05 Sep 2018 05:34 )                  23.6   09-05    146<H>  |  115<H>  |  47<H>  ----------------------------<  162<H>  4.2   |  17<L>  |  1.46<H>    Ca    8.3<L>      05 Sep 2018 05:34  Phos  4.1     09-05  Mg     2.7     09-05    Albumin, Serum: 2.5 g/dL (09.02.18 @ 21:20)    POCT Blood Glucose.: 118 mg/dL (09.05.18 @ 11:05)    Type + Screen (09.04.18 @ 07:24)    ABO Interpretation: A    Rh Interpretation: Positive    Antibody Screen: Negative    Culture - Urine (08.28.18 @ 22:10)    -  Aztreonam: S 8    -  Ceftazidime: S <=1    -  Ciprofloxacin: S <=0.5    -  Gentamicin: S 2    -  Piperacillin/Tazobactam: S <=8    -  Tobramycin: S <=2    Specimen Source: Catheterized Catheterized    Culture Results:   >100,000 CFU/ml Pseudomonas aeruginosa    Organism Identification: Pseudomonas aeruginosa    Organism: Pseudomonas aeruginosa    Method Type: SHREE    IMAGING: REVIEWED    EXAM:  XR CHEST PORTABLE URGENT 1V                        PROCEDURE DATE:  09/03/2018    INTERPRETATION:  Chest x-ray  Indication: Rhonchi  A portable frontal view of the chest is compared to the prior study dated   9/2/2018. ET tube and gastric tube are unchanged. Normal heart size.   Slightly increased retrocardiac opacity could be due to atelectasis.   Right lung remains clear. Cannot exclude a small left pleural effusion.   No right pleural effusion. No pneumothorax.  IMPRESSION: Development of retrocardiac opacity in the left side could be   due to atelectasis. Recommend follow-up.    EXAM:  CT BRAIN                        PROCEDURE DATE:  08/29/2018    INTERPRETATION:  PROCEDURE: CT brain without intravenous contrast  INDICATION: CVA  TECHNIQUE: Multiple axial images were obtained at 5 mm intervals from the   skull base to the vertex. Sagittal and coronal reformatted images were   obtained from the axial data set. The images were reviewed in brain and   bone windows.  COMPARISON: CT's brain 8/26/2018 and 8/18/2018; MRI brain 8/20/2018  FINDINGS: The CT examination demonstrates new focal hypodensity within   the inferior aspect of the wilfred (series 70982 image 8 and series 97515   image 16). It is unclear whether this may represent a lacunar infarct or   artifact. There is poor visualization of the multiple well-circumscribed   hypodensities within the right corona radiata. There are punctate   hypodensities within the basal ganglia and thalamus bilaterally and right   posterior limb of the internal capsule and left corona radiata which were   present on the previous CT study. There are ill-defined areas of   hypodensity within the right paramedian frontal lobe correlating with the   patient's prior [place] stroke. There is no intracranial   hemorrhage. The ventricles remain stable in size.  The visualized paranasal sinuses are within normal limits. The mastoid   air cells are well aerated.  A nasal tube is present with increased fluid and debris layering within   the nasopharynx.  IMPRESSION: New hypodensity within the wilfred which may represent new   ischemia or artifact. The previously identified multiple ill-defined   areas of hypodensity within the right corona radiata are not well seen on   the current study. No intracranial hemorrhage.  Acute on subacute/chronic   ischemia would be better evaluated with a MRI with diffusion-weighted   images.     EXAM:  US DPLX LWR EXT VEINS COMPL BI                        PROCEDURE DATE:  08/31/2018    INTERPRETATION:    VENOUS DUPLEX DOPPLER OF BOTH LOWER EXTREMITIES dated 8/31/2018 12:51 PM  INDICATION: Fever. CVA.  TECHNIQUE: Duplex Doppler evaluation including gray-scale ultrasound   imaging, color flow Doppler imaging, and Doppler spectral analysis of the   veins of both lower extremities was performed.   COMPARISON: None  FINDINGS:  Thigh veins: The common femoral, femoral, popliteal, proximal greater   saphenous, and proximal deep femoral veins are patent and free of   thrombus bilaterally. The veins are normally compressible and have normal   phasic flow and augmentation response.  Calf veins: The paired peroneal and posterior tibial calf veins are   patent bilaterally.  IMPRESSION:  No deep vein thrombosis seen.    ECG  Ventricular Rate 54 BPM  Atrial Rate 54 BPM  P-R Interval 104 ms  QRS Duration 94 ms  Q-T Interval 458 ms  QTC Calculation(Bezet) 434 ms  P Axis 14 degrees  R Axis 15 degrees  T Axis 81 degrees  Diagnosis Line Sinus bradycardia with short AK  Left ventricular hypertrophy with ST-T abnormalities    ADVANCED DIRECTIVES:     FULL CODE        Decision maker: The patient does not demonstrate to have capacity for complex medical decision making given medical condition.  Legal surrogate: HCP form naming the patient's friend Leyla Lizama [phone] as the HCP agent placed in the paper chart. [Form incomplete and done the day prior to admission]  Alternate surrogate: Friend Steven Foote Esq. [phone]    PSYCHOSOCIAL-SPIRITUAL ASSESSMENT:       Reviewed       Care plan adjusted as above	    GOALS OF CARE DISCUSSION       Palliative care info/counseling provided	           Family meeting planned for later today.           See previous Palliative Medicine Note       Documentation of GOC: Full code, HCP.  	   REFERRALS	        Palliative Med        Unit SW/Case Mgmt       Patient/Family Support       Nutrition        Dietician       PT/OT       Hospice - TBD

## 2018-09-05 NOTE — PROGRESS NOTE ADULT - SUBJECTIVE AND OBJECTIVE BOX
Neurology Stroke Progress Note     INTERVAL HPI/OVERNIGHT EVENTS:  Patient seen and examined this morning. Eyes open. Slight movement of lower extremities without stimulus. Also with more reflexive movements to pain.     MEDICATIONS  (STANDING):  acetaminophen    Suspension 650 milliGRAM(s) Oral every 6 hours  ALBUTerol/ipratropium for Nebulization 3 milliLiter(s) Nebulizer every 6 hours  amiodarone    Tablet 400 milliGRAM(s) Oral every 8 hours  amLODIPine   Tablet 10 milliGRAM(s) Oral daily  aspirin 325 milliGRAM(s) Oral daily  atorvastatin 20 milliGRAM(s) Oral at bedtime  BACItracin   Ointment 1 Application(s) Topical daily  chlorhexidine 0.12% Liquid 15 milliLiter(s) Swish and Spit two times a day  chlorhexidine 2% Cloths 1 Application(s) Topical daily  cloNIDine 0.4 milliGRAM(s) Oral every 8 hours  dextrose 5%. 1000 milliLiter(s) (50 mL/Hr) IV Continuous <Continuous>  dextrose 50% Injectable 12.5 Gram(s) IV Push once  dextrose 50% Injectable 25 Gram(s) IV Push once  dextrose 50% Injectable 25 Gram(s) IV Push once  folic acid 1 milliGRAM(s) Oral daily  heparin  Injectable 5000 Unit(s) SubCutaneous every 8 hours  hydrALAZINE 100 milliGRAM(s) Oral every 8 hours  HYDROmorphone  Injectable 0.5 milliGRAM(s) IV Push every 4 hours  insulin glargine Injectable (LANTUS) 10 Unit(s) SubCutaneous at bedtime  insulin regular  human corrective regimen sliding scale   SubCutaneous every 6 hours  iron sucrose IVPB 200 milliGRAM(s) IV Intermittent every 24 hours  isosorbide   dinitrate Tablet (ISORDIL) 30 milliGRAM(s) Oral every 8 hours  levETIRAcetam  IVPB 500 milliGRAM(s) IV Intermittent every 12 hours  methylPREDNISolone sodium succinate Injectable 125 milliGRAM(s) IV Push two times a day  metoprolol tartrate Injectable 5 milliGRAM(s) IV Push every 6 hours  pantoprazole  Injectable 40 milliGRAM(s) IV Push daily  thiamine 100 milliGRAM(s) Oral daily    MEDICATIONS  (PRN):  dextrose 40% Gel 15 Gram(s) Oral once PRN Blood Glucose LESS THAN 70 milliGRAM(s)/deciliter  glucagon  Injectable 1 milliGRAM(s) IntraMuscular once PRN Glucose LESS THAN 70 milligrams/deciliter      Allergies    No Known Allergies    Intolerances        ROS: As per HPI, otherwise negative    Vital Signs Last 24 Hrs  T(C): 36.4 (05 Sep 2018 10:00), Max: 37.4 (04 Sep 2018 19:00)  T(F): 97.5 (05 Sep 2018 10:00), Max: 99.4 (04 Sep 2018 19:00)  HR: 62 (05 Sep 2018 10:00) (48 - 76)  BP: 181/86 (05 Sep 2018 10:00) (128/69 - 214/98)  BP(mean): 109 (05 Sep 2018 10:00) (78 - 164)  RR: 17 (05 Sep 2018 10:00) (9 - 18)  SpO2: 99% (05 Sep 2018 10:00) (87% - 100%)    Physical exam:  Gen: intubated. Eyes open  Mental status -   Cranial nerves - PERRL+ , left facial droop, Right gaze preference. Does not cross midline  Right corneal reflex intact. No response of left corneal reflex  Motor - some slight movement of both lower extremities without stimulus.   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 Febrile, worsening mental status with new brainstem stroke. Palliative on board, awaiting ethics and HCP with decision. Family meeting at 2:30 today    1. severe vasculitis with recurrent strokes, worsening neuro exam  -s/p solumedrol for 5 total days, now on 125 BID  -keep  -180  -continue aspirin, atorvastatin  -EEG with background slowing  -Repeat HCT on 8/29 with new brainstem stroke    2. Resp failure, fevers, UTI  -ID per MICU team  -palliative on board, likely need ethics involvement.  -Family and HCP Leyla to come in today for discussion of hospice vs trach/PEG. Per previous conversation with patient, patient stated that her friend Leyla knew her best, and she did not want her sister to make decisions for her   -Family meeting today at 2:30    -rest of care per MICU team    DVT prophylaxis   -Heparin SQ TID and SCDs Neurology Stroke Progress Note     INTERVAL HPI/OVERNIGHT EVENTS:  Patient seen and examined this morning. Eyes open. Slight movement of lower extremities without stimulus. Also with more reflexive movements to pain.     MEDICATIONS  (STANDING):  acetaminophen    Suspension 650 milliGRAM(s) Oral every 6 hours  ALBUTerol/ipratropium for Nebulization 3 milliLiter(s) Nebulizer every 6 hours  amiodarone    Tablet 400 milliGRAM(s) Oral every 8 hours  amLODIPine   Tablet 10 milliGRAM(s) Oral daily  aspirin 325 milliGRAM(s) Oral daily  atorvastatin 20 milliGRAM(s) Oral at bedtime  BACItracin   Ointment 1 Application(s) Topical daily  chlorhexidine 0.12% Liquid 15 milliLiter(s) Swish and Spit two times a day  chlorhexidine 2% Cloths 1 Application(s) Topical daily  cloNIDine 0.4 milliGRAM(s) Oral every 8 hours  dextrose 5%. 1000 milliLiter(s) (50 mL/Hr) IV Continuous <Continuous>  dextrose 50% Injectable 12.5 Gram(s) IV Push once  dextrose 50% Injectable 25 Gram(s) IV Push once  dextrose 50% Injectable 25 Gram(s) IV Push once  folic acid 1 milliGRAM(s) Oral daily  heparin  Injectable 5000 Unit(s) SubCutaneous every 8 hours  hydrALAZINE 100 milliGRAM(s) Oral every 8 hours  HYDROmorphone  Injectable 0.5 milliGRAM(s) IV Push every 4 hours  insulin glargine Injectable (LANTUS) 10 Unit(s) SubCutaneous at bedtime  insulin regular  human corrective regimen sliding scale   SubCutaneous every 6 hours  iron sucrose IVPB 200 milliGRAM(s) IV Intermittent every 24 hours  isosorbide   dinitrate Tablet (ISORDIL) 30 milliGRAM(s) Oral every 8 hours  levETIRAcetam  IVPB 500 milliGRAM(s) IV Intermittent every 12 hours  methylPREDNISolone sodium succinate Injectable 125 milliGRAM(s) IV Push two times a day  metoprolol tartrate Injectable 5 milliGRAM(s) IV Push every 6 hours  pantoprazole  Injectable 40 milliGRAM(s) IV Push daily  thiamine 100 milliGRAM(s) Oral daily    MEDICATIONS  (PRN):  dextrose 40% Gel 15 Gram(s) Oral once PRN Blood Glucose LESS THAN 70 milliGRAM(s)/deciliter  glucagon  Injectable 1 milliGRAM(s) IntraMuscular once PRN Glucose LESS THAN 70 milligrams/deciliter      Allergies    No Known Allergies    Intolerances        ROS: As per HPI, otherwise negative    Vital Signs Last 24 Hrs  T(C): 36.4 (05 Sep 2018 10:00), Max: 37.4 (04 Sep 2018 19:00)  T(F): 97.5 (05 Sep 2018 10:00), Max: 99.4 (04 Sep 2018 19:00)  HR: 62 (05 Sep 2018 10:00) (48 - 76)  BP: 181/86 (05 Sep 2018 10:00) (128/69 - 214/98)  BP(mean): 109 (05 Sep 2018 10:00) (78 - 164)  RR: 17 (05 Sep 2018 10:00) (9 - 18)  SpO2: 99% (05 Sep 2018 10:00) (87% - 100%)    Physical exam:  Gen: intubated. Eyes open  Mental status -   Cranial nerves - PERRL+ , left facial droop, Right gaze preference. Does not cross midline  Right corneal reflex intact. No response of left corneal reflex  Motor - some slight movement of both lower extremities without stimulus.   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 Febrile, worsening mental status with new brainstem stroke. Palliative on board, awaiting ethics and HCP with decision. Family meeting at 2:30 today    1. severe vasculitis with recurrent strokes, worsening neuro exam  -s/p solumedrol for 5 total days and Cytoxan, now on Solumedrol 125 BID  -keep  -180  -continue aspirin, atorvastatin  -EEG with background slowing  -Repeat HCT on 8/29 with new brainstem stroke in addition to all previous infarcts.    2. Resp failure, fevers, UTI  -ID per MICU team  -palliative on board, likely need ethics involvement.  -Family and HCP Leyla to come in today for discussion of hospice vs trach/PEG. Per previous conversation with patient, patient stated that her friend Leyla knew her best, and she did not want her sister to make decisions for her   -Family meeting today at 2:30    -rest of care per MICU team    DVT prophylaxis   -Heparin SQ TID and SCDs

## 2018-09-05 NOTE — PROGRESS NOTE ADULT - SUBJECTIVE AND OBJECTIVE BOX
INTERVAL HPI/OVERNIGHT EVENTS:    SUBJECTIVE: Patient seen and examined at bedside.     CONSTITUTIONAL: No weakness, fevers or chills  EYES/ENT: No visual changes;  No vertigo or throat pain   NECK: No pain or stiffness  RESPIRATORY: No cough, wheezing, hemoptysis; No shortness of breath  CARDIOVASCULAR: No chest pain or palpitations  GASTROINTESTINAL: No abdominal or epigastric pain. No nausea, vomiting, or hematemesis; No diarrhea or constipation. No melena or hematochezia.  GENITOURINARY: No dysuria, frequency or hematuria  NEUROLOGICAL: No numbness or weakness  SKIN: No itching, rashes    OBJECTIVE:    VITAL SIGNS:  ICU Vital Signs Last 24 Hrs  T(C): 36.6 (05 Sep 2018 06:02), Max: 37.4 (04 Sep 2018 19:00)  T(F): 97.9 (05 Sep 2018 06:02), Max: 99.4 (04 Sep 2018 19:00)  HR: 52 (05 Sep 2018 06:07) (50 - 76)  BP: 146/78 (05 Sep 2018 06:00) (113/65 - 214/98)  BP(mean): 115 (05 Sep 2018 06:00) (85 - 164)  ABP: 142/108 (05 Sep 2018 06:00) (112/66 - 198/106)  ABP(mean): 124 (05 Sep 2018 06:00) (82 - 142)  RR: 15 (05 Sep 2018 06:07) (9 - 18)  SpO2: 99% (05 Sep 2018 06:07) (87% - 100%)    Mode: AC/ CMV (Assist Control/ Continuous Mandatory Ventilation), RR (machine): 12, TV (machine): 360, FiO2: 50, PEEP: 5, ITime: 1, MAP: 5, PIP: 7    09-03 @ 07:01  -  09-04 @ 07:00  --------------------------------------------------------  IN: 1353.5 mL / OUT: 0 mL / NET: 1353.5 mL    09-04 @ 07:01  -  09-05 @ 06:29  --------------------------------------------------------  IN: 560 mL / OUT: 0 mL / NET: 560 mL      CAPILLARY BLOOD GLUCOSE      POCT Blood Glucose.: 163 mg/dL (05 Sep 2018 06:26)      PHYSICAL EXAM:    General: NAD  HEENT: NC/AT; PERRL, clear conjunctiva  Neck: supple  Respiratory: CTA b/l  Cardiovascular: +S1/S2; RRR  Abdomen: soft, NT/ND; +BS x4  Extremities: WWP, 2+ peripheral pulses b/l; no LE edema  Skin: normal color and turgor; no rash  Neurological:    MEDICATIONS:  MEDICATIONS  (STANDING):  acetaminophen    Suspension 650 milliGRAM(s) Oral every 6 hours  ALBUTerol/ipratropium for Nebulization 3 milliLiter(s) Nebulizer every 6 hours  amiodarone    Tablet 400 milliGRAM(s) Oral every 8 hours  amLODIPine   Tablet 10 milliGRAM(s) Oral daily  aspirin 325 milliGRAM(s) Oral daily  atorvastatin 20 milliGRAM(s) Oral at bedtime  BACItracin   Ointment 1 Application(s) Topical daily  chlorhexidine 0.12% Liquid 15 milliLiter(s) Swish and Spit two times a day  chlorhexidine 2% Cloths 1 Application(s) Topical daily  cloNIDine 0.4 milliGRAM(s) Oral every 8 hours  dextrose 5%. 1000 milliLiter(s) (50 mL/Hr) IV Continuous <Continuous>  dextrose 50% Injectable 12.5 Gram(s) IV Push once  dextrose 50% Injectable 25 Gram(s) IV Push once  dextrose 50% Injectable 25 Gram(s) IV Push once  dextrose 50% Injectable 25 Gram(s) IV Push once  folic acid 1 milliGRAM(s) Oral daily  heparin  Injectable 5000 Unit(s) SubCutaneous every 8 hours  hydrALAZINE 100 milliGRAM(s) Oral every 8 hours  HYDROmorphone  Injectable 0.5 milliGRAM(s) IV Push every 4 hours  insulin glargine Injectable (LANTUS) 10 Unit(s) SubCutaneous at bedtime  insulin regular  human corrective regimen sliding scale   SubCutaneous every 6 hours  iron sucrose IVPB 200 milliGRAM(s) IV Intermittent every 24 hours  isosorbide   dinitrate Tablet (ISORDIL) 30 milliGRAM(s) Oral every 8 hours  levETIRAcetam  IVPB 500 milliGRAM(s) IV Intermittent every 12 hours  methylPREDNISolone sodium succinate Injectable 125 milliGRAM(s) IV Push two times a day  metoprolol tartrate Injectable 5 milliGRAM(s) IV Push every 6 hours  pantoprazole  Injectable 40 milliGRAM(s) IV Push daily  thiamine 100 milliGRAM(s) Oral daily    MEDICATIONS  (PRN):  dextrose 40% Gel 15 Gram(s) Oral once PRN Blood Glucose LESS THAN 70 milliGRAM(s)/deciliter  glucagon  Injectable 1 milliGRAM(s) IntraMuscular once PRN Glucose LESS THAN 70 milligrams/deciliter      ALLERGIES:  Allergies    No Known Allergies    Intolerances        LABS:                        7.9    10.0  )-----------( 109      ( 05 Sep 2018 05:34 )             23.6     09-05    146<H>  |  115<H>  |  47<H>  ----------------------------<  162<H>  4.2   |  17<L>  |  1.46<H>    Ca    8.3<L>      05 Sep 2018 05:34  Phos  4.1     09-05  Mg     2.7     09-05            RADIOLOGY & ADDITIONAL TESTS: Reviewed. INTERVAL HPI/OVERNIGHT EVENTS: no acute events overnight.     SUBJECTIVE: Patient seen and examined at bedside. Zosyn for asp pna finished yesterday. ROS unable to be completed d/t mentation.      OBJECTIVE:    VITAL SIGNS:  ICU Vital Signs Last 24 Hrs  T(C): 36.6 (05 Sep 2018 06:02), Max: 37.4 (04 Sep 2018 19:00)  T(F): 97.9 (05 Sep 2018 06:02), Max: 99.4 (04 Sep 2018 19:00)  HR: 52 (05 Sep 2018 06:07) (50 - 76)  BP: 146/78 (05 Sep 2018 06:00) (113/65 - 214/98)  BP(mean): 115 (05 Sep 2018 06:00) (85 - 164)  ABP: 142/108 (05 Sep 2018 06:00) (112/66 - 198/106)  ABP(mean): 124 (05 Sep 2018 06:00) (82 - 142)  RR: 15 (05 Sep 2018 06:07) (9 - 18)  SpO2: 99% (05 Sep 2018 06:07) (87% - 100%)    Mode: AC/ CMV (Assist Control/ Continuous Mandatory Ventilation), RR (machine): 12, TV (machine): 360, FiO2: 50, PEEP: 5, ITime: 1, MAP: 5, PIP: 7    09-03 @ 07:01 - 09-04 @ 07:00  --------------------------------------------------------  IN: 1353.5 mL / OUT: 0 mL / NET: 1353.5 mL    09-04 @ 07:01  -  09-05 @ 06:29  --------------------------------------------------------  IN: 560 mL / OUT: 0 mL / NET: 560 mL      CAPILLARY BLOOD GLUCOSE      POCT Blood Glucose.: 163 mg/dL (05 Sep 2018 06:26)      PHYSICAL EXAM:    General: intubated, nonresponsive  HEENT: NC/AT; PERRL, clear conjunctiva  Neck: supple  Respiratory: rhonchi b/l  Cardiovascular: systolic murmur noted, RRR  Abdomen: soft, BSx4, mildly distended.  Extremities: WWP, 2+ peripheral pulses b/l; no LE edema  Skin: normal color and turgor; no rash  Neurological: A&Ox0 pt unresponsive to noxious stimuli. rightward gaze preference      MEDICATIONS:  MEDICATIONS  (STANDING):  acetaminophen    Suspension 650 milliGRAM(s) Oral every 6 hours  ALBUTerol/ipratropium for Nebulization 3 milliLiter(s) Nebulizer every 6 hours  amiodarone    Tablet 400 milliGRAM(s) Oral every 8 hours  amLODIPine   Tablet 10 milliGRAM(s) Oral daily  aspirin 325 milliGRAM(s) Oral daily  atorvastatin 20 milliGRAM(s) Oral at bedtime  BACItracin   Ointment 1 Application(s) Topical daily  chlorhexidine 0.12% Liquid 15 milliLiter(s) Swish and Spit two times a day  chlorhexidine 2% Cloths 1 Application(s) Topical daily  cloNIDine 0.4 milliGRAM(s) Oral every 8 hours  dextrose 5%. 1000 milliLiter(s) (50 mL/Hr) IV Continuous <Continuous>  dextrose 50% Injectable 12.5 Gram(s) IV Push once  dextrose 50% Injectable 25 Gram(s) IV Push once  dextrose 50% Injectable 25 Gram(s) IV Push once  dextrose 50% Injectable 25 Gram(s) IV Push once  folic acid 1 milliGRAM(s) Oral daily  heparin  Injectable 5000 Unit(s) SubCutaneous every 8 hours  hydrALAZINE 100 milliGRAM(s) Oral every 8 hours  HYDROmorphone  Injectable 0.5 milliGRAM(s) IV Push every 4 hours  insulin glargine Injectable (LANTUS) 10 Unit(s) SubCutaneous at bedtime  insulin regular  human corrective regimen sliding scale   SubCutaneous every 6 hours  iron sucrose IVPB 200 milliGRAM(s) IV Intermittent every 24 hours  isosorbide   dinitrate Tablet (ISORDIL) 30 milliGRAM(s) Oral every 8 hours  levETIRAcetam  IVPB 500 milliGRAM(s) IV Intermittent every 12 hours  methylPREDNISolone sodium succinate Injectable 125 milliGRAM(s) IV Push two times a day  metoprolol tartrate Injectable 5 milliGRAM(s) IV Push every 6 hours  pantoprazole  Injectable 40 milliGRAM(s) IV Push daily  thiamine 100 milliGRAM(s) Oral daily    MEDICATIONS  (PRN):  dextrose 40% Gel 15 Gram(s) Oral once PRN Blood Glucose LESS THAN 70 milliGRAM(s)/deciliter  glucagon  Injectable 1 milliGRAM(s) IntraMuscular once PRN Glucose LESS THAN 70 milligrams/deciliter      ALLERGIES:  Allergies    No Known Allergies    Intolerances        LABS:                        7.9    10.0  )-----------( 109      ( 05 Sep 2018 05:34 )             23.6     09-05    146<H>  |  115<H>  |  47<H>  ----------------------------<  162<H>  4.2   |  17<L>  |  1.46<H>    Ca    8.3<L>      05 Sep 2018 05:34  Phos  4.1     09-05  Mg     2.7     09-05            RADIOLOGY & ADDITIONAL TESTS: Reviewed.

## 2018-09-05 NOTE — PROGRESS NOTE ADULT - PROBLEM SELECTOR PLAN 3
The patient self admitted herself to 8U after being found at home she could not care for herself due to recurrence of MDD. Psychiatry recommending integrative therapies / modalities eg. music therapy, pet therapy, massage therapy, etc.  Management as per ICU and psychiatry.

## 2018-09-05 NOTE — PROGRESS NOTE ADULT - SUBJECTIVE AND OBJECTIVE BOX
PALLIATIVE MEDICINE FAMILY MEETING NOTE    Follow up same day prolonged service visit. A face to face encounter was performed on [facility]  Active issue: GOC/AD, Support  Start time: 2:50pm  End time: 3:55pm  Time spent: + 65min  Total time spent: 100min  Management: Had a family meeting with the patient's HCP/friend Leyla, the patients cousin Nelida, and the patient's niece Fanny and her . We discussed and updated the friends and family on the ICU care since admission as well as the specialist care she has been receiving while at [facility]. The MICU fellow Dr Samayoa was present during the family meeting and discussed their current plan of care. Also present was Dr Sapp neurologist attending, Lukasz Chambers palliative SW, and myself Dr Fidel Waldron palliative attending. The patients family upon understanding that the current state of affairs would not be consistent with the patients wishes, has decided to pursue a more comfort guided plan of care including but not limited to liberating from the ventilator via a palliative extubation with the expectation that the patient would receive aggressive symptom management until her passing. If the patient has another cardiac arrest they have agreed not to pursue any further heroic measures and would like a natural and peaceful death. They agreed to pursue the extubation after the ICU team is able to do their afternoon rounds. I placed the DNR DNI order in the computer and would recommend the following in the setting of palliative extubation:    Recommend that patient remains in ICU post extubation for 1 night to determine symptomatic needs; Then transfer to  or [facility] for ongoing end of life care.  In anticipation of a palliative extubation consider the following regimen:       Pre extubation:   -Glycopyrrolate 0.2mg IVP 30-60min prior to start of extubation  -Scopolamine patch to be placed once decision is made to move forward with extubation.  -Dilaudid 0.2-0.5mg IV 30 min prior to extubation if RR>20-30       Post extubation:  -Dilaudid 0.5mg IV q30min PRN Dyspnea/Air hunger/RR>25/Increased work of breathing  -Dilaudid 1mg IV q30min PRN RR>35/Excessive work of breathing/Resp distress  If >3 PRN in initial 2hours then start Dilaudid drip at 0.5mg/hr with continued PRN's  -Ativan 1mg IV q30min PRN Anxiety  -Haldol 1mg IV q1h PRN Terminal Agitation  -Atropine 1% solution 2 sublingual drops [id number] PRN excessive secretions  Consider comfort measures:  Nasal canula at 2LPM  Vital signs once per 8hour shift consisting of Temperature, pulse, and respiratory rate  No MEWS   No blood work or further imaging testing.    Advance care planning meeting  Start time: 3:39pm  End time: 3:55pm  Total time: 16min  A face to face meeting to discuss advance care planning was held today regarding: ALCON [facility]E  Primary decision maker: The patients HCP/friend Leyla Lizama  Alternate/surrogate: The patients cousin Nelida, and the patient's niece Fanny and her   Discussed advance directives including, but not limited to, healthcare proxy and code status.  Decision regarding code status: DNR DNI No heroic measures or escalation of care. Proceed with palliative extubation.   Documentation completed today: In hospital DNR

## 2018-09-05 NOTE — PROGRESS NOTE ADULT - PROBLEM SELECTOR PLAN 6
Support provided to patient and family/friends. Patient to have access to supportive services during rest of hospital stay as the patient/family deemed necessary ie. Chaplaincy, Massage therapy, Music therapy, Patient and family supportive services, Palliative SW, etc.    As discussed during the palliative IDT meeting and as identified during the patients PSSA screening the patient would benefit from music therapy, massage therapy, pet therapy, chaplaincy.

## 2018-09-05 NOTE — PROGRESS NOTE ADULT - ASSESSMENT
69yo Female, PMHx HTN and Major Depressive Disorder, presented to the ED with recent history of self harm and depressed mood. Consult after patient had repeated episodes of strokes and respiratory failure currently on vent support. Consult for [facility]

## 2018-09-05 NOTE — PROGRESS NOTE ADULT - ASSESSMENT
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 Patient with residual left sided deficits with new lethargy, CTH showing new infarcts.  - c/w  rectally while NPO. Atorvastatin 80 when able   - c/w Keppra, 500mg PO BID  - solumedrol 125mg bid.  - scheduled tylenol  - repeat CT head showed hypodensity in [place], possible new infarct vs ischemia   - f/u palliative recs.   - Pt is full code for now, HCP planning to come in tomorrow with pt's family.     Cardiac  # Post-cardiac arrest  - pulseless v-fib 9/3/18, code called  - s/p two rounds of epi, 1g Mg, 100mg Amio. S/p amio gtt. ROSC achieved  - cont amio PO 400mg q8hrs. Avoid QT prolonging agents  - dilaudid 0.5mg q4hrs for pain. Possible broken ribs 2/2 compressions.  - elevated troponin likely 2/2 demand ischemia vs ACS  - c/w statin    #Hypertensive emergency/urgency - Episodes of vomiting noted from possible hypertensive encephelopathy. CTH with no ICH, edema, or midline shift, though showing evolving infarcts.    - d/c Nicardipine drip   - SBP between 140-160 per Neuro  - will d/c [place] due to inacurate SBP read    Pulmonary  #Concern for aspiration   - Patient with vomiting and AMS in setting of likely hypertensive encephalopathy, CXR without infiltrate  - intubated on CPAP. Saturating appropriately.   - will downgrade abx to levoquin from zosyn d/t sensitivities.     Renal  #MARYJANE vs CKD - Possibly 2/2 vasculitis vs CKD 2/2 hypertensive nephropathy - Patient presented with MARYJANE of 1.45; unknown baseline. Renal ultrasound showed slightly diminished left renal size with smooth renal cortical mantle thinning which may suggest underlying nephrosclerosis. Renal duplex performed 8/24, showing <60% stenosis of the right renal artery at its origin. Urine lytes revealing FeNa - 1.4%, indicating intrinsic renal disease.   - Avoiding nephrotoxic agents, Trend Cr. Cr today improved at 1.26  - Na 150, Cl 121. Na goals 140-150 to decrease cerebral edema. Will increase free water feeds to 250cc q4hr to increase intravascular volume    GI  #Vomiting - patient with vomiting at onset of hypertension and alteration of mental status, possible hypertensive encephalopathy  - c/w HTN treatment as above  - Zofran 4mg IV PRN. Keep NPO  - GI consult for PEG placement.     Endo  # hyperglycemia  - likely 2/2 high dose steroids  - sugars trending into normal and hypoglycemic ranges. Will hold scheduled insulin for now and keep RISS. can add back lantus 10U daily if needed.    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 Patient with residual left sided deficits with new lethargy, CTH showing new infarcts.  - c/w  rectally while NPO. Atorvastatin 80 when able   - c/w Keppra, 500mg PO BID  - solumedrol 125mg bid.  - scheduled tylenol  - repeat CT head showed hypodensity in [place], possible new infarct vs ischemia   - f/u palliative recs.   - Pt is full code for now, HCP planning to come in today at 2:30pm with pt's family.     Cardiac  # Post-cardiac arrest  - pulseless v-fib 9/3/18, code called  - s/p two rounds of epi, 1g Mg, 100mg Amio. S/p amio gtt. ROSC achieved  - cont amio PO 400mg q8hrs. Avoid QT prolonging agents  - dilaudid 0.5mg q4hrs for pain. Possible broken ribs 2/2 compressions.  - elevated troponin likely 2/2 demand ischemia vs ACS  - c/w statin    #Hypertensive emergency/urgency - Episodes of vomiting noted from possible hypertensive encephelopathy. CTH with no ICH, edema, or midline shift, though showing evolving infarcts.    - d/c Nicardipine drip   - SBP between 140-160 per Neuro  - will d/c [place] due to inacurate SBP read    Pulmonary  #Concern for aspiration   - Patient with vomiting and AMS in setting of likely hypertensive encephalopathy, CXR without infiltrate  - intubated on CPAP. Saturating appropriately.   - will downgrade abx to levoquin from zosyn d/t sensitivities.     Renal  #MARYJANE vs CKD - Possibly 2/2 vasculitis vs CKD 2/2 hypertensive nephropathy - Patient presented with MARYJANE of 1.45; unknown baseline. Renal ultrasound showed slightly diminished left renal size with smooth renal cortical mantle thinning which may suggest underlying nephrosclerosis. Renal duplex performed 8/24, showing <60% stenosis of the right renal artery at its origin. Urine lytes revealing FeNa - 1.4%, indicating intrinsic renal disease.   - Avoiding nephrotoxic agents, Trend Cr. Cr today improved at 1.26  - Na 150, Cl 121. Na goals 140-150 to decrease cerebral edema. Will increase free water feeds to 250cc q4hr to increase intravascular volume    GI  #Vomiting - patient with vomiting at onset of hypertension and alteration of mental status, possible hypertensive encephalopathy  - c/w HTN treatment as above  - Zofran 4mg IV PRN. Keep NPO  - GI consult for PEG placement.     Endo  # hyperglycemia  - likely 2/2 high dose steroids  - sugars trending into normal and hypoglycemic ranges. Will hold scheduled insulin for now and keep RISS. can add back lantus 10U daily if needed.    F: none   E: replete as needed  N: GI consult for PEG placement  HSQ, SCDs, PPI IV  MVT, Thiamine, Folic Acid  dispo: MICU

## 2018-09-05 NOTE — PROGRESS NOTE ADULT - REASON FOR ADMISSION
Depression, Stroke Code Called in Unit

## 2018-09-05 NOTE — PROGRESS NOTE ADULT - PROBLEM SELECTOR PLAN 1
Intubated. Pending GOC discussion later today with family and HCP whom are expected to be at the bedside ~2:30pm.  Management per MICU

## 2018-09-05 NOTE — PROGRESS NOTE ADULT - PROBLEM SELECTOR PROBLEM 6
Nutrition, metabolism, and development symptoms
Palliative care by specialist
Palliative care by specialist
Nutrition, metabolism, and development symptoms

## 2018-09-05 NOTE — PROGRESS NOTE ADULT - ATTENDING COMMENTS
Patient seen and examined with PA.  Agree with above.  Patient relatively worse than previously.  She has lost some brainstem reflexes including left corneal reflex, oculocephalic reflex.  Overall poor prognosis.  Family meeting planned for this afternoon.

## 2018-09-05 NOTE — PROGRESS NOTE ADULT - NSHPATTENDINGPLANDISCUSS_GEN_ALL_CORE
7 [place] team
HS, Neuro
Dr. Allen
Dr. Youngblood and MICU team
HS
HS
friend who has power of 
HO
HS
HS, Neuro
as above
HS
as above
housestaff
7 [place] team
7 [facility] team
7 [place] team

## 2018-09-05 NOTE — PROGRESS NOTE ADULT - ATTENDING COMMENTS
Patient seen and examined with house-staff during bedside rounds.  Resident note read, including vitals, physical findings, laboratory data, and radiological reports.   Revisions included below.  Direct personal management at bed side and extensive interpretation of the data.  Plan was outlined and discussed in details with the housestaff.  Decision making of high complexity  Action taken for acute disease activity to reflect the level of care provided:  - medication reconciliation  - review laboratory data  The fellow had a discussion with the family regarding goals of care. Patient is for terminal extubation

## 2018-09-05 NOTE — PROGRESS NOTE ADULT - PROBLEM SELECTOR PLAN 4
HCP form naming Leyla Lizama as her agent completed the day before admission. Form missing a second witness.

## 2018-09-05 NOTE — PROGRESS NOTE ADULT - PROBLEM SELECTOR PLAN 2
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 Has had acute- subacute CVA. Pending conversations with family and HCP regarding further care.  Management per MICU and Neurology

## 2018-09-11 DIAGNOSIS — I16.1 HYPERTENSIVE EMERGENCY: ICD-10-CM

## 2018-09-11 DIAGNOSIS — G81.94 HEMIPLEGIA, UNSPECIFIED AFFECTING LEFT NONDOMINANT SIDE: ICD-10-CM

## 2018-09-11 DIAGNOSIS — I63.9 CEREBRAL INFARCTION, UNSPECIFIED: ICD-10-CM

## 2018-09-11 DIAGNOSIS — Z91.14 PATIENT'S OTHER NONCOMPLIANCE WITH MEDICATION REGIMEN: ICD-10-CM

## 2018-09-11 DIAGNOSIS — Z78.1 PHYSICAL RESTRAINT STATUS: ICD-10-CM

## 2018-09-11 DIAGNOSIS — I67.7 CEREBRAL ARTERITIS, NOT ELSEWHERE CLASSIFIED: ICD-10-CM

## 2018-09-11 DIAGNOSIS — G91.9 HYDROCEPHALUS, UNSPECIFIED: ICD-10-CM

## 2018-09-11 DIAGNOSIS — I12.9 HYPERTENSIVE CHRONIC KIDNEY DISEASE WITH STAGE 1 THROUGH STAGE 4 CHRONIC KIDNEY DISEASE, OR UNSPECIFIED CHRONIC KIDNEY DISEASE: ICD-10-CM

## 2018-09-11 DIAGNOSIS — R00.1 BRADYCARDIA, UNSPECIFIED: ICD-10-CM

## 2018-09-11 DIAGNOSIS — F33.2 MAJOR DEPRESSIVE DISORDER, RECURRENT SEVERE WITHOUT PSYCHOTIC FEATURES: ICD-10-CM

## 2018-09-11 DIAGNOSIS — I65.23 OCCLUSION AND STENOSIS OF BILATERAL CAROTID ARTERIES: ICD-10-CM

## 2018-09-11 DIAGNOSIS — N18.3 CHRONIC KIDNEY DISEASE, STAGE 3 (MODERATE): ICD-10-CM

## 2018-09-11 DIAGNOSIS — N17.9 ACUTE KIDNEY FAILURE, UNSPECIFIED: ICD-10-CM

## 2018-09-11 DIAGNOSIS — I46.9 CARDIAC ARREST, CAUSE UNSPECIFIED: ICD-10-CM

## 2018-09-11 DIAGNOSIS — D50.0 IRON DEFICIENCY ANEMIA SECONDARY TO BLOOD LOSS (CHRONIC): ICD-10-CM

## 2018-09-11 DIAGNOSIS — Z51.5 ENCOUNTER FOR PALLIATIVE CARE: ICD-10-CM

## 2018-09-11 DIAGNOSIS — R13.10 DYSPHAGIA, UNSPECIFIED: ICD-10-CM

## 2018-09-11 DIAGNOSIS — G46.3 BRAIN STEM STROKE SYNDROME: ICD-10-CM

## 2018-10-24 PROCEDURE — 84300 ASSAY OF URINE SODIUM: CPT

## 2018-10-24 PROCEDURE — 99291 CRITICAL CARE FIRST HOUR: CPT | Mod: 25

## 2018-10-24 PROCEDURE — 93312 ECHO TRANSESOPHAGEAL: CPT

## 2018-10-24 PROCEDURE — 86901 BLOOD TYPING SEROLOGIC RH(D): CPT

## 2018-10-24 PROCEDURE — 36415 COLL VENOUS BLD VENIPUNCTURE: CPT

## 2018-10-24 PROCEDURE — 85300 ANTITHROMBIN III ACTIVITY: CPT

## 2018-10-24 PROCEDURE — 93005 ELECTROCARDIOGRAM TRACING: CPT

## 2018-10-24 PROCEDURE — 70551 MRI BRAIN STEM W/O DYE: CPT

## 2018-10-24 PROCEDURE — 85598 HEXAGNAL PHOSPH PLTLT NEUTRL: CPT

## 2018-10-24 PROCEDURE — C1894: CPT

## 2018-10-24 PROCEDURE — 82570 ASSAY OF URINE CREATININE: CPT

## 2018-10-24 PROCEDURE — 82306 VITAMIN D 25 HYDROXY: CPT

## 2018-10-24 PROCEDURE — 87070 CULTURE OTHR SPECIMN AEROBIC: CPT

## 2018-10-24 PROCEDURE — 85025 COMPLETE CBC W/AUTO DIFF WBC: CPT

## 2018-10-24 PROCEDURE — 86140 C-REACTIVE PROTEIN: CPT

## 2018-10-24 PROCEDURE — 86160 COMPLEMENT ANTIGEN: CPT

## 2018-10-24 PROCEDURE — 85303 CLOT INHIBIT PROT C ACTIVITY: CPT

## 2018-10-24 PROCEDURE — 92610 EVALUATE SWALLOWING FUNCTION: CPT | Mod: GN

## 2018-10-24 PROCEDURE — 80307 DRUG TEST PRSMV CHEM ANLYZR: CPT

## 2018-10-24 PROCEDURE — C1725: CPT

## 2018-10-24 PROCEDURE — 82962 GLUCOSE BLOOD TEST: CPT

## 2018-10-24 PROCEDURE — 70498 CT ANGIOGRAPHY NECK: CPT

## 2018-10-24 PROCEDURE — 83605 ASSAY OF LACTIC ACID: CPT

## 2018-10-24 PROCEDURE — 82784 ASSAY IGA/IGD/IGG/IGM EACH: CPT

## 2018-10-24 PROCEDURE — 86038 ANTINUCLEAR ANTIBODIES: CPT

## 2018-10-24 PROCEDURE — 97110 THERAPEUTIC EXERCISES: CPT

## 2018-10-24 PROCEDURE — 80053 COMPREHEN METABOLIC PANEL: CPT

## 2018-10-24 PROCEDURE — 82088 ASSAY OF ALDOSTERONE: CPT

## 2018-10-24 PROCEDURE — 92526 ORAL FUNCTION THERAPY: CPT | Mod: GN

## 2018-10-24 PROCEDURE — 93970 EXTREMITY STUDY: CPT

## 2018-10-24 PROCEDURE — 86146 BETA-2 GLYCOPROTEIN ANTIBODY: CPT

## 2018-10-24 PROCEDURE — 95951: CPT

## 2018-10-24 PROCEDURE — 36430 TRANSFUSION BLD/BLD COMPNT: CPT

## 2018-10-24 PROCEDURE — 94003 VENT MGMT INPAT SUBQ DAY: CPT

## 2018-10-24 PROCEDURE — 82595 ASSAY OF CRYOGLOBULIN: CPT

## 2018-10-24 PROCEDURE — 81001 URINALYSIS AUTO W/SCOPE: CPT

## 2018-10-24 PROCEDURE — 83036 HEMOGLOBIN GLYCOSYLATED A1C: CPT

## 2018-10-24 PROCEDURE — 83090 ASSAY OF HOMOCYSTEINE: CPT

## 2018-10-24 PROCEDURE — 82550 ASSAY OF CK (CPK): CPT

## 2018-10-24 PROCEDURE — 93308 TTE F-UP OR LMTD: CPT

## 2018-10-24 PROCEDURE — 93976 VASCULAR STUDY: CPT

## 2018-10-24 PROCEDURE — 85027 COMPLETE CBC AUTOMATED: CPT

## 2018-10-24 PROCEDURE — 86850 RBC ANTIBODY SCREEN: CPT

## 2018-10-24 PROCEDURE — 80074 ACUTE HEPATITIS PANEL: CPT

## 2018-10-24 PROCEDURE — 87389 HIV-1 AG W/HIV-1&-2 AB AG IA: CPT

## 2018-10-24 PROCEDURE — 76770 US EXAM ABDO BACK WALL COMP: CPT

## 2018-10-24 PROCEDURE — 86900 BLOOD TYPING SEROLOGIC ABO: CPT

## 2018-10-24 PROCEDURE — 84484 ASSAY OF TROPONIN QUANT: CPT

## 2018-10-24 PROCEDURE — 70450 CT HEAD/BRAIN W/O DYE: CPT

## 2018-10-24 PROCEDURE — 82607 VITAMIN B-12: CPT

## 2018-10-24 PROCEDURE — 85306 CLOT INHIBIT PROT S FREE: CPT

## 2018-10-24 PROCEDURE — 86235 NUCLEAR ANTIGEN ANTIBODY: CPT

## 2018-10-24 PROCEDURE — 86480 TB TEST CELL IMMUN MEASURE: CPT

## 2018-10-24 PROCEDURE — 70496 CT ANGIOGRAPHY HEAD: CPT

## 2018-10-24 PROCEDURE — 85613 RUSSELL VIPER VENOM DILUTED: CPT

## 2018-10-24 PROCEDURE — 94640 AIRWAY INHALATION TREATMENT: CPT

## 2018-10-24 PROCEDURE — 97162 PT EVAL MOD COMPLEX 30 MIN: CPT

## 2018-10-24 PROCEDURE — 82787 IGG 1 2 3 OR 4 EACH: CPT

## 2018-10-24 PROCEDURE — 0042T: CPT

## 2018-10-24 PROCEDURE — 94002 VENT MGMT INPAT INIT DAY: CPT

## 2018-10-24 PROCEDURE — 86923 COMPATIBILITY TEST ELECTRIC: CPT

## 2018-10-24 PROCEDURE — 86225 DNA ANTIBODY NATIVE: CPT

## 2018-10-24 PROCEDURE — 97530 THERAPEUTIC ACTIVITIES: CPT

## 2018-10-24 PROCEDURE — 80061 LIPID PANEL: CPT

## 2018-10-24 PROCEDURE — 82803 BLOOD GASES ANY COMBINATION: CPT

## 2018-10-24 PROCEDURE — 84540 ASSAY OF URINE/UREA-N: CPT

## 2018-10-24 PROCEDURE — 87186 SC STD MICRODIL/AGAR DIL: CPT

## 2018-10-24 PROCEDURE — 84443 ASSAY THYROID STIM HORMONE: CPT

## 2018-10-24 PROCEDURE — 83835 ASSAY OF METANEPHRINES: CPT

## 2018-10-24 PROCEDURE — 86780 TREPONEMA PALLIDUM: CPT

## 2018-10-24 PROCEDURE — 84100 ASSAY OF PHOSPHORUS: CPT

## 2018-10-24 PROCEDURE — 97161 PT EVAL LOW COMPLEX 20 MIN: CPT

## 2018-10-24 PROCEDURE — 85730 THROMBOPLASTIN TIME PARTIAL: CPT

## 2018-10-24 PROCEDURE — C1769: CPT

## 2018-10-24 PROCEDURE — 83735 ASSAY OF MAGNESIUM: CPT

## 2018-10-24 PROCEDURE — 83935 ASSAY OF URINE OSMOLALITY: CPT

## 2018-10-24 PROCEDURE — 99285 EMERGENCY DEPT VISIT HI MDM: CPT

## 2018-10-24 PROCEDURE — 71045 X-RAY EXAM CHEST 1 VIEW: CPT

## 2018-10-24 PROCEDURE — 80048 BASIC METABOLIC PNL TOTAL CA: CPT

## 2018-10-24 PROCEDURE — 82553 CREATINE MB FRACTION: CPT

## 2018-10-24 PROCEDURE — 86431 RHEUMATOID FACTOR QUANT: CPT

## 2018-10-24 PROCEDURE — P9016: CPT

## 2018-10-24 PROCEDURE — 85652 RBC SED RATE AUTOMATED: CPT

## 2018-10-24 PROCEDURE — 97116 GAIT TRAINING THERAPY: CPT

## 2018-10-24 PROCEDURE — 87086 URINE CULTURE/COLONY COUNT: CPT

## 2018-10-24 PROCEDURE — 93306 TTE W/DOPPLER COMPLETE: CPT

## 2018-10-24 PROCEDURE — 87040 BLOOD CULTURE FOR BACTERIA: CPT

## 2018-10-24 PROCEDURE — 80076 HEPATIC FUNCTION PANEL: CPT

## 2018-10-24 PROCEDURE — C1887: CPT

## 2018-10-24 PROCEDURE — 85307 ASSAY ACTIVATED PROTEIN C: CPT

## 2018-10-24 PROCEDURE — 82746 ASSAY OF FOLIC ACID SERUM: CPT

## 2018-10-24 PROCEDURE — 85610 PROTHROMBIN TIME: CPT

## 2018-11-20 NOTE — CONSULT NOTE ADULT - PROBLEM SELECTOR RECOMMENDATION 2
awaiting B12 level
Acute on subacute/chronic ischemia findings and multiple bilateral basal ganglia and right thalamus lacunar infarctions.   Management as per ICU and neurology.
Detail Level: Simple

## 2019-08-05 NOTE — ED ADULT NURSE NOTE - GROOMING
Current AAP recommendations are to keep children rear facing until AT LEAST 2 years of age and until they exceed the height/weight requirements of their carseat.  If he is nearing the height limitations of his current seat, they will need to get a convertible carseat that will allow him to stay rearfacing until he is 3.  Remind them that they can go to the fire department to have carseat installation/fit checked.     Fair

## 2019-11-16 NOTE — ED ADULT NURSE NOTE - NS ED BHA NUR THOUGHT PROCESS
Pt returned call in regards to recent lab result.     Pt is finished macrobid and symptoms have completely gone away.   Pt was advised to return to clinic for reevaluation if symptoms return.   
Normal/Making sense

## 2020-07-16 NOTE — ED ADULT NURSE REASSESSMENT NOTE - STATUS
"MEDICAL ONCOLOGY FOLLOW-UP NOTE    REFERRING PROVIDER: Jorge Alberto Yepez MD at [facility].    REASON FOR VISIT: Follow-up for metastatic hormone-sensitive prostate cancer, currently on ADT alone.    HISTORY OF PRESENT ILLNESS: Mr. 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 His oncologic history is detailed below.     -Had diverticulitis and was admitted for this. Still on antibiotics, half way done (cipro/flagyl)   -Continues to have diarrhea 1-2x/day. Very loose after starting the antibiotics. No abdominal pain. No pain to palpation. No N/V. On low fiber diet.   -No f/c  -No new pain  -Has urinary urgency and nocturia. Increasing over the last couple months  -No respiratory symptoms. No neuropathy, edema or bleeding    ONCOLOGIC HISTORY:  1. De deja metastatic prostate adenocarcinoma, stage IV (M1b at diagnosis), high-volume, castration-sensitive:  - 12/13/2018: PSA found to be elevated to 9.1 ng/mL on a routine follow-up with primary care provider Dr. Naylor at [facility]. Prior PSA were 1.4 on 8/16/17, 2.4 on 6/28/16, 2.9 on 6/28/16, and as low as 0.4 on 3/26/2003.   - 1/08/2019: Consultation with Sarah Wilson CNP in Urology clinic. Repeat PSA 9.6.  - 1/16/2019: MRI prostate with contrast - \"This examination is characterized as PIRADS 5- very high probability. Clinically significant cancer is highly likely to be present. There is a large, invasive mass arising from the right peripheral zone and extending into the neurovascular bundle, seminal vesicle, and along the anterolateral right mesorectal fascia. Metastatic right external iliac lymph node. Metastatic lesion in the posterior/superior right acetabulum.\"  - 1/25/2019: CT abdomen and pelvis with IV contrast - \"1. Heterogeneous enhancing mass posteriorly in the upper pole of the " awaiting bed, no change "right kidney measures 4.5 x 5.8 x 5.7 cm (AP by transverse by craniocaudal). It has a small nodular component extending posterior medially which abuts the right psoas muscle. This nodular extension measures 2.1 x 2.1 cm. Minimal stranding about the mass. No definite thrombus within the right renal vein. This renal mass is compatible with a renal cell carcinoma. A paraaortic lymph node situated immediately posterior to the left renal vein measures 1.1 cm in short axis, suspicious for metastasis. 2. A 2 cm right external iliac lymph node is also suspicious for metastases. 3. Multiple sclerotic osseous lesions suspicious for metastases. These include 0.8 and 0.6 cm sclerotic lesions laterally in the right iliac wing (images 53 and 62 respectively). Ill-defined groundglass density laterally in the right acetabulum measuring 1.7 x 1.2 cm corresponds with the lesion identified on MRI. A 0.4 cm groundglass density in the left acetabulum. Sclerotic lesion in the left femoral neck measures 0.9 x 1.6 cm (image 81). Sclerotic metastases would be more compatible with prostate metastases. 4. A 3 subcentimeter hepatic lesions are indeterminate. Metastases would be a consideration. These can be further characterized with liver MRI.\"  - 1/25/2019: NM bone scan - \"There is focal bony uptake in the left femoral neck, right acetabulum, right of midline at the S1 or L5 level of the spine, within multiple bilateral ribs, in the C7 or T1 level of the spine, and anteriorly within the skull. Findings are suspicious for metastases.\"  - 1/31/19: CT chest with contrast - \" No suspicious nodules in the chest.  Stable appearance of a 5.8 cm right renal mass concerning for renal carcinoma until proven otherwise.  Stable indeterminant subcentimeter hypodensities in the liver.  Multifocal osteoblastic metastasis including 2.5 cm lesion in the right fifth rib posteriorly and a 1.6 cm lesion in the right third rib posteriorly. No lytic lesions " "identified.\"  - 2/6/19: CT-guided right sclerotic fifth rib lesion biopsy - \"Metastatic carcinoma, consistent with prostate primary.  Immunohistochemical stains performed show the metastatic carcinoma stains positive for NKX3.1 (prostate marker), negative for STACIE 3 and PAX8, which supports the above diagnosis.\"  - 2/15/19: Started Casodex 50mg every day and consented for the biobanking protocol. 3/18/19 - stopped Casodex.  - 2/19/19: Case discussed in tumor board - recommendation for nephectomy for suspected malignant right renal mass.   - 2/26/19: Started Lupron 22.5mg every 3 months.   - 5/8/19: Started Docetaxel 75mg/m2 IV every 3 weeks. 06/18/19 - cycle 3, 7/10/19 - cycle 4, 07/31/19 - cycle 5, 08/21/19 - cycle 6.   - 10/2/19: Restaging CT CAP and NM Bone Scan showed improved osseous disease, no evidence of recurrent or new metastatic disease.  - 1/5/20: Restaging CT CAP and NM Bone Scan showed stable osseous disease, no evidence of recurrent or new metastatic disease.  - 4/6/20: NM bone scan with slightly improved uptake in known skeletal mets. CT C/A/P with contrast with stable osseous mets, no yusuf enlargement, no new visceral mets etc.  - 04/08/20: Zometa every 3 months.  - 7/10/20: NM bone scan with worsening lesion at L5. CT CAP without recurrent or new metastatic disease.      2. Stage III ([id number]), grade 3 of 4, clear-cell RCC of right kidney:  - Incidentally diagnosed as above.   - Underwent curative-intent robotic right radical nephrectomy with Dr. Wesley Cleveland on 3/19/19. No tumor spillage per op report.   - Path showed: \"Histologic Type: Clear cell renal cell carcinoma; Sarcomatoid Features: Not identified; Histologic Grade: Nucleolar grade 3 (WHO/ISUP). Extent Tumor Size: 4.3 cm. Microscopic Tumor Extension: Tumor extension into renal sinus (in vascular structures). Margins: Negative. Tumor Necrosis: Present; focal. Lymph-Vascular Invasion: Not identified.  Pathologic Staging (pTNM) Primary " "Tumor (pT): pT3a: Tumor extends into renal vein branches/renal sinus. Regional Lymph Nodes (pN): pNX. Number of Lymph Nodes Examined: 0 Distant Metastasis (pM): pM N/A.\"  - Restaging scans as above.    PAST MEDICAL HISTORY:  Past Medical History:   Diagnosis Date     Cancer of kidney, right (H)      Complication of anesthesia     slow wakeup      Malignant neoplasm of prostate metastatic to bone (H) 2/14/2019     Thyroid disease      PAST SURGICAL HISTORY:   Past Surgical History:   Procedure Laterality Date     CYSTOSCOPY      about 10 years ago     INSERT PORT VASCULAR ACCESS Right 5/2/2019    Procedure: Chest Port Placement;  Surgeon: Tate Burciaga PA-C;  Location: [facility] OR     IR CHEST PORT PLACEMENT > 5 YRS OF AGE  5/2/2019     LAPAROSCOPIC NEPHRECTOMY Right 3/19/2019    Procedure: Right laparoscopic radical nephrectomy;  Surgeon: Wesley Cleveland MD;  Location: [facility] OR      SOCIAL HISTORY:   Social History     Tobacco Use     Smoking status: Never Smoker     Smokeless tobacco: Never Used   Substance Use Topics     Alcohol use: Not on file     Comment: wine - very rare     Drug use: No     FAMILY HISTORY:   Family History   Problem Relation Age of Onset     Diabetes Father      ALLERGIES:   Allergies   Allergen Reactions     Doxycycline GI Disturbance     CURRENT MEDICATIONS:   Current Outpatient Medications:      acetaminophen (TYLENOL) 325 MG tablet, Take 2 tablets (650 mg) by mouth every 4 hours as needed for mild pain, Disp:  , Rfl:      amLODIPine (NORVASC) 10 MG tablet, Take 1 tablet (10 mg) by mouth daily, Disp: 30 tablet, Rfl: 0     atorvastatin (LIPITOR) 20 MG tablet, Take 20 mg by mouth daily, Disp: , Rfl:      calcium citrate-vitamin D (CITRACAL) 315-250 MG-UNIT TABS per tablet, Take 650 mg by mouth 2 times daily , Disp: , Rfl:      cetirizine (ZYRTEC) 10 MG tablet, Take 10 mg by mouth as needed for allergies , Disp: , Rfl:      ciprofloxacin (CIPRO) 500 MG tablet, Take 1 tablet (500 " mg) by mouth 2 times daily for 12 days, Disp: 24 tablet, Rfl: 0     co-enzyme Q-10 100 MG CAPS capsule, Take 100 mg by mouth daily , Disp: , Rfl:      cycloSPORINE (RESTASIS) 0.05 % ophthalmic emulsion, Place 1 drop into both eyes 2 times daily , Disp: , Rfl:      fluticasone (FLONASE) 50 MCG/ACT nasal spray, Spray 2 sprays in nostril daily as needed , Disp: , Rfl:      Glucosamine-Chondroit-Vit C-Mn (GLUCOSAMINE 1500 COMPLEX) CAPS, Take 1 capsule by mouth daily, Disp: , Rfl:      levothyroxine (SYNTHROID/LEVOTHROID) 100 MCG tablet, Take 100 mcg by mouth daily, Disp: , Rfl:      metroNIDAZOLE (FLAGYL) 500 MG tablet, Take 1 tablet (500 mg) by mouth 3 times daily for 12 days, Disp: 36 tablet, Rfl: 0     Multiple Vitamins-Minerals (MULTIVITAMIN ADULT EXTRA C PO), Take 1 tablet by mouth every 24 hours, Disp: , Rfl:      Nutritional Supplements (SALMON OIL) CAPS, Take 2 capsules by mouth daily , Disp: , Rfl:      omeprazole (PRILOSEC) 20 MG DR capsule, Take 20 mg by mouth daily, Disp: , Rfl:      polyethylene glycol (MIRALAX) 17 g packet, Take 17 g by mouth 2 times daily for 14 days, Disp: 28 packet, Rfl: 0    PHYSICAL EXAMINATION:  VITALS:   Vital Signs 7/11/2020   Systolic 124   Diastolic 75   Pulse 72   Temperature 97.4   Respirations 16   O2 98     ECOG PS 0.    Video physical exam  General: Patient appears well in no acute distress. Normal body habitus.  Skin: No visualized rash or lesions on visualized skin  Eyes: EOMI, no erythema, sclera icterus or discharge noted  Resp: Appears to be breathing comfortably without accessory muscle usage, speaking in full sentences, no cough  MSK: Appears to have normal range of motion based on visualized movements  Neurologic: No apparent tremors, facial movements symmetric  Psych: affect normal, alert and oriented    The rest of a comprehensive physical examination is deferred due to PHE (public health emergency) video restrictions      LABORATORY DATA:      7/13/2020 15:00    Sodium 137   Potassium 3.9   Chloride 105   Carbon Dioxide 24   Urea Nitrogen 16   Creatinine 1.22   GFR Estimate 65   GFR Estimate If Black 75   Calcium 9.3   Anion Gap 8   Albumin 4.0   Protein Total 7.7   Bilirubin Total 0.4   Alkaline Phosphatase 52   ALT 25   AST 18   Chromogranin A 174 (H)   Lactate Dehydrogenase 165   PSA 0.42   Testosterone Total 13 (L)   Glucose 92   WBC 5.5   Hemoglobin 14.2   Hematocrit 44.2   Platelet Count 213   RBC Count 4.88   MCV 91   MCH 29.1   MCHC 32.1   RDW 13.1   Diff Method Automated Method   % Neutrophils 53.6   % Lymphocytes 34.4   % Monocytes 7.7   % Eosinophils 3.6   % Basophils 0.5   % Immature Granulocytes 0.2   Nucleated RBCs 0   Absolute Neutrophil 2.9   Absolute Lymphocytes 1.9   Absolute Monocytes 0.4   Absolute Eosinophils 0.2   Absolute Basophils 0.0   Abs Immature Granulocytes 0.0   Absolute Nucleated RBC 0.0      1/6/2020 08:58 4/8/2020 09:30 7/13/2020 15:00   PSA 0.44 0.41 0.42       IMAGING:  CT CAP dated 7/10/20  IMPRESSION:  In this patient with a history of prostate cancer and right renal cell  carcinoma status post right nephrectomy:  1. Acute diverticulitis, likely uncomplicated, of the sigmoid colon  with prominent inflammatory changes surrounding the colon and a tiny  amount of free air. No evidence for abscess although sensitivity for  abscess on this noncontrast CT as well.  2. No evidence of intrathoracic or intra-abdominal metastatic disease.  3. No significant change in the osseous metastatic disease. Recommend  follow-up with same day nuclear medicine bone scan.  4. Moderate to large hiatal hernia.    NM Bone Scan dated 7/10/20  IMPRESSION:   1. Increased right L5 lamina metastatic lesion, was smaller on  4/6/2020 CT, now also visible on bone scan.    2. No new lesions, otherwise stable foci of osseous metastatic  disease.     ASSESSMENT & PLAN: Mr. Reyes is a delightful 59 year old gentleman with recently diagnosed metastatic castration  "sensitive prostate adenocarcinoma as well as an incidentally diagnosed stage III clear-cell renal cell carcinoma of the right kidney (s/p radical R nephrectomy 3/19/19), who is here for a follow-up visit after completion of docetaxel for prostate cancer.     1. Metastatic prostate cancer, with involvement of the bones and RPLN:   - Patient met the criteria for CHAARTED \"high-volume\" metastatic hormone-sensitive prostate cancer. He completed 6 cycles of docetaxel in combination with ADT (per JOSEFA, [id number], STAMPEDE).   -Reviewed imaging today. Restaging CT CAP from 7/10/20 showed no evidence of recurrence or further metastatic disease, though NM Bone scan did demonstrate worsening likely metastatic lesion at L5. PSA has been very stable (0.41-> 0.42). Discussed with Dr. Smalls. No need for systemic therapy at this time with stable PSA. He will discuss with Rad Onc about possible radiation to this area.   -will continue with Lupron and Zometa alone  -will have him follow-up with Dr. Smalls in 3 months, sooner if needed    - Continue Lupron 22.5mg every 3 months, last dose on 4/8 - next dose tomorrow    2. Bone metastases:   -currently on Zometa, started on 4/8/20. Plan for a total of 2 years. Next due tomorrow. Continue every 3 months   - Encouraged to continue calcium and vitamin D supplementation.    3. Stage 3, UISS-high risk ccRCC:  - no clear evidence of residual disease. The retroperitoneal lymphadenopathy is more consistent with metastatic prostate cancer  -Previously had bladder dome CT finding. Dr. Cleveland was not concerned though felt a cytoscopy could be appropriate. Not noted on CT from last week  - Continue active surveillance with self-reporting for signs/symptoms of recurrence and periodic H&P and scans.      4. Genetics referral:  - Was referred and seen by Genetics on 3/7/19. No evidence of inherited cancer syndromes found on work-up.     5.   -having progressively worsening urgency and nocturia. " Will start him on Flomax 0.4 mg daily. Can increase if needed    6. Diverticulitis   -noted on CT from 7/10 and was admitted overnight. Discharged with Flagyl and Cipro, to complete 10 days of abx (done ~ 7/23). Started having diarrhea after staring antibiotics. Has had resolution of abdominal pain. Likely from antibiotics. If diarrhea does not resolve after stopping, should follow-up with PCP and r/o C.diff. Should also call them if pain were to return or stools were to become very frequent   -Continue on low fiber diet    Video-Visit Details    Type of service:  Video Visit    Video Start Time: 10:10 AM  Video End Time: 10:36 AM    Originating Location (pt. Location): Home    Distant Location (provider location):  [facility]     Platform used for Video Visit: Kinza Murray PA-C

## 2020-09-28 NOTE — ED ADULT NURSE NOTE - CHIEF COMPLAINT QUOTE
Anesthesia Post Evaluation    Patient: Herber Camarillo    Procedure(s) Performed: Procedure(s) (LRB):  EXTRACTION, CATARACT, WITH IOL INSERTION (Left)    Final Anesthesia Type: MAC    Patient location during evaluation: [facility]  Patient participation: Yes- Able to Participate  Level of consciousness: awake and alert, awake and oriented  Post-procedure vital signs: reviewed and stable  Pain management: adequate  Airway patency: patent    PONV status at discharge: No PONV  Anesthetic complications: no      Cardiovascular status: blood pressure returned to baseline, hemodynamically stable and stable  Respiratory status: spontaneous ventilation, unassisted and room air  Hydration status: euvolemic  Follow-up not needed.          Vitals Value Taken Time   /74 09/28/20 0957   Temp 36 09/28/20 0957   Pulse 55 09/28/20 0957   Resp 20 09/28/20 0957   SpO2 100 09/28/20 0957         No case tracking events are documented in the log.      Pain/Robin Score: No data recorded       Patient BIBA after HCP called 911 for patient's failure to thrive. Patient also endorsing passive SI. To be placed on 1:1

## 2021-03-01 NOTE — PATIENT PROFILE ADULT. - AS SC BRADEN MOISTURE
[FreeTextEntry1] : reviewed imaging an dlabs from outside\par \par MRI 2015 - non-displaced tear at the base of the anterior labrum, mild tendinosis hamstring, innumerable gluteal siliconomas\par \par MRI lumbar spine 2018 - mild multilevel spondylsois without stenosis or foraminal narrowing\par \par MRI pelvis 2018 - extensive granulomatosis of the bilateral gluteal regions involving the subcutaneous fat and gluteus tess muscles\par \par MRI cervical multiple level soondylosis wihtout stenosis\par \par left rib - normal\par \par labs october 2020 \par - cbc normal \par esr 17\par aic normla \par b12, folate wnl\par vit d 43\par tsh wnl\par us negcrp neg\par rf neg\par covid ab neg\par monique 1: 320 
(4) rarely moist

## 2021-06-02 NOTE — PROGRESS NOTE ADULT - PROBLEM SELECTOR PROBLEM 3
MARYJANE (acute kidney injury) Implemented All Universal Safety Interventions:  [place] to call system. Call bell, personal items and telephone within reach. Instruct patient to call for assistance. Room bathroom lighting operational. Non-slip footwear when patient is off stretcher. Physically safe environment: no spills, clutter or unnecessary equipment. Stretcher in lowest position, wheels locked, appropriate side rails in place.

## 2022-01-19 NOTE — PROCEDURE NOTE - ESTIMATED BLOOD LOSS
Subjective:  Castro Chen is a 55 year old male who presents to the clinic today for a preoperative history and physical.  He is scheduled for surgery with Dr. Rodriguez on 2022 at [facility].  He will be undergoing left total vs reverse total shoulder arthroplasty for diagnosis of left shoulder chronic pain and rotator cuff arthropathy.  No previous complication to anesthesia.  No known history of prolonged or excessive bleeding.  No previous history of venous thrombosis or embolism.      Current Outpatient Medications   Medication Sig Dispense Refill   • amoxicillin (AMOXIL) 500 MG capsule Take 4 Capsules by mouth 1 hour prior to dental procedure 4 capsule 1   • Valacyclovir HCl 1000 MG Tab TAKE 2 TABLETS BY MOUTH AT ONSET OF SORES AND REPEAT IN 12 HOURS 21 tablet 0   • fluticasone (FLONASE) 50 MCG/ACT nasal spray Spray 2 sprays in each nostril daily. 16 g 3   • acetaminophen (Tylenol 8 Hour) 650 MG CR tablet Take 1 tablet by mouth every 6 hours as needed for Pain. 45 tablet 0   • meloxicam (MOBIC) 15 MG tablet TAKE 1 TABLET BY MOUTH EVERY DAY 90 tablet 4     No current facility-administered medications for this visit.        ALLERGIES:   Allergen Reactions   • Atorvastatin MYALGIA                                                                PMH:  YECENIA - On CPAP  Overweight  Hyperlipidemia  Chronic sinusitis  Deviated nasal septum  Left rotator cuff arthropathy  Chronic left shoulder pain  OA of the left hip    PSH:  Appendectomy  Endoscopic sinus surgery/septoplasty  Left total hip arthroplasty    SH:  with three children. 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 He has smoked off and on for years but quit fully 2008.  He is smoking a cigar twice monthly.  He drinks alcohol socially a couple of times weekly. No drug use.     FH: Father  at a young age, presumably from a heart attack at 61. Mother is alive with hyperlipidemia. He has three older brothers without known health problems. His children are 
healthy. No family history of diabetes. No family history of colon cancer or prostate cancer.                                          ROS:  --General:  No fevers or chills.   --Skin:  No rash.  No skin lesions.  --HEENT:  No headache.  No URI symptoms.  --Respiratory:  No dyspnea.  No wheezing.    --Cardiovascular:  No chest pain.  No palpitations.  No edema.  --Gastrointestinal:  No nausea.  No vomiting.  No diarrhea.  No constipation.  No abdominal pain.  --Genitourinary:  No dysuria.  No frequency.  No hematuria.  --Musculoskeletal:  Chronic left hip pain is resolved after joint arthroplasty.  Positive chronic left shoulder pain.     Objective:  Blood pressure 116/80, pulse 86, temperature 97.8 °F (36.6 °C), temperature source Temporal, resp. rate 20, height 5' 6\" (1.676 m), weight 99.4 kg (219 lb 3.2 oz), SpO2 96 %.  --General appearance - Alert & orientated, pleasant, casually-dressed, obese man in no distress.  Mood is euthymic.    --Skin - Normal color.  No rashes.    --Head - NC/AT.  --Eyes - PERRLA.  EOM's intact.   --Ears - External ears normal.  Canals clear.  TM's normal.  --Nose/Sinuses - Nares normal.    --Oropharynx - Oropharynx pink.  MMM.   --Neck - Neck supple.  No adenopathy.    --Heart - RRR with normal S1S2.  No murmurs.    --Lungs - CTA throughout.  No rales.  No rhonchi.  No wheezing.  Patient has good air exchange.  Normal respiratory effort.   --Abdomen - Soft.  Non-tender.  Non-distended.  Normal bowel sounds.  No masses.  No organomegaly.    --Extremities - No edema.      Lab Services on 01/10/2022   Component Date Value Ref Range Status   • PROTEIN, TOTAL SERUM 01/10/2022 7.5  6.4 - 8.5 g/dL Final   • NA (SODIUM, SERUM) 01/10/2022 139  136 - 146 mmol/L Final   • K (POTASSIUM, SERUM) 01/10/2022 4.5  3.5 - 5.3 mmol/L Final   • GLUCOSE, RANDOM 01/10/2022 88  70 - 200 mg/dL Final   • CREATININE, SERUM 01/10/2022 0.7  0.6 - 1.6 mg/dL Final   • CO2 VENOUS 01/10/2022 27  22 - 32 mmol/L Final 
  • CHLORIDE, SERUM 01/10/2022 103  96 - 107 mmol/L Final   • CALCIUM, SERUM 01/10/2022 10.1  8.6 - 10.6 mg/dL Final   • BLOOD UREA NITROGEN 01/10/2022 19  6 - 27 mg/dL Final   • ASPARTATE AMINOTRNSFRASE(SGOT) 01/10/2022 24  14 - 43 U/L Final   • BILIRUBIN, TOTAL 01/10/2022 0.8  0.0 - 1.3 mg/dL Final   • ALANINE AMINOTRANSFERASE(SGPT) 01/10/2022 25  5 - 49 U/L Final   • ALKALINE PHOSPHATASE(2024123) 01/10/2022 86  45 - 115 U/L Final   • ALBUMIN, SERUM 01/10/2022 4.9  3.6 - 5.1 g/dL Final   • EGFR*  01/10/2022 >60  >60 mL/min/[1.73m2] Final   • EGFR* NON- 01/10/2022 >60  >60 mL/min/[1.73m2] Final   • WHITE BLOOD CELL COUNT 01/10/2022 6.3  4.0 - 10.0 10*3/uL Final   • RED BLOOD CELL COUNT 01/10/2022 4.89  3.90 - 5.70 10*6/uL Final   • HEMOGLOBIN 01/10/2022 14.2  13.7 - 17.5 g/dL Final   • HEMATOCRIT 01/10/2022 44.4  40.0 - 51.0 % Final   • MEAN CORPUSCULAR VOLUME 01/10/2022 90.8  79.0 - 95.0 fL Final   • MEAN CORPUSCULAR HGB 01/10/2022 29.0  27.0 - 34.0 pg Final   • MEAN CORPUSCULAR HGB CONCENTRATION 01/10/2022 32.0  32.0 - 36.0 % Final   • PLATELET COUNT 01/10/2022 264  150 - 400 10*3/uL Final   • MEAN PLATELET VOLUME 01/10/2022 11.7  8.6 - 12.4 fL Final   • RED CELL DISTRIBUTION WIDTH 01/10/2022 13.9  11.3 - 14.8 % Final   • NEUTROPHIL PERCENT 01/10/2022 55.2  34.0 - 73.5 % Final   • NEUTROPHIL ABSOLUTE # 01/10/2022 3.5  1.4 - 6.5 10*3/uL Final   • IMMATURE GRANULOCYTE PERCENT 01/10/2022 0.3  0.0 - 0.5 % Final   • IMMATURE GRANULOCYTE ABSOLUTE 01/10/2022 0.02  0.00 - 0.05 10*3/uL Final   • LYMPH PERCENT 01/10/2022 31.1  20.5 - 51.1 % Final   • LYMPHOCYTE ABSOLUTE # 01/10/2022 2.0  1.2 - 3.4 10*3/uL Final   • MONOCYTE PERCENT 01/10/2022 7.3  4.3 - 12.9 % Final   • MONOCYTE ABSOLUTE # 01/10/2022 0.5  0.2 - 0.9 10*3/uL Final   • EOSINOPHIL % 01/10/2022 5.2  0.0 - 10.0 % Final   • EOSINOPHIL ABSOLUTE # 01/10/2022 0.3  0.0 - 0.5 10*3/uL Final   • BASOPHIL % 01/10/2022 0.9  0.0 - 1.2 % 
Final   • BASOPHIL ABSOLUTE # 01/10/2022 0.1  0.0 - 0.1 10*3/uL Final   • DIFFERENTIAL TYPE 01/10/2022 AUTO DIFF   Final        A/P:  Castro Chen is a 55 year old male here for preoperative assessment for the above surgery.  1.  CV - Stable without symptoms or signs of active cardiac ischemia, failure, or arrhythmia.  EKG today shows sinus rhythm with incomplete right bundle branch block and old inferior infarct which is unchanged compared to prior EKGs.  2.  Pulmonary - Stable without active lung disease or symptoms thereof.   3.  Renal - BUN, creatinine, and GFR are normal.    4.  Hematologic - No anemia, thrombocytopenia, or known coagulopathy.   5.  Disposition - Medically stable to proceed with surgery as scheduled.  The patient is considered to have low perioperative risk.      Call if questions or problems.    Electronically Signed by Feroz Rodriguez M.D. 1/19/2022      
minimal

## 2022-03-06 NOTE — ED PROVIDER NOTE - CROS ED NEURO ALL NEG
Adolescent depression, self inflicted cutting behavior and thoughts of hurting self and labile emotions.      Labs, urine, EKG, and psych consult for possible SI.
- - -

## 2022-06-30 NOTE — OCCUPATIONAL THERAPY INITIAL EVALUATION ADULT - LIVES WITH, PROFILE
ANTICOAGULATION  MANAGEMENT    Alexei Blake is being discharged from the [facility] ([facility]).    Reason for discharge:     Anticoagulation episode resolved, ACC referral closed and Standing order discontinued        Bonnie Peres RN      
in apartment +4 steps/alone

## 2022-10-27 NOTE — CONSULT NOTE ADULT - ATTENDING COMMENTS
----- Message from Jackie Vicki sent at 10/27/2022  9:06 AM EDT -----  Subject: Message to Provider    QUESTIONS  Information for Provider? Mom called in and states that pt was seen at the   Urgent care on 10/24 for cough, diarrhea, congestion and fever. Pt was   tested for RSV and results were negative. Pt is still experiencing   symptoms. Please call Mom back about her concerns  ---------------------------------------------------------------------------  --------------  Genevieve Palmer INFO  [phone]; OK to leave message on voicemail  ---------------------------------------------------------------------------  --------------  SCRIPT ANSWERS  Relationship to Patient? Parent  Representative Name? Victor Manuel Clark  Patient is under 25 and the Parent has custody? Yes  Additional information verified (besides Name and Date of Birth)?  Phone   Number Patient seen and examined with Dr. Saeed of [address] [place] [address] and then discussed with Dr. Solitario.  Patient more lethargic today.  She stated that she felt horrible.   She's been vomiting.  Had blood congealing at entry of midline.  sbp elevated into 200's.  Stroke code called for further evaluation of brain.     O: General - lying in bed, NAD  CV: RRR  Extremities: 2+ radial pulses bilaterally     Neurological exam -   Mental status - Awake, alert, oriented to self, didn't want to answer more questions, speaking full sentences, no dysarthria, follows 2-step commands  Cranial nerves - EOMI, PERRL+, +blink to threat bilaterally, no nystagmus, facial sensation intact, left facial droop, palatal elevation intact, tongue midline  Motor - strength 5/5 on right, 0/5 in left, normal bulk and tone  Sensory - Decreased response to light touch on left, no neglect on DSS  Cerebellar - finger-nose intact on left  Gait - deferred    Discussed results of CT Head with Dr. King - new infarcts in right corona radiata compared to previous CT and MRI.    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  s/p high dose steroids and Cytoxan.  Concern since acute changes in neuro exam with vomiting in setting of hypertension.         Plan:  1) Upgrade to MICU for better blood pressure control - consider cardene gtt, sbp 150-180  2) Increase Solumedrol to 250mg BID with reassessment tomorrow after discussion with Rheumatology.   3) Treat nausea, as per MICU  4) Secondary stroke prevention -   a) antiplatelet - Aspirin 325m   - May consider heparin gtt in the blood pressure is better controlled and she stops vomiting.  b) statin - Atorvastatin 80mg  5) DVT prophylaxis - Heparin SQ and SCDs    She will ultimately need rehab.

## 2023-02-26 NOTE — PROGRESS NOTE BEHAVIORAL HEALTH - LANGUAGE
No abnormalities noted
no
No abnormalities noted

## 2023-05-11 NOTE — ED BEHAVIORAL HEALTH ASSESSMENT NOTE - SUMMARY
68 F with PPH of MDD, recurrent, brought in by close friend due to worsening depression with SI. Patient's landlord apparently contacted friend Leyla Lizama to report that there was concern about patient's living conditions, and she was found to be living in squalor with urine and feces on the floor. On interview patient endorses symptoms congruent with episode of MDD, moderate to severe, and objectively patient appears significantly depressed, with psychomotor slowing, slowed thoughts and increased speech latency. Patient endorses SI but there is no evidence she is at an elevated or acute risk of harming herself on an inpatient unit. Patient is appropriate for admission and agrees to voluntary. Tissue Cultured Epidermal Autograft Text: Because of the size and depth of the defect and to facilitate healing by granulation, a tissue-cultured epidermal autograft was planned.  After prep and local anesthesia, Xenograft material was trimmed to fi the defect and secured circumferentially.

## 2023-06-09 NOTE — STROKE CODE NOTE - NIH STROKE SCALE: 6A. MOTOR LEG, LEFT, QM
Refilled
(3) No effort against gravity
(2) Some effort against gravity
(3) No effort against gravity

## 2023-06-24 NOTE — PROGRESS NOTE ADULT - ASSESSMENT
68F PMH MDD, found down in her apartment. Stroke code called on 8/11 for L facial droop and weakness. CT then showed multiple lacunar infarcts, age indeterminate, in basal ganglia and thalamus. CTA showed chronic L VA occlusion, R VA and basilar hypoplasia, carotid stenosis bilaterally. No tPA given. Symptoms improved, then 8/12 another stroke code was called for L facial droop and left UE/LE weakness, CT unchanged. MRI showed acute right PATRICIA infarct with scattered smaller areas of infarct. Patient hemodynamically stable and transferred to  now being worked up for vasculitis. lower abdomen/incision site

## 2023-09-22 NOTE — PHYSICAL THERAPY INITIAL EVALUATION ADULT - CRITERIA FOR SKILLED THERAPEUTIC INTERVENTIONS
We will plan for colonoscopy. You will need to be on a clear liquid diet the day before your procedure and have nothing to eat/drink after midnight except your bowel prep. Please take your bowel prep the evening before your procedure as instructed. You should get a phone call the week leading up to your procedure to go over these instructions. You will need a ride to and from the procedure unit on the day of your procedure. Please do not drive for the rest of the day after your procedure. functional limitations in following categories/predicted duration of therapy intervention/therapy frequency/anticipated discharge recommendation/risk reduction/prevention/rehab potential/impairments found

## 2024-02-20 NOTE — PROCEDURE NOTE - NSPROCDETAILS_GEN_ALL_CORE
positive blood return obtained via catheter/connected to a pressurized flush line/all materials/supplies accounted for at end of procedure/hemostasis with direct pressure, dressing applied/Seldinger technique/location identified, draped/prepped, sterile technique used, needle inserted/introduced/sutured in place
sutured in place/positive blood return obtained via catheter/Seldinger technique/all materials/supplies accounted for at end of procedure/location identified, draped/prepped, sterile technique used, needle inserted/introduced/connected to a pressurized flush line/hemostasis with direct pressure, dressing applied
[Conjuctival Injection] : conjunctival injection
[Congestion] : congestion
[NL] : warm, clear
guidewire recovered/sterile technique, catheter placed/ultrasound guidance/lumen(s) aspirated and flushed/sterile dressing applied
Admission

## 2024-04-02 NOTE — PROGRESS NOTE BEHAVIORAL HEALTH - FUND OF KNOWLEDGE
[Time Spent: ___ minutes] : I have spent [unfilled] minutes of time on the encounter.
Impaired
Impaired
Normal
Normal
Impaired
Normal
Normal

## 2025-01-29 NOTE — PROGRESS NOTE BEHAVIORAL HEALTH - NS ED BHA MSE GENERAL APPEARANCE
Patient is here alone today.        If any information or results need to be relayed from today's visit, the best way to contact the patient is via [phone] (mobile) - Patient gives verbal permission to leave a detailed voicemail at the number provided.    
Well developed